# Patient Record
Sex: FEMALE | Race: WHITE | NOT HISPANIC OR LATINO | Employment: OTHER | URBAN - METROPOLITAN AREA
[De-identification: names, ages, dates, MRNs, and addresses within clinical notes are randomized per-mention and may not be internally consistent; named-entity substitution may affect disease eponyms.]

---

## 2017-01-10 ENCOUNTER — HOSPITAL ENCOUNTER (OUTPATIENT)
Dept: NON INVASIVE DIAGNOSTICS | Facility: CLINIC | Age: 82
Discharge: HOME | End: 2017-01-10
Payer: COMMERCIAL

## 2017-01-10 DIAGNOSIS — I71.4 ABDOMINAL AORTIC ANEURYSM WITHOUT RUPTURE (HCC): ICD-10-CM

## 2017-01-10 DIAGNOSIS — Z98.890 OTHER SPECIFIED POSTPROCEDURAL STATES: ICD-10-CM

## 2017-01-10 DIAGNOSIS — Z95.828 PRESENCE OF OTHER VASCULAR IMPLANTS AND GRAFTS: ICD-10-CM

## 2017-01-10 PROCEDURE — 93978 VASCULAR STUDY: CPT

## 2017-01-18 ENCOUNTER — GENERIC CONVERSION - ENCOUNTER (OUTPATIENT)
Dept: OTHER | Facility: OTHER | Age: 82
End: 2017-01-18

## 2017-01-18 ENCOUNTER — LAB CONVERSION - ENCOUNTER (OUTPATIENT)
Dept: OTHER | Facility: OTHER | Age: 82
End: 2017-01-18

## 2017-01-18 LAB
CREATININE, RANDOM URINE (HISTORICAL): 79 MG/DL (ref 20–320)
MAGNESIUM, UR (HISTORICAL): 3.1 MG/DL
MICROALBUMIN/CREATININE RATIO (HISTORICAL): 39 MCG/MG CREAT
TSH SERPL DL<=0.05 MIU/L-ACNC: 9.8 MIU/L (ref 0.4–4.5)

## 2017-01-23 ENCOUNTER — GENERIC CONVERSION - ENCOUNTER (OUTPATIENT)
Dept: OTHER | Facility: OTHER | Age: 82
End: 2017-01-23

## 2017-02-02 ENCOUNTER — TRANSCRIBE ORDERS (OUTPATIENT)
Dept: ADMINISTRATIVE | Facility: HOSPITAL | Age: 82
End: 2017-02-02

## 2017-02-02 DIAGNOSIS — Z00.00 ROUTINE CHECK-UP: Primary | ICD-10-CM

## 2017-02-02 LAB
A/G RATIO (HISTORICAL): 1.6 (CALC) (ref 1–2.5)
ALBUMIN SERPL BCP-MCNC: 4.6 G/DL (ref 3.6–5.1)
ALP SERPL-CCNC: 69 U/L (ref 33–130)
ALT SERPL W P-5'-P-CCNC: 17 U/L (ref 6–29)
AST SERPL W P-5'-P-CCNC: 19 U/L (ref 10–35)
BILIRUB SERPL-MCNC: 0.8 MG/DL (ref 0.2–1.2)
BUN SERPL-MCNC: 26 MG/DL (ref 7–25)
BUN/CREA RATIO (HISTORICAL): 20 (CALC) (ref 6–22)
CALCIUM SERPL-MCNC: 9.8 MG/DL (ref 8.6–10.4)
CHLORIDE SERPL-SCNC: 95 MMOL/L (ref 98–110)
CO2 SERPL-SCNC: 26 MMOL/L (ref 20–31)
CREAT SERPL-MCNC: 1.32 MG/DL (ref 0.6–0.88)
EGFR AFRICAN AMERICAN (HISTORICAL): 41 ML/MIN/1.73M2
EGFR-AMERICAN CALC (HISTORICAL): 35 ML/MIN/1.73M2
GAMMA GLOBULIN (HISTORICAL): 2.9 G/DL (CALC) (ref 1.9–3.7)
GLUCOSE (HISTORICAL): 96 MG/DL (ref 65–99)
POTASSIUM SERPL-SCNC: 4.7 MMOL/L (ref 3.5–5.3)
SODIUM SERPL-SCNC: 131 MMOL/L (ref 135–146)
TOTAL PROTEIN (HISTORICAL): 7.5 G/DL (ref 6.1–8.1)

## 2017-02-07 ENCOUNTER — ALLSCRIPTS OFFICE VISIT (OUTPATIENT)
Dept: OTHER | Facility: OTHER | Age: 82
End: 2017-02-07

## 2017-03-17 ENCOUNTER — LAB CONVERSION - ENCOUNTER (OUTPATIENT)
Dept: OTHER | Facility: OTHER | Age: 82
End: 2017-03-17

## 2017-03-17 LAB
A/G RATIO (HISTORICAL): 1.7 (CALC) (ref 1–2.5)
ALBUMIN SERPL BCP-MCNC: 4.5 G/DL (ref 3.6–5.1)
ALP SERPL-CCNC: 70 U/L (ref 33–130)
ALT SERPL W P-5'-P-CCNC: 18 U/L (ref 6–29)
AST SERPL W P-5'-P-CCNC: 15 U/L (ref 10–35)
BILIRUB SERPL-MCNC: 0.8 MG/DL (ref 0.2–1.2)
BUN SERPL-MCNC: 24 MG/DL (ref 7–25)
BUN/CREA RATIO (HISTORICAL): 20 (CALC) (ref 6–22)
CALCIUM SERPL-MCNC: 9.8 MG/DL (ref 8.6–10.4)
CHLORIDE SERPL-SCNC: 101 MMOL/L (ref 98–110)
CO2 SERPL-SCNC: 30 MMOL/L (ref 20–31)
CREAT SERPL-MCNC: 1.21 MG/DL (ref 0.6–0.88)
EGFR AFRICAN AMERICAN (HISTORICAL): 45 ML/MIN/1.73M2
EGFR-AMERICAN CALC (HISTORICAL): 39 ML/MIN/1.73M2
GAMMA GLOBULIN (HISTORICAL): 2.6 G/DL (CALC) (ref 1.9–3.7)
GLUCOSE (HISTORICAL): 132 MG/DL (ref 65–99)
HBA1C MFR BLD HPLC: 6 % OF TOTAL HGB
POTASSIUM SERPL-SCNC: 4.8 MMOL/L (ref 3.5–5.3)
SODIUM SERPL-SCNC: 135 MMOL/L (ref 135–146)
T4 FREE SERPL-MCNC: 1.3 NG/DL (ref 0.8–1.8)
TOTAL PROTEIN (HISTORICAL): 7.1 G/DL (ref 6.1–8.1)
TSH SERPL DL<=0.05 MIU/L-ACNC: 7.64 MIU/L (ref 0.4–4.5)

## 2017-03-20 ENCOUNTER — GENERIC CONVERSION - ENCOUNTER (OUTPATIENT)
Dept: OTHER | Facility: OTHER | Age: 82
End: 2017-03-20

## 2017-03-20 DIAGNOSIS — E87.1 HYPO-OSMOLALITY AND HYPONATREMIA: ICD-10-CM

## 2017-03-20 DIAGNOSIS — N18.30 CHRONIC KIDNEY DISEASE, STAGE III (MODERATE) (HCC): ICD-10-CM

## 2017-03-20 DIAGNOSIS — E03.9 HYPOTHYROIDISM: ICD-10-CM

## 2017-03-20 DIAGNOSIS — E11.9 TYPE 2 DIABETES MELLITUS WITHOUT COMPLICATIONS (HCC): ICD-10-CM

## 2017-04-06 ENCOUNTER — TRANSCRIBE ORDERS (OUTPATIENT)
Dept: ADMINISTRATIVE | Facility: HOSPITAL | Age: 82
End: 2017-04-06

## 2017-04-06 DIAGNOSIS — I71.4 ABDOMINAL AORTIC ANEURYSM WITHOUT RUPTURE (HCC): Primary | ICD-10-CM

## 2017-04-25 ENCOUNTER — HOSPITAL ENCOUNTER (OUTPATIENT)
Dept: RADIOLOGY | Facility: HOSPITAL | Age: 82
Discharge: HOME | End: 2017-04-25
Attending: SURGERY
Payer: COMMERCIAL

## 2017-04-25 DIAGNOSIS — I71.4 ABDOMINAL AORTIC ANEURYSM WITHOUT RUPTURE (HCC): ICD-10-CM

## 2017-04-25 PROCEDURE — 74176 CT ABD & PELVIS W/O CONTRAST: CPT

## 2017-06-01 DIAGNOSIS — I10 ESSENTIAL (PRIMARY) HYPERTENSION: ICD-10-CM

## 2017-06-01 DIAGNOSIS — E11.9 TYPE 2 DIABETES MELLITUS WITHOUT COMPLICATIONS (HCC): ICD-10-CM

## 2017-06-01 DIAGNOSIS — N18.30 CHRONIC KIDNEY DISEASE, STAGE III (MODERATE) (HCC): ICD-10-CM

## 2017-06-01 DIAGNOSIS — E03.9 HYPOTHYROIDISM: ICD-10-CM

## 2017-06-02 ENCOUNTER — LAB CONVERSION - ENCOUNTER (OUTPATIENT)
Dept: OTHER | Facility: OTHER | Age: 82
End: 2017-06-02

## 2017-06-02 LAB
A/G RATIO (HISTORICAL): 1.6 (CALC) (ref 1–2.5)
ALBUMIN SERPL BCP-MCNC: 4.4 G/DL (ref 3.6–5.1)
ALP SERPL-CCNC: 59 U/L (ref 33–130)
ALT SERPL W P-5'-P-CCNC: 17 U/L (ref 6–29)
AST SERPL W P-5'-P-CCNC: 16 U/L (ref 10–35)
BILIRUB SERPL-MCNC: 0.9 MG/DL (ref 0.2–1.2)
BUN SERPL-MCNC: 27 MG/DL (ref 7–25)
BUN/CREA RATIO (HISTORICAL): 23 (CALC) (ref 6–22)
CALCIUM SERPL-MCNC: 9.6 MG/DL (ref 8.6–10.4)
CHLORIDE SERPL-SCNC: 98 MMOL/L (ref 98–110)
CO2 SERPL-SCNC: 23 MMOL/L (ref 20–31)
CREAT SERPL-MCNC: 1.18 MG/DL (ref 0.6–0.88)
EGFR AFRICAN AMERICAN (HISTORICAL): 47 ML/MIN/1.73M2
EGFR-AMERICAN CALC (HISTORICAL): 40 ML/MIN/1.73M2
GAMMA GLOBULIN (HISTORICAL): 2.8 G/DL (CALC) (ref 1.9–3.7)
GLUCOSE (HISTORICAL): 113 MG/DL (ref 65–99)
POTASSIUM SERPL-SCNC: 4.4 MMOL/L (ref 3.5–5.3)
SODIUM SERPL-SCNC: 132 MMOL/L (ref 135–146)
T4 FREE SERPL-MCNC: 1.4 NG/DL (ref 0.8–1.8)
TOTAL PROTEIN (HISTORICAL): 7.2 G/DL (ref 6.1–8.1)
TSH SERPL DL<=0.05 MIU/L-ACNC: 9.3 MIU/L (ref 0.4–4.5)

## 2017-06-08 ENCOUNTER — ALLSCRIPTS OFFICE VISIT (OUTPATIENT)
Dept: OTHER | Facility: OTHER | Age: 82
End: 2017-06-08

## 2017-07-18 ENCOUNTER — HOSPITAL ENCOUNTER (OUTPATIENT)
Dept: RADIOLOGY | Facility: HOSPITAL | Age: 82
Discharge: HOME | End: 2017-07-18
Attending: ORTHOPAEDIC SURGERY
Payer: COMMERCIAL

## 2017-07-18 ENCOUNTER — ALLSCRIPTS OFFICE VISIT (OUTPATIENT)
Dept: OTHER | Facility: OTHER | Age: 82
End: 2017-07-18

## 2017-07-18 DIAGNOSIS — M25.562 PAIN IN LEFT KNEE: ICD-10-CM

## 2017-07-18 PROCEDURE — 73562 X-RAY EXAM OF KNEE 3: CPT

## 2017-08-01 ENCOUNTER — APPOINTMENT (OUTPATIENT)
Dept: PHYSICAL THERAPY | Age: 82
End: 2017-08-01
Payer: COMMERCIAL

## 2017-08-01 DIAGNOSIS — M25.562 PAIN IN LEFT KNEE: ICD-10-CM

## 2017-08-01 DIAGNOSIS — E03.9 HYPOTHYROIDISM: ICD-10-CM

## 2017-08-01 PROCEDURE — G8991 OTHER PT/OT GOAL STATUS: HCPCS

## 2017-08-01 PROCEDURE — 97162 PT EVAL MOD COMPLEX 30 MIN: CPT

## 2017-08-01 PROCEDURE — G8990 OTHER PT/OT CURRENT STATUS: HCPCS

## 2017-08-02 ENCOUNTER — GENERIC CONVERSION - ENCOUNTER (OUTPATIENT)
Dept: OTHER | Facility: OTHER | Age: 82
End: 2017-08-02

## 2017-08-02 ENCOUNTER — LAB CONVERSION - ENCOUNTER (OUTPATIENT)
Dept: OTHER | Facility: OTHER | Age: 82
End: 2017-08-02

## 2017-08-02 LAB
T4 FREE SERPL-MCNC: 1.7 NG/DL (ref 0.8–1.8)
TSH SERPL DL<=0.05 MIU/L-ACNC: 1.38 MIU/L (ref 0.4–4.5)

## 2017-08-03 ENCOUNTER — APPOINTMENT (OUTPATIENT)
Dept: PHYSICAL THERAPY | Age: 82
End: 2017-08-03
Payer: COMMERCIAL

## 2017-08-03 PROCEDURE — 97110 THERAPEUTIC EXERCISES: CPT

## 2017-08-08 ENCOUNTER — APPOINTMENT (OUTPATIENT)
Dept: PHYSICAL THERAPY | Age: 82
End: 2017-08-08
Payer: COMMERCIAL

## 2017-08-08 PROCEDURE — 97110 THERAPEUTIC EXERCISES: CPT

## 2017-08-10 ENCOUNTER — APPOINTMENT (OUTPATIENT)
Dept: PHYSICAL THERAPY | Age: 82
End: 2017-08-10
Payer: COMMERCIAL

## 2017-08-10 PROCEDURE — 97110 THERAPEUTIC EXERCISES: CPT

## 2017-08-15 ENCOUNTER — APPOINTMENT (OUTPATIENT)
Dept: PHYSICAL THERAPY | Age: 82
End: 2017-08-15
Payer: COMMERCIAL

## 2017-08-15 PROCEDURE — 97110 THERAPEUTIC EXERCISES: CPT

## 2017-08-15 PROCEDURE — 97010 HOT OR COLD PACKS THERAPY: CPT

## 2017-08-17 ENCOUNTER — APPOINTMENT (OUTPATIENT)
Dept: PHYSICAL THERAPY | Age: 82
End: 2017-08-17
Payer: COMMERCIAL

## 2017-08-17 PROCEDURE — 97010 HOT OR COLD PACKS THERAPY: CPT

## 2017-08-17 PROCEDURE — 97110 THERAPEUTIC EXERCISES: CPT

## 2017-08-22 ENCOUNTER — APPOINTMENT (OUTPATIENT)
Dept: PHYSICAL THERAPY | Age: 82
End: 2017-08-22
Payer: COMMERCIAL

## 2017-08-22 PROCEDURE — 97110 THERAPEUTIC EXERCISES: CPT

## 2017-08-24 ENCOUNTER — APPOINTMENT (OUTPATIENT)
Dept: PHYSICAL THERAPY | Age: 82
End: 2017-08-24
Payer: COMMERCIAL

## 2017-08-24 PROCEDURE — G8990 OTHER PT/OT CURRENT STATUS: HCPCS

## 2017-08-24 PROCEDURE — 97110 THERAPEUTIC EXERCISES: CPT

## 2017-08-24 PROCEDURE — G8991 OTHER PT/OT GOAL STATUS: HCPCS

## 2017-08-29 ENCOUNTER — APPOINTMENT (OUTPATIENT)
Dept: PHYSICAL THERAPY | Age: 82
End: 2017-08-29
Payer: COMMERCIAL

## 2017-08-29 PROCEDURE — 97110 THERAPEUTIC EXERCISES: CPT

## 2017-08-31 ENCOUNTER — APPOINTMENT (OUTPATIENT)
Dept: PHYSICAL THERAPY | Age: 82
End: 2017-08-31
Payer: COMMERCIAL

## 2017-08-31 PROCEDURE — 97110 THERAPEUTIC EXERCISES: CPT

## 2017-09-05 ENCOUNTER — APPOINTMENT (OUTPATIENT)
Dept: PHYSICAL THERAPY | Age: 82
End: 2017-09-05
Payer: COMMERCIAL

## 2017-09-07 ENCOUNTER — APPOINTMENT (OUTPATIENT)
Dept: PHYSICAL THERAPY | Age: 82
End: 2017-09-07
Payer: COMMERCIAL

## 2017-09-07 ENCOUNTER — GENERIC CONVERSION - ENCOUNTER (OUTPATIENT)
Dept: OTHER | Facility: OTHER | Age: 82
End: 2017-09-07

## 2017-09-07 PROCEDURE — G8992 OTHER PT/OT  D/C STATUS: HCPCS

## 2017-09-07 PROCEDURE — 97110 THERAPEUTIC EXERCISES: CPT

## 2017-09-07 PROCEDURE — G8991 OTHER PT/OT GOAL STATUS: HCPCS

## 2017-10-02 DIAGNOSIS — E03.9 HYPOTHYROIDISM: ICD-10-CM

## 2017-10-02 DIAGNOSIS — N18.30 CHRONIC KIDNEY DISEASE, STAGE III (MODERATE) (HCC): ICD-10-CM

## 2017-10-02 DIAGNOSIS — E11.42 TYPE 2 DIABETES MELLITUS WITH DIABETIC POLYNEUROPATHY (HCC): ICD-10-CM

## 2017-10-02 DIAGNOSIS — E11.9 TYPE 2 DIABETES MELLITUS WITHOUT COMPLICATIONS (HCC): ICD-10-CM

## 2017-10-02 DIAGNOSIS — I10 ESSENTIAL (PRIMARY) HYPERTENSION: ICD-10-CM

## 2017-10-02 DIAGNOSIS — E78.5 HYPERLIPIDEMIA: ICD-10-CM

## 2017-10-04 ENCOUNTER — LAB CONVERSION - ENCOUNTER (OUTPATIENT)
Dept: OTHER | Facility: OTHER | Age: 82
End: 2017-10-04

## 2017-10-04 LAB
A/G RATIO (HISTORICAL): 1.7 (CALC) (ref 1–2.5)
ALBUMIN SERPL BCP-MCNC: 4.5 G/DL (ref 3.6–5.1)
ALP SERPL-CCNC: 69 U/L (ref 33–130)
ALT SERPL W P-5'-P-CCNC: 18 U/L (ref 6–29)
AST SERPL W P-5'-P-CCNC: 17 U/L (ref 10–35)
BILIRUB SERPL-MCNC: 1 MG/DL (ref 0.2–1.2)
BUN SERPL-MCNC: 23 MG/DL (ref 7–25)
BUN/CREA RATIO (HISTORICAL): 20 (CALC) (ref 6–22)
CALCIUM SERPL-MCNC: 9.7 MG/DL (ref 8.6–10.4)
CHLORIDE SERPL-SCNC: 99 MMOL/L (ref 98–110)
CHOLEST SERPL-MCNC: 167 MG/DL
CHOLEST/HDLC SERPL: 1.7 (CALC)
CO2 SERPL-SCNC: 24 MMOL/L (ref 20–31)
CREAT SERPL-MCNC: 1.16 MG/DL (ref 0.6–0.88)
CREATININE, RANDOM URINE (HISTORICAL): 74 MG/DL (ref 20–320)
EGFR AFRICAN AMERICAN (HISTORICAL): 48 ML/MIN/1.73M2
EGFR-AMERICAN CALC (HISTORICAL): 41 ML/MIN/1.73M2
GAMMA GLOBULIN (HISTORICAL): 2.6 G/DL (CALC) (ref 1.9–3.7)
GLUCOSE (HISTORICAL): 125 MG/DL (ref 65–99)
HBA1C MFR BLD HPLC: 5.8 % OF TOTAL HGB
HDLC SERPL-MCNC: 101 MG/DL
LDL CHOLESTEROL (HISTORICAL): 50 MG/DL (CALC)
MAGNESIUM, UR (HISTORICAL): 3.3 MG/DL
MICROALBUMIN/CREATININE RATIO (HISTORICAL): 45 MCG/MG CREAT
NON-HDL-CHOL (CHOL-HDL) (HISTORICAL): 66 MG/DL (CALC)
POTASSIUM SERPL-SCNC: 4.7 MMOL/L (ref 3.5–5.3)
SODIUM SERPL-SCNC: 133 MMOL/L (ref 135–146)
TOTAL PROTEIN (HISTORICAL): 7.1 G/DL (ref 6.1–8.1)
TRIGL SERPL-MCNC: 75 MG/DL

## 2017-10-10 ENCOUNTER — ALLSCRIPTS OFFICE VISIT (OUTPATIENT)
Dept: OTHER | Facility: OTHER | Age: 82
End: 2017-10-10

## 2017-10-11 NOTE — PROGRESS NOTES
Assessment  1  Hypertension (401 9) (I10)   2  Type 2 diabetes mellitus (250 00) (E11 9)   3  Chronic kidney disease, stage 3 (585 3) (N18 3)   4  Hypothyroidism (244 9) (E03 9)   5  Hyperlipidemia (272 4) (E78 5)   6  Hyponatremia (276 1) (E87 1)   7  Ambulatory dysfunction (719 7) (R26 2)   8  Chronic pain of left knee (488 52,790 23) (M25 562,G89 29)   9  Seborrheic dermatitis of scalp (690 18) (L21 9)    Plan  Ambulatory dysfunction, Chronic kidney disease, stage 3, Hyperlipidemia, Hypertension,  Hypothyroidism, Type 2 diabetes mellitus    · Follow-up visit in 5 months Evaluation and Treatment  Follow-up  Status: Hold For -  Scheduling  Requested for: 10Oct2017  Chronic kidney disease, stage 3, Hyperlipidemia, Hypertension, Hyponatremia,  Hypothyroidism, Type 2 diabetes mellitus    · (Q) COMPREHENSIVE METABOLIC PNL W/ADJUSTED CALCIUM; Status:Active; Requested for:89Ijy6941;   Hypertension    · Losartan Potassium 100 MG Oral Tablet; take 1 tablet once daily   · Amlodipine-Atorvastatin 10-20 MG Oral Tablet; Take 1 tablet daily  Hypothyroidism    · Levothyroxine Sodium 137 MCG Oral Tablet; take 1 tablet daily before  breakfast   · (Q) TSH, 3RD GENERATION; Status:Active; Requested for:31Ouf3635;   Seborrheic dermatitis of scalp    · Ketoconazole 2 % External Shampoo; APPLY TO SCALP  2 TIMES PER WEEK   FOR 5 MINUTES AND RINSE    Discussion/Summary    1  HTN - stable  Continue current BP medications  Monitor BP at home  2 DM  diet controlled  Monitor blood sugar 2-3 times per week  a low carb  diet  stage 3  creatinine level is stable  Will continue to monitor labs  Recommended to stay well hydrated, avoid NSAIDs  - controlled  Continue statin  - take Sodium Chloride 1 g one tablet daily  continue Levothyroxine 137 mcg daily  dysfunction - discussed fall precautions with patient  Continue to use a walker for ambulation to prevent falls    knee pain secondary to OA - followup with orthopedic surgeon   Adria on 10/17/17  Seborrheic scalp dermatitis -use Ketoconazole 2% shampoo 1-2 times per week  dermatology evaluation  followup visit in 5 months  Check labs prior to next visit  The patient, patient's family was counseled regarding diagnostic results,-instructions for management,-risk factor reductions,-risks and benefits of treatment options,-importance of compliance with treatment  Possible side effects of new medications were reviewed with the patient/guardian today  The treatment plan was reviewed with the patient/guardian  The patient/guardian understands and agrees with the treatment plan      Chief Complaint  Patient here for 4 month follow up for Chronic kidney disease, stage 3, Diabetic peripheral neuropathy, Hyperlipidemia, Hypertension, Hypothyroidism, and Type 2 diabetes mellitus  Patient is here today for follow up of chronic conditions described in HPI  History of Present Illness  Patient is 44-year-old female with HTN, Type 2 DM- diet controlled, Hyperlipidemia, Hypothyroidism, peripheral neuropathy, CKD stage 3, Hyponatremia, PVD, DJD, ambulatory dysfunction  presents for 4 month followup visit accompanied by her daughter  current medications, blood work results from 10/3/17  blood sugar 125, Hemoglobin A1c 5 8, potassium 4 7, sodium 133, creatinine 1 16  stable, GFR 41, cholesterol 167, , LDL 50  1 38 - checked in 8/17    - currently taking Losartan 100 mg daily, Amlodipine / Atorvastatin 10/20 mg daily  denies CP, SOB, dizziness  2 DM  diet controlled  Patient checks blood sugar at home 2-3 times per week  blood sugar today was 119   - currently on Levothyroxine 137 mcg daily  Weight has been stable  Denies fatigue  has PVD, S/P endovascular AAA repair performed by vascular surgeon Dr Laney Colon in 3/16  arterial Duplex done in 1/17  abdomen done in April 2017 showed AAA 3 6 x 4 8 cm  will have vascular studies done in 1/18 as per vascular surgeon    dry scaly patches on scalp, itchy scalp  Using Ketoconazole 2 % shampoo 1-2 times per week  is independent with ADLs  She walks with a walker  Denies falls  daughter visits frequently, supervises all medications, provides transportation, helps with social activities  pain in L knee, swelling  She has followup appointment with orthopedic surgeon Dr Roxy Diehl on 10/17/17  Tylenol PRN for arthritic pain  is seeing podiatrist Dr Constanza Shah every 3 months  Patient is due for eye exam    had Flu shot done at [facility] last week  13 done in 9/15  Review of Systems    Constitutional: no fever,-no chills-and-not feeling tired  Wt  has been stable  Eyes: wears glasses, but-no eye pain,-no dryness of the eyes,-eyes not red,-no purulent discharge from the eyes-and-no itching of the eyes  No visual disturbances  ENT: hearing loss, but-no earache,-no nosebleeds,-no sore throat,-no nasal discharge-and-no hoarseness  Cardiovascular: mild L leg edema, using compression stockings, but-no chest pain,-no intermittent leg claudication-and-no palpitations  Respiratory: no shortness of breath,-no cough,-no wheezing-and-no shortness of breath during exertion  Gastrointestinal: no abdominal pain,-no nausea,-no vomiting,-no diarrhea-and-no blood in stools-   The patient presents with complaints of occasional episodes of constipation  Genitourinary: no dysuria,-no pelvic pain-and-no incontinence  Musculoskeletal: joint stiffness-   The patient presents with complaints of bilateral hand, bilateral finger(s) and left knee arthralgias  Integumentary: dry, flaky scalp, but-no rashes-and-no skin wound  Neurological: numbness in feet, but-no headache-and-no dizziness  Psychiatric: feels sad due to recent death of her son, but-no anxiety-and-no sleep disturbances  Endocrine: no muscle weakness-and-no hot flashes     Hematologic/Lymphatic: no swollen glands,-no tendency for easy bleeding,-no tendency for easy bruising-and-no swollen glands in the neck  Active Problems  1  Aftercare following left shoulder joint replacement surgery (V54 81,V43 61)   (Z47 1,Z96 612)   2  Allergic rhinitis (477 9) (J30 9)   3  Ambulatory dysfunction (719 7) (R26 2)   4  Aneurysm of abdominal aorta (441 4) (I71 4)   5  Anxiety (300 00) (F41 9)   6  Aortoiliac stenosis (440 0) (I70 0)   7  Chronic kidney disease, stage 3 (585 3) (N18 3)   8  Chronic left shoulder pain (719 41,338 29) (M25 512,G89 29)   9  Chronic pain of left knee (091 36,218 62) (M25 562,G89 29)   10  Chronic pain syndrome (338 4) (G89 4)   11  Constipation (564 00) (K59 00)   12  Diabetic peripheral neuropathy (250 60,357 2) (E11 42)   13  Dry scalp (701 1) (R23 8)   14  First degree AV block (426 11) (I44 0)   15  Gait disturbance (781 2) (R26 9)   16  Greater trochanteric bursitis of right hip (726 5) (M70 61)   17  History of endovascular stent graft for abdominal aortic aneurysm (V43 4) (Z95 828)   18  Hyperlipidemia (272 4) (E78 5)   19  Hypertension (401 9) (I10)   20  Hyponatremia (276 1) (E87 1)   21  Hypothyroidism (244 9) (E03 9)   22  Initial Medicare annual wellness visit (V70 0) (Z00 00)   23  Left knee pain (719 46) (M25 562)   24  Leg cramps (729 82) (R25 2)   25  Lumbosacral spondylosis without myelopathy (721 3) (M47 817)   26  Medicare annual wellness visit, subsequent (V70 0) (Z00 00)   27  Microalbuminuria (791 0) (R80 9)   28  Osteoarthritis (715 90) (M19 90)   29  Osteopenia (733 90) (M85 80)   30  Peripheral neuropathy (356 9) (G62 9)   31  Postoperative state (V45 89) (Z98 890)   32  Preoperative cardiovascular examination (V72 81) (Z01 810)   33  Primary osteoarthritis of left knee (715 16) (M17 12)   34  Primary osteoarthritis of left shoulder (715 11) (M19 012)   35  PVD (peripheral vascular disease) (443 9) (I73 9)   36  Renal cyst, acquired (593 2) (N28 1)   37  Seasonal allergies (477 9) (J30 2)   38  Seborrheic dermatitis of scalp (690 18) (L21 9)   39  Shoulder pain (719 41) (M25 519)   40  Spinal stenosis (724 00) (M48 00)   41  Status post aortic aneurysm repair (V45 89) (Z98 890,Z86 79)   42  Type 2 diabetes mellitus (250 00) (E11 9)    Past Medical History  1  History of Appendicitis (541)   2  History of Gross hematuria (599 71) (R31 0)   3  History of allergic urticaria (V13 3) (Z87 2)   4  History of bronchitis (V12 69) (Z87 09)   5  History of eczema (V13 3) (Z87 2)   6  History of hematuria (V13 09) (Z87 448)   7  History of sinusitis (V12 69) (Z87 09)   8  History of syncope (V15 89) (Z87 898)   9  Left rotator cuff tear arthropathy (716 81) (M12 812)   10  History of Osteoporosis (733 00) (M81 0)   11  History of Primary osteoarthritis of left knee (715 16) (M17 12)   12  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) (M19 211)   13  History of Shoulder pain (719 41) (M25 519)    The active problems and past medical history were reviewed and updated today  Surgical History  1  History of Cataract Surgery   2  History of Endovascular Repair Of Abdominal Aorta Aneurysm   3  History of Hip Replacement   4  History of Knee Arthroscopy (Therapeutic)   5  History of Laminectomy Lumbar   6  History of Rotator Cuff Repair   7  History of Shoulder Arthroplasty   8  History of Total Hip Replacement    The surgical history was reviewed and updated today  Family History  Mother    1  Family history of Coronary Artery Disease (V17 49)   2  Family history of Diabetes Mellitus (V18 0)  Father    3  Family history of Coronary Artery Disease (V17 49)  Sister    4  Family history of Arthritis (V17 7)   5  Family history of Cancer  Family History    6  Family history of Unexplained Rapid Death    The family history was reviewed and updated today         Social History   · Denied: History of Alcohol   · Marital History -    · Never A Smoker   · Never Used Drugs   · Retired From Work   · Uses Safety Equipment - [employer]  The social history was reviewed and updated today  Current Meds   1  Amlodipine-Atorvastatin 10-20 MG Oral Tablet; Take 1 tablet daily; Therapy: 26SHH8814 to (Evaluate:17Aug2018)  Requested for: 54Hav6458; Last   Rx:26Upy4439 Ordered   2  B-12 500 MCG Oral Tablet; TAKE 1 TABLET DAILY; Therapy: (Recorded:68Vme7176) to Recorded   3  Calcium + D TABS; Therapy: (Recorded:15Cmg2823) to Recorded   4  Derma-Smoothe/FS Scalp 0 01 % External Oil; apply to scalp 3 times per week as   directed; Therapy: 28QRD9435 to (Last Rx:14Jhk3137)  Requested for: 11TCM9314 Ordered   5  Derma-Smoothe/FS Scalp 0 01 % External Oil; apply to scalp 3 times per week as   directed; Therapy: 89LDA6034 to (Last Rx:08Jun2017)  Requested for: 01ABT2043 Ordered   6  Docusate Sodium 100 MG Oral Tablet; TAKE 1 TABLET DAILY AS DIRECTED; Therapy: 98QZE7095 to Recorded   7  Fexofenadine HCl - 180 MG Oral Tablet; take 1 tab daily; Therapy: 28AIR5232 to (Last Rx:63Szk0907) Ordered   8  Fluticasone Propionate 50 MCG/ACT Nasal Suspension; use 2 spr  in each nostril daily; Therapy: 90EWU0910 to (Last Rx:35Fjg8141)  Requested for: 72WSN2838 Ordered   9  Ketoconazole 2 % External Shampoo; APPLY TO SCALP  2 TIMES PER WEEK  FOR 5   MINUTES AND RINSE; Therapy: 76OQQ5792 to (Last Rx:08Jun2017)  Requested for: 77NUO0409 Ordered   10  Levothyroxine Sodium 137 MCG Oral Tablet; take 1 tablet daily before breakfast;    Therapy: 53PVL7207 to (Evaluate:16Nov2017)  Requested for: 00VXP3142; Last    Rx:20Apr2017 Ordered   11  LORazepam 0 5 MG Oral Tablet; take 1/2 tab  - 1 tablet daily PRN for anxiety; Therapy: 18AYV1148 to (Last Rx:09Jun2017) Ordered   12  Losartan Potassium 100 MG Oral Tablet; take 1 tablet once daily  Requested for:    27OQD6281; Last Rx:13Iwy5984 Ordered   13  Magnesium 250 MG Oral Tablet Recorded   14  Multivitamin & Mineral Oral Liquid Recorded   15   OneTouch Delica Lancets Fine Miscellaneous; TEST BLOOD SUGAR ONE TIME DAILY    OR AS NEEDED; Therapy: 48IIW6609 to (Evaluate:20Pvf2641)  Requested for: 02PHA4090; Last    Rx:14Jun2017 Ordered   16  OneTouch Lancets Miscellaneous; TEST DAILY; Therapy: 82ZPK9406 to (Rosamaria Donovan)  Requested for: 50Ste5721; Last    Rx:40Jfd7296 Ordered   17  OneTouch Ultra Blue In [employer]; use as directed; Therapy: 67MOE4825 to (Evaluate:09Jun2018)  Requested for: 00HFW0370; Last    Rx:14Jun2017 Ordered   18  OneTouch Ultra Mini w/Device Kit; Test BS daily and as needed; Therapy: 07FEC7198 to (Last Rx:15Jan2016)  Requested for: 20Jan2016 Ordered   19  OneTouch UltraSoft Lancets Miscellaneous; TEST BLOOD SUGAR ONE TIME DAILY; Therapy: 79CHW1067 to (Evaluate:70Bcr8269)  Requested for: 20Jan2016; Last    Rx:15Jan2016 Ordered   20  Polyethylene Glycol 3350 Oral Powder; MIX 1 CAPFUL (17GM) IN 8 OUNCES OF WATER,    JUICE, OR TEA AND DRINK DAILY; Therapy: 41YJM6804 to (Evaluate:05Jan2016); Last Rx:79Mhg9849 Ordered   21  Sodium Chloride 1 GM Oral Tablet; Take 1 tab  Daily; Therapy: 39Vfr3283 to (Last Rx:31Jan2017)  Requested for: 85TND2651 Ordered   22  Triamcinolone Acetonide 0 1 % External Cream; Apply BID to affected areas; Therapy: 14EUU7001 to (Evaluate:71Kok7364)  Requested for: 93RPW9672; Last    Rx:24Dbq5850 Ordered    The medication list was reviewed and updated today  Allergies  1  Bextra TABS   2  Darvocet-N 100 TABS   3  Cataflam TABS   4  Daypro TABS   5  Percocet TABS    Vitals  Vital Signs    Recorded: 60VKR6002 10:47AM Recorded: 74ZEW1524 10:25AM   Temperature  97 6 F, Tympanic   Heart Rate  72, L Radial   Respiration  16   Systolic 124 809, LUE   Diastolic 66 64, LUE   Height  5 ft 2 in   Weight  149 lb 3 2 oz   BMI Calculated  27 29   BSA Calculated  1 69   Pain Scale  8-9     Physical Exam    Constitutional   General appearance: No acute distress, well appearing and well nourished  Eyes   Conjunctiva and lids: No swelling, erythema or discharge      Pupils and irises: Equal, round and reactive to light  Ears, Nose, Mouth, and Throat   External inspection of ears and nose: Normal     Otoscopic examination: Tympanic membranes translucent with normal light reflex  Canals patent without erythema  Oropharynx: Normal with no erythema, edema, exudate or lesions  Pulmonary   Respiratory effort: No increased work of breathing or signs of respiratory distress  Auscultation of lungs: Clear to auscultation  Cardiovascular   Auscultation of heart: Normal rate and rhythm, normal S1 and S2, without murmurs  Examination of extremities for edema and/or varicosities: Abnormal   mild L LE edema, using compression stockings  Carotid pulses: Normal  -no carotid bruits  Abdomen   Abdomen: Non-tender, no masses  Liver and spleen: No hepatomegaly or splenomegaly  Musculoskeletal Ambulates with a walker  Digits and nails: Normal without clubbing or cyanosis  Inspection/palpation of joints, bones, and muscles: Abnormal     L knee is swollen, tender over medial joint line  Decreased ROM  Skin   Skin and subcutaneous tissue: Abnormal     Flaky skin, dry scaly patches on scalp  Neurologic   Sensation: Abnormal  -decreased sensation to light touch in feet  Psychiatric   Orientation to person, place, and time: Normal     Mood and affect: Normal          Results/Data  (1) COMPREHENSIVE METABOLIC PANEL 45RQS8536 76:82RS Vandana Segura     Test Name Result Flag Reference   GLUCOSE 125 mg/dL H 65-99   Fasting reference interval     For someone without known diabetes, a glucose value  between 100 and 125 mg/dL is consistent with  prediabetes and should be confirmed with a  follow-up test    UREA NITROGEN (BUN) 23 mg/dL  7-25   CREATININE 1 16 mg/dL H 0 60-0 88   For patients >52years of age, the reference limit  for Creatinine is approximately 13% higher for people  identified as -American  eGFR NON-AFR   AMERICAN 41 mL/min/1 73m2 L > OR = 60   eGFR  48 mL/min/1 73m2 L > OR = 60   BUN/CREATININE RATIO 20 (calc)  6-22   SODIUM 133 mmol/L L 135-146   POTASSIUM 4 7 mmol/L  3 5-5 3   CHLORIDE 99 mmol/L     CARBON DIOXIDE 24 mmol/L  20-31   CALCIUM 9 7 mg/dL  8 6-10 4   PROTEIN, TOTAL 7 1 g/dL  6 1-8 1   ALBUMIN 4 5 g/dL  3 6-5 1   GLOBULIN 2 6 g/dL (calc)  1 9-3 7   ALBUMIN/GLOBULIN RATIO 1 7 (calc)  1 0-2 5   BILIRUBIN, TOTAL 1 0 mg/dL  0 2-1 2   ALKALINE PHOSPHATASE 69 U/L     AST 17 U/L  10-35   ALT 18 U/L  6-29     (1) LIPID PANEL, FASTING 40XFB9139 09:38AM Paladion     Test Name Result Flag Reference   CHOLESTEROL, TOTAL 167 mg/dL  <200   HDL CHOLESTEROL 101 mg/dL  >53   TRIGLICERIDES 75 mg/dL  <547   LDL-CHOLESTEROL 50 mg/dL (calc)     Reference range: <100     Desirable range <100 mg/dL for patients with CHD or  diabetes and <70 mg/dL for diabetic patients with  known heart disease  LDL-C is now calculated using the Jose Miguel-Reynolds   calculation, which is a validated novel method providing   better accuracy than the Friedewald equation in the   estimation of LDL-C  Cristin Barnes al  [facility]  6199;504(06): 9830-1620   (http://[employer]/faq/RTR286)   CHOL/HDLC RATIO 1 7 (calc)  <5 0   NON HDL CHOLESTEROL 66 mg/dL (calc)  <130   For patients with diabetes plus 1 major ASCVD risk   factor, treating to a non-HDL-C goal of <100 mg/dL   (LDL-C of <70 mg/dL) is considered a therapeutic   option       (Q) MICROALBUMIN, RANDOM URINE (W/CREATININE) 00SPQ9932 09:38AM Paladion     Test Name Result Flag Reference   CREATININE, RANDOM URINE 74 mg/dL     MICROALBUMIN 3 3 mg/dL     Reference Range  Not established   MICROALBUMIN/CREATININE$RATIO, RANDOM URINE 45 mcg/mg creat H <30   The ADA defines abnormalities in albumin  excretion as follows:     Category         Result (mcg/mg creatinine)     Normal                    <30  Microalbuminuria            Clinical albuminuria   > OR = 300     The ADA recommends that at least two of three  specimens collected within a 3-6 month period be  abnormal before considering a patient to be  within a diagnostic category  (Q) HEMOGLOBIN A1c 03Oct2017 09:38AM Douglas Arredondo   REPORT COMMENT:  FASTING:YES     Test Name Result Flag Reference   HEMOGLOBIN A1c 5 8 % of total Hgb H <5 7   For someone without known diabetes, a hemoglobin   A1c value between 5 7% and 6 4% is consistent with  prediabetes and should be confirmed with a   follow-up test      For someone with known diabetes, a value <7%  indicates that their diabetes is well controlled  A1c  targets should be individualized based on duration of  diabetes, age, comorbid conditions, and other  considerations  This assay result is consistent with an increased risk  of diabetes  Currently, no consensus exists regarding use of  hemoglobin A1c for diagnosis of diabetes for children  Health Management  Type 2 diabetes mellitus   *VB - Eye Exam; every 1 year; Last 21UZG4584; Next Due: 25Rfh2668; Overdue    Future Appointments    Date/Time Provider Specialty Site   10/17/2017 01:30 PM WILMER Grande   Orthopedic Surgery [address]     Signatures   Electronically signed by : WILMER Ruiz ; Oct 10 2017 11:18AM EST                       (Author)

## 2017-10-17 ENCOUNTER — GENERIC CONVERSION - ENCOUNTER (OUTPATIENT)
Dept: OTHER | Facility: OTHER | Age: 82
End: 2017-10-17

## 2017-10-27 DIAGNOSIS — I71.4 ABDOMINAL AORTIC ANEURYSM WITHOUT RUPTURE (HCC): ICD-10-CM

## 2018-01-09 NOTE — RESULT NOTES
Message   Call patient  Fasting blood sugar 103  Hb A1c 5 8 - very good blood sugar control  Kidney function test,  electrolytes, blood count - WNL  Verified Results  (1) CBC/PLT/DIFF 00REJ7150 11:00AM Amaya Solitario     Test Name Result Flag Reference   WBC COUNT 9 21 Thousand/uL  4 31-10 16   RBC COUNT 4 24 Million/uL  3 81-5 12   HEMOGLOBIN 13 8 g/dL  11 5-15 4   HEMATOCRIT 40 5 %  34 8-46  1   MCV 96 fL  82-98   MCH 32 5 pg  26 8-34 3   MCHC 34 1 g/dL  31 4-37 4   RDW 14 2 %  11 6-15 1   MPV 11 0 fL  8 9-12 7   PLATELET COUNT 625 Thousands/uL  149-390   nRBC AUTOMATED 0 /100 WBCs     NEUTROPHILS RELATIVE PERCENT 69 %  43-75   LYMPHOCYTES RELATIVE PERCENT 21 %  14-44   MONOCYTES RELATIVE PERCENT 9 %  4-12   EOSINOPHILS RELATIVE PERCENT 1 %  0-6   BASOPHILS RELATIVE PERCENT 0 %  0-1   NEUTROPHILS ABSOLUTE COUNT 6 36 Thousands/µL  1 85-7 62   LYMPHOCYTES ABSOLUTE COUNT 1 91 Thousands/µL  0 60-4 47   MONOCYTES ABSOLUTE COUNT 0 81 Thousand/µL  0 17-1 22   EOSINOPHILS ABSOLUTE COUNT 0 07 Thousand/µL  0 00-0 61   BASOPHILS ABSOLUTE COUNT 0 04 Thousands/µL  0 00-0 10     (1) COMPREHENSIVE METABOLIC PANEL 64WBW9857 65:84CH Amaya Solitario   National Kidney Disease Education Program recommendations are as follows:  GFR calculation is accurate only with a steady state creatinine  Chronic Kidney disease less than 60 ml/min/1 73 sq  meters  Kidney failure less than 15 ml/min/1 73 sq  meters       Test Name Result Flag Reference   GLUCOSE,RANDM 103 mg/dL     SODIUM 136 mmol/L  136-145   POTASSIUM 4 8 mmol/L  3 5-5 3   CHLORIDE 102 mmol/L  100-108   CARBON DIOXIDE 27 mmol/L  21-32   ANION GAP (CALC) 7 mmol/L  4-13   BLOOD UREA NITROGEN 23 mg/dL  5-25   CREATININE 1 12 mg/dL  0 60-1 30   Standardized to IDMS reference method   CALCIUM 9 6 mg/dL  8 3-10 1   BILI, TOTAL 1 05 mg/dL H 0 20-1 00   ALK PHOSPHATAS 74 U/L     ALT (SGPT) 26 U/L  12-78   AST(SGOT) 14 U/L  5-45   ALBUMIN 4 4 g/dL  3 5-5 0 TOTAL PROTEIN 7 7 g/dL  6 4-8 2   eGFR Non-African American 45 7 ml/min/1 73sq m       (1) HEMOGLOBIN A1C [id number] 11:00AM Tatianna Arredondo   5 7-6 4% impaired fasting glucose  >=6 5% diagnosis of diabetes    Falsely low levels are seen in conditions linked to short RBC life span-  hemolytic anemia, and splenomegaly  Falsely elevated levels are seen in situations where there is an increased production of RBC- receipt of erythropoietin or blood transfusions  Adopted from ADA-Clinical Practice Recommendations     Test Name Result Flag Reference   HEMOGLOBIN A1C 5 8 % H 4 0-5 6   EST  AVG   GLUCOSE 120 mg/dl

## 2018-01-09 NOTE — RESULT NOTES
Message   Call patient and her daughter  Kidney function test ( creatinine level) is elevated  Recommended to improve hydration, drink more fluids  Will review the rest of blood work when patient comes for followup visit next month         Verified Results  (Q) TSH, 3RD GENERATION [id number] 09:30AM Gulshan Cespedes   REPORT COMMENT:  FASTING:YES     Test Name Result Flag Reference   TSH 9 80 mIU/L H 0 40-4 50

## 2018-01-10 NOTE — PROGRESS NOTES
History of Present Illness  Care Coordination Encounter Information:   Type of Encounter: Telephonic   Contact: Initial Contact   Last Office Visit: 3/17/16   Spoke to Adult Child   Outreached patient to see how she is doing post abdominal aortic repair on 3/11/16  He daughter Amber Smith answered and states mom is doing well, back to her old self  She didn't't know her exact blood sugar readings but said they were good, back to her normal  She is checking them three times a week  She has a CT scan scheduled for 4/12/16 then a follow up with Dr Joni Stafford on 4/18/16  No changes with medications  Her mom is now getting ready for shoulder replacement therapy and starting that process  Instructed to call the office with any concerns  Active Problems    1  Allergic rhinitis (477 9) (J30 9)   2  Allergic urticaria (708 0) (L50 0)   3  Aneurysm of abdominal aorta (441 4) (I71 4)   4  Anxiety (300 00) (F41 9)   5  Aortoiliac stenosis (440 0) (I70 0)   6  Chronic kidney disease, stage 3 (585 3) (N18 3)   7  Chronic pain syndrome (338 4) (G89 4)   8  Constipation (564 00) (K59 00)   9  Diabetic peripheral neuropathy (250 60,357 2) (E11 42)   10  First degree AV block (426 11) (I44 0)   11  Gait disturbance (781 2) (R26 9)   12  Hyperlipidemia (272 4) (E78 5)   13  Hypertension (401 9) (I10)   14  Hyponatremia (276 1) (E87 1)   15  Hypothyroidism (244 9) (E03 9)   16  Initial Medicare annual wellness visit (V70 0) (Z00 00)   17  Leg cramps (729 82) (R25 2)   18  Lumbosacral spondylosis without myelopathy (721 3) (M47 817)   19  Osteoarthritis (715 90) (M19 90)   20  Osteopenia (733 90) (M85 80)   21  Peripheral neuropathy (356 9) (G62 9)   22  Postoperative state (V45 89) (Z98 89)   23  Preoperative cardiovascular examination (V72 81) (Z01 810)   24  PVD (peripheral vascular disease) (443 9) (I73 9)   25  Renal cyst, acquired (593 2) (N28 1)   26  Seasonal allergies (477 9) (J30 2)   27   Spinal stenosis (724 00) (M48 00) 28  Status post aortic aneurysm repair (V45 89) (Z98 89,Z86 79)   29  Type 2 diabetes mellitus (250 00) (E11 9)    Past Medical History    1  History of Appendicitis (541)   2  History of Gross hematuria (599 71) (R31 0)   3  History of bronchitis (V12 69) (Z87 09)   4  History of eczema (V13 3) (Z87 2)   5  History of hematuria (V13 09) (Z87 448)   6  History of sinusitis (V12 69) (Z87 09)   7  History of syncope (V15 89) (Z87 898)   8  History of Osteoporosis (733 00) (M81 0)   9  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) (M19 211)   10  History of Shoulder pain (719 41) (M25 519)    Surgical History    1  History of Cataract Surgery   2  History of Endovascular Repair Of Abdominal Aorta Aneurysm   3  History of Hip Replacement   4  History of Knee Arthroscopy   5  History of Laminectomy Lumbar   6  History of Rotator Cuff Repair   7  History of Shoulder Arthroplasty   8  History of Total Hip Replacement    Family History    1  Family history of Coronary Artery Disease (V17 49)   2  Family history of Diabetes Mellitus (V18 0)    3  Family history of Coronary Artery Disease (V17 49)    4  Family history of Arthritis (V17 7)   5  Family history of Cancer    6  Family history of Unexplained Rapid Death    Social History    · Denied: History of Alcohol   · Marital History -    · Never A Smoker   · Never Used Drugs   · Retired From Work   · Uses Safety Equipment - [address] [employer] [address]    1  Fluticasone Propionate 50 MCG/ACT Nasal Suspension; use 2 spr  in each nostril daily; Therapy: 77TJF6360 to (Last Rx:78Vfb4811)  Requested for: 23MAX3176 Ordered    2  Fexofenadine HCl - 180 MG Oral Tablet (Allegra Allergy); take 1 tab daily; Therapy: 02YNA6348 to (Last Rx:33Zpy3349) Ordered   3  Triamcinolone Acetonide 0 1 % External Cream; Apply BID to affected areas; Therapy: 73WTS4804 to (Evaluate:51Lbr0959)  Requested for: 92EOF7224; Last   Rx:59Cqy7388 Ordered    4   LORazepam 0 5 MG Oral Tablet; take 1/2 tab  - 1 tablet daily PRN for anxiety; Therapy: 26EEW1923 to (Last Rx:13Oct2014) Ordered    5  N-Acetyl-L-Cysteine 600 MG Oral Capsule; Take 2 tablets by mouth the evening before   CT angiogram  Repeat 2 tablets by mouth at 6 AM the day of CT angiogram;   Therapy: 56JRD1231 to (Last Rx:01Feb2016)  Requested for: 99JSC7879 Ordered    6  Polyethylene Glycol 3350 Oral Powder (MiraLax); MIX 1 CAPFUL (17GM) IN 8 OUNCES   OF WATER, JUICE, OR TEA AND DRINK DAILY; Therapy: 58BKJ6928 to (Evaluate:05Jan2016); Last Rx:50Cuu7464 Ordered    7  Amlodipine-Atorvastatin 10-20 MG Oral Tablet; Take 1 tablet daily; Therapy: 83OCK3069 to (Evaluate:27Oct2016)  Requested for: 96ERJ5397; Last   Rx:02Nov2015 Ordered   8  Losartan Potassium 100 MG Oral Tablet; take 1 tablet once daily  Requested for:   16Jun2015; Last Rx:16Jun2015 Ordered    9  Sodium Chloride 1 GM Oral Tablet; Take 1 tab  Daily; Therapy: 20Rhu1685 to (Last Rx:37Got3905)  Requested for: 94Xgx7813 Ordered    10  Levothyroxine Sodium 137 MCG Oral Tablet; take 1 tablet daily before breakfast;    Therapy: 47KJT6642 to (Evaluate:13Mar2016)  Requested for: 21BFT1538; Last    Rx:19Mar2015 Ordered    11  OneTouch Delica Lancets Fine Miscellaneous; CHECK BLOOD SUGAR 1 TIME DAILY    OR  AS NEEDED; Therapy: 04CKP9411 to (Last Rx:16Feb2016)  Requested for: 74EKO4512 Ordered   12  OneTouch Lancets Miscellaneous; TEST DAILY; Therapy: 37GES9571 to (Velna Dial)  Requested for: 52Zon2965; Last    Rx:13Nos4258 Ordered   13  OneTouch Ultra Blue In Vitro Strip; USE AS DIRECTED; Therapy: 49OVD7137 to (Evaluate:08Jan2017)  Requested for: 20Jan2016; Last    Rx:15Jan2016 Ordered   14  OneTouch Ultra Mini w/Device Kit; Test BS daily and as needed; Therapy: 88VGS4118 to (Last Rx:15Jan2016)  Requested for: 20Jan2016 Ordered   15  OneTouch UltraSoft Lancets Miscellaneous; TEST BLOOD SUGAR ONE TIME DAILY;     Therapy: 13LEP8927 to (Evaluate:21Lgo3021)  Requested for: 08DIE7300; Last    Rx:15Jan2016 Ordered    16  B-12 500 MCG Oral Tablet; TAKE 1 TABLET DAILY; Therapy: (Recorded:75Umq6765) to Recorded   17  Calcium + D TABS; Therapy: (Recorded:38Okt7948) to Recorded   18  Cranberry CAPS Recorded   19  Docusate Sodium 100 MG Oral Tablet; TAKE 1 TABLET DAILY AS DIRECTED; Therapy: 69BNC6525 to Recorded   20  Magnesium 250 MG Oral Tablet Recorded   21  MethylPREDNISolone (Liu) 4 MG Oral Tablet; TAKE AS DIRECTED ON PATIENT    INSTRUCTION CARD; Therapy: (Recorded:63Xmw4782) to Recorded   22  Multivitamin & Mineral Oral Liquid Recorded    Allergies    1  Bextra TABS   2  Darvocet-N 100 TABS   3  Cataflam TABS   4  Daypro TABS   5  Percocet TABS    Health Management   *VB - Eye Exam; every 1 year; Last 42JPZ2293; Next Due: 49Meb4279; Active    End of Encounter Meds    1  Fluticasone Propionate 50 MCG/ACT Nasal Suspension; use 2 spr  in each nostril daily; Therapy: 89AVO4809 to (Last Rx:04Feb2016)  Requested for: 52CTH0082 Ordered    2  Fexofenadine HCl - 180 MG Oral Tablet (Allegra Allergy); take 1 tab daily; Therapy: 84WRC9881 to (Last Rx:03Dec2015) Ordered   3  Triamcinolone Acetonide 0 1 % External Cream; Apply BID to affected areas; Therapy: 23DXU9033 to (Evaluate:30Sow4198)  Requested for: 04QPV5184; Last   Rx:47Asz3837 Ordered    4  LORazepam 0 5 MG Oral Tablet; take 1/2 tab  - 1 tablet daily PRN for anxiety; Therapy: 17WWE1608 to (Last Rx:13Oct2014) Ordered    5  N-Acetyl-L-Cysteine 600 MG Oral Capsule; Take 2 tablets by mouth the evening before   CT angiogram  Repeat 2 tablets by mouth at 6 AM the day of CT angiogram;   Therapy: 25NJS7103 to (Last Rx:01Feb2016)  Requested for: 60TLL4253 Ordered    6  Polyethylene Glycol 3350 Oral Powder (MiraLax); MIX 1 CAPFUL (17GM) IN 8 OUNCES   OF WATER, JUICE, OR TEA AND DRINK DAILY; Therapy: 68TIM9230 to (Evaluate:05Jan2016); Last Rx:18Ccf4876 Ordered    7  Amlodipine-Atorvastatin 10-20 MG Oral Tablet;  Take 1 tablet daily; Therapy: 15PPD8595 to (Evaluate:27Oct2016)  Requested for: 25NRR5096; Last   Rx:02Nov2015 Ordered   8  Losartan Potassium 100 MG Oral Tablet; take 1 tablet once daily  Requested for:   16Jun2015; Last Rx:16Jun2015 Ordered    9  Sodium Chloride 1 GM Oral Tablet; Take 1 tab  Daily; Therapy: 52Snx0108 to (Last Rx:62Liy5018)  Requested for: 36Mip9399 Ordered    10  Levothyroxine Sodium 137 MCG Oral Tablet; take 1 tablet daily before breakfast;    Therapy: 38TTY0756 to (Evaluate:13Mar2016)  Requested for: 40XRS1946; Last    Rx:19Mar2015 Ordered    11  OneTouch Delica Lancets Fine Miscellaneous; CHECK BLOOD SUGAR 1 TIME DAILY    OR  AS NEEDED; Therapy: 81UQA4332 to (Last Rx:54Fdj3411)  Requested for: 19DQC4689 Ordered   12  OneTouch Lancets Miscellaneous; TEST DAILY; Therapy: 09NFC1279 to ([phone])  Requested for: 50Qni8766; Last    Rx:13Gle2778 Ordered   13  OneTouch Ultra Blue In Vitro Strip; USE AS DIRECTED; Therapy: 27EIP4494 to (Evaluate:08Jan2017)  Requested for: 20Jan2016; Last    Rx:15Jan2016 Ordered   14  OneTouch Ultra Mini w/Device Kit; Test BS daily and as needed; Therapy: 22LBG7233 to (Last Rx:15Jan2016)  Requested for: 20Jan2016 Ordered   15  OneTouch UltraSoft Lancets Miscellaneous; TEST BLOOD SUGAR ONE TIME DAILY; Therapy: 36RAK6321 to (Evaluate:63Iyb6262)  Requested for: 20Jan2016; Last    Rx:15Jan2016 Ordered    16  B-12 500 MCG Oral Tablet; TAKE 1 TABLET DAILY; Therapy: (Recorded:86Nqm1206) to Recorded   17  Calcium + D TABS; Therapy: (Recorded:36Obl8982) to Recorded   18  Cranberry CAPS Recorded   19  Docusate Sodium 100 MG Oral Tablet; TAKE 1 TABLET DAILY AS DIRECTED; Therapy: 94RCF7847 to Recorded   20  Magnesium 250 MG Oral Tablet Recorded   21  MethylPREDNISolone (Liu) 4 MG Oral Tablet; TAKE AS DIRECTED ON PATIENT    INSTRUCTION CARD; Therapy: (Recorded:63Bst7899) to Recorded   22   Multivitamin & Mineral Oral Liquid Recorded    Future Appointments    Date/Time Provider Specialty Site   04/18/2016 01:45 PM Arcenio Maher MD Vascular Surgery [facility]  [place]   08/04/2016 02:00 PM WILMER Hernandez  [facility]   04/05/2016 01:30 PM WILMER Wheatley   [facility]     Patient Care Team    Care Team Member Role Specialty Office Number   Lyubov Cho DO  Pain Management [phone]   [facility] [place], [address]  Pain Management [phone]   Jory Rodarte MD Referring Family Medicine [phone]   [address]  Nurse Practitioner [phone]   Flavia Solis MD  Vascular Surgery [phone]     Signatures   Electronically signed by : Prema Carcamo RN; Mar 28 2016 12:00PM EST                       (Author)

## 2018-01-13 NOTE — MISCELLANEOUS
History of Present Illness  TCM Communication [facility]: The patient is being contacted for follow-up after hospitalization  She was hospitalized at [facility]  The dates of hospitalization: 2, date of admission: 5/31/2016, date of discharge: 6/2/2016  Diagnosis: Left reverse total shoulder arthroplasty  She was discharged Still admitted  Medications were not reviewed today  She did not schedule a follow up appointment  She refused a follow up appointment due to Still admitted at the hospital  Counseling was provided to the patient  Topics counseled included importance of compliance with treatment  Communication performed and completed by WILLIE Thorpe      Active Problems    1  Allergic rhinitis (477 9) (J30 9)   2  Aneurysm of abdominal aorta (441 4) (I71 4)   3  Anxiety (300 00) (F41 9)   4  Aortoiliac stenosis (440 0) (I70 0)   5  Chronic kidney disease, stage 3 (585 3) (N18 3)   6  Chronic left shoulder pain (719 41,338 29) (M25 512,G89 29)   7  Chronic pain syndrome (338 4) (G89 4)   8  Constipation (564 00) (K59 00)   9  Diabetic peripheral neuropathy (250 60,357 2) (E11 42)   10  First degree AV block (426 11) (I44 0)   11  Gait disturbance (781 2) (R26 9)   12  History of endovascular stent graft for abdominal aortic aneurysm (V43 4) (Z95 828)   13  Hyperlipidemia (272 4) (E78 5)   14  Hypertension (401 9) (I10)   15  Hyponatremia (276 1) (E87 1)   16  Hypothyroidism (244 9) (E03 9)   17  Initial Medicare annual wellness visit (V70 0) (Z00 00)   18  Left rotator cuff tear arthropathy (716 81) (M12 812)   19  Leg cramps (729 82) (R25 2)   20  Lumbosacral spondylosis without myelopathy (721 3) (M47 817)   21  Osteoarthritis (715 90) (M19 90)   22  Osteopenia (733 90) (M85 80)   23  Peripheral neuropathy (356 9) (G62 9)   24  Postoperative state (V45 89) (Z98 89)   25  Preoperative cardiovascular examination (V72 81) (Z01 810)   26  Primary osteoarthritis of left shoulder (715 11) (M19 012)   27   PVD (peripheral vascular disease) (443 9) (I73 9)   28  Renal cyst, acquired (593 2) (N28 1)   29  Seasonal allergies (477 9) (J30 2)   30  Shoulder pain (719 41) (M25 519)   31  Spinal stenosis (724 00) (M48 00)   32  Status post aortic aneurysm repair (V45 89) (Z98 89,Z86 79)   33  Type 2 diabetes mellitus (250 00) (E11 9)    Past Medical History    1  History of Appendicitis (541)   2  History of Gross hematuria (599 71) (R31 0)   3  History of allergic urticaria (V13 3) (Z87 2)   4  History of bronchitis (V12 69) (Z87 09)   5  History of eczema (V13 3) (Z87 2)   6  History of hematuria (V13 09) (Z87 448)   7  History of sinusitis (V12 69) (Z87 09)   8  History of syncope (V15 89) (Z87 898)   9  History of Osteoporosis (733 00) (M81 0)   10  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) (M19 211)   11  History of Shoulder pain (719 41) (M25 519)    Surgical History    1  History of Cataract Surgery   2  History of Endovascular Repair Of Abdominal Aorta Aneurysm   3  History of Hip Replacement   4  History of Knee Arthroscopy   5  History of Laminectomy Lumbar   6  History of Rotator Cuff Repair   7  History of Shoulder Arthroplasty   8  History of Total Hip Replacement    Family History  Mother    1  Family history of Coronary Artery Disease (V17 49)   2  Family history of Diabetes Mellitus (V18 0)  Father    3  Family history of Coronary Artery Disease (V17 49)  Sister    4  Family history of Arthritis (V17 7)   5  Family history of Cancer  Family History    6  Family history of Unexplained Rapid Death    Social History    · Denied: History of Alcohol   · Marital History -    · Never A Smoker   · Never Used Drugs   · Retired From Work   · Uses Safety Equipment - [employer]   1  Amlodipine-Atorvastatin 10-20 MG Oral Tablet; Take 1 tablet daily; Therapy: 44DLP9851 to (Evaluate:27Oct2016)  Requested for: 70QLJ7776; Last   Rx:02Nov2015 Ordered   2   B-12 500 MCG Oral Tablet; TAKE 1 TABLET DAILY; Therapy: (Recorded:62Pye9212) to Recorded   3  Calcium + D TABS; Therapy: (Recorded:12Lgc5054) to Recorded   4  Cranberry CAPS Recorded   5  Docusate Sodium 100 MG Oral Tablet; TAKE 1 TABLET DAILY AS DIRECTED; Therapy: 71JYB2229 to Recorded   6  Fexofenadine HCl - 180 MG Oral Tablet; take 1 tab daily; Therapy: 31GRT6065 to (Last Rx:46Loh5685) Ordered   7  Fluticasone Propionate 50 MCG/ACT Nasal Suspension; use 2 spr  in each nostril daily; Therapy: 40IZM6007 to (Last Rx:21Xzf5039)  Requested for: 75TOA9475 Ordered   8  Levothyroxine Sodium 137 MCG Oral Tablet; take 1 tablet daily before breakfast;   Therapy: 27BHL3130 to (Evaluate:25Mar2017)  Requested for: 53UDK4010; Last   Rx:30Mar2016 Ordered   9  LORazepam 0 5 MG Oral Tablet; take 1/2 tab  - 1 tablet daily PRN for anxiety; Therapy: 99SXO2775 to (Last Rx:07Acl3684) Ordered   10  Losartan Potassium 100 MG Oral Tablet; take 1 tablet once daily  Requested for:    16Jun2015; Last Rx:16Jun2015 Ordered   11  Magnesium 250 MG Oral Tablet Recorded   12  Multivitamin & Mineral Oral Liquid Recorded   13  N-Acetyl-L-Cysteine 600 MG Oral Capsule; Take 2 tablets by mouth the evening before    CT angiogram  Repeat 2 tablets by mouth at 6 AM the day of CT angiogram;    Therapy: 80BVB7679 to (Last Rx:65Vzh1217)  Requested for: 77QXF9896 Ordered   14  OneTouch Delica Lancets Fine Miscellaneous; CHECK BLOOD SUGAR 1 TIME DAILY    OR  AS NEEDED; Therapy: 52EKU0627 to (Last Rx:49Vqe4780)  Requested for: 31IPS1228 Ordered   15  OneTouch Lancets Miscellaneous; TEST DAILY; Therapy: 03LOI9594 to (Shady Vzáquez)  Requested for: 74Pru5734; Last    Rx:44Bht8715 Ordered   16  OneTouch Ultra Blue In Vitro Strip; USE AS DIRECTED; Therapy: 07JLG9743 to (Evaluate:08Jan2017)  Requested for: 20Jan2016; Last    Rx:15Jan2016 Ordered   17  OneTouch Ultra Mini w/Device Kit; Test BS daily and as needed;     Therapy: 89JLM7349 to (Last Rx:15Jan2016)  Requested for: 49HQN1829 Ordered   18  OneTouch UltraSoft Lancets Miscellaneous; TEST BLOOD SUGAR ONE TIME DAILY; Therapy: 03LOM0957 to (Evaluate:76Tmk9257)  Requested for: 20Jan2016; Last    Rx:15Jan2016 Ordered   19  Polyethylene Glycol 3350 Oral Powder; MIX 1 CAPFUL (17GM) IN 8 OUNCES OF WATER,    JUICE, OR TEA AND DRINK DAILY; Therapy: 75TRU1189 to (Evaluate:05Jan2016); Last Rx:80Sox3988 Ordered   20  Sodium Chloride 1 GM Oral Tablet; Take 1 tab  Daily; Therapy: 96Txr7860 to (Last Rx:44Bvw0131)  Requested for: 17Pha9258 Ordered   21  Triamcinolone Acetonide 0 1 % External Cream; Apply BID to affected areas; Therapy: 29MFL3517 to (Evaluate:99Fkr9264)  Requested for: 64IMT1316; Last    Rx:61Nvz9002 Ordered    Allergies    1  Bextra TABS   2  Darvocet-N 100 TABS   3  Cataflam TABS   4  Daypro TABS   5  Percocet TABS    Health Management  Type 2 diabetes mellitus   *VB - Eye Exam; every 1 year; Last 19JRU4086; Next Due: 78Dza4723; Active    Future Appointments    Date/Time Provider Specialty Site   08/04/2016 02:00 PM WILMER Hernandez  [facility]   06/09/2016 11:00 AM WILMER Wheatley   Orthopedic Surgery [facility]     Signatures   Electronically signed by : WILMER Negron ; Jun 6 2016 12:14PM EST                       (Author)

## 2018-01-13 NOTE — RESULT NOTES
Verified Results  (1) T4, FREE 66Xkw2851 02:06PM [facility]     Test Name Result Flag Reference   T4, FREE 1 7 ng/dL  0 8-1 8     (Q) TSH, 3RD GENERATION 92Oee9098 02:06PM [facility]     Test Name Result Flag Reference   TSH 1 38 mIU/L  0 40-4 50

## 2018-01-14 VITALS
HEART RATE: 68 BPM | WEIGHT: 148 LBS | TEMPERATURE: 97.5 F | BODY MASS INDEX: 27.23 KG/M2 | RESPIRATION RATE: 16 BRPM | SYSTOLIC BLOOD PRESSURE: 138 MMHG | DIASTOLIC BLOOD PRESSURE: 74 MMHG | HEIGHT: 62 IN

## 2018-01-14 VITALS
HEIGHT: 62 IN | SYSTOLIC BLOOD PRESSURE: 134 MMHG | BODY MASS INDEX: 27.46 KG/M2 | TEMPERATURE: 97.6 F | HEART RATE: 72 BPM | WEIGHT: 149.2 LBS | DIASTOLIC BLOOD PRESSURE: 66 MMHG | RESPIRATION RATE: 16 BRPM

## 2018-01-14 VITALS
DIASTOLIC BLOOD PRESSURE: 68 MMHG | BODY MASS INDEX: 27.63 KG/M2 | HEIGHT: 62 IN | HEART RATE: 76 BPM | WEIGHT: 150.13 LBS | SYSTOLIC BLOOD PRESSURE: 169 MMHG

## 2018-01-15 VITALS
DIASTOLIC BLOOD PRESSURE: 64 MMHG | TEMPERATURE: 96.7 F | HEIGHT: 62 IN | BODY MASS INDEX: 27.3 KG/M2 | HEART RATE: 64 BPM | SYSTOLIC BLOOD PRESSURE: 142 MMHG | WEIGHT: 148.38 LBS | RESPIRATION RATE: 16 BRPM

## 2018-01-15 NOTE — RESULT NOTES
Message  I called patient  Spoke with her daughter  Reviewed test results  Patient is doing well  Will continue current medications, recheck CMP, TSH, T4 free in June prior to followup visit  Will mail slip for blood test to patient's home  Verified Results  (1) COMPREHENSIVE METABOLIC PANEL 95UQN6110 77:62RQ Jorge Motta     Test Name Result Flag Reference   GLUCOSE 132 mg/dL H 65-99   Fasting reference interval   UREA NITROGEN (BUN) 24 mg/dL  7-25   CREATININE 1 21 mg/dL H 0 60-0 88   For patients >52years of age, the reference limit  for Creatinine is approximately 13% higher for people  identified as -American  eGFR NON-AFR  AMERICAN 39 mL/min/1 73m2 L > OR = 60   eGFR AFRICAN AMERICAN 45 mL/min/1 73m2 L > OR = 60   BUN/CREATININE RATIO 20 (calc)  6-22   ALT 18 U/L  6-29   ALBUMIN 4 5 g/dL  3 6-5 1   GLOBULIN 2 6 g/dL (calc)  1 9-3 7   ALBUMIN/GLOBULIN RATIO 1 7 (calc)  1 0-2 5   BILIRUBIN, TOTAL 0 8 mg/dL  0 2-1 2   ALKALINE PHOSPHATASE 70 U/L     AST 15 U/L  10-35   SODIUM 135 mmol/L  135-146   POTASSIUM 4 8 mmol/L  3 5-5 3   CHLORIDE 101 mmol/L     CARBON DIOXIDE 30 mmol/L  20-31   CALCIUM 9 8 mg/dL  8 6-10 4   PROTEIN, TOTAL 7 1 g/dL  6 1-8 1     (1) T4, FREE 57SVN4068 09:54AM Obed Arredondo     Test Name Result Flag Reference   T4, FREE 1 3 ng/dL  0 8-1 8     (Q) TSH, 3RD GENERATION 70QRS1704 09:54AM Jorge Motta     Test Name Result Flag Reference   TSH 7 64 mIU/L H 0 40-4 50     (Q) HEMOGLOBIN A1c 60DKX1143 09:54AM Tatianna Arredondo   REPORT COMMENT:  FASTING:NO     Test Name Result Flag Reference   HEMOGLOBIN A1c 6 0 % of total Hgb H <5 7   According to ADA guidelines, hemoglobin A1c <7 0%  represents optimal control in non-pregnant diabetic  patients  Different metrics may apply to specific  patient populations  Standards of Medical Care in  [phone]  Diabetes Care   2013;36:s11-s66     For the purpose of screening for the presence of  diabetes  <5 7%       Consistent with the absence of diabetes  5 7-6 4%    Consistent with increased risk for diabetes              (prediabetes)  >or=6 5%    Consistent with diabetes     This assay result is consistent with an increased risk  of diabetes  Currently, no consensus exists for use of hemoglobin  A1c for diagnosis of diabetes for children         Signatures   Electronically signed by : WILMER Zhang ; Mar 20 2017 12:12PM EST                       (Author)

## 2018-01-15 NOTE — MISCELLANEOUS
History of Present Illness  TCM Communication [facility]: The patient is being contacted for follow-up after hospitalization  She was hospitalized at [facility]  The dates of hospitalization: 2 days, date of admission: 3/10/2016, date of discharge: 3/12/2016  Diagnosis: Abdominal aortic aneurysm without rupture  She was discharged to home  Medications were not reviewed today  She did not schedule a follow up appointment  She refused a follow up appointment due to did not return call for KAITLIN  Communication performed and completed by A  [facility] LPN      Active Problems     1  Allergic rhinitis (477 9) (J30 9)   2  Allergic urticaria (708 0) (L50 0)   3  Anxiety (300 00) (F41 9)   4  Aortoiliac stenosis (440 0) (I70 0)   5  Chronic pain syndrome (338 4) (G89 4)   6  Constipation (564 00) (K59 00)   7  Diabetic peripheral neuropathy (250 60,357 2) (E11 42)   8  First degree AV block (426 11) (I44 0)   9  Gait disturbance (781 2) (R26 9)   10  Hyperlipidemia (272 4) (E78 5)   11  Hyponatremia (276 1) (E87 1)   12  Initial Medicare annual wellness visit (V70 0) (Z00 00)   13  Leg cramps (729 82) (R25 2)   14  Lumbosacral spondylosis without myelopathy (721 3) (M47 817)   15  Osteoarthritis (715 90) (M19 90)   16  Osteopenia (733 90) (M85 80)   17  Peripheral neuropathy (356 9) (G62 9)   18  Preoperative cardiovascular examination (V72 81) (Z01 810)   19  Renal cyst, acquired (593 2) (N28 1)   20  Seasonal allergies (477 9) (J30 2)   21  Spinal stenosis (724 00) (M48 00)    Hypothyroidism (244 9) (E03 9)       Type 2 diabetes mellitus (250 00) (E11 9)       Hypertension (401 9) (I10)       Aneurysm of abdominal aorta (441 4) (I71 4)       PVD (peripheral vascular disease) (443 9) (I73 9)       Chronic kidney disease, stage 3 (585 3) (N18 3)          Past Medical History    1  History of Appendicitis (541)   2  History of Gross hematuria (599 71) (R31 0)   3  History of bronchitis (V12 69) (Z87 09)   4   History of eczema (V13 3) (Z87 2)   5  History of hematuria (V13 09) (Z87 448)   6  History of sinusitis (V12 69) (Z87 09)   7  History of syncope (V15 89) (Z87 898)   8  History of Osteoporosis (733 00) (M81 0)   9  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) (M19 211)   10  History of Shoulder pain (719 41) (M25 519)    Surgical History    1  History of Cataract Surgery   2  History of Hip Replacement   3  History of Knee Arthroscopy   4  History of Laminectomy Lumbar   5  History of Rotator Cuff Repair   6  History of Shoulder Arthroplasty   7  History of Total Hip Replacement    Family History    1  Family history of Coronary Artery Disease (V17 49)   2  Family history of Diabetes Mellitus (V18 0)    3  Family history of Coronary Artery Disease (V17 49)    4  Family history of Arthritis (V17 7)   5  Family history of Cancer    6  Family history of Unexplained Rapid Death    Social History    · Denied: History of Alcohol   · Marital History -    · Never A Smoker   · Never Used Drugs   · Retired From Work   · Uses Safety Equipment - [address] [employer] [address]   1  Amlodipine-Atorvastatin 10-20 MG Oral Tablet; Take 1 tablet daily; Therapy: 33ACE8022 to (Evaluate:27Oct2016)  Requested for: 31EDF2965; Last   Rx:02Nov2015 Ordered   2  B-12 500 MCG Oral Tablet; TAKE 1 TABLET DAILY; Therapy: (Recorded:26Okz2874) to Recorded   3  Calcium + D TABS; Therapy: (Recorded:60Hcz0060) to Recorded   4  Cranberry CAPS Recorded   5  Docusate Sodium 100 MG Oral Tablet; TAKE 1 TABLET DAILY AS DIRECTED; Therapy: 52QLI0985 to Recorded   6  Fexofenadine HCl - 180 MG Oral Tablet; take 1 tab daily; Therapy: 88PPM4114 to (Last Rx:83Xka6120) Ordered   7  Fluticasone Propionate 50 MCG/ACT Nasal Suspension; use 2 spr  in each nostril daily; Therapy: 87XQX3725 to (Last Rx:45Ueb9081)  Requested for: 64EBW4877 Ordered   8   Levothyroxine Sodium 137 MCG Oral Tablet; take 1 tablet daily before breakfast;   Therapy: 86SKD4070 to (Evaluate:13Mar2016)  Requested for: 50EZS2053; Last   Rx:19Mar2015 Ordered   9  LORazepam 0 5 MG Oral Tablet; take 1/2 tab  - 1 tablet daily PRN for anxiety; Therapy: 28AMI3856 to (Last Rx:13Oct2014) Ordered   10  Losartan Potassium 100 MG Oral Tablet; take 1 tablet once daily  Requested for:    16Jun2015; Last Rx:16Jun2015 Ordered   11  Magnesium 250 MG Oral Tablet Recorded   12  MethylPREDNISolone (Liu) 4 MG Oral Tablet; TAKE AS DIRECTED ON PATIENT    INSTRUCTION CARD; Therapy: (Recorded:50Vdi8449) to Recorded   13  Multivitamin & Mineral Oral Liquid Recorded   14  N-Acetyl-L-Cysteine 600 MG Oral Capsule; Take 2 tablets by mouth the evening before    CT angiogram  Repeat 2 tablets by mouth at 6 AM the day of CT angiogram;    Therapy: 85PPS9872 to (Last Rx:01Feb2016)  Requested for: 76DRY7031 Ordered   15  OneTouch Delica Lancets Fine Miscellaneous; CHECK BLOOD SUGAR 1 TIME DAILY    OR  AS NEEDED; Therapy: 88JRM8347 to (Last Rx:16Feb2016)  Requested for: 10QIH8065 Ordered   16  OneTouch Lancets Miscellaneous; TEST DAILY; Therapy: 12SGR6468 to (Daiana Grandchild)  Requested for: 03Gle3296; Last    Rx:36Csa6994 Ordered   17  OneTouch Ultra Blue In Vitro Strip; USE AS DIRECTED; Therapy: 02UMG4223 to (Evaluate:08Jan2017)  Requested for: 20Jan2016; Last    Rx:15Jan2016 Ordered   18  OneTouch Ultra Mini w/Device Kit; Test BS daily and as needed; Therapy: 03OBK0453 to (Last Rx:15Jan2016)  Requested for: 20Jan2016 Ordered   19  OneTouch UltraSoft Lancets Miscellaneous; TEST BLOOD SUGAR ONE TIME DAILY; Therapy: 00QOU2014 to (Evaluate:72Xya4623)  Requested for: 20Jan2016; Last    Rx:15Jan2016 Ordered   20  Polyethylene Glycol 3350 Oral Powder; MIX 1 CAPFUL (17GM) IN 8 OUNCES OF WATER,    JUICE, OR TEA AND DRINK DAILY; Therapy: 81SXB7296 to (Evaluate:05Jan2016); Last Rx:42Hkm2338 Ordered   21  Sodium Chloride 1 GM Oral Tablet; Take 1 tab  Daily;     Therapy: 30Apr2015 to (Last Rx:21Dec2015)  Requested for: 79Htr5249 Ordered   22  Triamcinolone Acetonide 0 1 % External Cream; Apply BID to affected areas; Therapy: 29DJL7574 to (Evaluate:09Gmz6315)  Requested for: 02OHZ3130; Last    Rx:31Hcq4039 Ordered    Allergies    1  Bextra TABS   2  Darvocet-N 100 TABS   3  Cataflam TABS   4  Daypro TABS   5  Percocet TABS    Health Management  Type 2 diabetes mellitus   *VB - Eye Exam; every 1 year; Last 99WSK6968; Next Due: 83Zjt0454; Active    Future Appointments    Date/Time Provider Specialty Site   08/04/2016 02:00 PM WILMER Lundy   [address]   03/24/2016 10:15 AM LUCIANA Osorio [facility]     Signatures   Electronically signed by : WILMER Zhang ; Mar 18 2016  8:41AM EST                       (Author)

## 2018-01-16 ENCOUNTER — GENERIC CONVERSION - ENCOUNTER (OUTPATIENT)
Dept: OTHER | Facility: OTHER | Age: 83
End: 2018-01-16

## 2018-01-16 ENCOUNTER — ALLSCRIPTS OFFICE VISIT (OUTPATIENT)
Dept: OTHER | Facility: OTHER | Age: 83
End: 2018-01-16

## 2018-01-16 ENCOUNTER — HOSPITAL ENCOUNTER (OUTPATIENT)
Dept: NON INVASIVE DIAGNOSTICS | Facility: CLINIC | Age: 83
Discharge: HOME | End: 2018-01-16
Payer: COMMERCIAL

## 2018-01-16 DIAGNOSIS — Z00.00 ROUTINE CHECK-UP: ICD-10-CM

## 2018-01-16 DIAGNOSIS — M70.52 OTHER BURSITIS OF KNEE, LEFT KNEE: ICD-10-CM

## 2018-01-16 PROCEDURE — 93978 VASCULAR STUDY: CPT

## 2018-01-17 NOTE — PROGRESS NOTES
Assessment   1  Pes anserinus bursitis of left knee (726 61) (M70 52)   2  Primary osteoarthritis of left knee (715 16) (M17 12)   3  Knee effusion, left (719 06) (M25 462)    Plan   Pes anserinus bursitis of left knee    · *1 - [facility]  * please evaluate and treat for left knee    arthritis has anserinus and difficulty with balance  Status: Active  Requested for:    44CNO9748  Care Summary provided  : Yes   · Follow-up visit in 3 months Evaluation and Treatment  Follow-up  Status: Complete     Done: 72JFV2430    Discussion/Summary      57-year-old female presents for repeat evaluation of left knee arthritis and pes anserine bursitis   had aspiration injection left knee today as well as left pes bursitis weightbear as tolerated as tolerated 3 months repeat evaluation  History of Present Illness   HPI: A 3year-old female turns for follow-up examination of left knee pain  Secondary to left knee pes bursitis as well as left knee arthritis  Patient has noticed increasing in bilateral pitting edema and lower extremity pain since her previous visit  No other complaints at this time  Review of Systems        Constitutional: No fever, no chills, feels well, no tiredness, no recent weight gain or loss  Eyes: No complaints of eyesight problems, no red eyes  ENT: no loss of hearing, no nosebleeds, no sore throat  Cardiovascular: No complaints of chest pain, no palpitations, no leg claudication or lower extremity edema  Respiratory: no compliants of shortness of breath, no wheezing, no cough  Gastrointestinal: no complaints of abdominal pain, no constipation, no nausea or diarrhea, no vomiting, no bloody stools  Genitourinary: no complaints of dysuria, no incontinence  Musculoskeletal: as noted in HPI  Integumentary: no complaints of skin rash or lesion, no itching or dry skin, no skin wounds        Neurological: no complaints of headache, no confusion, no numbness or tingling, no dizziness  Endocrine: No complaints of muscle weakness, no feelings of weakness, no frequent urination, no excessive thirst       Psychiatric: No suicidal thoughts, no anxiety, no feelings of depression  ROS reviewed  Active Problems   1  Aftercare following left shoulder joint replacement surgery (V54 81,V43 61)     (Z47 1,Z96 612)   2  Allergic rhinitis (477 9) (J30 9)   3  Ambulatory dysfunction (719 7) (R26 2)   4  Aneurysm of abdominal aorta (441 4) (I71 4)   5  Anxiety (300 00) (F41 9)   6  Aortoiliac stenosis (440 0) (I70 0)   7  Chronic kidney disease, stage 3 (585 3) (N18 3)   8  Chronic left shoulder pain (719 41,338 29) (M25 512,G89 29)   9  Chronic pain of left knee (424 99,936 37) (M25 562,G89 29)   10  Chronic pain syndrome (338 4) (G89 4)   11  Constipation (564 00) (K59 00)   12  Diabetic peripheral neuropathy (250 60,357 2) (E11 42)   13  Dry scalp (701 1) (R23 8)   14  First degree AV block (426 11) (I44 0)   15  Gait disturbance (781 2) (R26 9)   16  Greater trochanteric bursitis of right hip (726 5) (M70 61)   17  History of endovascular stent graft for abdominal aortic aneurysm (V43 4) (Z95 828)   18  Hyperlipidemia (272 4) (E78 5)   19  Hypertension (401 9) (I10)   20  Hyponatremia (276 1) (E87 1)   21  Hypothyroidism (244 9) (E03 9)   22  Initial Medicare annual wellness visit (V70 0) (Z00 00)   23  Left knee pain (719 46) (M25 562)   24  Leg cramps (729 82) (R25 2)   25  Lumbosacral spondylosis without myelopathy (721 3) (M47 817)   26  Medicare annual wellness visit, subsequent (V70 0) (Z00 00)   27  Microalbuminuria (791 0) (R80 9)   28  Osteoarthritis (715 90) (M19 90)   29  Osteopenia (733 90) (M85 80)   30  Peripheral neuropathy (356 9) (G62 9)   31  Pes anserinus bursitis of left knee (726 61) (M70 52)   32  Postoperative state (V45 89) (Z98 890)   33  Preoperative cardiovascular examination (V72 81) (Z01 810)   34   Primary osteoarthritis of left knee (715 16) (M17 12)   35  Primary osteoarthritis of left shoulder (715 11) (M19 012)   36  PVD (peripheral vascular disease) (443 9) (I73 9)   37  Renal cyst, acquired (593 2) (N28 1)   38  Seasonal allergies (477 9) (J30 2)   39  Seborrheic dermatitis of scalp (690 18) (L21 9)   40  Shoulder pain (719 41) (M25 519)   41  Spinal stenosis (724 00) (M48 00)   42  Status post aortic aneurysm repair (V45 89) (Z98 890,Z86 79)   43  Type 2 diabetes mellitus (250 00) (E11 9)    Past Medical History    · History of Appendicitis (541)   · History of Gross hematuria (599 71) (R31 0)   · History of allergic urticaria (V13 3) (Z87 2)   · History of bronchitis (V12 69) (Z87 09)   · History of eczema (V13 3) (Z87 2)   · History of hematuria (V13 09) (Z87 448)   · History of sinusitis (V12 69) (Z87 09)   · History of syncope (V15 89) (N77 779)   · Left rotator cuff tear arthropathy (716 81) (M12 812)   · History of Osteoporosis (733 00) (M81 0)   · History of Primary osteoarthritis of left knee (715 16) (M17 12)   · 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) (M19 211)   · History of Shoulder pain (719 41) (M25 519)     The active problems and past medical history were reviewed and updated today  Surgical History    · History of Cataract Surgery   · History of Endovascular Repair Of Abdominal Aorta Aneurysm   · History of Hip Replacement   · History of Knee Arthroscopy (Therapeutic)   · History of Laminectomy Lumbar   · History of Rotator Cuff Repair   · History of Shoulder Arthroplasty   · History of Total Hip Replacement     The surgical history was reviewed and updated today         Family History   Mother    · Family history of Coronary Artery Disease (V17 49)   · Family history of Diabetes Mellitus (V18 0)  Father    · Family history of Coronary Artery Disease (V17 49)  Sister    · Family history of Arthritis (V17 7)   · Family history of Cancer  Family History    · Family history of Unexplained Rapid Death     The family history was reviewed and updated today  Social History    · Denied: History of Alcohol   · Marital History -    · Never A Smoker   · Never Used Drugs   · Retired From Work   · Uses Safety Equipment - [address]  The social history was reviewed and updated today  Current Meds    1  Amlodipine-Atorvastatin 10-20 MG Oral Tablet; Take 1 tablet daily; Therapy: 77SZW3846 to (Evaluate:17Aug2018)  Requested for: 86OTQ2094; Last     Rx:67Gzl7884 Ordered   2  B-12 500 MCG Oral Tablet; TAKE 1 TABLET DAILY; Therapy: (Recorded:67Sia3241) to Recorded   3  Calcium + D TABS; Therapy: (Recorded:42Ljz9543) to Recorded   4  Derma-Smoothe/FS Scalp 0 01 % External Oil; apply to scalp 3 times per week as     directed; Therapy: 91APZ4050 to (Last Rx:97Obd4268)  Requested for: 07QYD1128 Ordered   5  Derma-Smoothe/FS Scalp 0 01 % External Oil; apply to scalp 3 times per week as     directed; Therapy: 23PNS0133 to (Last Rx:08Jun2017)  Requested for: 53ORN3801 Ordered   6  Docusate Sodium 100 MG Oral Tablet; TAKE 1 TABLET DAILY AS DIRECTED; Therapy: 14GQM3304 to Recorded   7  Fexofenadine HCl - 180 MG Oral Tablet; take 1 tab daily; Therapy: 95OSR1509 to (Last Rx:73Orb0604) Ordered   8  Fluticasone Propionate 50 MCG/ACT Nasal Suspension; use 2 spr  in each nostril daily; Therapy: 55SOC6681 to (Last Rx:42Iym5590)  Requested for: 17DKL4143 Ordered   9  Ketoconazole 2 % External Shampoo; APPLY TO SCALP  2 TIMES PER WEEK  FOR 5     MINUTES AND RINSE; Therapy: 56BGG6945 to (Last Rx:10Oct2017)  Requested for: 98ABA8154 Ordered   10  Levothyroxine Sodium 137 MCG Oral Tablet; take 1 tablet daily before breakfast;      Therapy: 87SPY6456 to (Evaluate:05Oct2018)  Requested for: 13POS0215; Last      Rx:10Oct2017 Ordered   11  LORazepam 0 5 MG Oral Tablet; take 1/2 tab  - 1 tablet daily PRN for anxiety; Therapy: 02EWH5661 to (Last Rx:09Jun2017) Ordered   12   Losartan Potassium 100 MG Oral Tablet; take 1 tablet once daily  Requested for:      82NYD4878; Last Rx:10Oct2017 Ordered   13  Magnesium 250 MG Oral Tablet Recorded   14  Multivitamin & Mineral Oral Liquid Recorded   15  OneTouch Delica Lancets Fine Miscellaneous; TEST BLOOD SUGAR ONE TIME DAILY      OR AS NEEDED; Therapy: 17TAU0878 to (Evaluate:80Xyq3795)  Requested for: 67PDQ7990; Last      Rx:14Jun2017 Ordered   16  OneTouch Lancets MISC; TEST DAILY; Therapy: 83NKI9928 to (Levon Rashid)  Requested for: 59Lyt0666; Last      Rx:20Rmr5311 Ordered   17  OneTouch Ultra Blue In [employer]; use as directed; Therapy: 69JOV0130 to (Evaluate:09Jun2018)  Requested for: 81FNW2870; Last      Rx:14Jun2017 Ordered   18  OneTouch Ultra Mini w/Device Kit; Test BS daily and as needed; Therapy: 14KHV6618 to (Last Rx:15Jan2016)  Requested for: 20Jan2016 Ordered   19  OneTouch UltraSoft Lancets Miscellaneous; TEST BLOOD SUGAR ONE TIME DAILY; Therapy: 20UNH3340 to (Evaluate:89Dfq4861)  Requested for: 20Jan2016; Last      Rx:15Jan2016 Ordered   20  Polyethylene Glycol 3350 Oral Powder; MIX 1 CAPFUL (17GM) IN 8 OUNCES OF      WATER, JUICE, OR TEA AND DRINK DAILY; Therapy: 86ZKS7602 to (Evaluate:05Jan2016); Last Rx:22Yqx9808 Ordered   21  Sodium Chloride 1 GM Oral Tablet; Take 1 tab  Daily; Therapy: 20Oun8472 to (Last Rx:31Jan2017)  Requested for: 43RSM5757 Ordered   22  Triamcinolone Acetonide 0 1 % External Cream; Apply BID to affected areas; Therapy: 49YIH4719 to (Evaluate:69Ecr6601)  Requested for: 65UDL9466; Last      Rx:35Hok1108 Ordered     The medication list was reviewed and updated today  Allergies   1  Bextra TABS   2  Darvocet-N 100 TABS   3  Cataflam TABS   4  Daypro TABS   5   Percocet TABS    Vitals   Signs   Heart Rate: 77  Systolic: 745  Diastolic: 62  Height: 5 ft 2 in  Weight: 152 lb 8 oz  BMI Calculated: 27 89  BSA Calculated: 1 7    Physical Exam       tenderness to palpation over joint  to palpation over pes anserine  Tender to palpation over anterior and lateral compartments of lower extremity  Pitting edema bilaterally up to knees  Stable to varus valgus stress in extension 0 degrees flexion 120 degrees negative Lachman's negative posterior drawer      Constitutional - General appearance: Normal       Musculoskeletal - Gait and station: Normal       Cardiovascular - Pulses: Normal       Respiratory - Lungs - Clear to auscultation bilaterally, no rales, no rhonci, no wheezes  Abdomen - Abdomen - Soft, nontender, nondistended  Eyes      Pupils and irises: Normal        Procedure   After sterile preparation of skin overlying the pes anserine bursitis injection of 2 cc lidocaine 2 cc Marcaine 2 cc Celestone was introduced into the pes bursa patient tolerated procedure well sterile dressing was then applied  Following after using 3 cc 1 percent lidocaine as anesthetic for the superior lateral portal of the left knee 18 gauge needle was used to aspirate 30 cc of serous fluid from the left knee  Following 2 cc lidocaine 2 cc Marcaine 2 cc Celestone was introduced into the left knee joint patient tolerated procedure well sterile dressing was applied      Attending Note   Attending Note [facility]: Attending Note: I interviewed, took the history and examined the patient,-- the staff discussed the patient on the day of the visit,-- I discussed the case with the Resident and reviewed the Resident's note,-- I supervised the Resident,-- I supervised the procedure performed by the Resident-- and-- I agree with the Resident management plan as it was presented to me  Level of Participation: I was present in clinic and examined the patient  Patient's History: 70-year-old female reports return of pain in the medial aspect left knee, in addition she reports balance issues that requires therapy  Key Parts of the Exam: Exam finds diffuse left knee that is in varus   There is tenderness patient medial joint line  There is tenderness palpation the proximal medial tibia  The calf is edematous  There is no tenderness  Diagnosis and Plan: Balance issues, and effused left knee which is arthritic  An aspiration injection for the knee joint is indicated, as is injection to the pes bursa  These are advised, except, administers outlined above  Physical therapy was written for this patient and I would welcome the opportunity to see her back in the office in 3 months time for follow-up  I agree with the Resident's note  Future Appointments      Date/Time Provider Specialty Site   03/13/2018 10:00 AM WILMER Hope  [facility] [address] [place] [address]     Signatures    Electronically signed by :  Mohit Bravo, ; Jan 16 2018  3:34PM EST                       (Author)     Electronically signed by : WILMER Agudelo ; Jan 16 2018  6:17PM EST                       (Author)

## 2018-01-18 NOTE — PROGRESS NOTES
Preliminary Nursing Report                Patient Information    Initial Encounter Entry Date:   2016 1:09 PM EST (Automated Transmission Automated Transmission)       Last Modified:   {Peewee Wyatt}              Legal Name: Duc Chowdhury        Social Security Number:        YOB: 1925        Age (years): 80        Gender: F        Body Mass Index (BMI): 26 kg/m2        Height: 64 in  Weight: 150 lbs (68 kgs)           Address:   [address]              Phone: [phone]        Email:        Ethnicity: Decline to State        Religious:        Marital Status:        Preferred Language: English        Race: Other Race                    Patient Insurance Information        Primary Insurance Information Carrier Name: {Primary  CarrierName}           Carrier Address:   {Primary  CarrierAddress}              Carrier Phone: {Primary  CarrierPhone}          Group Number: {Primary  GroupNumber}          Policy Number: {Primary  PolicyNumber}          Insured Name: {Primary  InsuredName}          Insured : {Primary  InsuredDOB}          Relationship to Insured: {Primary  RelationshiptoInsured}           Secondary Insurance Information Carrier Name: {Secondary  CarrierName}           Carrier Address:   {Secondary  CarrierAddress}              Carrier Phone: {Secondary  CarrierPhone}          Group Number: {Secondary  GroupNumber}          Policy Number: {Secondary  PolicyNumber}          Insured Name: {Secondary  InsuredName}          Insured : {Secondary  InsuredDOB}          Relationship to Insured: {Secondary  RelationshiptoInsured}                       Health Profile   Booking #:   Lali Parsons #: [id number]               DOS: 03/10/2016    Surgery : 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; using modular bifurcated prosthesis (one docking limb)    Add'l Procedures/Notes:     Surgery Risk: Major          Precautions Chronic kidney disease, stage 3       Diabetic peripheral neuropathy       PVD (peripheral vascular disease)       Type 2 diabetes mellitus                   Allergies    Bextra TABS       Cataflam TABS       Darvocet-N 100 TABS       Daypro TABS       Percocet TABS             Medications    Amlodipine-Atorvastatin 10-20 MG Oral Tablet       B-12 500 MCG Oral Tablet       Calcium + D TABS       Cranberry CAPS       Docusate Sodium 100 MG Oral Tablet       Fexofenadine HCl - 180 MG Oral Tablet       Fluticasone Propionate 50 MCG/ACT Nasal Suspension       Levothyroxine Sodium 137 MCG Oral Tablet       LORazepam 0 5 MG Oral Tablet       Losartan Potassium 100 MG Oral Tablet       Magnesium 250 MG Oral Tablet       MethylPREDNISolone (Liu) 4 MG Oral Tablet       Multivitamin & Mineral Oral Liquid       N-Acetyl-L-Cysteine 600 MG Oral Capsule       Polyethylene Glycol 3350 Oral Powder       Sodium Chloride 1 GM Oral Tablet       Triamcinolone Acetonide 0 1 % External Cream               Conditions    Allergic rhinitis       Allergic urticaria       Aneurysm of abdominal aorta       Anxiety       Aortoiliac stenosis       Chronic kidney disease, stage 3       Chronic pain syndrome       Constipation       Diabetic peripheral neuropathy       First degree AV block       Gait disturbance       Hyperlipidemia       Hypertension       Hyponatremia       Hypothyroidism       Initial Medicare annual wellness visit       Leg cramps       Lumbosacral spondylosis without myelopathy       Osteoarthritis       Osteopenia       Peripheral neuropathy       Preoperative cardiovascular examination       PVD (peripheral vascular disease)       Renal cyst, acquired       Seasonal allergies       Spinal stenosis       Type 2 diabetes mellitus               Family History    None             Surgical History    None             Social History    Denies Alcohol       Marital History -        Never A Smoker       Never Used Drugs Retired From Work       Uses Safety Equipment - [employer]                               Patient Instructions       ? NPO Instructions   The day before surgery it is recommended to have a light dinner at your usual time and you are allowed a light snack early in the evening  Do not eat anything heavy or eat a big meal after 7pm  Do not eat or drink anything after midnight prior to your surgery  If you are supposed to take any of your medications, do so with a sip of water  Failure to follow these instructions can lead to an increased risk of lung complications and may result in a delay or cancellation of your procedure  If you have any questions, contact your institution for further instructions  No candy, no gum, no mints, no chewing tobacco   Triggered by: Medical Procedure Risk         ? Calcium Blocker (Blood Pressure Medication) 3, 4, 6, 5, 2  Please continue to take this medication on your normal schedule  If this is an oral medication and you take in the morning, you may do so with a sip of water  Triggered by: Amlodipine-Atorvastatin 10-20 MG Oral Tablet         ? Diabetic Medication   Please decrease your morning insulin dose to one-half of normal dose  If you are taking oral diabetes medications, the morning dose should be omitted  If taking metformin (Glucophage), discontinue the medication for 24 hours prior to surgery  If you have an insulin pump, continue at a basal rate only  Triggered by: Type 2 diabetes mellitus, Diabetic peripheral neuropathy         ? Steroids 83, 84, 102  Please continue your steroid medications up to and including the day of surgery (with a sip of water, if oral medication)  Triggered by: Triamcinolone Acetonide 0 1 % External Cream, , MethylPREDNISolone (Liu) 4 MG Oral Tablet         ? Thyroxine (Synthroid)   Please continue to take this medication on your normal schedule   If this is an oral medication and you take in the morning, you may do so with a sip of water   Triggered by: Levothyroxine Sodium 137 MCG Oral Tablet               Testing Considerations       ? Blood Glucose on Day of Surgery t  Please check the blood sugar on the morning of surgery  Triggered by: Type 2 diabetes mellitus, Diabetic peripheral neuropathy         ? Coagulation Tests (PT/PTT/INR) t  Triggered by: Aneurysm of abdominal aorta         ? Complete Blood Count (CBC) t, client, client  If test was completed and normal within last six months, repeat test is not necessary  Triggered by: Aneurysm of abdominal aorta, Chronic kidney disease, stage 3, First degree AV block, PVD (peripheral vascular disease), Age or Facility Rec         ? Comprehensive Metabolic Panel (CMP) t  If test was completed and normal within last six months, repeat test is not necessary  Triggered by: Aneurysm of abdominal aorta, First degree AV block, PVD (peripheral vascular disease)         ? Consider Chest X-ray (CXR) t  Consider a Chest X-Ray (CXR) if patient is having respiratory symptoms  Triggered by: Aneurysm of abdominal aorta, Age or Facility Rec         ? Consider Hemoglobin A1c (HbA1c) client, client, client, client  If test was completed and normal within the last three months, repeat test is not necessary  Triggered by: Type 2 diabetes mellitus, Diabetic peripheral neuropathy, Age or Facility Rec         ? Electrocardiogram (ECG) t  Patient does not need new test if normal ECG is present within the last six months and no change in clinical condition  Triggered by: Aneurysm of abdominal aorta, Chronic kidney disease, stage 3, Type 2 diabetes mellitus, Diabetic peripheral neuropathy, First degree AV block, Hypertension, Hyponatremia, PVD (peripheral vascular disease), Age or Facility Rec         ? Type and Screen client  Type and Screen - Blood: If there is anticipated or possible large blood loss with this procedure, then a Type and Screen for Blood should be ordered    Triggered by: Age or Facility Rec ? Urinalysis t  Urinalysis may be appropriate if recent urinary symptoms or implants are being placed in surgical procedure  Triggered by: Medical Procedure               Consultations       ? Cardiac Evaluation   Further evaluation of cardiopulmonary status should be strongly considered  Triggered by: Aneurysm of abdominal aorta         ? Pain Management Notes client, client  The patient has listed conditions or takes medication that may affect their pain management  Triggered by: Chronic pain syndrome, Age or Facility Rec         ? Primary Care Physician Evaluation   Primary care physician may need to evaluate patient prior to surgery  This is likely NOT necessary if the listed conditions are chronic and stable  Triggered by: PVD (peripheral vascular disease)               Miscellaneous Questions         Question: Are you able to walk up a flight of stairs, walk up a hill or do heavy housework WITHOUT having chest pain or shortness of breath? Answer: YES                   Allergies/Conditions/Medications Not Found        The following were not recognized by our system when generating the recommendations  Please consider if this would impact any preoperative protocols  ? Denies Alcohol       ? Marital History -        ? Never A Smoker       ? Never Used Drugs       ? Peripheral neuropathy       ? Retired From Work       ? Uses Safety Equipment - Seatbelts       ? B-12 500 MCG Oral Tablet       ? Calcium + D TABS       ? Cranberry CAPS       ? Docusate Sodium 100 MG Oral Tablet       ? Fluticasone Propionate 50 MCG/ACT Nasal Suspension       ? LORazepam 0 5 MG Oral Tablet       ? Losartan Potassium 100 MG Oral Tablet       ? Sodium Chloride 1 GM Oral Tablet                  Appointment Information         Date:    03/10/2016        Location:    [place]        Address:           Directions:                      Footnotes revision 14      ?? Denotes a free-text entry        Legal Disclaimer: Any and all recommendations and services provided herein are designed to assist in the preoperative care of the patient  Nothing contained herein is designed to replace, eliminate or alleviate the responsibility of the attending physician to supervise and determine the patient?s preoperative care and course of treatment  Failure to provide complete, accurate information may negatively impact the system?s ability to recommend the proper preoperative protocol  THE ATTENDING PHYSICIAN IS RESPONSIBLE TO REVIEW THE SUGGESTED PREOPERATIVE PROTOCOLS/COURSE OF TREATMENT AND PRESCRIBE THE FINAL COURSE OF PREOPERATIVE TREATMENT IN CONSULTATION WITH THE PATIENT  THE ePREOP SYSTEM AND ITS MATERIALS ARE PROVIDED ? AS IS? WITHOUT WARRANTY OF ANY KIND, EXPRESS OR IMPLIED, INCLUDING, BUT NOT LIMITED TO, WARRANTIES OF PERFORMANCE OR MERCHANTABILITY OR FITNESS FOR A PARTICULAR PURPOSE  PATIENT AND PHYSICIANS HEREBY AGREE THAT THEIR USE OF THE MATERIALS AND RESOURCES ACT AS A CONSENT TO RELEASE AND WAIVE ePREOP FROM ANY AND ALL CLAIMS OF WARRANTY, TORT OR CONTRACT LAW OF ANY KIND             Electronically signed Wu Rollins MD  Feb 25 2016  4:58PM EST

## 2018-01-22 VITALS
WEIGHT: 146.25 LBS | HEIGHT: 62 IN | SYSTOLIC BLOOD PRESSURE: 170 MMHG | DIASTOLIC BLOOD PRESSURE: 68 MMHG | BODY MASS INDEX: 26.91 KG/M2 | HEART RATE: 86 BPM

## 2018-01-22 VITALS
DIASTOLIC BLOOD PRESSURE: 62 MMHG | HEIGHT: 62 IN | HEART RATE: 77 BPM | BODY MASS INDEX: 28.06 KG/M2 | WEIGHT: 152.5 LBS | SYSTOLIC BLOOD PRESSURE: 171 MMHG

## 2018-01-23 ENCOUNTER — APPOINTMENT (OUTPATIENT)
Dept: PHYSICAL THERAPY | Age: 83
End: 2018-01-23
Payer: COMMERCIAL

## 2018-01-23 DIAGNOSIS — M70.52 OTHER BURSITIS OF KNEE, LEFT KNEE: ICD-10-CM

## 2018-01-23 PROCEDURE — G8978 MOBILITY CURRENT STATUS: HCPCS

## 2018-01-23 PROCEDURE — G8979 MOBILITY GOAL STATUS: HCPCS

## 2018-01-23 PROCEDURE — 97162 PT EVAL MOD COMPLEX 30 MIN: CPT

## 2018-01-24 ENCOUNTER — TRANSCRIBE ORDERS (OUTPATIENT)
Dept: VASCULAR SURGERY | Facility: CLINIC | Age: 83
End: 2018-01-24

## 2018-01-24 DIAGNOSIS — I71.4 ABDOMINAL AORTIC ANEURYSM WITHOUT RUPTURE (HCC): Primary | ICD-10-CM

## 2018-01-25 ENCOUNTER — OFFICE VISIT (OUTPATIENT)
Dept: PHYSICAL THERAPY | Age: 83
End: 2018-01-25
Payer: COMMERCIAL

## 2018-01-25 DIAGNOSIS — M70.52 PES ANSERINUS BURSITIS OF LEFT KNEE: Primary | ICD-10-CM

## 2018-01-25 PROCEDURE — 97110 THERAPEUTIC EXERCISES: CPT

## 2018-01-25 NOTE — PROGRESS NOTES
Daily Note     Today's date: 2018  Patient name: Matthew Rogers  :   MRN: [id number]  Referring provider: Josh Lamas MD  Dx:   Encounter Diagnosis   Name Primary?  Pes anserinus bursitis of left knee Yes                  Subjective: No additional complaints following initial eval  Patient reports no issues performing HEP  Objective: See treatment diary below    Precautions: Decreased balance, HTN, DM-diet controlled    Daily Treatment Diary     Manual              Hip adductor, L 5 min                             Exercise Diary              Nu step L1 x10'            QS 20x 5"            Glut sets 20x5"            Heel slides, L 10x            SAQ, B 2x 10            Ball squeezes 20x 5"            Hip abd Red  20x 5"                         Stand hip 3 way             Stand ham curl             HR 20x                         Progression:             Side stepping NT            Tandem stance NT            SLS NT            Biodex NT                                                       Modalities                                                         Assessment: Initiated plan of care today  Focus facilitating technique and dosage of TE with review of HEP  Patient tolerated treatment well and without report of pain t/o program  Patient educated about delayed muscle soreness associated with start of new exercises  To continue with current HEP  Monitor response to initiation of plan of care  Plan: Monitor response to start of POC  Watch for abnormal DOMS  Progress treatment as tolerated

## 2018-01-29 ENCOUNTER — TELEPHONE (OUTPATIENT)
Dept: FAMILY MEDICINE CLINIC | Facility: CLINIC | Age: 83
End: 2018-01-29

## 2018-01-30 ENCOUNTER — OFFICE VISIT (OUTPATIENT)
Dept: PHYSICAL THERAPY | Age: 83
End: 2018-01-30
Payer: COMMERCIAL

## 2018-01-30 DIAGNOSIS — M70.52 PES ANSERINUS BURSITIS OF LEFT KNEE: Primary | ICD-10-CM

## 2018-01-30 PROCEDURE — 97110 THERAPEUTIC EXERCISES: CPT

## 2018-01-30 NOTE — PROGRESS NOTES
Daily Note     Today's date: 2018  Patient name: Jj Estrada  :   MRN: [id number]  Referring provider: Henrietta Skaggs MD  Dx:   Encounter Diagnosis   Name Primary?  Pes anserinus bursitis of left knee Yes                  Subjective: Patient reports no issues with HEP or exercises initiated last visit  Notes she was sore  and Monday but feels that is more related to getting in/out of backseat of car multiple times over the weekend  Reports she usually sits in the front so she was bending more having to get into the backseat  Objective: See treatment diary below    Precautions: Decreased balance, HTN, DM-diet controlled    Daily Treatment Diary     Manual             Hip adductor, L 5 min 5 min                            Exercise Diary              Nu step L1 x10' L3  x10'           QS 20x 5" 20x :05           Glut sets 20x5" 20x :05           Heel slides, L 10x 10x           SAQ, B 2x 10 2x 10           Ball squeezes 20x 5" 20x :05           Hip abd Red  20x 5" Red  20x :05                        Stand hip 3 way  10x           Stand ham curl  10x           HR 20x 20x                        Progression:             Side stepping NT NT           Tandem stance NT NT           SLS NT NT           Biodex NT NT                        Functional training sit/stand  5x                            Modalities                                                         Assessment: Patient continuing to tolerate POC well  Able to progress with additional standing TE today without adverse effects  Time spent today on functional training sit <> stand as patient has noticed increasing difficulty standing from seated position  Patieant educated proper weight shift and foot placement  Able to complete sit <> stand with only assistance from arm rests following instruction  Plan: Continue POC  Continue to monitor for abnormal DOMS  Progress sit <> stand to low mat table at future visits

## 2018-01-31 ENCOUNTER — TELEPHONE (OUTPATIENT)
Dept: FAMILY MEDICINE CLINIC | Facility: CLINIC | Age: 83
End: 2018-01-31

## 2018-02-01 ENCOUNTER — OFFICE VISIT (OUTPATIENT)
Dept: PHYSICAL THERAPY | Age: 83
End: 2018-02-01
Payer: COMMERCIAL

## 2018-02-01 DIAGNOSIS — M70.52 PES ANSERINUS BURSITIS OF LEFT KNEE: Primary | ICD-10-CM

## 2018-02-01 PROCEDURE — 97110 THERAPEUTIC EXERCISES: CPT | Performed by: PHYSICAL THERAPIST

## 2018-02-01 NOTE — PROGRESS NOTES
Daily Note     Today's date: 2018  Patient name: Joy Lozada  : [id number]  MRN: [id number]  Referring provider: Ana Bose MD  Dx:   Encounter Diagnosis   Name Primary?  Pes anserinus bursitis of left knee Yes                  Subjective: Patient feels her knee is steadily improving since IE   Has less knee pain today  Objective: See treatment diary below    Precautions: Decreased balance, HTN, DM-diet controlled    Daily Treatment Diary     Manual            Hip adductor, L 5 min 5 min 5'                           Exercise Diary              Nu step L1 x10' L3  x10' L3  10'          QS 20x 5" 20x :05 20x5"          Glut sets 20x5" 20x :05 20x5"          Heel slides, L 10x 10x 10x          SAQ, B 2x 10 2x 10 1 5#  20x          Ball squeezes 20x 5" 20x :05 20x5"          Hip abd Red  20x 5" Red  20x :05 green  20x                       Stand hip 3 way  10x 10x          Stand ham curl  10x 10x          HR 20x 20x 20x                       Progression:             Side stepping NT NT           Tandem stance NT NT           SLS NT NT           Biodex NT NT                        Functional training sit/stand  5x 2x 5  Hi/lo                           Modalities                                                         Assessment:  Continued with current plan  Progressed program as patient able to tolerate  Improved hip abduction rom since IE ; however, still very tight proximally  Improved ambulation- speed and improved knee flexion during swing phase since IE   Patient making good progress  Increased R > L knee valgus collapse during stand>sit portion of sit<>stand transfer exercises  Cuing for increased control during eccentric portion, knee control improves  Plan: Continue POC

## 2018-02-05 NOTE — TELEPHONE ENCOUNTER
Called daughter, left voicemail to let know that forms will just be filled out and returned on 2/9/18 at appointment

## 2018-02-06 ENCOUNTER — OFFICE VISIT (OUTPATIENT)
Dept: PHYSICAL THERAPY | Age: 83
End: 2018-02-06
Payer: COMMERCIAL

## 2018-02-06 DIAGNOSIS — M70.52 PES ANSERINUS BURSITIS OF LEFT KNEE: Primary | ICD-10-CM

## 2018-02-06 PROCEDURE — 97110 THERAPEUTIC EXERCISES: CPT

## 2018-02-06 PROCEDURE — 97140 MANUAL THERAPY 1/> REGIONS: CPT

## 2018-02-06 NOTE — PROGRESS NOTES
Daily Note     Today's date: 2018  Patient name: Michelle Carpenter  :   MRN: [id number]  Referring provider: Sumanth Burns MD  Dx:   Encounter Diagnosis   Name Primary?  Pes anserinus bursitis of left knee Yes                   Subjective: Increased L knee pain and swelling today  Patient denies activity which may have caused increased pain  Objective: See treatment diary below    Precautions: Decreased balance, HTN, DM-diet controlled     Daily Treatment Diary      Manual                 Hip adductor, L 5 min 5 min 5'  5'                                             Exercise Diary                        Nu step L1 x10' L3  x10' L3  10'  L3 x 10 min               QS 20x 5" 20x :05 20x5"  20x :05               Glut sets 20x5" 20x :05 20x5"  20x :05               Heel slides, L 10x 10x 10x  20x               SAQ, B 2x 10 2x 10 1 5#  20x  1 5#  20x               Ball squeezes 20x 5" 20x :05 20x5"  20x :05               Hip abd Red  20x 5" Red  20x :05 green  20x  green  20x                                       Stand hip 3 way   10x 10x  20x               Stand ham curl   10x 10x  20x               HR 20x 20x 20x  20x                                       Progression:                       Side stepping NT NT    NT               Tandem stance NT NT    NT               SLS NT NT    NT               Biodex NT NT    NT                                       Functional training sit/stand   5x 2x 5  Hi/lo  2x 5  Hi/lo                                             Modalities                                                                                                    Assessment: Continuing plan of care  Patient tolerating mat TE without difficulty or increased L knee pain  Patient with increasing symptoms with standing TE but feeling she would like to increase reps today  Increased fatigue with increased reps  Using green TB today during sit <> stand to prevent valgus collapse  Educated patient about modifying functional activities to avoid increasing L knee pain  Plan: Continue per plan of care

## 2018-02-07 RX ORDER — BLOOD-GLUCOSE METER
KIT MISCELLANEOUS
COMMUNITY
Start: 2016-01-14 | End: 2018-06-26

## 2018-02-07 RX ORDER — ASPIRIN 81 MG
1 TABLET, DELAYED RELEASE (ENTERIC COATED) ORAL AS NEEDED
COMMUNITY
Start: 2014-04-29 | End: 2018-12-17 | Stop reason: ALTCHOICE

## 2018-02-07 RX ORDER — LORAZEPAM 0.5 MG/1
TABLET ORAL
Status: ON HOLD | COMMUNITY
Start: 2014-10-13 | End: 2018-07-23 | Stop reason: ALTCHOICE

## 2018-02-07 RX ORDER — FLUOCINOLONE ACETONIDE 0.11 MG/ML
OIL TOPICAL
COMMUNITY
Start: 2017-06-08 | End: 2020-02-03

## 2018-02-07 RX ORDER — LANCETS
EACH MISCELLANEOUS
COMMUNITY
Start: 2014-12-26 | End: 2018-06-26

## 2018-02-07 RX ORDER — FEXOFENADINE HCL 180 MG/1
1 TABLET ORAL AS NEEDED
COMMUNITY
Start: 2015-12-03

## 2018-02-08 ENCOUNTER — OFFICE VISIT (OUTPATIENT)
Dept: PHYSICAL THERAPY | Age: 83
End: 2018-02-08
Payer: COMMERCIAL

## 2018-02-08 DIAGNOSIS — M70.52 PES ANSERINUS BURSITIS OF LEFT KNEE: Primary | ICD-10-CM

## 2018-02-08 PROBLEM — I12.9 TYPE 2 DM WITH CKD STAGE 3 AND HYPERTENSION (HCC): Status: ACTIVE | Noted: 2018-02-08

## 2018-02-08 PROBLEM — E03.9 ACQUIRED HYPOTHYROIDISM: Status: ACTIVE | Noted: 2018-02-08

## 2018-02-08 PROBLEM — M25.562 CHRONIC PAIN OF LEFT KNEE: Status: ACTIVE | Noted: 2018-02-08

## 2018-02-08 PROBLEM — I73.9 PVD (PERIPHERAL VASCULAR DISEASE) (HCC): Status: ACTIVE | Noted: 2018-02-08

## 2018-02-08 PROBLEM — E11.22 TYPE 2 DM WITH CKD STAGE 3 AND HYPERTENSION (HCC): Status: ACTIVE | Noted: 2018-02-08

## 2018-02-08 PROBLEM — N18.30 TYPE 2 DM WITH CKD STAGE 3 AND HYPERTENSION (HCC): Status: ACTIVE | Noted: 2018-02-08

## 2018-02-08 PROBLEM — N18.30 CHRONIC KIDNEY DISEASE (CKD), STAGE III (MODERATE) (HCC): Status: ACTIVE | Noted: 2018-02-08

## 2018-02-08 PROBLEM — E87.1 HYPONATREMIA: Status: ACTIVE | Noted: 2018-02-08

## 2018-02-08 PROBLEM — M19.90 DEGENERATIVE JOINT DISEASE: Status: ACTIVE | Noted: 2018-02-08

## 2018-02-08 PROBLEM — M15.9 GENERALIZED OSTEOARTHRITIS: Status: ACTIVE | Noted: 2018-02-08

## 2018-02-08 PROBLEM — E78.2 MIXED HYPERLIPIDEMIA: Status: ACTIVE | Noted: 2018-02-08

## 2018-02-08 PROBLEM — G89.29 CHRONIC PAIN OF LEFT KNEE: Status: ACTIVE | Noted: 2018-02-08

## 2018-02-08 PROBLEM — R26.2 AMBULATORY DYSFUNCTION: Status: ACTIVE | Noted: 2018-02-08

## 2018-02-08 PROBLEM — I10 ESSENTIAL HYPERTENSION: Status: ACTIVE | Noted: 2018-02-08

## 2018-02-08 PROCEDURE — 97110 THERAPEUTIC EXERCISES: CPT

## 2018-02-08 PROCEDURE — 97530 THERAPEUTIC ACTIVITIES: CPT

## 2018-02-08 NOTE — PROGRESS NOTES
Daily Note     Today's date: 2018  Patient name: Amada New  :   MRN: [id number]  Referring provider: Debi Souza MD  Dx:   Encounter Diagnosis   Name Primary?  Pes anserinus bursitis of left knee Yes                   Subjective:  Reports some decrease in L knee since last session  Notes swelling decreases with overnight rest and increases t/o day  Plans to try compression socks to help control edema in LLE  Objective: See treatment diary below    Precautions: Decreased balance, HTN, DM-diet controlled     Daily Treatment Diary      Manual               Hip adductor, L 5 min 5 min 5'  5'                                             Exercise Diary                        Nu step L1 x10' L3  x10' L3  10'  L3 x 10 min  L3   10 min             QS 20x 5" 20x :05 20x5"  20x :05  20x :03             Glut sets 20x5" 20x :05 20x5"  20x :05  20x :03             Heel slides, L 10x 10x 10x  20x  20x             SAQ, B 2x 10 2x 10 1 5#  20x  1 5#  20x  1 5#  20x             Ball squeezes 20x 5" 20x :05 20x5"  20x :05  20x :05             Hip abd Red  20x 5" Red  20x :05 green  20x  green  20x  green  20x                                     Stand hip 3 way   10x 10x  20x  20x             Stand ham curl   10x 10x  20x  20x             HR 20x 20x 20x  20x  20x                                     Progression:                       Side stepping NT NT    NT  NT             Tandem stance NT NT    NT  NT             SLS NT NT    NT  NT             Biodex NT NT    NT  NT                                     Functional training sit/stand   5x 2x 5  Hi/lo  2x 5  Hi/lo  2x 5  Hi/lo                                           Modalities                                                                                                  Assessment: Continuing with plan of care as indicated  Patient continues to require cues for proper weight shift with sit <> stand from low surface   Using TB around knees for proprioception to decrease L knee valgus collapse and compensation during knee extension with standing  Patient adapting well to plan of care  Plan: Continue per plan of care

## 2018-02-09 ENCOUNTER — OFFICE VISIT (OUTPATIENT)
Dept: FAMILY MEDICINE CLINIC | Facility: CLINIC | Age: 83
End: 2018-02-09
Payer: COMMERCIAL

## 2018-02-09 VITALS
HEIGHT: 63 IN | BODY MASS INDEX: 26.68 KG/M2 | DIASTOLIC BLOOD PRESSURE: 70 MMHG | TEMPERATURE: 97.9 F | SYSTOLIC BLOOD PRESSURE: 136 MMHG | WEIGHT: 150.6 LBS | RESPIRATION RATE: 16 BRPM | HEART RATE: 64 BPM

## 2018-02-09 DIAGNOSIS — E03.9 ACQUIRED HYPOTHYROIDISM: ICD-10-CM

## 2018-02-09 DIAGNOSIS — G89.29 CHRONIC PAIN OF LEFT KNEE: ICD-10-CM

## 2018-02-09 DIAGNOSIS — I12.9 TYPE 2 DM WITH CKD STAGE 3 AND HYPERTENSION (HCC): ICD-10-CM

## 2018-02-09 DIAGNOSIS — R60.0 BILATERAL LEG EDEMA: ICD-10-CM

## 2018-02-09 DIAGNOSIS — E11.22 TYPE 2 DM WITH CKD STAGE 3 AND HYPERTENSION (HCC): ICD-10-CM

## 2018-02-09 DIAGNOSIS — M15.9 GENERALIZED OSTEOARTHRITIS: ICD-10-CM

## 2018-02-09 DIAGNOSIS — I10 ESSENTIAL HYPERTENSION: Primary | ICD-10-CM

## 2018-02-09 DIAGNOSIS — N18.30 TYPE 2 DM WITH CKD STAGE 3 AND HYPERTENSION (HCC): ICD-10-CM

## 2018-02-09 DIAGNOSIS — E87.1 HYPONATREMIA: ICD-10-CM

## 2018-02-09 DIAGNOSIS — R26.2 AMBULATORY DYSFUNCTION: ICD-10-CM

## 2018-02-09 DIAGNOSIS — I73.9 PVD (PERIPHERAL VASCULAR DISEASE) (HCC): ICD-10-CM

## 2018-02-09 DIAGNOSIS — M25.562 CHRONIC PAIN OF LEFT KNEE: ICD-10-CM

## 2018-02-09 PROCEDURE — 99214 OFFICE O/P EST MOD 30 MIN: CPT | Performed by: FAMILY MEDICINE

## 2018-02-09 RX ORDER — SODIUM CHLORIDE 1000 MG
1 TABLET, SOLUBLE MISCELLANEOUS DAILY
Qty: 30 TABLET | Refills: 6 | Status: SHIPPED | OUTPATIENT
Start: 2018-02-09 | End: 2018-10-17 | Stop reason: SDUPTHER

## 2018-02-09 NOTE — PROGRESS NOTES
Assessment/Plan:    Patient [facility] due to physical decline, balance problems  She needs assistance with bathing, showering, dressing,  mobility ( getting in and out of bed, chair)  Forms completed today  HTN - stable  Continue current medical regimen  Type 2 DM - diet controlled  Monitor blood sugar at home 2-3 times per week  CKD stage 3 - recommended to stay well hydrated  Avoid NSAID's  Will continue to monitor labs  Hypothyroidism - continue levothyroxine 137 mcg daily  Ambulatory dysfunction - discussed fall precations with patient  Continue to use a walker for ambulation  BP Leg edema / Venous insufficiency - recommended to start wearing compression stockings  Patient was provided with Rx to get new pair of KENDALL stockings  Keep follow-up visit as scheduled in 4/18  Diagnoses and all orders for this visit:    Essential hypertension    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 hypertension ([facility] [address] )    Acquired hypothyroidism    Ambulatory dysfunction    Chronic pain of left knee    Generalized osteoarthritis    PVD (peripheral vascular disease) ([facility])    Hyponatremia  -     sodium chloride 1 g tablet; Take 1 tablet (1 g total) by mouth daily Sodium Chloride 1 GM Oral Tablet Take 1 tab  Daily  Quantity: 30;  Refills: 5    Lorean Fought M D ;  Started 30-Apr-2015 Active    Bilateral leg edema    Other orders  -     pyrithione zinc (HEAD AND SHOULDERS) 1 % shampoo; Apply topically daily as needed for dandruff          Subjective:      Patient ID: Priyanka Merlos is a 80 y o  female  HPI     Patient is 27-year-old female with HTN, Type 2 DM- diet controlled, Hyperlipidemia, Hypothyroidism, peripheral neuropathy, CKD stage 3, Hyponatremia, PVD, DJD, ambulatory dysfunction  She presents to the office accompanied by her daughter to fill out forms      Patient applied for aid and attendance benefits from the [facility] due to physical decline, balance problems  She needs help with bathing, showering, dressing,  assistance with mobility ( getting in and out of bed, chair)  Patient  walks with a walker  Denies recent falls  C/o BL knee pain due to severe osteoarthritis  She has been followed by orthopedic surgeon Dr Tarik Lynch who drained fluid from L knee and injected cortisone  Daughter visits frequently, supervises all medications, provides transportation, helps with social activities  HTN - BP remains stable  Denies chest pain, SOB, dizziness  Type 2 DM - diet controlled  HB A1C was 5 8 in 10/17  Patient checks blood sugar at home 2-3 times per week  Bood sugar ranges 114-130's  Hypothyroidism - currently on Levothyroxine 137 mcg daily  Patient has PVD, S/P endovascular AAA repair performed by vascular surgeon Dr Martinez Ask in 3/16  C/o BL leg swelling, L > R  Patient does not use compression stockings, asking for        Rx  to get a new pair  Review of Systems   Constitutional: Positive for fatigue (mild )  Negative for appetite change  HENT: Positive for hearing loss  Negative for congestion, ear pain, sore throat and trouble swallowing  Eyes: Negative for pain, redness and visual disturbance  Respiratory: Negative for cough, chest tightness, shortness of breath and wheezing  Cardiovascular: Positive for leg swelling  Negative for chest pain and palpitations  Gastrointestinal: Positive for constipation (occasional)  Negative for abdominal pain, blood in stool, nausea and vomiting  Genitourinary: Negative for dysuria, frequency, hematuria and pelvic pain  Musculoskeletal: Positive for arthralgias (BL knee pain)  Joint stiffness  Gait instability   Skin: Negative for rash and wound  Neurological: Positive for numbness (in feet)  Negative for dizziness, syncope and headaches  Hematological: Negative      Psychiatric/Behavioral: Negative for behavioral problems and sleep disturbance  The patient is not nervous/anxious  Objective:    Vitals:    02/09/18 0953   BP: 136/70   Pulse: 64   Resp: 16   Temp: 97 9 °F (36 6 °C)        Physical Exam   Constitutional: She is oriented to person, place, and time  She appears well-developed and well-nourished  No distress  HENT:   Head: Normocephalic and atraumatic  Right Ear: External ear normal    Left Ear: External ear normal    Mouth/Throat: Oropharynx is clear and moist    Eyes: Conjunctivae are normal  Pupils are equal, round, and reactive to light  Cardiovascular: Normal rate, regular rhythm and normal heart sounds  No murmur heard  BL LE edema 1+  No calf tenderness BL  Pulmonary/Chest: Effort normal and breath sounds normal  She has no wheezes  She has no rales  Abdominal: Soft  Bowel sounds are normal  There is no tenderness  Musculoskeletal:   Patient ambulates with a walker  BL knee exam: crepitus with ROM, mild swelling  Neurological: She is alert and oriented to person, place, and time  Skin: Skin is warm and dry  No rash noted  Psychiatric: She has a normal mood and affect  Her behavior is normal    Nursing note and vitals reviewed

## 2018-02-12 ENCOUNTER — TELEPHONE (OUTPATIENT)
Dept: FAMILY MEDICINE CLINIC | Facility: CLINIC | Age: 83
End: 2018-02-12

## 2018-02-13 ENCOUNTER — OFFICE VISIT (OUTPATIENT)
Dept: PHYSICAL THERAPY | Age: 83
End: 2018-02-13
Payer: COMMERCIAL

## 2018-02-13 DIAGNOSIS — M70.52 PES ANSERINUS BURSITIS OF LEFT KNEE: Primary | ICD-10-CM

## 2018-02-13 PROCEDURE — 97110 THERAPEUTIC EXERCISES: CPT | Performed by: PHYSICAL THERAPIST

## 2018-02-13 NOTE — PROGRESS NOTES
Daily Note     Today's date: 2018  Patient name: Claudio Das  :   MRN: [id number]  Referring provider: Katarina Mitchell MD  Dx:   No diagnosis found  Subjective:   Patient reports increased L knee buckling today, L knee pain persists  8/10 knee pain today  Objective: See treatment diary below    Precautions: Decreased balance, HTN, DM-diet controlled     Daily Treatment Diary      Manual             Hip adductor, L 5 min 5 min 5'  5'    5'                                         Exercise Diary                        Nu step L1 x10' L3  x10' L3  10'  L3 x 10 min  L3   10 min  L 3  10'           QS 20x 5" 20x :05 20x5"  20x :05  20x :03 20x  :05           Glut sets 20x5" 20x :05 20x5"  20x :05  20x :03  20x  :05           Heel slides, L 10x 10x 10x  20x  20x  20x           SAQ, B 2x 10 2x 10 1 5#  20x  1 5#  20x  1 5#  20x  2#  20x           Ball squeezes 20x 5" 20x :05 20x5"  20x :05  20x :05  20x  :05           Hip abd Red  20x 5" Red  20x :05 green  20x  green  20x  green  20x green  20x                                   Stand hip 3 way   10x 10x  20x  20x  20x           Stand ham curl   10x 10x  20x  20x  20x           HR 20x 20x 20x  20x  20x  20x                                   Progression:                       Side stepping NT NT    NT  NT   NT           Tandem stance NT NT    NT  NT   NT           SLS NT NT    NT  NT   NT           Biodex NT NT    NT  NT   NT                                   Functional training sit/stand   5x 2x 5  Hi/lo  2x 5  Hi/lo  2x 5  Hi/lo   NT                                         Modalities                                                                                                  Assessment: Corrected knee position with heel slides- tends to externally rotate hip with heel slides - medial knee pain subsides with change in LE position   Appears to fire hip adductors to compensate for quad weakness with heel slides, with walking, etc which may be attributing to patient's pain  Knee pain subsided with mat TE   Aggravated with standing exercises, modified to 10 reps on each leg, then repeated  TR performed seated  Plan: Continue per plan of care

## 2018-02-15 ENCOUNTER — OFFICE VISIT (OUTPATIENT)
Dept: PHYSICAL THERAPY | Age: 83
End: 2018-02-15
Payer: COMMERCIAL

## 2018-02-15 DIAGNOSIS — M70.52 PES ANSERINUS BURSITIS OF LEFT KNEE: Primary | ICD-10-CM

## 2018-02-15 PROCEDURE — G8980 MOBILITY D/C STATUS: HCPCS

## 2018-02-15 PROCEDURE — 97140 MANUAL THERAPY 1/> REGIONS: CPT

## 2018-02-15 PROCEDURE — 97110 THERAPEUTIC EXERCISES: CPT

## 2018-02-15 PROCEDURE — G8979 MOBILITY GOAL STATUS: HCPCS

## 2018-02-15 NOTE — PROGRESS NOTES
Daily Note     Today's date: 2/15/2018  Patient name: Jose Cat  :   MRN: [id number]  Referring provider: Marito Ochoa MD  Dx:   Encounter Diagnosis   Name Primary?  Pes anserinus bursitis of left knee Yes                   Subjective:  L knee pain remains elevated  Difficulty walking even with 4 wheeled RW due to pain and sensation of possible knee buckling  Reports working on correcting LLE positioning with gait  Notes difficult to maintain and increased knee pain       Objective: See treatment diary below    Precautions: Decreased balance, HTN, DM-diet controlled     Daily Treatment Diary      Manual  1/25 1/30 2/1  2/6  2/8  2/13  2/15         Hip adductor, L 5 min 5 min 5'  5'    5'  5'                                       Exercise Diary               2/15         Nu step L1 x10' L3  x10' L3  10'  L3 x 10 min  L3   10 min  L 3  10' L3 x 10 min         QS 20x 5" 20x :05 20x5"  20x :05  20x :03 20x  :05  20x :05         Glut sets 20x5" 20x :05 20x5"  20x :05  20x :03  20x  :05  20x :05         Heel slides, L 10x 10x 10x  20x  20x  20x  20x         SAQ, B 2x 10 2x 10 1 5#  20x  1 5#  20x  1 5#  20x  2#  20x  2#  20x         Ball squeezes 20x 5" 20x :05 20x5"  20x :05  20x :05  20x  :05  20x :05         Hip abd Red  20x 5" Red  20x :05 green  20x  green  20x  green  20x green  20x  green  20x                                 Stand hip 3 way   10x 10x  20x  20x  20x  20x         Stand ham curl   10x 10x  20x  20x  20x  20x         HR 20x 20x 20x  20x  20x  20x  20x                                 Progression:                       Side stepping NT NT    NT  NT   NT  2x         Tandem stance NT NT    NT  NT   NT  NT         SLS NT NT    NT  NT   NT  NT         Biodex NT NT    NT  NT   NT  NT                                 Functional training sit/stand   5x 2x 5  Hi/lo  2x 5  Hi/lo  2x 5  Hi/lo   NT  NT                                         Assessment: FOTO score improved from IE   Continues to have moderate difficulty with up/down stairs and performing usual housework  No change to walking two blocks with patient reporting she continues to have quite a bit of difficulty  Little to no difficulty with simple tasks and ADLs such as lifting groceries from floor and don/doff socks  Plan: Continue per plan of care

## 2018-02-15 NOTE — PROGRESS NOTES
PT Re-Evaluation     Today's date: 2/15/2018  Patient name: Michelle Carpenter  :   MRN: [id number]  Referring provider: Sumanth Burns MD  Dx:   Encounter Diagnosis   Name Primary?  Pes anserinus bursitis of left knee Yes       Start Time: 1400  Stop Time: 1450  Total time in clinic (min): 50 minutes    Assessment  Impairments: abnormal gait, abnormal or restricted ROM, activity intolerance, impaired balance, impaired physical strength, lacks appropriate home exercise program and pain with function  Functional limitations: Patient reports pain with sit>stand transfers, with IADLs, with prolonged standing and walking  Limitations in her daily walking program- unable to tolerate walking > 5-10 min d/t knee pain  Assessment details: Patient seen for re-assessment  Patient is making progress towards goals; however, continues to c/o moderate-severe knee pain with functional activities, walking and at rest  Patient's gait dysfunction, tight adductors seem to be attributing to the pain/deficit  PT focusing on hip/knee strengthening, stability, gait dysfunction to address pes anserine pain/deficits  Patient is making good progress towards goals  PT recommending patient to continue with PT   Understanding of Dx/Px/POC: good   Prognosis: good    Goals  ST  Increase MMT by 1/2 MMT - partially met  2  Increase ROM by 5-10 degrees - partially met  3  Decrease pain by 50% - partially met    LTG  1  Independent with HEP - MET  2  Patient to be able to perform sit>stand transfer with < 3/10 pain- not met  3  Improve gait pattern - as able  4  Patient to be able to tolerate her previous walking program of 15 min with RW     5  Patient to be able to tolerate standing (ie with meal prep) x ~5-10 min with <4/10 knee pain      Plan  Patient would benefit from: skilled PT  Planned modality interventions: thermotherapy: hydrocollator packs and cryotherapy  Planned therapy interventions: manual therapy, neuromuscular re-education, patient education, postural training, strengthening, stretching, therapeutic activities, therapeutic exercise, activity modification, balance, functional ROM exercises, gait training, home exercise program and transfer training  Frequency: 2x week  Duration in weeks: 4  Treatment plan discussed with: patient        Subjective Evaluation    History of Present Illness  Mechanism of injury: Patient feels therapy has helped somewhat with her knee pain, has some good days/bad days in regards to her pain  Patient noticing increase in knee pain over the past few days  Has tried walking outside (with her walker) ~ 5-10 min total to assist with regaining her ability to tolerate walking 15 min as she did previously    Pain  Current pain ratin  At best pain ratin  At worst pain ratin  Quality: dull ache, sharp, pressure and throbbing  Relieving factors: ice and rest  Aggravating factors: standing and walking  Progression: improved          Objective     Active Range of Motion   Left Hip   Flexion: 80 degrees   Abduction: 10 degrees     Right Hip   Flexion: 90 degrees   Abduction: 15 degrees   Left Knee   Flexion: WFL  Extension: -5 degrees     Right Knee   Flexion: WFL  Extension: -5 degrees   Left Ankle/Foot   Dorsiflexion (ke): 0 degrees     Right Ankle/Foot   Dorsiflexion (ke): 0 degrees     Additional Active Range of Motion Details  Passive hip abduction 20 degrees B    Strength/Myotome Testing     Left Hip   Planes of Motion   Flexion: 3+  Extension: 3+  Abduction: 3+  Adduction: 3+  External rotation: WFL    Right Hip   Planes of Motion   Flexion: 4-  Extension: 3+  Abduction: 3+  Adduction: 3+    Left Knee   Flexion: 4-  Extension: 4-    Right Knee   Flexion: 4-  Extension: 4-    Left Ankle/Foot   Dorsiflexion: 3+  Plantar flexion: 4-    Right Ankle/Foot   Dorsiflexion: 4-  Plantar flexion: 4-    Additional Strength Details  Pain with MMT both knee flexion/extension    Ambulation Ambulation: Level Surfaces   Ambulation with assistive device: independent    Additional Level Surfaces Ambulation Details  Patient ambulates with RW at all times, increased L knee valgus position during stance phase, decreased B step length, decreased gait speed, narrow base of support, significant trendelenburg gait pattern in each hip during SLS when walking/stepping without UE support  Recommended patient to use RW at all times with gait and transfers  Gait training during PT sessions - specifically to address hip weakness/knee control during stance phase to address knee pain  Patient reports less knee pain with corrected knee position during stance phase  Unable to consistently maintain this gait pattern throughout all gait           Flowsheet Rows    Flowsheet Row Most Recent Value   PT/OT G-Codes   Current Score  45   Projected Score  47   FOTO information reviewed  Yes   Assessment Type  Re-evaluation   G code set  Mobility: Walking & Moving Around

## 2018-02-20 ENCOUNTER — APPOINTMENT (OUTPATIENT)
Dept: PHYSICAL THERAPY | Age: 83
End: 2018-02-20
Payer: COMMERCIAL

## 2018-02-21 LAB
ALBUMIN SERPL-MCNC: 4.3 G/DL (ref 3.6–5.1)
ALBUMIN/GLOB SERPL: 1.8 (CALC) (ref 1–2.5)
ALP SERPL-CCNC: 75 U/L (ref 33–130)
ALT SERPL-CCNC: 17 U/L (ref 6–29)
AST SERPL-CCNC: 16 U/L (ref 10–35)
BILIRUB SERPL-MCNC: 0.8 MG/DL (ref 0.2–1.2)
BUN SERPL-MCNC: 26 MG/DL (ref 7–25)
BUN/CREAT SERPL: 20 (CALC) (ref 6–22)
CALCIUM ALBUM COR SERPL-MCNC: 9.6 MG/DL (CALC) (ref 8.6–10.2)
CALCIUM SERPL-MCNC: 9.5 MG/DL (ref 8.6–10.4)
CHLORIDE SERPL-SCNC: 98 MMOL/L (ref 98–110)
CO2 SERPL-SCNC: 26 MMOL/L (ref 20–31)
CREAT SERPL-MCNC: 1.27 MG/DL (ref 0.6–0.88)
GLOBULIN SER CALC-MCNC: 2.4 G/DL (CALC) (ref 1.9–3.7)
GLUCOSE SERPL-MCNC: 112 MG/DL (ref 65–99)
POTASSIUM SERPL-SCNC: 4.2 MMOL/L (ref 3.5–5.3)
PROT SERPL-MCNC: 6.7 G/DL (ref 6.1–8.1)
SL AMB EGFR AFRICAN AMERICAN: 42 ML/MIN/1.73M2
SL AMB EGFR NON AFRICAN AMERICAN: 37 ML/MIN/1.73M2
SODIUM SERPL-SCNC: 132 MMOL/L (ref 135–146)
TSH SERPL-ACNC: 1.35 MIU/L (ref 0.4–4.5)

## 2018-02-22 ENCOUNTER — APPOINTMENT (OUTPATIENT)
Dept: PHYSICAL THERAPY | Age: 83
End: 2018-02-22
Payer: COMMERCIAL

## 2018-02-27 ENCOUNTER — OFFICE VISIT (OUTPATIENT)
Dept: PHYSICAL THERAPY | Age: 83
End: 2018-02-27
Payer: COMMERCIAL

## 2018-02-27 DIAGNOSIS — M70.52 PES ANSERINUS BURSITIS OF LEFT KNEE: Primary | ICD-10-CM

## 2018-02-27 PROCEDURE — 97110 THERAPEUTIC EXERCISES: CPT

## 2018-02-27 NOTE — PROGRESS NOTES
Daily Note     Today's date: 2018  Patient name: Enma Pompa  :   MRN: [id number]  Referring provider: Meche Green MD  Dx:   Encounter Diagnosis     ICD-10-CM    1  Pes anserinus bursitis of left knee M70 52                   Subjective: Started wearing B compressions stockings  Patient reports LLE decreased below knee since start of wearing socks  Minimal changes to swelling in knee  Objective: See treatment diary below    Precautions: Decreased balance, HTN, DM - diet controlled    Specialty Daily Treatment Diary     Manual         Hip adductor, L 5 min                   Exercise Diary                 Nu step, L3 10 min       QS 20x :05       Glut set  20x :05       SAQ, B 2#  20x       Ball squeezes 20x :03       Hip abduction, TB Red  20x               Stand hip 3-way, B 20x       Stand ham curl, B 20x       HR 20x               Sidestepping  3x       Sit <> stand 2x 5               Tandem stance NT       SLS NT       Biodex NT                           Modalities                                    Assessment: Tolerated treatment well  Continuing with current plan of care  Patient presents with good technique with TE but does present with LLE ROM and strength deficits  Patient feeling swelling and pain in L knee limiting progression  Planning on rescheduling orthopedic appointment to earlier date feeling  is too long to wait to be seen  PT re-assessment pushed back as patient missing last week due to previously scheduled appointments and transportation issues  Plan: Continue per plan of care

## 2018-03-01 ENCOUNTER — APPOINTMENT (OUTPATIENT)
Dept: PHYSICAL THERAPY | Age: 83
End: 2018-03-01
Payer: COMMERCIAL

## 2018-03-01 ENCOUNTER — OFFICE VISIT (OUTPATIENT)
Dept: PHYSICAL THERAPY | Age: 83
End: 2018-03-01
Payer: COMMERCIAL

## 2018-03-01 DIAGNOSIS — M70.52 PES ANSERINUS BURSITIS OF LEFT KNEE: Primary | ICD-10-CM

## 2018-03-01 PROCEDURE — 97110 THERAPEUTIC EXERCISES: CPT

## 2018-03-01 NOTE — PROGRESS NOTES
Daily Note     Today's date: 3/1/2018  Patient name: Tari Rice  :   MRN: [id number]  Referring provider: Leticia Porter MD  Dx:   Encounter Diagnosis     ICD-10-CM    1  Pes anserinus bursitis of left knee M70 52                   Subjective: No significant changes to report    Objective: See treatment diary below    Precautions: Decreased balance, HTN, DM - diet controlled    Specialty Daily Treatment Diary     Manual  2/27 3/1      Hip adductor, L 5 min 5 min      MRE Hip Er, L  NV          Exercise Diary                 Nu step, L3 10 min 10 min      QS 20x :05 20x :05      Glut set  20x :05 20x :05      SAQ, B 2#  20x       Ball squeezes 20x :03 20x :03      Hip abduction, TB Red  20x Green  20x :03              Stand hip 3-way, B 20x 20x      Stand ham curl, B 20x 20x      HR 20x 20x              Sidestepping  3x 3x      Sit <> stand 2x 5 2x 5              Tandem stance NT NV      SLS NT NV      Biodex NT NV                          Modalities                                    Assessment: Patient making steady progress  Continues with L knee valgus and IR of hip with gait activities  Would benefit from additional hip ER strengthening  Plan: D/C standing TE NV in order to progress to balance activities  Add MRE hip ER

## 2018-03-02 ENCOUNTER — TELEPHONE (OUTPATIENT)
Dept: FAMILY MEDICINE CLINIC | Facility: CLINIC | Age: 83
End: 2018-03-02

## 2018-03-02 DIAGNOSIS — R60.0 BILATERAL LEG EDEMA: ICD-10-CM

## 2018-03-02 DIAGNOSIS — E11.22 TYPE 2 DM WITH CKD STAGE 3 AND HYPERTENSION (HCC): ICD-10-CM

## 2018-03-02 DIAGNOSIS — I73.9 PVD (PERIPHERAL VASCULAR DISEASE) (HCC): ICD-10-CM

## 2018-03-02 DIAGNOSIS — I12.9 TYPE 2 DM WITH CKD STAGE 3 AND HYPERTENSION (HCC): ICD-10-CM

## 2018-03-02 DIAGNOSIS — N18.30 TYPE 2 DM WITH CKD STAGE 3 AND HYPERTENSION (HCC): ICD-10-CM

## 2018-03-02 DIAGNOSIS — M15.9 GENERALIZED OSTEOARTHRITIS: ICD-10-CM

## 2018-03-02 DIAGNOSIS — M25.562 CHRONIC PAIN OF LEFT KNEE: Primary | ICD-10-CM

## 2018-03-02 DIAGNOSIS — G89.29 CHRONIC PAIN OF LEFT KNEE: Primary | ICD-10-CM

## 2018-03-02 NOTE — TELEPHONE ENCOUNTER
I placed order for hospital bed  Please fax it to [address]  supply    Please check with [facility] if they have forms to fill out for prior authorization

## 2018-03-06 ENCOUNTER — OFFICE VISIT (OUTPATIENT)
Dept: PHYSICAL THERAPY | Age: 83
End: 2018-03-06
Payer: COMMERCIAL

## 2018-03-06 DIAGNOSIS — M70.52 PES ANSERINUS BURSITIS OF LEFT KNEE: Primary | ICD-10-CM

## 2018-03-06 PROCEDURE — G8979 MOBILITY GOAL STATUS: HCPCS | Performed by: PHYSICAL THERAPIST

## 2018-03-06 PROCEDURE — 97110 THERAPEUTIC EXERCISES: CPT | Performed by: PHYSICAL THERAPIST

## 2018-03-06 PROCEDURE — 97112 NEUROMUSCULAR REEDUCATION: CPT | Performed by: PHYSICAL THERAPIST

## 2018-03-06 PROCEDURE — G8980 MOBILITY D/C STATUS: HCPCS | Performed by: PHYSICAL THERAPIST

## 2018-03-06 NOTE — PROGRESS NOTES
Daily Note     Today's date: 3/6/2018  Patient name: Whitney Robledo  :   MRN: [id number]  Referring provider: Drake Fernandes MD  Dx:   Encounter Diagnosis     ICD-10-CM    1  Pes anserinus bursitis of left knee M70 52                   Subjective: Patient feeling that she's not making any progress in the past 2 weeks, continues to feel knee pain at rest and with activity  Patient feeling discouraged  PT talked with patient, recommended her to call MD again and set up another appt if able and talked about patient exercising at home and discharging PT   Objective: See treatment diary below    Precautions: Decreased balance, HTN, DM - diet controlled    Specialty Daily Treatment Diary     Manual  2/27 3/1 3/6     Hip adductor, L 5 min 5 min 5 min     MRE Hip Er, L  NV          Exercise Diary                 Nu step, L3 10 min 10 min 10'     QS 20x :05 20x :05 20x :05     Glut set  20x :05 20x :05 20x :05     SAQ, B 2#  20x       Ball squeezes 20x :03 20x :03 20x :03     Hip abduction, TB Red  20x Green  20x :03 Green  20x :03             Stand hip 3-way, B 20x 20x 20x     Stand ham curl, B 20x 20x 20x     HR 20x 20x 20x             Sidestepping  3x 3x 3x     Sit <> stand 2x 5 2x 5              Tandem stance NT NV      SLS NT NV      Biodex NT NV                          Modalities                                    Assessment: Patient unable to perform balance exercises today d/t increasing knee pain  Patient re-assessed- see re-assessment for further information  Plan: D/C PT

## 2018-03-07 NOTE — PROCEDURES
Procedure    Surgeon: DR Jocelyn Sarabia   Procedure: LEFT TSA   Cognitive Assessment   Cognitive Assessment: Mini- Cog Total Score: Kassidy Monteroch DENIES   Depression Screening   Depression Screening: Total Score: Kassidy Redding DENIES   Cardiac Risk Factors Include:   1) ANEURYSM    2)    3) CAD   4) DM   5) HTN      Pulmonary Risk Factors Include:   1)    Patient was provided and educated on an incentive spirometer  Functional/Performance Assessment   The patient answered 0 of the 8 questions with "No"  A caregiver does not assist patient   Able to stand up from a chair by themselves and on the first try  Able to get dressed by self  Able to bathe by self  Able to make own meals  Able to get to bathroom by self  There is a bathroom in the home on the same floor they will sepnd most of their time in after surgery  Patient is able to obtain assistance after surgery in their home from a relative or other caregiver that does not reside with them  Home health services have not been utilized in the past year   ~He/he owns and uses adaptive equipment and has experienced 0 fall(s) in the last year  Frailty Assessment   Frailty Score: 2  inpatient physical therapy consult is appropriate  INPATIENT   Gait & Mobility Assessment The patient did not require more than 15 seconds to complete TGUT  Nutritional Assessment r /rs  had not experienced unexplained weightloss in the past year  Caregiver burden score: (2) Moderate burden   Summary: THIS PATIENT HAD GERIATRIC SCREENING RT AGE AND HAVING SURGERY  THE PATIENT WALKED INTO PAT WITH A ROLLER WALKER AND DID WELL/     NO STEP INTO HER PLACE, SHE HAS ELEVATORS AS SHE LIVES IN AN INDEPENDENT LIVING  DENIES FALLS  DENIES WT  LOSS  DENIES DEPRESSION  PATIENT WISHES TO GO TO REHAB AFTER DC FROM SURGERY        Active Problems     1  Allergic rhinitis (477 9) (J30 9)   2  Aneurysm of abdominal aorta (441 4) (I71 4)   3  Anxiety (300 00) (F41 9)   4   Aortoiliac stenosis (440 0) (I70 0)   5  Chronic pain syndrome (338 4) (G89 4)   6  Constipation (564 00) (K59 00)   7  Diabetic peripheral neuropathy (250 60,357 2) (E11 42)   8  First degree AV block (426 11) (I44 0)   9  Gait disturbance (781 2) (R26 9)   10  History of endovascular stent graft for abdominal aortic aneurysm (V43 4) (Z95 828)   11  Initial Medicare annual wellness visit (V70 0) (Z00 00)   12  Leg cramps (729 82) (R25 2)   13  Lumbosacral spondylosis without myelopathy (721 3) (M47 817)   14  Osteoarthritis (715 90) (M19 90)   15  Osteopenia (733 90) (M85 80)   16  Peripheral neuropathy (356 9) (G62 9)   17  Postoperative state (V45 89) (Z98 89)   18  PVD (peripheral vascular disease) (443 9) (I73 9)   19  Renal cyst, acquired (593 2) (N28 1)   20  Seasonal allergies (477 9) (J30 2)   21  Shoulder pain (719 41) (M25 519)   22  Spinal stenosis (724 00) (M48 00)   23  Status post aortic aneurysm repair (V45 89) (Z98 89,Z86 79)    Hypothyroidism (244 9) (E03 9)       Type 2 diabetes mellitus (250 00) (E11 9)       Hypertension (401 9) (I10)       Hyperlipidemia (272 4) (E78 5)       Hyponatremia (276 1) (E87 1)       Preoperative cardiovascular examination (V72 81) (Z01 810)       Allergic urticaria (708 0) (L50 0)       Chronic kidney disease, stage 3 (585 3) (N18 3)       Left rotator cuff tear arthropathy (716 81) (K25 774)          Current Meds    1  Fluticasone Propionate 50 MCG/ACT Nasal Suspension; use 2 spr  in each nostril daily; Therapy: 88DRM0728 to (Last Rx:04Feb2016)  Requested for: 83ZUL5986 Ordered    2  LORazepam 0 5 MG Oral Tablet; take 1/2 tab  - 1 tablet daily PRN for anxiety; Therapy: 47GIW7688 to (Last Rx:13Oct2014) Ordered    3  N-Acetyl-L-Cysteine 600 MG Oral Capsule; Take 2 tablets by mouth the evening before   CT angiogram  Repeat 2 tablets by mouth at 6 AM the day of CT angiogram;   Therapy: 74VIT6647 to (Last Rx:57Myi8446)  Requested for: 13SWJ5471 Ordered    4   Polyethylene Glycol 3350 Oral Powder; MIX 1 CAPFUL (17GM) IN 8 OUNCES OF WATER,   JUICE, OR TEA AND DRINK DAILY; Therapy: 65BXJ6051 to (Evaluate:05Jan2016); Last Rx:09Jsh0335 Ordered    5  Amlodipine-Atorvastatin 10-20 MG Oral Tablet; Take 1 tablet daily; Therapy: 45DTT7484 to (Evaluate:27Oct2016)  Requested for: 81QNB9322; Last   Rx:02Nov2015 Ordered   6  Losartan Potassium 100 MG Oral Tablet; take 1 tablet once daily  Requested for:   24Xtm5251; Last Rx:16Jun2015 Ordered    7  Sodium Chloride 1 GM Oral Tablet; Take 1 tab  Daily; Therapy: 56Neh9821 to (Last Rx:98Bjv7866)  Requested for: 69Onn3944 Ordered    8  Levothyroxine Sodium 137 MCG Oral Tablet; take 1 tablet daily before breakfast;   Therapy: 92KYC8436 to (Evaluate:25Mar2017)  Requested for: 55PFK6725; Last   Rx:30Mar2016 Ordered    9  Fexofenadine HCl - 180 MG Oral Tablet; take 1 tab daily; Therapy: 24SGH5303 to (Last Rx:44Tmi2937) Ordered   10  Triamcinolone Acetonide 0 1 % External Cream; Apply BID to affected areas; Therapy: 98QQD2056 to (Evaluate:23Dec2015)  Requested for: 55BUZ9811; Last    Rx:76Mvx4025 Ordered    11  OneTouch Delica Lancets Fine Miscellaneous; CHECK BLOOD SUGAR 1 TIME DAILY    OR  AS NEEDED; Therapy: 20KMP7533 to (Last Rx:25Qpv4842)  Requested for: 07PZT2908 Ordered   12  OneTouch Lancets Miscellaneous; TEST DAILY; Therapy: 80VJF0630 to (Kathey Hammans)  Requested for: 40Eoc9676; Last    Rx:97Tit2511 Ordered   13  OneTouch Ultra Blue In Vitro Strip; USE AS DIRECTED; Therapy: 50RMF1358 to (Evaluate:08Jan2017)  Requested for: 20Jan2016; Last    Rx:15Jan2016 Ordered   14  OneTouch Ultra Mini w/Device Kit; Test BS daily and as needed; Therapy: 73CIH3790 to (Last Rx:15Jan2016)  Requested for: 20Jan2016 Ordered   15  OneTouch UltraSoft Lancets Miscellaneous; TEST BLOOD SUGAR ONE TIME DAILY; Therapy: 40ACE6723 to (Evaluate:39Nmv8113)  Requested for: 20Jan2016; Last    Rx:15Jan2016 Ordered    16   B-12 500 MCG Oral Tablet; TAKE 1 TABLET DAILY; Therapy: (Recorded:57Mse8922) to Recorded   17  Calcium + D TABS; Therapy: (Recorded:30Ahp7522) to Recorded   18  Cranberry CAPS Recorded   19  Docusate Sodium 100 MG Oral Tablet; TAKE 1 TABLET DAILY AS DIRECTED; Therapy: 57DLK8308 to Recorded   20  Magnesium 250 MG Oral Tablet Recorded   21  MethylPREDNISolone (Liu) 4 MG TABS; TAKE AS DIRECTED ON PATIENT    INSTRUCTION CARD; Therapy: (Recorded:26Xsb6965) to Recorded   22  Multivitamin & Mineral Oral Liquid Recorded    Allergies    1  Bextra TABS   2  Darvocet-N 100 TABS   3  Cataflam TABS   4  Daypro TABS   5   Percocet TABS    Signatures   Electronically signed by : MADHAVI Browne; May 25 2016  1:20PM EST                       (Author)    Electronically signed by : WILMER Hudson ; May 27 2016 10:41AM EST                       (Author)

## 2018-03-08 ENCOUNTER — APPOINTMENT (OUTPATIENT)
Dept: PHYSICAL THERAPY | Age: 83
End: 2018-03-08
Payer: COMMERCIAL

## 2018-03-08 NOTE — PROGRESS NOTES
PT Discharge    Today's date: 3/8/2018  Patient name: Antony Underwood  :   MRN: [id number]  Referring provider: Lori Watts MD  Dx:   Encounter Diagnosis     ICD-10-CM    1  Pes anserinus bursitis of left knee M70 52        Start Time: 1400  Stop Time: 1450  Total time in clinic (min): 50 minutes    Assessment  Impairments: abnormal or restricted ROM, impaired balance, impaired physical strength and pain with function    Assessment details: Patient seen for brief re-assessment  PT recommending patient to discharge and to follow up with MD   Patient responded well to PT initially after ~ 4 weeks, then pain started to increase again, swelling in knee joint increased with no change to PT exercise plan  Patient has compression stockings that she wears regularly  PT educated patient about gait pattern, patient trying to improve gait by abducting L hip and using L hip muscles to control trendelenberg gait pattern; however, unable to consistently maintain  Thank you for your referrals  Understanding of Dx/Px/POC: good   Prognosis: fair    Goals  Impairment Goals to be met within 4 weeks  - Decrease pain by 50% - NOT MET  - Improve ROM by 5-10 degrees - NOT MET  - Increase strength by 1/2 MMT grade - NOT MET       Functional Goals to be met within 4-6 weeks  - Return to Prior Level of Function  - Increase Functional Status Measure to predicted level   - Patient will be independent with HEP         Plan  Treatment plan discussed with: patient        Subjective Evaluation    History of Present Illness  Mechanism of injury: Patient continues to c/o increased knee pain, unsure if she's seeing results from PT as she normally does    Pain  Current pain ratin  At best pain rating: 3  At worst pain ratin  Location: L knee  Quality: dull ache and sharp  Aggravating factors: standing and walking          Objective     Active Range of Motion   Left Hip   Flexion: 90 degrees     Right Hip   Flexion: 90 degrees   Left Knee   Flexion: 90 degrees   Extension: -5 degrees     Right Knee   Flexion: 95 degrees   Extension: 0 degrees   Left Ankle/Foot   Dorsiflexion (ke): 0 degrees     Right Ankle/Foot   Dorsiflexion (ke): 0 degrees     Strength/Myotome Testing     Left Hip   Planes of Motion   Flexion: 4-  Abduction: 3+  Adduction: 4-    Right Hip   Planes of Motion   Flexion: 3+  Abduction: 3+  Adduction: 4-    Left Knee   Flexion: 4-  Extension: 4-    Right Knee   Flexion: 4-  Extension: 4-    Left Ankle/Foot   Dorsiflexion: 4  Plantar flexion: 4    Right Ankle/Foot   Dorsiflexion: 4  Plantar flexion: 4    Ambulation     Ambulation: Level Surfaces     Additional Level Surfaces Ambulation Details  Patient ambulates with 4 wheel RW with the following gait dysfunction- decreased and unequal stride length, decreased gait speed, trendelenberg gait pattern, keeps L knee/LE adducted during swing phase and stance phase  L knee pain decreases if patient is able to actively abduct L LE during stance phase; however, unable to consistently maintain

## 2018-03-11 PROBLEM — E11.9 TYPE 2 DIABETES MELLITUS (HCC): Status: ACTIVE | Noted: 2018-02-08

## 2018-03-11 PROBLEM — M25.462 KNEE EFFUSION, LEFT: Status: ACTIVE | Noted: 2018-01-16

## 2018-03-11 PROBLEM — L21.9 SEBORRHEIC DERMATITIS OF SCALP: Status: ACTIVE | Noted: 2017-06-08

## 2018-03-11 PROBLEM — E78.5 HYPERLIPIDEMIA: Status: ACTIVE | Noted: 2018-02-08

## 2018-03-11 PROBLEM — R23.8 DRY SCALP: Status: ACTIVE | Noted: 2017-02-07

## 2018-03-13 ENCOUNTER — APPOINTMENT (OUTPATIENT)
Dept: PHYSICAL THERAPY | Age: 83
End: 2018-03-13
Payer: COMMERCIAL

## 2018-03-13 ENCOUNTER — OFFICE VISIT (OUTPATIENT)
Dept: FAMILY MEDICINE CLINIC | Facility: CLINIC | Age: 83
End: 2018-03-13
Payer: COMMERCIAL

## 2018-03-13 VITALS
BODY MASS INDEX: 26.22 KG/M2 | HEIGHT: 63 IN | DIASTOLIC BLOOD PRESSURE: 76 MMHG | HEART RATE: 67 BPM | WEIGHT: 148 LBS | OXYGEN SATURATION: 99 % | RESPIRATION RATE: 16 BRPM | TEMPERATURE: 97.5 F | SYSTOLIC BLOOD PRESSURE: 142 MMHG

## 2018-03-13 DIAGNOSIS — G89.29 CHRONIC PAIN OF LEFT KNEE: ICD-10-CM

## 2018-03-13 DIAGNOSIS — I73.9 PVD (PERIPHERAL VASCULAR DISEASE) (HCC): ICD-10-CM

## 2018-03-13 DIAGNOSIS — N18.30 CHRONIC KIDNEY DISEASE, STAGE 3 (HCC): ICD-10-CM

## 2018-03-13 DIAGNOSIS — E78.2 MIXED HYPERLIPIDEMIA: ICD-10-CM

## 2018-03-13 DIAGNOSIS — I10 ESSENTIAL HYPERTENSION: Primary | ICD-10-CM

## 2018-03-13 DIAGNOSIS — M25.562 CHRONIC PAIN OF LEFT KNEE: ICD-10-CM

## 2018-03-13 DIAGNOSIS — M15.9 GENERALIZED OSTEOARTHRITIS: ICD-10-CM

## 2018-03-13 DIAGNOSIS — R26.2 AMBULATORY DYSFUNCTION: ICD-10-CM

## 2018-03-13 DIAGNOSIS — N18.30 TYPE 2 DM WITH CKD STAGE 3 AND HYPERTENSION (HCC): ICD-10-CM

## 2018-03-13 DIAGNOSIS — E03.9 ACQUIRED HYPOTHYROIDISM: ICD-10-CM

## 2018-03-13 DIAGNOSIS — E11.22 TYPE 2 DM WITH CKD STAGE 3 AND HYPERTENSION (HCC): ICD-10-CM

## 2018-03-13 DIAGNOSIS — I12.9 TYPE 2 DM WITH CKD STAGE 3 AND HYPERTENSION (HCC): ICD-10-CM

## 2018-03-13 DIAGNOSIS — R60.0 LEG EDEMA, LEFT: ICD-10-CM

## 2018-03-13 PROBLEM — I87.2 CHRONIC VENOUS INSUFFICIENCY: Status: ACTIVE | Noted: 2018-02-08

## 2018-03-13 PROCEDURE — 99215 OFFICE O/P EST HI 40 MIN: CPT | Performed by: FAMILY MEDICINE

## 2018-03-13 RX ORDER — KETOCONAZOLE 20 MG/ML
1 SHAMPOO TOPICAL ONCE
COMMUNITY
End: 2020-02-03

## 2018-03-13 RX ORDER — TRAMADOL HYDROCHLORIDE 50 MG/1
TABLET ORAL
Qty: 30 TABLET | Refills: 0 | Status: SHIPPED | OUTPATIENT
Start: 2018-03-13 | End: 2018-03-19 | Stop reason: SDUPTHER

## 2018-03-13 RX ORDER — CLOBETASOL PROPIONATE 0.5 MG/G
1 CREAM TOPICAL 2 TIMES DAILY
COMMUNITY
End: 2019-09-05

## 2018-03-13 NOTE — PROGRESS NOTES
Assessment/Plan:    1  HTN - stable  Continue current BP medications  Monitor BP at home       2  Hypothyroidism -continue Levothyroxine 137 mcg daily  3 Type 2 DM - diet controlled  Hb A1C 5 9 in  10/17  Monitor blood sugar 2-3 times per week  Follow a low carb  diet       4  CKD stage 3 - creatinine level is stable  Will continue to monitor labs  Recommended to stay well hydrated, avoid NSAIDs      5  Hyperlipidemia - continue statin therapy       6  Ambulatory dysfunction - discussed fall precautions with patient  Continue to use a walker for ambulation to prevent falls      7  Severe left knee pain secondary to OA / L knee swelling - recommended to followup with orthopedic surgeon Dr Mayo Agent  Rx given for Tramadol 50 mg to take 1/2 tab- 1 tablet every 8 hr  PRN for severe pain  Take Tylenol PRN for moderate pain  Discussed side effects with patient and her daughter  8 L LE edema - will order Venous Doppler L LE to r/o DVT  Use compression stoking for chronic venous insufficency Bl Le  Schedule follow-up visit in 4 months  Check labs prior to next visit          Diagnoses and all orders for this visit:    Essential hypertension  -     Comprehensive metabolic panel; Future    Acquired hypothyroidism  -     TSH, 3rd generation with T4 reflex; Future    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)  -     Comprehensive metabolic panel; Future  -     Lipid panel; Future  -     HEMOGLOBIN A1C W/ EAG ESTIMATION; Future  -     Microalbumin / creatinine urine ratio; Future    Chronic kidney disease, stage 3  -     CBC and differential; Future  -     Comprehensive metabolic panel; Future    Mixed hyperlipidemia    Ambulatory dysfunction    Generalized osteoarthritis  -     traMADol (ULTRAM) 50 mg tablet; Take 1/2 tab -1 tablet every 8 hr  PRN for pain    PVD (peripheral vascular disease) ([facility])    Leg edema, left  -     VAS lower limb venous duplex study, unilateral/limited;  Future    Chronic pain of left knee    Other orders  -     clobetasol (TEMOVATE) 0 05 % cream; Apply 1 application topically 2 (two) times a day  -     ketoconazole (NIZORAL) 2 % shampoo; Apply 1 application topically once          Subjective:      Patient ID: Nel Celestin is a 80 y o  female  HPI     Patient is 70-year-old female with HTN, Type 2 DM- diet controlled, Hyperlipidemia, Hypothyroidism, peripheral neuropathy, CKD stage 3, Hyponatremia, PVD, DJD, severe OA BL knee, ambulatory dysfunction  Patient presents for 4 month followup visit accompanied by her daughter  Reviewed all current medications, blood work results from 2/20/18  TSH 1 35, fasting blood sugar 112, potassium 4 2, creatinine 1 27- stable, GFR 37  HTN - BP is stable  Patient denies Cp, SOB, dizziness  Type 2 DM - diet controlled  Hb A1C was 5 8 in 10/17  Patient checks blood sugar at home 2-3 times per week  Blood sugar ranges 103-120  Hypothyroidism - currently on Levothyroxine 137 mcg daily  Weight has been stable  Patient has PVD, S/P endovascular AAA repair performed by vascular surgeon Dr Efrain Blandon in 3/16  Patient walks with a walker  Denies falls  Patient's  daughter visits frequently, supervises all medications, provides transportation, helps with social activities  Patient c/o severe pain in L knee, swelling, difficulty ambulating  She has been followed by orthopedic surgeon Dr Brenton Connell, last seen in 1/18  She takesTylenol 500 mg - 2 tablets daily for pain with minimal relief  C/o worsening  L leg edema  Denies prior H/o DVT  No recent leg injury  Patient  is  seeing podiatrist Dr Collette Shetty every 3 months  Prevnar 13 done in 9/15       The following portions of the patient's history were reviewed and updated as appropriate: allergies, current medications, past family history, past medical history, past social history, past surgical history and problem list     Review of Systems   Constitutional: Positive for fatigue (mild )  Negative for activity change, appetite change, chills, fever and unexpected weight change  HENT: Positive for hearing loss  Negative for ear pain, sore throat and trouble swallowing  Eyes: Negative for pain, redness and visual disturbance  Respiratory: Negative for cough, chest tightness, shortness of breath and wheezing  Cardiovascular: Positive for leg swelling (worsening L LE edema)  Negative for chest pain and palpitations  Gastrointestinal: Positive for constipation (occasional)  Negative for abdominal pain, blood in stool, nausea and vomiting  Genitourinary: Negative for dysuria, frequency, hematuria and pelvic pain  Musculoskeletal: Positive for arthralgias (BL knee pain, L > R ) and joint swelling (L knee)  Negative for myalgias  Joint stiffness  Gait instability   Skin: Negative for rash and wound  Neurological: Positive for numbness (in feet)  Negative for dizziness, syncope and headaches  Hematological: Negative  Psychiatric/Behavioral: Negative for behavioral problems and sleep disturbance  The patient is not nervous/anxious  Objective:      /76 (BP Location: Left arm, Patient Position: Sitting, Cuff Size: Adult)   Pulse 67   Temp 97 5 °F (36 4 °C) (Tympanic)   Resp 16   Ht 5' 3" (1 6 m)   Wt 67 1 kg (148 lb)   SpO2 99%   BMI 26 22 kg/m²     BP recheck 124/68  Physical Exam   Constitutional: She is oriented to person, place, and time  She appears well-developed and well-nourished  No distress  HENT:   Head: Normocephalic and atraumatic  Right Ear: External ear normal    Left Ear: External ear normal    Mouth/Throat: Oropharynx is clear and moist    Eyes: Conjunctivae are normal  Pupils are equal, round, and reactive to light  Cardiovascular: Normal rate, regular rhythm and normal heart sounds  No murmur heard  R LE edema 1+  L LE edema 2+   Mild L calf tenderness    No carotid bruits BL   Pulmonary/Chest: Effort normal and breath sounds normal  She has no wheezes  She has no rales  Abdominal: Soft  Bowel sounds are normal  There is no tenderness  Musculoskeletal:   Patient ambulates with a walker  BL knee exam: crepitus with ROM  L knee is swollen, tender over medial joint line  Neurological: She is alert and oriented to person, place, and time  Skin: Skin is warm and dry  No rash noted  Psychiatric: She has a normal mood and affect  Her behavior is normal    Nursing note and vitals reviewed

## 2018-03-14 ENCOUNTER — TELEPHONE (OUTPATIENT)
Dept: FAMILY MEDICINE CLINIC | Facility: CLINIC | Age: 83
End: 2018-03-14

## 2018-03-15 ENCOUNTER — HOSPITAL ENCOUNTER (OUTPATIENT)
Dept: ULTRASOUND IMAGING | Facility: HOSPITAL | Age: 83
Discharge: HOME | End: 2018-03-15
Payer: COMMERCIAL

## 2018-03-15 ENCOUNTER — OFFICE VISIT (OUTPATIENT)
Dept: OBGYN CLINIC | Facility: HOSPITAL | Age: 83
End: 2018-03-15
Payer: COMMERCIAL

## 2018-03-15 ENCOUNTER — TELEPHONE (OUTPATIENT)
Dept: OBGYN CLINIC | Facility: OTHER | Age: 83
End: 2018-03-15

## 2018-03-15 ENCOUNTER — APPOINTMENT (OUTPATIENT)
Dept: PHYSICAL THERAPY | Age: 83
End: 2018-03-15
Payer: COMMERCIAL

## 2018-03-15 VITALS
DIASTOLIC BLOOD PRESSURE: 62 MMHG | HEART RATE: 80 BPM | WEIGHT: 145 LBS | SYSTOLIC BLOOD PRESSURE: 151 MMHG | HEIGHT: 63 IN | BODY MASS INDEX: 25.69 KG/M2

## 2018-03-15 DIAGNOSIS — G89.29 CHRONIC PAIN OF LEFT KNEE: Primary | ICD-10-CM

## 2018-03-15 DIAGNOSIS — M25.462 EFFUSION OF LEFT KNEE: ICD-10-CM

## 2018-03-15 DIAGNOSIS — R60.0 LEG EDEMA, LEFT: ICD-10-CM

## 2018-03-15 DIAGNOSIS — M17.12 LOCALIZED OSTEOARTHRITIS OF LEFT KNEE: ICD-10-CM

## 2018-03-15 DIAGNOSIS — M25.562 CHRONIC PAIN OF LEFT KNEE: Primary | ICD-10-CM

## 2018-03-15 DIAGNOSIS — M70.52 PES ANSERINUS BURSITIS OF LEFT KNEE: ICD-10-CM

## 2018-03-15 PROCEDURE — 99213 OFFICE O/P EST LOW 20 MIN: CPT | Performed by: ORTHOPAEDIC SURGERY

## 2018-03-15 PROCEDURE — 20610 DRAIN/INJ JOINT/BURSA W/O US: CPT | Performed by: ORTHOPAEDIC SURGERY

## 2018-03-15 PROCEDURE — 93971 EXTREMITY STUDY: CPT | Performed by: SURGERY

## 2018-03-15 PROCEDURE — 93971 EXTREMITY STUDY: CPT

## 2018-03-15 RX ORDER — BUPIVACAINE HYDROCHLORIDE 2.5 MG/ML
2 INJECTION, SOLUTION INFILTRATION; PERINEURAL
Status: COMPLETED | OUTPATIENT
Start: 2018-03-15 | End: 2018-03-15

## 2018-03-15 RX ORDER — LIDOCAINE HYDROCHLORIDE 10 MG/ML
2 INJECTION, SOLUTION INFILTRATION; PERINEURAL
Status: COMPLETED | OUTPATIENT
Start: 2018-03-15 | End: 2018-03-15

## 2018-03-15 RX ORDER — BETAMETHASONE SODIUM PHOSPHATE AND BETAMETHASONE ACETATE 3; 3 MG/ML; MG/ML
6 INJECTION, SUSPENSION INTRA-ARTICULAR; INTRALESIONAL; INTRAMUSCULAR; SOFT TISSUE
Status: COMPLETED | OUTPATIENT
Start: 2018-03-15 | End: 2018-03-15

## 2018-03-15 RX ADMIN — LIDOCAINE HYDROCHLORIDE 2 ML: 10 INJECTION, SOLUTION INFILTRATION; PERINEURAL at 14:08

## 2018-03-15 RX ADMIN — BUPIVACAINE HYDROCHLORIDE 2 ML: 2.5 INJECTION, SOLUTION INFILTRATION; PERINEURAL at 14:08

## 2018-03-15 RX ADMIN — BETAMETHASONE SODIUM PHOSPHATE AND BETAMETHASONE ACETATE 6 MG: 3; 3 INJECTION, SUSPENSION INTRA-ARTICULAR; INTRALESIONAL; INTRAMUSCULAR; SOFT TISSUE at 14:08

## 2018-03-15 NOTE — PROGRESS NOTES
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)    Hypothyroidism    Chronic kidney disease, stage 3    Hyperlipidemia    Hyponatremia    Chronic venous insufficiency    Generalized osteoarthritis    Chronic pain of left knee    Ambulatory dysfunction    Bilateral leg edema    Allergic rhinitis    Anxiety    Aortoiliac stenosis ([facility])    Constipation    Diabetic peripheral neuropathy ([facility])    Dry scalp    First degree AV block    Gait disturbance    Knee effusion, left    Lumbosacral spondylosis without myelopathy    Microalbuminuria    Osteopenia    Peripheral neuropathy    Seborrheic dermatitis of scalp    Spinal stenosis    Leg edema, left    Effusion of left knee    Localized osteoarthritis of left knee    Pes anserinus bursitis of left knee           Plan      Left knee arthrocentesis with cortisone injection to both the joint and the left pes bursa  In regards to her bilateral lower edema Yanira Wooten was instructed to discuss this with her primary care physician as well as her use of compression stockings  Prescription for Voltaren gel was forwarded to her pharmacy to use locally for pain relief  She will follow up with her regular scheduled appointment in 1 month  Patient seen and examined and plan formulated with Dr Amelia Ramires  Subjective:     Patient ID:    Chief Ericka Jeffers 80 y o  female      HPI    80-year-old female whose known to us for conservative treatment of left knee osteoarthritis   She had her pes bursa and left knee joint aspirated/injected at her last visit here approximately 2 months ago  She has repeat swelling and presents today for treatment of this  The following portions of the patient's history were reviewed and updated as appropriate: allergies, current medications, past family history, past social history, past surgical history and problem list         Social History     Social History    Marital status:      Spouse name: N/A    Number of children: N/A    Years of education: N/A     Occupational History    RETIRED      Social History Main Topics    Smoking status: Never Smoker    Smokeless tobacco: Never Used    Alcohol use Yes      Comment: occasionally, DENIED: HISTORY OF ALCOHOL AS PER [employer]    Drug use: No    Sexual activity: Not on file     Other Topics Concern    Not on file     Social History Narrative    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 - [employer]     Past Medical History:   Diagnosis Date    AAA [facility]abdominal aortic aneurysm) ([facility] )     s/p EVAR 3/10/16    Allergic urticaria     LAST ASSESSED: [id number]    Appendicitis     Arthritis     Coronary artery disease     Diabetes mellitus ([facility] )     Eczema     Gross hematuria     LAST ASSESSED: [id number]    Hematuria     Hypertension     Hyponatremia     Hypothyroid     Osteoporosis     LAST ASSESSED: [id number]    Primary osteoarthritis of left knee     LAST ASSESSED: [id number]    Renal disorder     Renal insufficiency     Rotator cuff tear arthropathy     LEFT LAST ASSESSED: [id number]    Secondary osteoarthritis of right shoulder     LAST ASSESSED: [id number]    Syncope      Past Surgical History:   Procedure Laterality Date    APPENDECTOMY      BACK SURGERY      CATARACT EXTRACTION W/  INTRAOCULAR LENS IMPLANT Bilateral     KNEE ARTHROSCOPY Left     ONSET: [id number] THERAPEUTIC    LUMBAR LAMINECTOMY      MS AAA REPAIR,MODULR BIFURCATED PROSTH N/A 3/10/2016    Procedure: REPAIR ANEURYSM ENDOVASCULAR ABDOMINAL AORTIC  (EVAR) ;   Surgeon: Royce Quarles MD;  [facility];  Service: Vascular    MD RECONSTR TOTAL SHOULDER IMPLANT Left 5/31/2016    Procedure: ARTHROPLASTY SHOULDER REVERSE;  Surgeon: Moni Durand MD;  [facility];  Service: Orthopedics    REVERSE TOTAL SHOULDER ARTHROPLASTY Right     ROTATOR CUFF REPAIR Left     RC SURGERY RESOLVED: 1999    SHOULDER ARTHROPLASTY      5/15 - FOR RIGHT SHOULDER; 5/16 - FOR LEFT SHOULDER    SHOULDER ARTHROSCOPY      TOTAL HIP ARTHROPLASTY Left     ONSET: [id number]    TOTAL HIP ARTHROPLASTY  05/31/2006    REVISION     Allergies   Allergen Reactions    Bextra [Valdecoxib] Rash     GI INTOL    Percocet [Oxycodone-Acetaminophen] Rash and GI Intolerance     Current Outpatient Prescriptions on File Prior to Visit   Medication Sig Dispense Refill    acetaminophen (TYLENOL) 325 mg tablet Take 2 tablets (650 mg total) by mouth every 6 (six) hours as needed for mild pain  30 tablet 0    amLODIPine-atorvastatin (CADUET) 10-20 MG per tablet Take 1 tablet by mouth daily at bedtime   B-12, Methylcobalamin, 1000 MCG SUBL Take 1 tablet by mouth daily  B-12 500 MCG Oral Tablet TAKE 1 TABLET DAILY  Quantity: 30;  Refills: 0    Eagle Carley GUILLEN D ; Active       Blood Glucose Monitoring Suppl (ONE TOUCH ULTRA MINI) w/Device KIT by Does not apply route      calcium citrate-vitamin D (CITRACAL+D) 315-200 MG-UNIT per tablet Take 1 tablet by mouth daily  Calcium + D TABS  Refills: 0   , M D ; Active       clobetasol (TEMOVATE) 0 05 % cream Apply 1 application topically 2 (two) times a day      Docusate Sodium 100 MG capsule Take 1 tablet by mouth daily      fexofenadine (ALLEGRA) 180 MG tablet Take 1 tablet by mouth daily      Fluocinolone Acetonide Scalp (DERMA-SMOOTHE/FS SCALP) 0 01 % OIL Apply topically      fluticasone (FLONASE) 50 mcg/act nasal spray Fluticasone Propionate 50 MCG/ACT Nasal Suspension use 2 spr   in each nostril daily  Quantity: 1;  Refills: 5    Eagle Lilliania WILMER D ;  Started 23-Sep-2014 Active      glucose blood test strip by In Vitro route      ketoconazole (NIZORAL) 2 % shampoo Apply 1 application topically once      Lancets (ONETOUCH ULTRASOFT) lancets by Does not apply route      levothyroxine 137 mcg tablet Take by mouth daily  Levothyroxine Sodium 137 MCG Oral Tablet take 1 tablet daily before breakfast  Quantity: 90;  Refills: 3    Matilda Curry M D ;  Started 3-Oct-2014 Active       LORazepam (ATIVAN) 0 5 mg tablet Take by mouth      losartan (COZAAR) 100 MG tablet Take 100 mg by mouth daily  Losartan Potassium 100 MG Oral Tablet take 1 tablet once daily  Quantity: 90;  Refills: 3    Matilda Curry M D ; Active       Magnesium 250 MG TABS 1 tablet daily  Magnesium 250 MG Oral Tablet  Quantity: 0;  Refills: 0   , M D ; Active       Multiple Vitamins-Minerals (MULTIVITAMIN & MINERAL PO) 1 tablet daily  Multivitamin & Mineral Oral Liquid  Quantity: 0;  Refills: 0   , M D ; Active       Polyethylene Glycol 3350 GRAN Take 1 Dose by mouth daily as needed  Polyethylene Glycol 3350 Oral Powder MIX 1 CAPFUL (17GM) IN 8 OUNCES OF WATER, JUICE, OR TEA AND DRINK DAILY  Quantity: 557;  Refills: 0    Matilda Curry M D ;  Started 3-Dec-2015 Active       pyrithione zinc (HEAD AND SHOULDERS) 1 % shampoo Apply topically daily as needed for dandruff      sodium chloride 1 g tablet Take 1 tablet (1 g total) by mouth daily Sodium Chloride 1 GM Oral Tablet Take 1 tab  Daily  Quantity: 30;  Refills: 5    Matilda Curry M D ;  Started 30-Apr-2015 Active 30 tablet 6    traMADol (ULTRAM) 50 mg tablet Take 1/2 tab -1 tablet every 8 hr  PRN for pain 30 tablet 0     No current facility-administered medications on file prior to visit  Objective:  Bilateral lower extremity 1+ pitting pretibial edema is present to both lower legs  No posterior calf tenderness is present  She has a 2+ effusion to the left knee with tenderness along the pes bursa      Large joint arthrocentesis  Date/Time: 3/15/2018 2:08 PM  Consent given by: patient  Supporting Documentation  Indications: pain   Procedure Details  Location: knee - L knee  Needle size: 18 G  Ultrasound guidance: no  Approach: posterior  Medications administered: 2 mL bupivacaine 0 25 %; 2 mL lidocaine 1 %; 6 mg betamethasone acetate-betamethasone sodium phosphate 6 (3-3) mg/mL    Aspirate amount: 40 mL  Aspirate: yellow  Patient tolerance: patient tolerated the procedure well with no immediate complications      Large joint arthrocentesis  Date/Time: 3/15/2018 2:08 PM  Consent given by: patient  Supporting Documentation  Indications: pain   Procedure Details  Location: knee - L knee (pes bursa)  Needle size: 22 G  Ultrasound guidance: no  Approach: posterior  Medications administered: 2 mL bupivacaine 0 25 %; 2 mL lidocaine 1 %; 6 mg betamethasone acetate-betamethasone sodium phosphate 6 (3-3) mg/mL    Patient tolerance: patient tolerated the procedure well with no immediate complications            Ortho Exam        none    Portions of the record may have been created with voice recognition software   Occasional wrong word or "sound a like" substitutions may have occurred due to the inherent limitations of voice recognition software   Read the chart carefully and recognize, using context, where substitutions have occurred

## 2018-03-15 NOTE — TELEPHONE ENCOUNTER
Caller: Efraín Castillo  Ph: [phone]  Dr Belgica Lopes patient  Caller reports disclofenac sodium requires prior authorization

## 2018-03-16 NOTE — TELEPHONE ENCOUNTER
Patients daughter, Talya Pederson, calling  Ph: [phone]  Caller is checking the status of the pre-authorization for disclofenac  Please process

## 2018-03-16 NOTE — PROGRESS NOTES
Gave patient's daughter, Lyssa King, the results   Yesterday at Dr Flora Miles office, they removed 40cc of fluid from the [facility]

## 2018-03-19 ENCOUNTER — TELEPHONE (OUTPATIENT)
Dept: FAMILY MEDICINE CLINIC | Facility: CLINIC | Age: 83
End: 2018-03-19

## 2018-03-19 DIAGNOSIS — M15.9 GENERALIZED OSTEOARTHRITIS: ICD-10-CM

## 2018-03-19 RX ORDER — TRAMADOL HYDROCHLORIDE 50 MG/1
TABLET ORAL
Qty: 90 TABLET | Refills: 0 | Status: SHIPPED | OUTPATIENT
Start: 2018-03-19 | End: 2018-04-18 | Stop reason: SDUPTHER

## 2018-03-20 ENCOUNTER — APPOINTMENT (OUTPATIENT)
Dept: PHYSICAL THERAPY | Age: 83
End: 2018-03-20
Payer: COMMERCIAL

## 2018-03-22 ENCOUNTER — APPOINTMENT (OUTPATIENT)
Dept: PHYSICAL THERAPY | Age: 83
End: 2018-03-22
Payer: COMMERCIAL

## 2018-03-26 NOTE — TELEPHONE ENCOUNTER
Please assign this to someone well versed in prior authorizations as I unfortuantely am not  Thank you

## 2018-03-26 NOTE — TELEPHONE ENCOUNTER
Pt daughter called back wanted to know the statues please advise thanks   Callback# [phone] Jreamie Ag

## 2018-03-27 ENCOUNTER — OFFICE VISIT (OUTPATIENT)
Dept: FAMILY MEDICINE CLINIC | Facility: CLINIC | Age: 83
End: 2018-03-27
Payer: COMMERCIAL

## 2018-03-27 VITALS
HEART RATE: 70 BPM | RESPIRATION RATE: 16 BRPM | BODY MASS INDEX: 25.69 KG/M2 | HEIGHT: 63 IN | SYSTOLIC BLOOD PRESSURE: 132 MMHG | OXYGEN SATURATION: 99 % | WEIGHT: 145 LBS | DIASTOLIC BLOOD PRESSURE: 70 MMHG | TEMPERATURE: 97.7 F

## 2018-03-27 DIAGNOSIS — R26.2 AMBULATORY DYSFUNCTION: ICD-10-CM

## 2018-03-27 DIAGNOSIS — M15.9 GENERALIZED OSTEOARTHRITIS: ICD-10-CM

## 2018-03-27 DIAGNOSIS — I10 ESSENTIAL HYPERTENSION: ICD-10-CM

## 2018-03-27 DIAGNOSIS — E11.22 TYPE 2 DM WITH CKD STAGE 3 AND HYPERTENSION (HCC): ICD-10-CM

## 2018-03-27 DIAGNOSIS — I12.9 TYPE 2 DM WITH CKD STAGE 3 AND HYPERTENSION (HCC): ICD-10-CM

## 2018-03-27 DIAGNOSIS — G63 POLYNEUROPATHY ASSOCIATED WITH UNDERLYING DISEASE (HCC): Primary | ICD-10-CM

## 2018-03-27 DIAGNOSIS — N18.30 TYPE 2 DM WITH CKD STAGE 3 AND HYPERTENSION (HCC): ICD-10-CM

## 2018-03-27 DIAGNOSIS — I73.9 PERIPHERAL VASCULAR DISEASE (HCC): ICD-10-CM

## 2018-03-27 PROCEDURE — 99214 OFFICE O/P EST MOD 30 MIN: CPT | Performed by: FAMILY MEDICINE

## 2018-03-27 NOTE — PROGRESS NOTES
After Shoulder Arthroscopy  After your arthroscopy, you will recover in the hospital or surgery center for a few hours. In some cases, you may stay overnight. When you are able to go home, your healthcare provider will instruct you how to relieve any pain and how to care for your shoulder as it heals. To help with healing, your healthcare provider may prescribe a program of physical therapy (PT).  In the recovery room     Physical therapy can help you regain full use of your shoulder. Your program will be tailored to your surgery.   After surgery, youll be taken to a recovery area to rest. Youll have a bandage to protect your incisions, and a sling to hold your arm in place. In some cases, cold packs or a cooling unit may be used to reduce swelling in your shoulder.  Going home  Before leaving the hospital or surgery center, be sure to know how to care for your shoulder at home. Ask any questions you have. Also know who to contact if you have questions later. When you are ready to leave the hospital or surgery center, an adult family member or friend will need to drive you home.  At home  · Take prescribed pain medicines as directed. Dont wait for pain to get bad before you take them.  · Ice your shoulder 3 times a day for 20 minutes at a time. Use an ice machine (if given one) or a bag of ice or frozen peas. Put a thin cloth between your skin and the ice source.  · Take care of your incisions as directed. You can begin bathing again in 3 days.  · See your doctor for a follow-up visit, typically around 5 days after surgery.  · Wear your sling as directed until your healthcare provider indicates you no longer need it.  · Complete your physical therapy program.  · Avoid these activities: _____________, _____________, ______________  Call your healthcare provider if you have  · Fever over 100.4°F (38°C)  · Bleeding from an incision  · Increased shoulder pain or swelling  · A red or oozing incision  · Numbness or  Assessment/Plan:    She presents today for face-to-face examination in order  to get approved for pressure reducing support mattress pad  Patient has limited mobility due to severe genaralized osteoarthritis, altered sensory perception due to peripheral neuropathy, compromised circulatory status due to peripheral vascular disease  I believe that she will benefit from using hospital bed and pressure reducing support mattress pad to prevent pressure ulcers and improve level of pain while in bed  Discussed physical exam findings and recommendations with patient and her daughter  Continue all current medications  Discussed fall precautions, diet with patient  Diagnoses and all orders for this visit:    Polyneuropathy associated with underlying disease ([facility] )    Generalized osteoarthritis    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 hypertension ([facility] )    Peripheral vascular disease ([facility] )    Essential hypertension    Ambulatory dysfunction          Subjective:      Patient ID: Charli Salvador is a 80 y o  female  HPI     Patient is 55-year-old female with Type 2 DM , peripheral neuropathy, CKD stage 3, peripheral vascular disease,  severe generalized osteoarthritis,  ambulatory dysfunction  She presents to the office accompanied by her daughter for face-to-face examination in order  to get approved for pressure reducing support mattress pad  Patient's daughter will get a hospital bed from [address] and was told that her mother needs to have medical examination  to get approved for alternating pressure pad and pump system  Patient has limited mobility  C/o pain in R shoulder, back, BL knee pain, numbness in arms and legs, muscle cramping in both arms, decreased sensation in feet  No prior H/o pressure  ulcers  Patient has been followed by orthopedic surgeon Dr Denae Diaz , seen on 3/15/18 for persistent pain and swelling of left knee  Reviewed current medications   No changes since last visit  Patient ambulates with a walker  No recent falls  The following portions of the patient's history were reviewed and updated as appropriate: allergies, current medications, past family history, past medical history, past social history, past surgical history and problem list     Review of Systems   Constitutional: Positive for fatigue (mild )  Negative for activity change, appetite change, chills, fever and unexpected weight change  HENT: Positive for hearing loss  Negative for ear pain, sore throat and trouble swallowing  Eyes: Negative for pain, redness and visual disturbance  Respiratory: Negative for cough, chest tightness, shortness of breath and wheezing  Cardiovascular: Positive for leg swelling (worsening L LE edema)  Negative for chest pain and palpitations  Gastrointestinal: Positive for constipation (occasional)  Negative for abdominal pain, blood in stool, nausea and vomiting  Genitourinary: Negative for dysuria, frequency, hematuria and pelvic pain  No urinary incontinence   Musculoskeletal: Positive for arthralgias (BL knee pain, L > R ) and joint swelling (L knee)  Negative for myalgias  Joint stiffness  Gait instability   Skin: Negative for rash and wound  Neurological: Positive for numbness (legs, arms )  Negative for dizziness, syncope and headaches  Hematological: Negative  Psychiatric/Behavioral: Negative for behavioral problems and sleep disturbance  The patient is not nervous/anxious  Objective:      /70 (BP Location: Left arm, Patient Position: Sitting, Cuff Size: Adult)   Pulse 70   Temp 97 7 °F (36 5 °C) (Tympanic)   Resp 16   Ht 5' 3" (1 6 m)   Wt 65 8 kg (145 lb)   SpO2 99%   BMI 25 69 kg/m²          Physical Exam   Constitutional: She is oriented to person, place, and time  She appears well-developed and well-nourished  No distress  HENT:   Head: Normocephalic and atraumatic     Right Ear: External tingling that doesnt go away 24 hours after surgery  Date Last Reviewed: 9/13/2015  © 7953-6529 [employer]. [address] [place] [address], [place] [address]. All rights reserved. This information is not intended as a substitute for professional medical care. Always follow your healthcare professional's instructions.        Surgery for Shoulder Impingement: Your Experience  Surgery can help free up space in your shoulder joint. This relieves symptoms of impingement. Prepare for surgery as instructed. If you dont, your surgery may have to be rescheduled. Your healthcare provider will give you instructions for recovering at home. If you have any questions, be sure to get them answered.  Preparing for surgery  Here are ways to get ready:   · As instructed, stop taking aspirin or other anti-inflammatory medicines.  · Tell your healthcare provider about all medicines and herbs you take. Ask whether you should stop taking them before surgery.  · Dont eat or drink after midnight the night before surgery. This includes water, mints, and chewing gum.  · Arrange for an adult friend or family member to give you a ride home.  Recovering from surgery  Here is what to expect:   · You will be taken to a recovery area after surgery. A healthcare provider will give you medicine to help relieve discomfort.  · If you had arthroscopy, you might go home the same day. If you had open surgery, you may need to stay overnight.  · Before leaving the surgery center or hospital, make sure you know how to care for yourself at home. Taking medicine, using ice, and keeping your arm in a sling as instructed will help you recover faster.  · It may take a few months to feel the full benefit of the surgery.  When to call the healthcare provider  After surgery, its normal to feel some shoulder pain and numbness for the first few days. But call your healthcare provider if you notice any of the following:  · Excessive pain or  ear normal    Left Ear: External ear normal    Mouth/Throat: Oropharynx is clear and moist    Eyes: Conjunctivae are normal  Pupils are equal, round, and reactive to light  Cardiovascular: Normal rate, regular rhythm and normal heart sounds  No murmur heard  BL LE edema 1+   Diminished DP pulses BL    Pulmonary/Chest: Effort normal and breath sounds normal  She has no wheezes  She has no rales  Abdominal: Soft  Bowel sounds are normal  There is no tenderness  Musculoskeletal:   Patient ambulates with a walker  BL knee exam: crepitus with ROM  L knee is swollen, tender over medial joint line  Neurological: She is alert and oriented to person, place, and time  Decreased sensation to light touch in both feet   Skin: Skin is warm and dry  No rash noted  Psychiatric: She has a normal mood and affect  Her behavior is normal    Nursing note and vitals reviewed  swelling  · Excessive drainage from the wound  · Numbness in your fingers or hand  · Increased redness near an incision  · Fever of 100.4°F (38°C) or higher, or as directed by your healthcare provider   Date Last Reviewed: 10/17/2015  © 1508-6097 [employer]. [address] [place] [address]. All rights reserved. This information is not intended as a substitute for professional medical care. Always follow your healthcare professional's instructions.

## 2018-04-17 ENCOUNTER — OFFICE VISIT (OUTPATIENT)
Dept: OBGYN CLINIC | Facility: HOSPITAL | Age: 83
End: 2018-04-17
Payer: COMMERCIAL

## 2018-04-17 VITALS
BODY MASS INDEX: 25.7 KG/M2 | SYSTOLIC BLOOD PRESSURE: 156 MMHG | HEIGHT: 63 IN | HEART RATE: 76 BPM | WEIGHT: 145.06 LBS | DIASTOLIC BLOOD PRESSURE: 64 MMHG

## 2018-04-17 DIAGNOSIS — M25.562 CHRONIC PAIN OF LEFT KNEE: Primary | ICD-10-CM

## 2018-04-17 DIAGNOSIS — G89.29 CHRONIC PAIN OF LEFT KNEE: Primary | ICD-10-CM

## 2018-04-17 DIAGNOSIS — M25.462 EFFUSION OF LEFT KNEE: ICD-10-CM

## 2018-04-17 DIAGNOSIS — M17.12 LOCALIZED OSTEOARTHRITIS OF LEFT KNEE: ICD-10-CM

## 2018-04-17 PROCEDURE — 99213 OFFICE O/P EST LOW 20 MIN: CPT | Performed by: ORTHOPAEDIC SURGERY

## 2018-04-17 PROCEDURE — 20610 DRAIN/INJ JOINT/BURSA W/O US: CPT

## 2018-04-17 RX ORDER — BUPIVACAINE HYDROCHLORIDE 2.5 MG/ML
2 INJECTION, SOLUTION INFILTRATION; PERINEURAL
Status: COMPLETED | OUTPATIENT
Start: 2018-04-17 | End: 2018-04-17

## 2018-04-17 RX ORDER — BETAMETHASONE SODIUM PHOSPHATE AND BETAMETHASONE ACETATE 3; 3 MG/ML; MG/ML
12 INJECTION, SUSPENSION INTRA-ARTICULAR; INTRALESIONAL; INTRAMUSCULAR; SOFT TISSUE
Status: COMPLETED | OUTPATIENT
Start: 2018-04-17 | End: 2018-04-17

## 2018-04-17 RX ORDER — LIDOCAINE HYDROCHLORIDE 10 MG/ML
2 INJECTION, SOLUTION INFILTRATION; PERINEURAL
Status: COMPLETED | OUTPATIENT
Start: 2018-04-17 | End: 2018-04-17

## 2018-04-17 RX ADMIN — BETAMETHASONE SODIUM PHOSPHATE AND BETAMETHASONE ACETATE 12 MG: 3; 3 INJECTION, SUSPENSION INTRA-ARTICULAR; INTRALESIONAL; INTRAMUSCULAR; SOFT TISSUE at 10:29

## 2018-04-17 RX ADMIN — BUPIVACAINE HYDROCHLORIDE 2 ML: 2.5 INJECTION, SOLUTION INFILTRATION; PERINEURAL at 10:29

## 2018-04-17 RX ADMIN — LIDOCAINE HYDROCHLORIDE 2 ML: 10 INJECTION, SOLUTION INFILTRATION; PERINEURAL at 10:29

## 2018-04-17 NOTE — PROGRESS NOTES
89 y o female presents for re-evaluation of her left knee, known Oa  At her last visit on 3/15/18 her knee and pes bursa were both injected with cortisone  She has no pain at rest but varying pain when weight bearing  She takes diclofenac as well as tramadol  She uses a seated rolling walker to assist in ambulation  Review of Systems  Review of systems negative unless otherwise specified in HPI    Past Medical History  Past Medical History:   Diagnosis Date    AAA (abdominal aortic aneurysm) ([address] )     s/p EVAR 3/10/16    Allergic urticaria     LAST ASSESSED: [id number]    Appendicitis     Arthritis     Coronary artery disease     Diabetes mellitus (HCC)     Eczema     Gross hematuria     LAST ASSESSED: [id number]    Hematuria     Hypertension     Hyponatremia     Hypothyroid     Osteoporosis     LAST ASSESSED: [id number]    Primary osteoarthritis of left knee     LAST ASSESSED: [id number]    Renal disorder     Renal insufficiency     Rotator cuff tear arthropathy     LEFT LAST ASSESSED: [id number]    Secondary osteoarthritis of right shoulder     LAST ASSESSED: [id number]    Syncope        Past Surgical History  Past Surgical History:   Procedure Laterality Date    APPENDECTOMY      BACK SURGERY      CATARACT EXTRACTION W/  INTRAOCULAR LENS IMPLANT Bilateral     KNEE ARTHROSCOPY Left     ONSET: 25OCT1999 THERAPEUTIC    LUMBAR LAMINECTOMY      MS AAA REPAIR,MODULR BIFURCATED PROSTH N/A 3/10/2016    Procedure: REPAIR ANEURYSM ENDOVASCULAR ABDOMINAL AORTIC  (EVAR) ;   Surgeon: Matteo Delgado MD;  [facility];  Service: Vascular    MS RECONSTR TOTAL SHOULDER IMPLANT Left 5/31/2016    Procedure: ARTHROPLASTY SHOULDER REVERSE;  Surgeon: Reinaldo Pozo MD;  [facility];  Service: Orthopedics    REVERSE TOTAL SHOULDER ARTHROPLASTY Right     ROTATOR CUFF REPAIR Left     RC SURGERY RESOLVED: 1999    SHOULDER ARTHROPLASTY      5/15 - FOR RIGHT SHOULDER; 5/16 - FOR LEFT SHOULDER  SHOULDER ARTHROSCOPY      TOTAL HIP ARTHROPLASTY Left     ONSET: [id number]    TOTAL HIP ARTHROPLASTY  05/31/2006    REVISION       Current Medications  Current Outpatient Prescriptions on File Prior to Visit   Medication Sig Dispense Refill    acetaminophen (TYLENOL) 325 mg tablet Take 2 tablets (650 mg total) by mouth every 6 (six) hours as needed for mild pain  30 tablet 0    amLODIPine-atorvastatin (CADUET) 10-20 MG per tablet Take 1 tablet by mouth daily at bedtime   B-12, Methylcobalamin, 1000 MCG SUBL Take 1 tablet by mouth daily  B-12 500 MCG Oral Tablet TAKE 1 TABLET DAILY  Quantity: 30;  Refills: 0    Knute Carls M D ; Active       Blood Glucose Monitoring Suppl (ONE TOUCH ULTRA MINI) w/Device KIT by Does not apply route      calcium citrate-vitamin D (CITRACAL+D) 315-200 MG-UNIT per tablet Take 1 tablet by mouth daily  Calcium + D TABS  Refills: 0   , M D ; Active       clobetasol (TEMOVATE) 0 05 % cream Apply 1 application topically 2 (two) times a day      diclofenac sodium (VOLTAREN) 1 % Apply 2 g topically 4 (four) times a day 100 g 3    Docusate Sodium 100 MG capsule Take 1 tablet by mouth daily      fexofenadine (ALLEGRA) 180 MG tablet Take 1 tablet by mouth daily      Fluocinolone Acetonide Scalp (DERMA-SMOOTHE/FS SCALP) 0 01 % OIL Apply topically      fluticasone (FLONASE) 50 mcg/act nasal spray Fluticasone Propionate 50 MCG/ACT Nasal Suspension use 2 spr  in each nostril daily  Quantity: 1;  Refills: 5    Knute Carls M D ;  Started 23-Sep-2014 Active      glucose blood test strip by In Vitro route      ketoconazole (NIZORAL) 2 % shampoo Apply 1 application topically once      Lancets (ONETOUCH ULTRASOFT) lancets by Does not apply route      levothyroxine 137 mcg tablet Take by mouth daily   Levothyroxine Sodium 137 MCG Oral Tablet take 1 tablet daily before breakfast  Quantity: 90;  Refills: 3    Knute Carls M D ;  Started 3-Oct-2014 Active       LORazepam (ATIVAN) 0 5 mg tablet Take by mouth      losartan (COZAAR) 100 MG tablet Take 100 mg by mouth daily  Losartan Potassium 100 MG Oral Tablet take 1 tablet once daily  Quantity: 90;  Refills: 3    Joe MEDEL ; Active       Magnesium 250 MG TABS 1 tablet daily  Magnesium 250 MG Oral Tablet  Quantity: 0;  Refills: 0   , M D ; Active       Multiple Vitamins-Minerals (MULTIVITAMIN & MINERAL PO) 1 tablet daily  Multivitamin & Mineral Oral Liquid  Quantity: 0;  Refills: 0   , M D ; Active       Polyethylene Glycol 3350 GRAN Take 1 Dose by mouth daily as needed  Polyethylene Glycol 3350 Oral Powder MIX 1 CAPFUL (17GM) IN 8 OUNCES OF WATER, JUICE, OR TEA AND DRINK DAILY  Quantity: 557;  Refills: 0    Joe MEDEL ;  Started 3-Dec-2015 Active       pyrithione zinc (HEAD AND SHOULDERS) 1 % shampoo Apply topically daily as needed for dandruff      sodium chloride 1 g tablet Take 1 tablet (1 g total) by mouth daily Sodium Chloride 1 GM Oral Tablet Take 1 tab  Daily  Quantity: 30;  Refills: 5    Joe MEDEL ;  Started 30-Apr-2015 Active 30 tablet 6    traMADol (ULTRAM) 50 mg tablet Take 1/2 tab -1 tablet every 8 hr  PRN for pain 90 tablet 0     No current facility-administered medications on file prior to visit  Recent Labs (HCT,HGB,PT,INR,ESR,CRP,GLU,HgA1C)    0  Lab Value Date/Time   HCT 38 7 07/28/2016 0900   HCT 38 0 10/01/2015 1333   HGB 12 9 07/28/2016 0900   HGB 12 8 10/01/2015 1333   WBC 7 06 07/28/2016 0900   WBC 6 16 10/01/2015 1333   INR 0 98 05/25/2016 1451   INR 1 00 04/21/2015 0947   GLUCOSE 131 07/28/2016 0900   GLUCOSE 153 (H) 01/07/2016 1401   HGBA1C 5 8 (H) 10/03/2017 0938         Physical exam  · General: Awake, Alert, Oriented  · Eyes: Pupils equal, round and reactive to light  · Heart: regular rate and rhythm  · Lungs: No audible wheezing  · Abdomen: soft  left Knee exam  Skin intact, mild swelling with moderate effusion    Painful ROM with crepitus  There is bony enlargement tendons medially  No valgus-varus instability  Procedure  Large joint arthrocentesis  Date/Time: 4/17/2018 10:29 AM  Consent given by: patient  Site marked: site marked  Supporting Documentation  Indications: pain and joint swelling   Procedure Details  Location: knee - L knee  Preparation: Patient was prepped and draped in the usual sterile fashion  Ultrasound guidance: no  Approach: lateral  Medications administered: 12 mg betamethasone acetate-betamethasone sodium phosphate 6 (3-3) mg/mL; 2 mL bupivacaine 0 25 %; 2 mL lidocaine 1 %    Aspirate amount: 40 mL  Aspirate: clear and blood-tinged  Patient tolerance: patient tolerated the procedure well with no immediate complications  Dressing:  Sterile dressing applied          Imaging  none    ASSESSMENT 79yo female with chronic left knee and recurrent effusion due to her underlying Oa  Repeat aspiration with cortisone injection today        PLAN  Repeat aspiration with cortisone injection done today  ICE and elevate as needed  WBAT   Activities as tolerated   Tramadol as needed  Follow-up in 2 months

## 2018-04-18 DIAGNOSIS — M15.9 GENERALIZED OSTEOARTHRITIS: ICD-10-CM

## 2018-04-18 RX ORDER — TRAMADOL HYDROCHLORIDE 50 MG/1
TABLET ORAL
Qty: 270 TABLET | Refills: 0 | Status: SHIPPED | OUTPATIENT
Start: 2018-04-18 | End: 2018-07-25 | Stop reason: HOSPADM

## 2018-06-19 ENCOUNTER — OFFICE VISIT (OUTPATIENT)
Dept: OBGYN CLINIC | Facility: HOSPITAL | Age: 83
End: 2018-06-19
Payer: COMMERCIAL

## 2018-06-19 VITALS
HEIGHT: 62 IN | BODY MASS INDEX: 26.68 KG/M2 | SYSTOLIC BLOOD PRESSURE: 154 MMHG | WEIGHT: 145 LBS | DIASTOLIC BLOOD PRESSURE: 64 MMHG | HEART RATE: 98 BPM

## 2018-06-19 DIAGNOSIS — M25.562 CHRONIC PAIN OF LEFT KNEE: Primary | ICD-10-CM

## 2018-06-19 DIAGNOSIS — G89.29 CHRONIC PAIN OF LEFT KNEE: Primary | ICD-10-CM

## 2018-06-19 DIAGNOSIS — M17.12 LOCALIZED OSTEOARTHRITIS OF LEFT KNEE: ICD-10-CM

## 2018-06-19 PROCEDURE — 99214 OFFICE O/P EST MOD 30 MIN: CPT | Performed by: ORTHOPAEDIC SURGERY

## 2018-06-19 RX ORDER — ACETAMINOPHEN 325 MG/1
975 TABLET ORAL ONCE
Status: CANCELLED | OUTPATIENT
Start: 2018-06-19 | End: 2018-06-19

## 2018-06-19 RX ORDER — GABAPENTIN 300 MG/1
300 CAPSULE ORAL ONCE
Status: CANCELLED | OUTPATIENT
Start: 2018-06-19 | End: 2018-06-19

## 2018-06-19 NOTE — PROGRESS NOTES
89 y o female follow up for Left knee pain  Patient notes she had no relief from  Left knee injection on 4/17/2018 for about a month and then slowly started to see improvement  She notes constant pain when walking and standing  At times Left knee nelson when walking  Patient notes she is starting to have Right hip pain when walking  She is taking Tramadol and using Voltaren Gel  She describes significant pain and significant dysfunction in her left knee  She also reports that her left knee gives way and she has a fear of falling  Physical exam:  Medial lateral Tenderness Left knee  Left knee swelling  Skin intact  ROM with Crepitus  Left leg bowing    Imaging  N/A x-rays left knee show me significant medial patellofemoral compartment arthritis    Procedure  N/A    Assessment/Plan:   80 y  o female Chronic Left knee OA  Schedule for Left TKA  Follow up PO   Despite attempts in alleviating pain and dysfunction with non aggressive treatments, this patient continues to have considerable pain and dysfunction  Her left knee dysfunction put risk for falling which may create further injuries  Despite her age, this point time I think elective left total knee replacement surgery is indicated  After review of the risks and benefits, consent was that the above-mentioned surgery  No guarantees given    I would welcome the opportunity see back in the office a postoperative patient

## 2018-06-25 ENCOUNTER — ANESTHESIA EVENT (OUTPATIENT)
Dept: PERIOP | Facility: HOSPITAL | Age: 83
DRG: 470 | End: 2018-06-25
Payer: COMMERCIAL

## 2018-06-26 ENCOUNTER — OFFICE VISIT (OUTPATIENT)
Dept: LAB | Facility: HOSPITAL | Age: 83
End: 2018-06-26
Attending: ORTHOPAEDIC SURGERY
Payer: COMMERCIAL

## 2018-06-26 ENCOUNTER — APPOINTMENT (OUTPATIENT)
Dept: LAB | Facility: HOSPITAL | Age: 83
End: 2018-06-26
Payer: COMMERCIAL

## 2018-06-26 ENCOUNTER — HOSPITAL ENCOUNTER (OUTPATIENT)
Dept: RADIOLOGY | Facility: HOSPITAL | Age: 83
Discharge: HOME | End: 2018-06-26
Payer: COMMERCIAL

## 2018-06-26 DIAGNOSIS — M17.12 LOCALIZED OSTEOARTHRITIS OF LEFT KNEE: ICD-10-CM

## 2018-06-26 DIAGNOSIS — G89.29 CHRONIC PAIN OF LEFT KNEE: ICD-10-CM

## 2018-06-26 DIAGNOSIS — E03.9 HYPOTHYROIDISM: ICD-10-CM

## 2018-06-26 DIAGNOSIS — M25.562 CHRONIC PAIN OF LEFT KNEE: ICD-10-CM

## 2018-06-26 DIAGNOSIS — E11.42 TYPE 2 DIABETES MELLITUS WITH DIABETIC POLYNEUROPATHY (HCC): ICD-10-CM

## 2018-06-26 DIAGNOSIS — E11.9 TYPE 2 DIABETES MELLITUS WITHOUT COMPLICATIONS (HCC): ICD-10-CM

## 2018-06-26 DIAGNOSIS — I10 ESSENTIAL (PRIMARY) HYPERTENSION: ICD-10-CM

## 2018-06-26 DIAGNOSIS — N18.30 TYPE 2 DM WITH CKD STAGE 3 AND HYPERTENSION (HCC): ICD-10-CM

## 2018-06-26 DIAGNOSIS — E03.9 ACQUIRED HYPOTHYROIDISM: ICD-10-CM

## 2018-06-26 DIAGNOSIS — N18.30 CHRONIC KIDNEY DISEASE, STAGE III (MODERATE) (HCC): ICD-10-CM

## 2018-06-26 DIAGNOSIS — I12.9 TYPE 2 DM WITH CKD STAGE 3 AND HYPERTENSION (HCC): ICD-10-CM

## 2018-06-26 DIAGNOSIS — E78.5 HYPERLIPIDEMIA: ICD-10-CM

## 2018-06-26 DIAGNOSIS — E11.22 TYPE 2 DM WITH CKD STAGE 3 AND HYPERTENSION (HCC): ICD-10-CM

## 2018-06-26 DIAGNOSIS — I10 ESSENTIAL HYPERTENSION: ICD-10-CM

## 2018-06-26 DIAGNOSIS — N18.30 CHRONIC KIDNEY DISEASE, STAGE 3 (HCC): ICD-10-CM

## 2018-06-26 LAB
ABO GROUP BLD: NORMAL
ALBUMIN SERPL BCP-MCNC: 4.3 G/DL (ref 3.5–5)
ALP SERPL-CCNC: 66 U/L (ref 46–116)
ALT SERPL W P-5'-P-CCNC: 27 U/L (ref 12–78)
ANION GAP SERPL CALCULATED.3IONS-SCNC: 11 MMOL/L (ref 4–13)
APTT PPP: 31 SECONDS (ref 24–36)
AST SERPL W P-5'-P-CCNC: 16 U/L (ref 5–45)
BACTERIA UR QL AUTO: ABNORMAL /HPF
BASOPHILS # BLD AUTO: 0.05 THOUSANDS/ΜL (ref 0–0.1)
BASOPHILS NFR BLD AUTO: 1 % (ref 0–1)
BILIRUB SERPL-MCNC: 0.88 MG/DL (ref 0.2–1)
BILIRUB UR QL STRIP: NEGATIVE
BLD GP AB SCN SERPL QL: NEGATIVE
BUN SERPL-MCNC: 25 MG/DL (ref 5–25)
CALCIUM SERPL-MCNC: 9.5 MG/DL (ref 8.3–10.1)
CHLORIDE SERPL-SCNC: 100 MMOL/L (ref 100–108)
CHOLEST SERPL-MCNC: 157 MG/DL (ref 50–200)
CLARITY UR: CLEAR
CO2 SERPL-SCNC: 23 MMOL/L (ref 21–32)
COLOR UR: YELLOW
CREAT SERPL-MCNC: 1.39 MG/DL (ref 0.6–1.3)
CREAT UR-MCNC: 117 MG/DL
EOSINOPHIL # BLD AUTO: 0.15 THOUSAND/ΜL (ref 0–0.61)
EOSINOPHIL NFR BLD AUTO: 2 % (ref 0–6)
ERYTHROCYTE [DISTWIDTH] IN BLOOD BY AUTOMATED COUNT: 13.9 % (ref 11.6–15.1)
EST. AVERAGE GLUCOSE BLD GHB EST-MCNC: 137 MG/DL
GFR SERPL CREATININE-BSD FRML MDRD: 33 ML/MIN/1.73SQ M
GLUCOSE P FAST SERPL-MCNC: 147 MG/DL (ref 65–99)
GLUCOSE UR STRIP-MCNC: NEGATIVE MG/DL
HBA1C MFR BLD: 6.4 % (ref 4.2–6.3)
HCT VFR BLD AUTO: 39.7 % (ref 34.8–46.1)
HDLC SERPL-MCNC: 85 MG/DL (ref 40–60)
HGB BLD-MCNC: 12.9 G/DL (ref 11.5–15.4)
HGB UR QL STRIP.AUTO: NEGATIVE
HYALINE CASTS #/AREA URNS LPF: ABNORMAL /LPF
IMM GRANULOCYTES # BLD AUTO: 0.05 THOUSAND/UL (ref 0–0.2)
IMM GRANULOCYTES NFR BLD AUTO: 1 % (ref 0–2)
INR PPP: 1 (ref 0.86–1.17)
KETONES UR STRIP-MCNC: NEGATIVE MG/DL
LDLC SERPL CALC-MCNC: 53 MG/DL (ref 0–100)
LEUKOCYTE ESTERASE UR QL STRIP: ABNORMAL
LYMPHOCYTES # BLD AUTO: 1.7 THOUSANDS/ΜL (ref 0.6–4.47)
LYMPHOCYTES NFR BLD AUTO: 19 % (ref 14–44)
MCH RBC QN AUTO: 31.5 PG (ref 26.8–34.3)
MCHC RBC AUTO-ENTMCNC: 32.5 G/DL (ref 31.4–37.4)
MCV RBC AUTO: 97 FL (ref 82–98)
MICROALBUMIN UR-MCNC: 96.4 MG/L (ref 0–20)
MICROALBUMIN/CREAT 24H UR: 82 MG/G CREATININE (ref 0–30)
MONOCYTES # BLD AUTO: 0.65 THOUSAND/ΜL (ref 0.17–1.22)
MONOCYTES NFR BLD AUTO: 7 % (ref 4–12)
NEUTROPHILS # BLD AUTO: 6.4 THOUSANDS/ΜL (ref 1.85–7.62)
NEUTS SEG NFR BLD AUTO: 70 % (ref 43–75)
NITRITE UR QL STRIP: NEGATIVE
NON-SQ EPI CELLS URNS QL MICRO: ABNORMAL /HPF
NONHDLC SERPL-MCNC: 72 MG/DL
NRBC BLD AUTO-RTO: 0 /100 WBCS
PH UR STRIP.AUTO: 6.5 [PH] (ref 4.5–8)
PLATELET # BLD AUTO: 221 THOUSANDS/UL (ref 149–390)
PMV BLD AUTO: 10.7 FL (ref 8.9–12.7)
POTASSIUM SERPL-SCNC: 4.3 MMOL/L (ref 3.5–5.3)
PROT SERPL-MCNC: 7.7 G/DL (ref 6.4–8.2)
PROT UR STRIP-MCNC: ABNORMAL MG/DL
PROTHROMBIN TIME: 13.3 SECONDS (ref 11.8–14.2)
RBC # BLD AUTO: 4.09 MILLION/UL (ref 3.81–5.12)
RBC #/AREA URNS AUTO: ABNORMAL /HPF
RH BLD: POSITIVE
SODIUM SERPL-SCNC: 134 MMOL/L (ref 136–145)
SP GR UR STRIP.AUTO: 1.01 (ref 1–1.03)
SPECIMEN EXPIRATION DATE: NORMAL
T4 FREE SERPL-MCNC: 1.28 NG/DL (ref 0.76–1.46)
TRIGL SERPL-MCNC: 93 MG/DL
TSH SERPL DL<=0.05 MIU/L-ACNC: 2.79 UIU/ML (ref 0.36–3.74)
UROBILINOGEN UR QL STRIP.AUTO: 0.2 E.U./DL
WBC # BLD AUTO: 9 THOUSAND/UL (ref 4.31–10.16)
WBC #/AREA URNS AUTO: ABNORMAL /HPF

## 2018-06-26 PROCEDURE — 83036 HEMOGLOBIN GLYCOSYLATED A1C: CPT

## 2018-06-26 PROCEDURE — 84439 ASSAY OF FREE THYROXINE: CPT

## 2018-06-26 PROCEDURE — 82570 ASSAY OF URINE CREATININE: CPT

## 2018-06-26 PROCEDURE — 71046 X-RAY EXAM CHEST 2 VIEWS: CPT

## 2018-06-26 PROCEDURE — 81001 URINALYSIS AUTO W/SCOPE: CPT

## 2018-06-26 PROCEDURE — 85610 PROTHROMBIN TIME: CPT

## 2018-06-26 PROCEDURE — 86900 BLOOD TYPING SEROLOGIC ABO: CPT

## 2018-06-26 PROCEDURE — 85730 THROMBOPLASTIN TIME PARTIAL: CPT

## 2018-06-26 PROCEDURE — 93005 ELECTROCARDIOGRAM TRACING: CPT

## 2018-06-26 PROCEDURE — 80053 COMPREHEN METABOLIC PANEL: CPT

## 2018-06-26 PROCEDURE — 36415 COLL VENOUS BLD VENIPUNCTURE: CPT

## 2018-06-26 PROCEDURE — 85025 COMPLETE CBC W/AUTO DIFF WBC: CPT

## 2018-06-26 PROCEDURE — 80061 LIPID PANEL: CPT

## 2018-06-26 PROCEDURE — 86901 BLOOD TYPING SEROLOGIC RH(D): CPT

## 2018-06-26 PROCEDURE — 86850 RBC ANTIBODY SCREEN: CPT

## 2018-06-26 PROCEDURE — 82043 UR ALBUMIN QUANTITATIVE: CPT

## 2018-06-26 PROCEDURE — 84443 ASSAY THYROID STIM HORMONE: CPT

## 2018-06-26 RX ORDER — FOLIC ACID 1 MG/1
TABLET ORAL DAILY
COMMUNITY
End: 2018-12-17 | Stop reason: ALTCHOICE

## 2018-06-26 RX ORDER — ASCORBIC ACID 500 MG
500 TABLET ORAL DAILY
COMMUNITY
End: 2018-12-17 | Stop reason: ALTCHOICE

## 2018-06-26 RX ORDER — FERROUS SULFATE 325(65) MG
325 TABLET ORAL
COMMUNITY
End: 2018-07-18

## 2018-06-26 NOTE — PRE-PROCEDURE INSTRUCTIONS
Pre-Surgery Instructions:   Medication Instructions    acetaminophen (TYLENOL) 325 mg tablet Instructed patient per Anesthesia Guidelines   amLODIPine-atorvastatin (CADUET) 10-20 MG per tablet Instructed patient per Anesthesia Guidelines   ascorbic acid (VITAMIN C) 500 mg tablet Patient was instructed by Physician and understands   B-12, Methylcobalamin, 1000 MCG SUBL Instructed patient per Anesthesia Guidelines   calcium citrate-vitamin D (CITRACAL+D) 315-200 MG-UNIT per tablet Patient was instructed by Physician and understands   diclofenac sodium (VOLTAREN) 1 % Instructed patient per Anesthesia Guidelines   Docusate Sodium 100 MG capsule Instructed patient per Anesthesia Guidelines   ferrous sulfate 325 (65 Fe) mg tablet Patient was instructed by Physician and understands   fexofenadine (ALLEGRA) 180 MG tablet Instructed patient per Anesthesia Guidelines   Fluocinolone Acetonide Scalp (DERMA-SMOOTHE/FS SCALP) 0 01 % OIL Instructed patient per Anesthesia Guidelines   fluticasone (FLONASE) 50 mcg/act nasal spray Instructed patient per Anesthesia Guidelines   folic acid (FOLVITE) 1 mg tablet Instructed patient per Anesthesia Guidelines   ketoconazole (NIZORAL) 2 % shampoo Instructed patient per Anesthesia Guidelines   levothyroxine 137 mcg tablet Instructed patient per Anesthesia Guidelines   LORazepam (ATIVAN) 0 5 mg tablet Instructed patient per Anesthesia Guidelines   losartan (COZAAR) 100 MG tablet Instructed patient per Anesthesia Guidelines   Magnesium 250 MG TABS Instructed patient per Anesthesia Guidelines   Multiple Vitamins-Minerals (MULTIVITAMIN & MINERAL PO) Instructed patient per Anesthesia Guidelines   Polyethylene Glycol 3350 GRAN Instructed patient per Anesthesia Guidelines   pyrithione zinc (HEAD AND SHOULDERS) 1 % shampoo Instructed patient per Anesthesia Guidelines      sodium chloride 1 g tablet Instructed patient per Anesthesia Guidelines   traMADol (ULTRAM) 50 mg tablet Instructed patient per Anesthesia Guidelines  Patient had PAT appt, daughter present  Medication list reviewed & instructed  As of 7 11 18 pt to stop MV, calcium, vit B12, voltaren gel  Instructed on tylenol or tramadol only  Last dose of losartan 7 16 18  Pt will continue folic acid, vit C, iron until 7 17 18  Pt takes caduet at hs  Am DOS pt to take levothyroxine with 1-2 sips of water  Tylenol or tramadol ok if needed  Showering instructions & cloths given by office, reviewed at time of appt  Soap and incentive given to pt and instructed, pt verbalizes understanding  All instructions verbally understood by patient, all questions answered

## 2018-06-27 ENCOUNTER — TELEPHONE (OUTPATIENT)
Dept: OBGYN CLINIC | Facility: HOSPITAL | Age: 83
End: 2018-06-27

## 2018-06-27 DIAGNOSIS — G89.29 CHRONIC KNEE PAIN, UNSPECIFIED LATERALITY: Primary | ICD-10-CM

## 2018-06-27 DIAGNOSIS — M25.569 CHRONIC KNEE PAIN, UNSPECIFIED LATERALITY: Primary | ICD-10-CM

## 2018-06-27 LAB
ATRIAL RATE: 72 BPM
P AXIS: 61 DEGREES
PR INTERVAL: 214 MS
QRS AXIS: 17 DEGREES
QRSD INTERVAL: 96 MS
QT INTERVAL: 400 MS
QTC INTERVAL: 438 MS
T WAVE AXIS: 63 DEGREES
VENTRICULAR RATE: 72 BPM

## 2018-06-27 NOTE — TELEPHONE ENCOUNTER
Pt's daughter, Frieda Stoll, returned my call to do pre-op elective admission assessment  Per Frieda Stoll,  Pt lives alone in an independently living [facility] apartment at [facility]  Pt has not steps to get into apartment and no steps inside the apartment  She has a walk-in shower  Per daughter, Pt has lunch and dinner prepared by facility daily, she would just have to walk to the [facility] (which is on the same floor as pt's apartment) to get her meals  Per Fridea Stoll,  Due to the studio apartment layout and it being small, she would be unable to sleep at her moms  She reports "that apartment is not really conducive to anyone other than my mom, it is just to small"  Frieda Nikkysherrie also reports that her home is not conducive for her mom to stay with her post-operatively  Frieda Stoll reports "With her other surgeries, she went to a rehab  Last time she went to [place] [id number]  on [facility]  Per Frieda Stoll, her mom would be agreeable to go to any SNF in [place]  I reviewed the list of preferred providers with her, she chose [address] [facility], [facility] and [address] Frieda Stoll reports that her mother told her that if she was [facility] back to home that her mom would prefer [facility]  Frieda Stoll reports "I will help as much as I can and so will our family- we just can't be there 24-7"  I explained that pt would have to be recommended for rehab by careteam postoperatively and that the rec is made off of pt's functionality, caregiver support, and home layout  I explained that both the SNF facility would have to accept and insurance would have to approve for pt to go to a rehab  Nikki verbalizes understanding  Per daughter, pt is independent with ADLs and is ambulating currently with a walker  Pt self manages her own meds using a pillbox  She uses [facility] in [place]  Frieda Stoll reporting her mom does not have RX for post-op anticoagulant  Eloise Shay reports her mom does have balance issues "because the knee gives out sometimes, she does sometimes get vertigo too", she however denies falls within the last year  Daughter reports that pt did have a nephrologist at one point, Dr Ponce Evans  I provided daughter with Dr Erasmo Avery phone number to get pt scheduled for pre-op clearance/consult r/t CKD3, Creat 1 39 and GFR 33  Eloise Shay declined assistance in making the appointment  I asked her to call back Zafar Trent RN, with appt time and date  Educated on post-op pain, early mobilization (POD0), indication for/use of incentive spirometer (10x/hour while awake) and indication for/use of foot/leg pumps (18 hours/day)  Eloise Shay denies having any questions/concerns at this time

## 2018-06-28 ENCOUNTER — TELEPHONE (OUTPATIENT)
Dept: OBGYN CLINIC | Facility: HOSPITAL | Age: 83
End: 2018-06-28

## 2018-06-28 ENCOUNTER — EVALUATION (OUTPATIENT)
Dept: PHYSICAL THERAPY | Age: 83
End: 2018-06-28
Payer: COMMERCIAL

## 2018-06-28 DIAGNOSIS — M25.562 CHRONIC PAIN OF LEFT KNEE: Primary | ICD-10-CM

## 2018-06-28 DIAGNOSIS — G89.29 CHRONIC PAIN OF LEFT KNEE: Primary | ICD-10-CM

## 2018-06-28 PROCEDURE — G8978 MOBILITY CURRENT STATUS: HCPCS | Performed by: PHYSICAL THERAPIST

## 2018-06-28 PROCEDURE — G8979 MOBILITY GOAL STATUS: HCPCS | Performed by: PHYSICAL THERAPIST

## 2018-06-28 PROCEDURE — 97161 PT EVAL LOW COMPLEX 20 MIN: CPT | Performed by: PHYSICAL THERAPIST

## 2018-06-28 NOTE — PROGRESS NOTES
PT Evaluation     Today's date: 2018  Patient name: Manuel Solitario  :   MRN: [id number]  Referring provider: Antonio Dye MD  Dx:   Encounter Diagnosis     ICD-10-CM    1  Chronic pain of left knee M25 [id number]     G89 [id number]                   Assessment  Impairments: abnormal gait, abnormal or restricted ROM, activity intolerance, impaired balance, impaired physical strength, lacks appropriate home exercise program, pain with function and weight-bearing intolerance  Functional limitations: Gait; IADLs; StairsPatient presents with symptom irritability no  Assessment details: Patient is a 80 y o  y/o female referred to PT by Dr Abhi Haas with a dx of Chronic L knee pain  Patient reports onset of pain complaints occurred years ago and has progressively worsened  The pain complaints are aggravated primarily with weight bearing activities  No other referral appears necessary at this time  Understanding of Dx/Px/POC: good   Prognosis: good    Goals  Short Term goals - 4 weeks  1  Patient will be independent and compliant with a HEP  2   Patient will report a 50% decrease in pain complaints  Long Term goals - 8 weeks  1  Patient will report elimination of pain complaints  2   Patient will ambulate community distance with use of a cane  3   Patient will return to all recreational activities without restriction  Plan  Patient would benefit from: skilled physical therapy  Planned modality interventions: cryotherapy  Planned therapy interventions: joint mobilization, manual therapy, ADL training, neuromuscular re-education, patient education, therapeutic exercise, graded activity and home exercise program  Frequency: 2x week  Duration in visits: 16  Duration in weeks: 8  Treatment plan discussed with: patient and family        Subjective Evaluation    History of Present Illness  Mechanism of injury: Chronic progressively worsening L knee pain      Recurrent probem    Quality of life: good    Pain  Current pain ratin  At best pain ratin  At worst pain ratin  Location: Diffuse L knee  Quality: dull ache, discomfort and sharp  Relieving factors: rest  Aggravating factors: standing, walking and stair climbing  Progression: worsening    Social Support  Steps to enter house: no  Stairs in house: no   Lives in: community-based residential facility and [facility]  Lives with: alone    Employment status: not working  Treatments  Previous treatment: physical therapy and injection treatment  Current treatment: physical therapy  Patient Goals  Patient goals for therapy: decreased edema, decreased pain, improved balance, increased motion, independence with ADLs/IADLs, increased strength and return to sport/leisure activities          Objective     Active Range of Motion   Left Knee   Flexion: 110 degrees   Extension: -10 degrees     Passive Range of Motion   Left Knee   Flexion: 112 degrees   Extension: -8 degrees     Mobility   Patellar Mobility:   Left Knee   Hypomobile: left medial, left lateral, left superior and left inferior    Strength/Myotome Testing     Left Hip   Planes of Motion   Flexion: 4  Extension: 3+  Abduction: 3+    Right Hip   Planes of Motion   Flexion: 4  Extension: 3+  Abduction: 3+    Ambulation   Weight-Bearing Status   Weight-Bearing Status (Left): weight-bearing as tolerated   Assistive device used: rollator walker with seat    Additional Weight-Bearing Status Details  Decreased hip and knee flexion L LE      Flowsheet Rows      Most Recent Value   PT/OT G-Codes   FOTO information reviewed  N/A   Assessment Type  Evaluation   G code set  Mobility: Walking & Moving Around   Mobility: Walking and Moving Around Current Status ()  CL   Mobility: Walking and Moving Around Goal Status ()  CJ          Precautions: DM; B THR; AAA; HTN; B TSA; CAD    Daily Treatment Diary     Manual              Patellar mobs             Manual knee flex/ext stretch Exercise Diary              Nu step             SAQ             SLR x 3             Heel slides                                                                                                                                                                                                                                 Modalities

## 2018-06-29 ENCOUNTER — TELEPHONE (OUTPATIENT)
Dept: FAMILY MEDICINE CLINIC | Facility: CLINIC | Age: 83
End: 2018-06-29

## 2018-06-29 ENCOUNTER — DOCUMENTATION (OUTPATIENT)
Dept: CARDIOLOGY CLINIC | Facility: CLINIC | Age: 83
End: 2018-06-29

## 2018-06-29 ENCOUNTER — OFFICE VISIT (OUTPATIENT)
Dept: CARDIOLOGY CLINIC | Facility: CLINIC | Age: 83
End: 2018-06-29
Payer: COMMERCIAL

## 2018-06-29 VITALS
DIASTOLIC BLOOD PRESSURE: 64 MMHG | HEART RATE: 69 BPM | BODY MASS INDEX: 24.82 KG/M2 | SYSTOLIC BLOOD PRESSURE: 150 MMHG | WEIGHT: 145.4 LBS | OXYGEN SATURATION: 97 % | HEIGHT: 64 IN

## 2018-06-29 DIAGNOSIS — I10 ESSENTIAL HYPERTENSION: ICD-10-CM

## 2018-06-29 DIAGNOSIS — N18.30 TYPE 2 DM WITH CKD STAGE 3 AND HYPERTENSION (HCC): Primary | ICD-10-CM

## 2018-06-29 DIAGNOSIS — I44.0 FIRST DEGREE AV BLOCK: ICD-10-CM

## 2018-06-29 DIAGNOSIS — I71.4 ABDOMINAL AORTIC ANEURYSM (AAA) WITHOUT RUPTURE (HCC): ICD-10-CM

## 2018-06-29 DIAGNOSIS — I12.9 TYPE 2 DM WITH CKD STAGE 3 AND HYPERTENSION (HCC): Primary | ICD-10-CM

## 2018-06-29 DIAGNOSIS — E11.22 TYPE 2 DM WITH CKD STAGE 3 AND HYPERTENSION (HCC): Primary | ICD-10-CM

## 2018-06-29 PROCEDURE — 99204 OFFICE O/P NEW MOD 45 MIN: CPT | Performed by: INTERNAL MEDICINE

## 2018-06-29 NOTE — PROGRESS NOTES
Cardiology Consultation     Dom Velez  [id number]  [birth year]  HEART & VASCULAR [facility] [address] [place] [address]    This pleasant 80-year-old woman is seen, preop clearance for left total knee replacement  In general she has been active in well as had no on cardiovascular issues  She does have a AAA repair 2 years ago that went well  She has had multiple orthopedic surgeries in the past and done well  Her cardiopulmonary review of symptoms is negative  She is not having angina at this visit she use a walker because of her knee  She has hypertension well controlled on current medications  She has noted some recent edema is working like compression hose  Her preop EKG is read as abnormal, anteroseptal infarction, there is no history of this and QRS is normal in V3  It does seems to be unchanged from 2015  1  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 hypertension ([address] )     2  Abdominal aortic aneurysm (AAA) without rupture ([facility] [address] )     3  Essential hypertension     4   First degree AV block       Patient Active Problem List   Diagnosis    Aneurysm of abdominal aorta (HCC)    Rotator cuff tear arthropathy    S/p left reverse total shoulder arthroplasty    Hypertension    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)    Hypothyroidism    Chronic kidney disease, stage 3    Hyperlipidemia    Hyponatremia    Peripheral vascular disease (HCC)    Generalized osteoarthritis    Chronic pain of left knee    Ambulatory dysfunction    Bilateral leg edema    Allergic rhinitis    Anxiety    Aortoiliac stenosis (HCC)    Constipation    Diabetic peripheral neuropathy (HCC)    Dry scalp    First degree AV block    Gait disturbance    Knee effusion, left    Lumbosacral spondylosis without myelopathy    Microalbuminuria    Osteopenia    Peripheral neuropathy    Seborrheic dermatitis of scalp    Spinal stenosis    Leg edema, left    Effusion of left knee    Localized osteoarthritis of left knee    Pes anserinus bursitis of left knee     Past Medical History:   Diagnosis Date    AAA (abdominal aortic aneurysm) (HCC)     s/p EVAR 3/10/16    Allergic urticaria     LAST ASSESSED: [id number]    Appendicitis     Arthritis     Coronary artery disease     Diabetes mellitus (HCC)     Eczema     Gross hematuria     LAST ASSESSED: [id number]    Hematuria     Hypertension     Hyponatremia     Hypothyroid     Osteoporosis     LAST ASSESSED: [id number]    PONV (postoperative nausea and vomiting)     Primary osteoarthritis of left knee     LAST ASSESSED: [id number]    Renal disorder     Renal insufficiency     Rotator cuff tear arthropathy     LEFT LAST ASSESSED: [id number]    Secondary osteoarthritis of right shoulder     LAST ASSESSED: [id number]    Syncope      Social History     Social History    Marital status:      Spouse name: N/A    Number of children: N/A    Years of education: N/A     Occupational History    RETIRED      Social History Main Topics    Smoking status: Never Smoker    Smokeless tobacco: Never Used    Alcohol use No    Drug use: No    Sexual activity: Not on file     Other Topics Concern    Not on file     Social History Narrative    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      Family History   Problem Relation Age of Onset    Coronary artery disease Mother     Diabetes Mother     Coronary artery disease Father     Cancer Sister     Arthritis Sister     Unexplained death Family         RAPID     Past Surgical History:   Procedure Laterality Date    APPENDECTOMY      BACK SURGERY      CATARACT EXTRACTION W/  INTRAOCULAR LENS IMPLANT Bilateral     KNEE ARTHROSCOPY Left     ONSET: 25OCT1999 THERAPEUTIC    LUMBAR LAMINECTOMY      MA AAA REPAIR,MODULR BIFURCATED PROSTH N/A 3/10/2016    Procedure: REPAIR ANEURYSM ENDOVASCULAR ABDOMINAL AORTIC  (EVAR) ;   Surgeon: Belén Rey Mena Hernandez MD;  [facility];  Service: Vascular    IL RECONSTR TOTAL SHOULDER IMPLANT Left 5/31/2016    Procedure: ARTHROPLASTY SHOULDER REVERSE;  Surgeon: Carlo Howell MD;  [facility];  Service: Orthopedics    REVERSE TOTAL SHOULDER ARTHROPLASTY Right     ROTATOR CUFF REPAIR Left     RC SURGERY RESOLVED: 1999    SHOULDER ARTHROPLASTY      5/15 - FOR RIGHT SHOULDER; 5/16 - FOR LEFT SHOULDER    SHOULDER ARTHROSCOPY      TOTAL HIP ARTHROPLASTY Left     ONSET: [id number]    TOTAL HIP ARTHROPLASTY  05/31/2006    REVISION       Current Outpatient Prescriptions:     acetaminophen (TYLENOL) 325 mg tablet, Take 2 tablets (650 mg total) by mouth every 6 (six) hours as needed for mild pain , Disp: 30 tablet, Rfl: 0    amLODIPine-atorvastatin (CADUET) 10-20 MG per tablet, Take 1 tablet by mouth daily at bedtime  , Disp: , Rfl:     ascorbic acid (VITAMIN C) 500 mg tablet, Take 500 mg by mouth daily, Disp: , Rfl:     B-12, Methylcobalamin, 1000 MCG SUBL, Take 1 tablet by mouth daily  B-12 500 MCG Oral Tablet TAKE 1 TABLET DAILY  Quantity: 30;  Refills: 0    Alessandro Freeze M D ; Active , Disp: , Rfl:     calcium citrate-vitamin D (CITRACAL+D) 315-200 MG-UNIT per tablet, Take 1 tablet by mouth daily   Calcium + D TABS  Refills: 0   , M D ; Active , Disp: , Rfl:     clobetasol (TEMOVATE) 0 05 % cream, Apply 1 application topically 2 (two) times a day, Disp: , Rfl:     diclofenac sodium (VOLTAREN) 1 %, Apply 2 g topically 4 (four) times a day, Disp: 100 g, Rfl: 3    Docusate Sodium 100 MG capsule, Take 1 tablet by mouth as needed  , Disp: , Rfl:     enoxaparin (LOVENOX) 40 mg/0 4 mL, Inject 0 4 mL (40 mg total) under the skin daily in the early morning for 30 days, Disp: 11 2 mL, Rfl: 0    ferrous sulfate 325 (65 Fe) mg tablet, Take 325 mg by mouth daily with breakfast, Disp: , Rfl:     fexofenadine (ALLEGRA) 180 MG tablet, Take 1 tablet by mouth as needed  , Disp: , Rfl:     Fluocinolone Acetonide Scalp (DERMA-SMOOTHE/FS SCALP) 0 01 % OIL, Apply topically, Disp: , Rfl:     fluticasone (FLONASE) 50 mcg/act nasal spray, Fluticasone Propionate 50 MCG/ACT Nasal Suspension use 2 spr  in each nostril daily  Quantity: 1;  Refills: 5    Doris Kenton M D ;  Started 23-Sep-2014 Active, Disp: , Rfl:     folic acid (FOLVITE) 1 mg tablet, Take by mouth daily, Disp: , Rfl:     ketoconazole (NIZORAL) 2 % shampoo, Apply 1 application topically once, Disp: , Rfl:     levothyroxine 137 mcg tablet, Take by mouth daily  Levothyroxine Sodium 137 MCG Oral Tablet take 1 tablet daily before breakfast  Quantity: 90;  Refills: 3    Doris Kenton M D ;  Started 3-Oct-2014 Active , Disp: , Rfl:     LORazepam (ATIVAN) 0 5 mg tablet, Take by mouth, Disp: , Rfl:     losartan (COZAAR) 100 MG tablet, Take 100 mg by mouth daily  Losartan Potassium 100 MG Oral Tablet take 1 tablet once daily  Quantity: 90;  Refills: 3    Doris Kenton M D ; Active , Disp: , Rfl:     Magnesium 250 MG TABS, 1 tablet daily  Magnesium 250 MG Oral Tablet  Quantity: 0;  Refills: 0   , M D ; Active , Disp: , Rfl:     Multiple Vitamins-Minerals (MULTIVITAMIN & MINERAL PO), 1 tablet daily  Multivitamin & Mineral Oral Liquid  Quantity: 0;  Refills: 0   , M D ; Active , Disp: , Rfl:     Polyethylene Glycol 3350 GRAN, Take 1 Dose by mouth daily as needed  Polyethylene Glycol 3350 Oral Powder MIX 1 CAPFUL (17GM) IN 8 OUNCES OF WATER, JUICE, OR TEA AND DRINK DAILY  Quantity: 557;  Refills: 0    Doris Kenton M D ;  Started 3-Dec-2015 Active , Disp: , Rfl:     pyrithione zinc (HEAD AND SHOULDERS) 1 % shampoo, Apply topically daily as needed for dandruff, Disp: , Rfl:     sodium chloride 1 g tablet, Take 1 tablet (1 g total) by mouth daily Sodium Chloride 1 GM Oral Tablet Take 1 tab   Daily  Quantity: 30;  Refills: 5    Doris Kenton M D ;  Started 30-Apr-2015 Active, Disp: 30 tablet, Rfl: 6    traMADol (ULTRAM) 50 mg tablet, Take 1/2 tab -1 tablet every 8 hr  PRN for pain (Patient taking differently: as needed Take 1/2 tab -1 tablet every 8 hr  PRN for pain ), Disp: 270 tablet, Rfl: 0  Allergies   Allergen Reactions    Bextra [Valdecoxib] Rash     GI INTOL    Percocet [Oxycodone-Acetaminophen] Rash and GI Intolerance     Vitals:    06/29/18 1056   BP: 150/64   BP Location: Left arm   Patient Position: Sitting   Cuff Size: Standard   Pulse: 69   SpO2: 97%   Weight: 66 kg (145 lb 6 4 oz)   Height: 5' 4" (1 626 m)       Labs:not applicable  Imaging: Xr Chest Pa & Lateral    Result Date: 6/29/2018  Narrative: CHEST INDICATION: Preoperative exam   M25 562: Pain in left knee  G89 29: Other chronic pain M17 12: Unilateral primary osteoarthritis, left knee  COMPARISON:  5/25/2016 EXAM PERFORMED/VIEWS:  XR CHEST PA & LATERAL FINDINGS: Cardiomediastinal silhouette appears unremarkable  The lungs are clear  No pneumothorax or pleural effusion  Bilateral shoulder prostheses  Degenerative changes of the spine  Chronic appearing compression deformity of T12  Impression: No acute cardiopulmonary disease  Workstation performed: [id number]       Review of Systems:  Review of Systems   Respiratory: Negative  Cardiovascular: Negative  Neurological: Negative for dizziness, syncope and light-headedness  Physical Exam:  Physical Exam   Constitutional: She appears well-nourished  Neck: Normal carotid pulses, no hepatojugular reflux and no JVD present  Cardiovascular: Normal rate, regular rhythm and normal heart sounds  Exam reveals no distant heart sounds  No murmur heard  Pulses:       Dorsalis pedis pulses are 2+ on the right side, and 2+ on the left side  Posterior tibial pulses are 2+ on the left side  Pulmonary/Chest: No respiratory distress  She has no wheezes  Abdominal: Soft  Normal aorta  She exhibits no distension  There is no hepatosplenomegaly     Musculoskeletal: She exhibits edema (There is 1 to 2+ edema bilaterally it is symmetrical it is not associated with JVD or HJ reflux  )  Discussion/Summary:  She is approved for surgery    No additional testing needed

## 2018-06-29 NOTE — PATIENT INSTRUCTIONS
The ankle swelling is not from her heart  We will send report to your orthopedic doctor so that she may proceed with surgery    You do not need to return to this office

## 2018-07-05 ENCOUNTER — TELEPHONE (OUTPATIENT)
Dept: FAMILY MEDICINE CLINIC | Facility: CLINIC | Age: 83
End: 2018-07-05

## 2018-07-05 DIAGNOSIS — R41.0 CONFUSION: Primary | ICD-10-CM

## 2018-07-05 NOTE — TELEPHONE ENCOUNTER
Please call patient's daughter  There is mild worsening of kidney function test compared to previous lab results  This could be related to declining renal function due to age  Recommend to stay well hydrated, avoid NSAID's  Patient may stop taking Tramadol and take Tylenol arthritis PRN for pain  Follow -up with nephrology as scheduled  Will addressed medications and blood work results when patient comes for pre-op evaluation

## 2018-07-05 NOTE — TELEPHONE ENCOUNTER
Please call patient's daughter  If patient had episodes of mental status change, recommend to check clean-catch urine sample and urine culture to r/o UTI  Orders placed in chart

## 2018-07-06 NOTE — TELEPHONE ENCOUNTER
Mya called this was just an FYI found out by [facility] the mattress topper was a monthly rental  Since the patient no longer needed it, it was returned

## 2018-07-10 ENCOUNTER — OFFICE VISIT (OUTPATIENT)
Dept: FAMILY MEDICINE CLINIC | Facility: CLINIC | Age: 83
End: 2018-07-10
Payer: COMMERCIAL

## 2018-07-10 VITALS
HEIGHT: 62 IN | WEIGHT: 144.8 LBS | HEART RATE: 72 BPM | TEMPERATURE: 98.6 F | RESPIRATION RATE: 16 BRPM | DIASTOLIC BLOOD PRESSURE: 78 MMHG | BODY MASS INDEX: 26.65 KG/M2 | SYSTOLIC BLOOD PRESSURE: 142 MMHG

## 2018-07-10 DIAGNOSIS — R26.2 AMBULATORY DYSFUNCTION: ICD-10-CM

## 2018-07-10 DIAGNOSIS — Z01.818 PREOP GENERAL PHYSICAL EXAM: Primary | ICD-10-CM

## 2018-07-10 DIAGNOSIS — N18.30 CHRONIC KIDNEY DISEASE, STAGE 3 (HCC): ICD-10-CM

## 2018-07-10 DIAGNOSIS — E78.2 MIXED HYPERLIPIDEMIA: ICD-10-CM

## 2018-07-10 DIAGNOSIS — M17.12 LOCALIZED OSTEOARTHRITIS OF LEFT KNEE: ICD-10-CM

## 2018-07-10 DIAGNOSIS — I12.9 TYPE 2 DM WITH CKD STAGE 3 AND HYPERTENSION (HCC): ICD-10-CM

## 2018-07-10 DIAGNOSIS — E11.22 TYPE 2 DM WITH CKD STAGE 3 AND HYPERTENSION (HCC): ICD-10-CM

## 2018-07-10 DIAGNOSIS — E03.9 ACQUIRED HYPOTHYROIDISM: ICD-10-CM

## 2018-07-10 DIAGNOSIS — I10 ESSENTIAL HYPERTENSION: ICD-10-CM

## 2018-07-10 DIAGNOSIS — N18.30 TYPE 2 DM WITH CKD STAGE 3 AND HYPERTENSION (HCC): ICD-10-CM

## 2018-07-10 PROCEDURE — 99214 OFFICE O/P EST MOD 30 MIN: CPT | Performed by: FAMILY MEDICINE

## 2018-07-10 NOTE — PROGRESS NOTES
Assessment/Plan:    Preop general physical exam  Patient is scheduled for L  total knee replacement on   July 23, 2018 with Dr Koffi Foy  Patient is medically stable for surgery  She was cleared by cardiologist Dr Radha Collins for upcoming surgery  No Aspirin 1 week prior to surgery  Hypertension  BP is stable  Continue Losartan 100 mg daily, Caduet 10/20 mg one tablet daily  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)  Lab Results   Component Value Date    HGBA1C 6 4 (H) 06/26/2018       No results for input(s): POCGLU in the last 72 hours  Blood Sugar Average: Last 72 hrs:    Type 2 DM - diet controlled  Continue to monitor blood sugar at home 2-3 times per week  Chronic kidney disease, stage 3  Recommended to stay well hydrated, avoid NSAIDs  Follow- up with his nephrologist Dr Alana Granados on July 18, 2018  Hypothyroidism  Continue Levothyroxine 137 mcg daily  Hyperlipidemia  Continue statin therapy  Ambulatory dysfunction  Patient ambulates with a walker  No recent falls  Diagnoses and all orders for this visit:    Preop general physical exam    Localized osteoarthritis of left knee    Essential hypertension    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)    Chronic kidney disease, stage 3    Acquired hypothyroidism    Mixed hyperlipidemia    Ambulatory dysfunction    Other orders  -     Cancel: POCT urine dip  -     Cancel: UA w Reflex to Microscopic w Reflex to Culture - Clinic Collect          Subjective:      Patient ID: Leslee Medeiros is a 80 y o  female  HPI     Patient presents for pre-op evaluation prior to L  total knee replacement surgery scheduled with Dr Koffi Foy  on July 23, 2018  Patient had cardiac clearance done by cardiologist   Dr Radha Collins on 6/29/18  Dr Radha Collins  cleared patient for surgery  PMH: HTN, Hypothyroidism, Type 2 DM - diet controlled,  CKD stage 3, Hyperlipidemia, ambulatory dysfunction        Patient has severe left knee pain secondary to severe osteoarthritis  Ambulates with a walker  No history of recent falls  Reviewed all current medications, blood work results from 6/26/18  Hemoglobin A1c 6 4, potassium 4 3, creatinine 1 39, fasting blood sugar 147, Hb  12 9, TSH 2 790  Patient recently got bilateral hearing aids  She is very happy with results  No more confusion  Urine culture was canceled  Patient does not have any urinary symptoms  HTN - blood pressure is stable  Patient denies chest pain, shortness of breath, dizziness  Type 2 DM -diet controlled  Patient checks blood sugar at home 2-3 times per week  Blood sugar ranges 116 -128  Hypothyroidism -currently on Levothyroxine 137 mcg daily  No significant fatigue  Weight has been stable  Patient has H/o PVD  She had endovascular  AAA repair performed by vascular surgeon Dr Joni Stafford in March 2016  No allergy to anesthesia drugs,  Denies easy bruising or bleeding  Denies tobacco use  CKD stage 3 - patient has office visit with nephrologist Dr Madyson Velasquez on 7/18/18  The following portions of the patient's history were reviewed and updated as appropriate: allergies, current medications, past family history, past medical history, past social history, past surgical history and problem list     Review of Systems   Constitutional: Negative for activity change, appetite change, chills, fatigue and fever  HENT: Positive for hearing loss (patient wears hearing aids)  Negative for congestion, ear pain, nosebleeds, sore throat, tinnitus and trouble swallowing  Eyes: Negative for pain, discharge, redness, itching and visual disturbance  Respiratory: Negative for cough, chest tightness, shortness of breath and wheezing  Cardiovascular: Positive for leg swelling (improved)  Negative for chest pain and palpitations  Gastrointestinal: Positive for constipation (occasionally)   Negative for abdominal pain, blood in stool, diarrhea, nausea and vomiting  Genitourinary: Negative for difficulty urinating, dysuria, frequency, hematuria and pelvic pain  Musculoskeletal: Positive for arthralgias (severe pain in L knee) and gait problem (patient ambulates with a walker)  Negative for myalgias  Skin: Negative for rash and wound  Neurological: Negative for dizziness, seizures, syncope and headaches  Hematological: Negative  Psychiatric/Behavioral: Negative  Objective:      /78 (BP Location: Left arm, Patient Position: Sitting, Cuff Size: Standard)   Pulse 72   Temp 98 6 °F (37 °C) (Tympanic)   Resp 16   Ht 5' 1 5" (1 562 m)   Wt 65 7 kg (144 lb 12 8 oz)   BMI 26 92 kg/m²          Physical Exam   Constitutional: She is oriented to person, place, and time  She appears well-developed and well-nourished  HENT:   Head: Normocephalic and atraumatic  Right Ear: External ear normal    Left Ear: External ear normal    Mouth/Throat: Oropharynx is clear and moist    Eyes: Conjunctivae are normal  Pupils are equal, round, and reactive to light  Neck: Normal range of motion  Neck supple  No JVD present  Cardiovascular: Normal rate, regular rhythm and normal heart sounds  No murmur heard  No carotid bruits BL  BL LE edema 1+   Pulmonary/Chest: Effort normal and breath sounds normal  She has no wheezes  She has no rales  Abdominal: Soft  Bowel sounds are normal  There is no tenderness  Musculoskeletal:   L knee is mildly swollen  Tender over medial and lateral joint line  Patient ambulates with a walker  Lymphadenopathy:     She has no cervical adenopathy  Neurological: She is alert and oriented to person, place, and time  Coordination normal    Skin: Skin is warm and dry  No rash noted  Psychiatric: She has a normal mood and affect  Her behavior is normal    Nursing note and vitals reviewed

## 2018-07-11 NOTE — ASSESSMENT & PLAN NOTE
Lab Results   Component Value Date    HGBA1C 6 4 (H) 06/26/2018       No results for input(s): POCGLU in the last 72 hours  Blood Sugar Average: Last 72 hrs:    Type 2 DM - diet controlled  Continue to monitor blood sugar at home 2-3 times per week

## 2018-07-11 NOTE — ASSESSMENT & PLAN NOTE
Patient is scheduled for L  total knee replacement on   July 23, 2018 with Dr Juanita Handley  Patient is medically stable for surgery  She was cleared by cardiologist Dr Suri Chahal for upcoming surgery  No Aspirin 1 week prior to surgery

## 2018-07-11 NOTE — ASSESSMENT & PLAN NOTE
Recommended to stay well hydrated, avoid NSAIDs  Follow- up with his nephrologist Dr Remi Campbell on July 18, 2018

## 2018-07-18 ENCOUNTER — OFFICE VISIT (OUTPATIENT)
Dept: NEPHROLOGY | Facility: CLINIC | Age: 83
End: 2018-07-18
Payer: COMMERCIAL

## 2018-07-18 VITALS
HEIGHT: 62 IN | WEIGHT: 147 LBS | SYSTOLIC BLOOD PRESSURE: 150 MMHG | DIASTOLIC BLOOD PRESSURE: 60 MMHG | BODY MASS INDEX: 27.05 KG/M2

## 2018-07-18 DIAGNOSIS — M25.562 CHRONIC PAIN OF LEFT KNEE: ICD-10-CM

## 2018-07-18 DIAGNOSIS — N28.1 RENAL CYST: ICD-10-CM

## 2018-07-18 DIAGNOSIS — I10 BENIGN ESSENTIAL HTN: ICD-10-CM

## 2018-07-18 DIAGNOSIS — R80.9 PROTEINURIA, UNSPECIFIED TYPE: Primary | ICD-10-CM

## 2018-07-18 DIAGNOSIS — N18.9 CHRONIC KIDNEY DISEASE, UNSPECIFIED CKD STAGE: ICD-10-CM

## 2018-07-18 DIAGNOSIS — G89.29 CHRONIC PAIN OF LEFT KNEE: ICD-10-CM

## 2018-07-18 LAB
SL AMB  POCT GLUCOSE, UA: ABNORMAL
SL AMB LEUKOCYTE ESTERASE,UA: ABNORMAL
SL AMB POCT BILIRUBIN,UA: ABNORMAL
SL AMB POCT BLOOD,UA: ABNORMAL
SL AMB POCT CLARITY,UA: CLEAR
SL AMB POCT COLOR,UA: YELLOW
SL AMB POCT KETONES,UA: ABNORMAL
SL AMB POCT NITRITE,UA: ABNORMAL
SL AMB POCT PH,UA: 6.5
SL AMB POCT SPECIFIC GRAVITY,UA: 1.01
SL AMB POCT URINE PROTEIN: ABNORMAL
SL AMB POCT UROBILINOGEN: NORMAL

## 2018-07-18 PROCEDURE — 81002 URINALYSIS NONAUTO W/O SCOPE: CPT | Performed by: INTERNAL MEDICINE

## 2018-07-18 PROCEDURE — 99214 OFFICE O/P EST MOD 30 MIN: CPT | Performed by: INTERNAL MEDICINE

## 2018-07-18 RX ORDER — AMLODIPINE BESYLATE 10 MG/1
10 TABLET ORAL DAILY
Qty: 4 TABLET | Refills: 0 | Status: SHIPPED | OUTPATIENT
Start: 2018-07-18 | End: 2018-08-13 | Stop reason: ALTCHOICE

## 2018-07-18 NOTE — PATIENT INSTRUCTIONS
1) on the day of surgery, please hold your losartan on the morning of surgery   Can resume day or two after if renal function is stable  2) HOLD YOUR AMLODIPINE/ATORVASTATIN UNTIL AFTER SURGERY  3) instead, take amlodipine only (starting 7/19 at night) nightly until day of surgery

## 2018-07-18 NOTE — PROGRESS NOTES
NEPHROLOGY OFFICE VISIT   Asif Hall 80 y o  female MRN: [id number]  7/18/2018    Reason for Visit: pre op evaluation of risk of LUISA    ASSESSMENT and PLAN:    I had the pleasure of seeing Ms Carey Peters today in the renal clinic for the continued management of CKD/pre op assessment  Since our last visit, there has been no ER visits or hospitalizations  He currently has no complaints at this time and is feeling well  Patient denies any chest pain, shortness of breath and swelling  The last blood work was done on june, which we have reviewed together  77-year-old female with a past medical history of diet-controlled diabetes, hypertension, hypothyroidism, arthritis, Raynaud's, Marco aortic aneurysm status post repair, who presents for initial evaluation 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 the 1990s  Nonsmoker  Rare alcohol use  1) CKD - Cr has been 1 2-1 4 since 1/2017 and prior to this was 0 8-1 1 mg/dL as far back as 2014  Used NSAIDs in 1990s more  No longer using  Never smoked    - check renal u/s due to slightly complex cyst on CT scan which can be completed postoperatively  we will help the patient's set this up  - Urine sediment - dipstick was 30+ protein, neg blood, neg nitrite, trace WBC  - drinks 4-5 cups of coffee, or tea currently, used to drink 10-12 drinks when was working prior    Surgical Plan from renal standpoint    -from the renal standpoint, the patient is high risk given advanced age, and likely CKD due to advanced age and hypertension  Patient is CKD stage 3  His creatinine has been stable for the last 2 years  1 2-1 4  Therefore, the patient appears to be optimized from the renal standpoint for surgery on Monday   -hold losartan on the day of surgery-patient is already aware of this  -hold atorvastatin    Patient is on a combination amlodipine and atorvastatin-therefore will give the patient a prescription for amlodipine which the daughter will change the pill box tonight  Atorvastatin/Amlodipine joint medicine can be restarted after the surgery  -avoid NSAIDs  -lab work 1 week post surgery with BMP, CBC, U PCR  -while the patient is in the hospital, check a BMP daily  -patient should have 500 cc normal saline given prior to the surgery  - lovenox - if Cr worsens post op, will need lower dose of lovenox  -we reviewed in detail the risk of acute kidney injury and dialysis if the renal function should worsen after surgery  2) Hyponatremia    - used to follows with Dr Angela Quinn  - on sodium chloride tab once daily  -monitor for now with 1 Sodium tablet daily  3) knee pain    - due for L knee - due for surgery  - takes tramadol for pain  - no NSAIDs    4) diarrhea - started one week ago (approx 8 days ago and took for 5 days and discontinued on Saturday  - started after iron tabs and resolved after holding iron tabs    5) edema     - vascular insufficiency ?  - pt is on amlodipine  -monitor for now    6) hypertension-continue current regimen  Blood pressures are 008 systolic today  Monitor for now  Would be hesitant to lower blood pressures further for now given gait disturbance already with the knee pain  Also the patient had mentioned syncope episodes prior when the patient was taking amlodipine and losartan both in the morning  It was a pleasure evaluating your patient in the office today  Thank you for allowing our team to participate in the care of Ms Theresa Morales  Please do not hesitate to contact our team if further issues/questions shall arise in the interim  No problem-specific Assessment & Plan notes found for this encounter  HPI:    Patient denies complaints  Presents for initial evaluation  Has knee pain  No nausea, no vomiting, no diarrhea recently  Though last week did have diarrhea while taking iron tablets which has now resolved      PATIENT INSTRUCTIONS:    Patient Instructions   1) on the day of surgery, please hold your losartan on the morning of surgery  Can resume day or two after if renal function is stable  2) HOLD YOUR AMLODIPINE/ATORVASTATIN UNTIL AFTER SURGERY  3) instead, take amlodipine only (starting 7/19 at night) nightly until day of surgery        OBJECTIVE:  Current Weight: Weight - Scale: 66 7 kg (147 lb)  Vitals:    07/18/18 1629 07/18/18 1707   BP:  150/60   Weight: 66 7 kg (147 lb)    Height: 5' 1 5" (1 562 m)     Body mass index is 27 33 kg/m²  REVIEW OF SYSTEMS:    Review of Systems   Constitutional: Negative  Negative for fatigue  HENT: Negative  Eyes: Negative  Respiratory: Negative  Negative for shortness of breath  Cardiovascular: Negative  Negative for leg swelling  Gastrointestinal: Negative  Endocrine: Negative  Genitourinary: Negative  Negative for difficulty urinating  Musculoskeletal: Negative  Skin: Negative  Allergic/Immunologic: Negative  Neurological: Negative  Hematological: Negative  Psychiatric/Behavioral: Negative  All other systems reviewed and are negative  PHYSICAL EXAM:      Physical Exam   Constitutional: She is oriented to person, place, and time  She appears well-developed and well-nourished  No distress  HENT:   Head: Normocephalic and atraumatic  Mouth/Throat: No oropharyngeal exudate  Eyes: Conjunctivae are normal  Right eye exhibits no discharge  Left eye exhibits no discharge  No scleral icterus  Neck: Normal range of motion  Neck supple  No JVD present  Cardiovascular: Normal rate and regular rhythm  Exam reveals no gallop and no friction rub  No murmur heard  Pulmonary/Chest: Effort normal and breath sounds normal  No respiratory distress  She has no wheezes  She has no rales  Abdominal: Soft  Bowel sounds are normal  She exhibits no distension  There is no tenderness  There is no rebound     Musculoskeletal: Normal range of motion  She exhibits no edema, tenderness or deformity  Neurological: She is alert and oriented to person, place, and time  Coordination normal    Skin: Skin is warm and dry  No rash noted  She is not diaphoretic  No erythema  No pallor  Psychiatric: She has a normal mood and affect  Her behavior is normal  Judgment and thought content normal    Nursing note and vitals reviewed  Medications:    Current Outpatient Prescriptions:     acetaminophen (TYLENOL) 325 mg tablet, Take 2 tablets (650 mg total) by mouth every 6 (six) hours as needed for mild pain , Disp: 30 tablet, Rfl: 0    amLODIPine-atorvastatin (CADUET) 10-20 MG per tablet, Take 1 tablet by mouth daily at bedtime  , Disp: , Rfl:     ascorbic acid (VITAMIN C) 500 mg tablet, Take 500 mg by mouth daily, Disp: , Rfl:     B-12, Methylcobalamin, 1000 MCG SUBL, Take 1 tablet by mouth daily  B-12 500 MCG Oral Tablet TAKE 1 TABLET DAILY  Quantity: 30;  Refills: 0    Mortimer Franci M D ; Active , Disp: , Rfl:     calcium citrate-vitamin D (CITRACAL+D) 315-200 MG-UNIT per tablet, Take 1 tablet by mouth daily  Calcium + D TABS  Refills: 0   , M D ; Active , Disp: , Rfl:     clobetasol (TEMOVATE) 0 05 % cream, Apply 1 application topically 2 (two) times a day, Disp: , Rfl:     diclofenac sodium (VOLTAREN) 1 %, Apply 2 g topically 4 (four) times a day, Disp: 100 g, Rfl: 3    Docusate Sodium 100 MG capsule, Take 1 tablet by mouth as needed  , Disp: , Rfl:     fexofenadine (ALLEGRA) 180 MG tablet, Take 1 tablet by mouth as needed  , Disp: , Rfl:     Fluocinolone Acetonide Scalp (DERMA-SMOOTHE/FS SCALP) 0 01 % OIL, Apply topically, Disp: , Rfl:     fluticasone (FLONASE) 50 mcg/act nasal spray, Fluticasone Propionate 50 MCG/ACT Nasal Suspension use 2 spr   in each nostril daily  Quantity: 1;  Refills: 5    Mortimer Assawoman M D ;  Started 23-Sep-2014 Active, Disp: , Rfl:     folic acid (FOLVITE) 1 mg tablet, Take by mouth daily, Disp: , Rfl:    ketoconazole (NIZORAL) 2 % shampoo, Apply 1 application topically once, Disp: , Rfl:     levothyroxine 137 mcg tablet, Take by mouth daily  Levothyroxine Sodium 137 MCG Oral Tablet take 1 tablet daily before breakfast  Quantity: 90;  Refills: 3    Rich Shannon M D ;  Started 3-Oct-2014 Active , Disp: , Rfl:     LORazepam (ATIVAN) 0 5 mg tablet, Take by mouth, Disp: , Rfl:     losartan (COZAAR) 100 MG tablet, Take 100 mg by mouth daily  Losartan Potassium 100 MG Oral Tablet take 1 tablet once daily  Quantity: 90;  Refills: 3    Rich Shannon M D ; Active , Disp: , Rfl:     Magnesium 250 MG TABS, 1 tablet daily  Magnesium 250 MG Oral Tablet  Quantity: 0;  Refills: 0   , M D ; Active , Disp: , Rfl:     Multiple Vitamins-Minerals (MULTIVITAMIN & MINERAL PO), 1 tablet daily  Multivitamin & Mineral Oral Liquid  Quantity: 0;  Refills: 0   , M D ; Active , Disp: , Rfl:     Polyethylene Glycol 3350 GRAN, Take 1 Dose by mouth daily as needed  Polyethylene Glycol 3350 Oral Powder MIX 1 CAPFUL (17GM) IN 8 OUNCES OF WATER, JUICE, OR TEA AND DRINK DAILY  Quantity: 557;  Refills: 0    Rich Ortega M D ;  Started 3-Dec-2015 Active , Disp: , Rfl:     pyrithione zinc (HEAD AND SHOULDERS) 1 % shampoo, Apply topically daily as needed for dandruff, Disp: , Rfl:     sodium chloride 1 g tablet, Take 1 tablet (1 g total) by mouth daily Sodium Chloride 1 GM Oral Tablet Take 1 tab  Daily  Quantity: 30;  Refills: 5    Rich Shannon M D ;  Started 30-Apr-2015 Active, Disp: 30 tablet, Rfl: 6    traMADol (ULTRAM) 50 mg tablet, Take 1/2 tab -1 tablet every 8 hr  PRN for pain (Patient taking differently: as needed Take 1/2 tab -1 tablet every 8 hr   PRN for pain ), Disp: 270 tablet, Rfl: 0    amLODIPine (NORVASC) 10 mg tablet, Take 1 tablet (10 mg total) by mouth daily for 4 doses, Disp: 4 tablet, Rfl: 0    enoxaparin (LOVENOX) 40 mg/0 4 mL, Inject 0 4 mL (40 mg total) under the skin daily in the early morning for 30 days, Disp: 11 2 mL, Rfl: 0    Laboratory Results:        Results for orders placed or performed in visit on 07/18/18   POCT urine dip   Result Value Ref Range    LEUKOCYTE ESTERASE,UA Trace      NITRITE,UA neg     SL AMB POCT UROBILINOGEN Normal     SL AMB POCT URINE PROTEIN 30+      PH,UA 6 5      BLOOD,UA neg      SPECIFIC GRAVITY,UA 1 010      KETONES,UA neg      BILIRUBIN,UA neg     GLUCOSE, UA neg      COLOR,UA yellow      CLARITY,UA clear

## 2018-07-18 NOTE — LETTER
July 18, 2018     Mainor Contreras MD  [facility] [id number]    Patient: Melia Martins   YOB: 1925   Date of Visit: 7/18/2018       Dear Dr Wilbur Hawkins: Thank you for referring Evan Martinez to me for evaluation  Below are my notes for this consultation  If you have questions, please do not hesitate to call me  I look forward to following your patient along with you  Sincerely,        Beckie Penny MD        CC: MD Beckie Calvillo MD  7/18/2018  5:40 PM  Sign at close encounter  [facility] 80 [facility]  [facility] MRN: [id number]  7/18/2018    Reason for Visit: pre op evaluation of risk of LUISA    ASSESSMENT and PLAN:    I had the pleasure of seeing Ms House Friday today in the renal clinic for the continued management of CKD/pre op assessment  Since our last visit, there has been no ER visits or hospitalizations  He currently has no complaints at this time and is feeling well  Patient denies any chest pain, shortness of breath and swelling  The last blood work was done on june, which we have reviewed together  51-year-old female with a past medical history of diet-controlled diabetes, hypertension, hypothyroidism, arthritis, Raynaud's, Marco aortic aneurysm status post repair, who presents for initial evaluation 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 the 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  Nonsmoker  Rare alcohol use  1) CKD - Cr has been 1 2-1 4 since 1/2017 and prior to this was 0 8-1 1 mg/dL as far back as 2014  Used NSAIDs in 1990s more  No longer using  Never smoked    - check renal u/s due to slightly complex cyst on CT scan which can be completed postoperatively   we will help the patient's set this up  - Urine sediment - dipstick was 30+ protein, neg blood, neg nitrite, trace WBC  - drinks 4-5 cups of coffee, or tea currently, used to drink 10-12 drinks when was working prior    Surgical Plan from renal standpoint    -from the renal standpoint, the patient is high risk given advanced age, and likely CKD due to advanced age and hypertension  Patient is CKD stage 3  His creatinine has been stable for the last 2 years  1 2-1 4  Therefore, the patient appears to be optimized from the renal standpoint for surgery on Monday   -hold losartan on the day of surgery-patient is already aware of this  -hold atorvastatin  Patient is on a combination amlodipine and atorvastatin-therefore will give the patient a prescription for amlodipine which the daughter will change the pill box tonight  Atorvastatin/Amlodipine joint medicine can be restarted after the surgery  -avoid NSAIDs  -lab work 1 week post surgery with BMP, CBC, U PCR  -while the patient is in the hospital, check a BMP daily  -patient should have 500 cc normal saline given prior to the surgery  - lovenox - if Cr worsens post op, will need lower dose of lovenox  -we reviewed in detail the risk of acute kidney injury and dialysis if the renal function should worsen after surgery  2) Hyponatremia    - used to follows with Dr Boni Villarreal  - on sodium chloride tab once daily  -monitor for now with 1 Sodium tablet daily  3) knee pain    - due for L knee - due for surgery  - takes tramadol for pain  - no NSAIDs    4) diarrhea - started one week ago (approx 8 days ago and took for 5 days and discontinued on Saturday  - started after iron tabs and resolved after holding iron tabs    5) edema     - vascular insufficiency ?  - pt is on amlodipine  -monitor for now    It was a pleasure evaluating your patient in the office today  Thank you for allowing our team to participate in the care of Ms Brian Cronin  Please do not hesitate to contact our team if further issues/questions shall arise in the interim  No problem-specific Assessment & Plan notes found for this encounter  HPI:    Patient denies complaints  Presents for initial evaluation  Has knee pain  No nausea, no vomiting, no diarrhea recently  Though last week did have diarrhea while taking iron tablets which has now resolved  PATIENT INSTRUCTIONS:    Patient Instructions   1) on the day of surgery, please hold your losartan on the morning of surgery  Can resume day or two after if renal function is stable  2) HOLD YOUR AMLODIPINE/ATORVASTATIN UNTIL AFTER SURGERY  3) instead, take amlodipine only (starting 7/19 at night) nightly until day of surgery        OBJECTIVE:  Current Weight: Weight - Scale: 66 7 kg (147 lb)  Vitals:    07/18/18 1629 07/18/18 1707   BP:  150/60   Weight: 66 7 kg (147 lb)    Height: 5' 1 5" (1 562 m)     Body mass index is 27 33 kg/m²  REVIEW OF SYSTEMS:    Review of Systems   Constitutional: Negative  Negative for fatigue  HENT: Negative  Eyes: Negative  Respiratory: Negative  Negative for shortness of breath  Cardiovascular: Negative  Negative for leg swelling  Gastrointestinal: Negative  Endocrine: Negative  Genitourinary: Negative  Negative for difficulty urinating  Musculoskeletal: Negative  Skin: Negative  Allergic/Immunologic: Negative  Neurological: Negative  Hematological: Negative  Psychiatric/Behavioral: Negative  All other systems reviewed and are negative  PHYSICAL EXAM:      Physical Exam   Constitutional: She is oriented to person, place, and time  She appears well-developed and well-nourished  No distress  HENT:   Head: Normocephalic and atraumatic  Mouth/Throat: No oropharyngeal exudate  Eyes: Conjunctivae are normal  Right eye exhibits no discharge  Left eye exhibits no discharge  No scleral icterus  Neck: Normal range of motion  Neck supple  No JVD present  Cardiovascular: Normal rate and regular rhythm  Exam reveals no gallop and no friction rub  No murmur heard  Pulmonary/Chest: Effort normal and breath sounds normal  No respiratory distress   She has no wheezes  She has no rales  Abdominal: Soft  Bowel sounds are normal  She exhibits no distension  There is no tenderness  There is no rebound  Musculoskeletal: Normal range of motion  She exhibits no edema, tenderness or deformity  Neurological: She is alert and oriented to person, place, and time  Coordination normal    Skin: Skin is warm and dry  No rash noted  She is not diaphoretic  No erythema  No pallor  Psychiatric: She has a normal mood and affect  Her behavior is normal  Judgment and thought content normal    Nursing note and vitals reviewed  Medications:    Current Outpatient Prescriptions:     acetaminophen (TYLENOL) 325 mg tablet, Take 2 tablets (650 mg total) by mouth every 6 (six) hours as needed for mild pain , Disp: 30 tablet, Rfl: 0    amLODIPine-atorvastatin (CADUET) 10-20 MG per tablet, Take 1 tablet by mouth daily at bedtime  , Disp: , Rfl:     ascorbic acid (VITAMIN C) 500 mg tablet, Take 500 mg by mouth daily, Disp: , Rfl:     B-12, Methylcobalamin, 1000 MCG SUBL, Take 1 tablet by mouth daily  B-12 500 MCG Oral Tablet TAKE 1 TABLET DAILY  Quantity: 30;  Refills: 0    Vandana Segura M D ; Active , Disp: , Rfl:     calcium citrate-vitamin D (CITRACAL+D) 315-200 MG-UNIT per tablet, Take 1 tablet by mouth daily  Calcium + D TABS  Refills: 0   , M D ; Active , Disp: , Rfl:     clobetasol (TEMOVATE) 0 05 % cream, Apply 1 application topically 2 (two) times a day, Disp: , Rfl:     diclofenac sodium (VOLTAREN) 1 %, Apply 2 g topically 4 (four) times a day, Disp: 100 g, Rfl: 3    Docusate Sodium 100 MG capsule, Take 1 tablet by mouth as needed  , Disp: , Rfl:     fexofenadine (ALLEGRA) 180 MG tablet, Take 1 tablet by mouth as needed  , Disp: , Rfl:     Fluocinolone Acetonide Scalp (DERMA-SMOOTHE/FS SCALP) 0 01 % OIL, Apply topically, Disp: , Rfl:     fluticasone (FLONASE) 50 mcg/act nasal spray, Fluticasone Propionate 50 MCG/ACT Nasal Suspension use 2 spr   in each nostril daily  Quantity: 1;  Refills: 5    Petra Holiness M D ;  Started 23-Sep-2014 Active, Disp: , Rfl:     folic acid (FOLVITE) 1 mg tablet, Take by mouth daily, Disp: , Rfl:     ketoconazole (NIZORAL) 2 % shampoo, Apply 1 application topically once, Disp: , Rfl:     levothyroxine 137 mcg tablet, Take by mouth daily  Levothyroxine Sodium 137 MCG Oral Tablet take 1 tablet daily before breakfast  Quantity: 90;  Refills: 3    Petra Holiness M D ;  Started 3-Oct-2014 Active , Disp: , Rfl:     LORazepam (ATIVAN) 0 5 mg tablet, Take by mouth, Disp: , Rfl:     losartan (COZAAR) 100 MG tablet, Take 100 mg by mouth daily  Losartan Potassium 100 MG Oral Tablet take 1 tablet once daily  Quantity: 90;  Refills: 3    Petra Holiness M D ; Active , Disp: , Rfl:     Magnesium 250 MG TABS, 1 tablet daily  Magnesium 250 MG Oral Tablet  Quantity: 0;  Refills: 0   , M D ; Active , Disp: , Rfl:     Multiple Vitamins-Minerals (MULTIVITAMIN & MINERAL PO), 1 tablet daily  Multivitamin & Mineral Oral Liquid  Quantity: 0;  Refills: 0   , M D ; Active , Disp: , Rfl:     Polyethylene Glycol 3350 GRAN, Take 1 Dose by mouth daily as needed  Polyethylene Glycol 3350 Oral Powder MIX 1 CAPFUL (17GM) IN 8 OUNCES OF WATER, JUICE, OR TEA AND DRINK DAILY  Quantity: 557;  Refills: 0    Petra Holiness M D ;  Started 3-Dec-2015 Active , Disp: , Rfl:     pyrithione zinc (HEAD AND SHOULDERS) 1 % shampoo, Apply topically daily as needed for dandruff, Disp: , Rfl:     sodium chloride 1 g tablet, Take 1 tablet (1 g total) by mouth daily Sodium Chloride 1 GM Oral Tablet Take 1 tab  Daily  Quantity: 30;  Refills: 5    Petra Holiness M D ;  Started 30-Apr-2015 Active, Disp: 30 tablet, Rfl: 6    traMADol (ULTRAM) 50 mg tablet, Take 1/2 tab -1 tablet every 8 hr  PRN for pain (Patient taking differently: as needed Take 1/2 tab -1 tablet every 8 hr   PRN for pain ), Disp: 270 tablet, Rfl: 0    amLODIPine (NORVASC) 10 mg tablet, Take 1 tablet (10 mg total) by mouth daily for 4 doses, Disp: 4 tablet, Rfl: 0    enoxaparin (LOVENOX) 40 mg/0 4 mL, Inject 0 4 mL (40 mg total) under the skin daily in the early morning for 30 days, Disp: 11 2 mL, Rfl: 0    Laboratory Results:        Results for orders placed or performed in visit on 07/18/18   POCT urine dip   Result Value Ref Range    LEUKOCYTE ESTERASE,UA Trace      NITRITE,UA neg      AMB POCT UROBILINOGEN Normal      AMB POCT URINE PROTEIN 30+      PH,UA 6 5      BLOOD,UA neg      SPECIFIC GRAVITY,UA 1 010      KETONES,UA neg      BILIRUBIN,UA neg     GLUCOSE, UA neg      COLOR,UA yellow      CLARITY,UA clear

## 2018-07-23 ENCOUNTER — APPOINTMENT (OUTPATIENT)
Dept: PHYSICAL THERAPY | Age: 83
End: 2018-07-23
Payer: COMMERCIAL

## 2018-07-23 ENCOUNTER — ANESTHESIA (OUTPATIENT)
Dept: PERIOP | Facility: HOSPITAL | Age: 83
DRG: 470 | End: 2018-07-23
Payer: COMMERCIAL

## 2018-07-23 ENCOUNTER — HOSPITAL ENCOUNTER (INPATIENT)
Facility: HOSPITAL | Age: 83
LOS: 2 days | Discharge: NURSING, REHAB OR LONG-TERM CARE | DRG: 470 | End: 2018-07-25
Attending: ORTHOPAEDIC SURGERY | Admitting: ORTHOPAEDIC SURGERY
Payer: COMMERCIAL

## 2018-07-23 DIAGNOSIS — E03.9 ACQUIRED HYPOTHYROIDISM: ICD-10-CM

## 2018-07-23 DIAGNOSIS — M17.12 LOCALIZED OSTEOARTHRITIS OF LEFT KNEE: ICD-10-CM

## 2018-07-23 DIAGNOSIS — N18.30 CHRONIC KIDNEY DISEASE, STAGE 3 (HCC): ICD-10-CM

## 2018-07-23 DIAGNOSIS — I10 ESSENTIAL HYPERTENSION: ICD-10-CM

## 2018-07-23 DIAGNOSIS — N18.30 TYPE 2 DM WITH CKD STAGE 3 AND HYPERTENSION (HCC): ICD-10-CM

## 2018-07-23 DIAGNOSIS — Z96.652 S/P TOTAL KNEE ARTHROPLASTY, LEFT: Primary | ICD-10-CM

## 2018-07-23 DIAGNOSIS — E11.22 TYPE 2 DM WITH CKD STAGE 3 AND HYPERTENSION (HCC): ICD-10-CM

## 2018-07-23 DIAGNOSIS — I12.9 TYPE 2 DM WITH CKD STAGE 3 AND HYPERTENSION (HCC): ICD-10-CM

## 2018-07-23 LAB
ABO GROUP BLD: NORMAL
BLD GP AB SCN SERPL QL: NEGATIVE
GLUCOSE SERPL-MCNC: 110 MG/DL (ref 65–140)
GLUCOSE SERPL-MCNC: 125 MG/DL (ref 65–140)
RH BLD: POSITIVE
SPECIMEN EXPIRATION DATE: NORMAL

## 2018-07-23 PROCEDURE — 86900 BLOOD TYPING SEROLOGIC ABO: CPT | Performed by: ORTHOPAEDIC SURGERY

## 2018-07-23 PROCEDURE — C1776 JOINT DEVICE (IMPLANTABLE): HCPCS | Performed by: ORTHOPAEDIC SURGERY

## 2018-07-23 PROCEDURE — G8978 MOBILITY CURRENT STATUS: HCPCS

## 2018-07-23 PROCEDURE — 82948 REAGENT STRIP/BLOOD GLUCOSE: CPT

## 2018-07-23 PROCEDURE — G8979 MOBILITY GOAL STATUS: HCPCS

## 2018-07-23 PROCEDURE — 0SRD0J9 REPLACEMENT OF LEFT KNEE JOINT WITH SYNTHETIC SUBSTITUTE, CEMENTED, OPEN APPROACH: ICD-10-PCS | Performed by: ORTHOPAEDIC SURGERY

## 2018-07-23 PROCEDURE — 86850 RBC ANTIBODY SCREEN: CPT | Performed by: ORTHOPAEDIC SURGERY

## 2018-07-23 PROCEDURE — 86901 BLOOD TYPING SEROLOGIC RH(D): CPT | Performed by: ORTHOPAEDIC SURGERY

## 2018-07-23 PROCEDURE — 27447 TOTAL KNEE ARTHROPLASTY: CPT | Performed by: ORTHOPAEDIC SURGERY

## 2018-07-23 PROCEDURE — 97163 PT EVAL HIGH COMPLEX 45 MIN: CPT

## 2018-07-23 PROCEDURE — C1713 ANCHOR/SCREW BN/BN,TIS/BN: HCPCS | Performed by: ORTHOPAEDIC SURGERY

## 2018-07-23 DEVICE — ATTUNE KNEE SYSTEM TIBIAL BASE FIXED BEARING SIZE 4 CEMENTED
Type: IMPLANTABLE DEVICE | Site: KNEE | Status: FUNCTIONAL
Brand: ATTUNE

## 2018-07-23 DEVICE — SMARTSET HV HIGH VISCOSITY BONE CEMENT 40G
Type: IMPLANTABLE DEVICE | Status: FUNCTIONAL
Brand: SMARTSET

## 2018-07-23 DEVICE — ATTUNE KNEE SYSTEM TIBIAL INSERT FIXED BEARING POSTERIOR STABILIZED 4 5MM AOX
Type: IMPLANTABLE DEVICE | Site: KNEE | Status: FUNCTIONAL
Brand: ATTUNE

## 2018-07-23 DEVICE — ATTUNE PATELLA MEDIALIZED DOME 35MM CEMENTED AOX
Type: IMPLANTABLE DEVICE | Site: PATELLA | Status: FUNCTIONAL
Brand: ATTUNE

## 2018-07-23 DEVICE — ATTUNE KNEE SYSTEM FEMORAL POSTERIOR STABILIZED SIZE 4 LEFT CEMENTED
Type: IMPLANTABLE DEVICE | Site: KNEE | Status: FUNCTIONAL
Brand: ATTUNE

## 2018-07-23 RX ORDER — OXYCODONE HYDROCHLORIDE 5 MG/1
5 TABLET ORAL EVERY 4 HOURS PRN
Status: DISCONTINUED | OUTPATIENT
Start: 2018-07-23 | End: 2018-07-25 | Stop reason: HOSPADM

## 2018-07-23 RX ORDER — LORATADINE 10 MG/1
10 TABLET ORAL DAILY
Status: DISCONTINUED | OUTPATIENT
Start: 2018-07-23 | End: 2018-07-25 | Stop reason: HOSPADM

## 2018-07-23 RX ORDER — MEPERIDINE HYDROCHLORIDE 25 MG/ML
12.5 INJECTION INTRAMUSCULAR; INTRAVENOUS; SUBCUTANEOUS AS NEEDED
Status: DISCONTINUED | OUTPATIENT
Start: 2018-07-23 | End: 2018-07-23 | Stop reason: HOSPADM

## 2018-07-23 RX ORDER — ONDANSETRON 2 MG/ML
4 INJECTION INTRAMUSCULAR; INTRAVENOUS EVERY 8 HOURS PRN
Status: DISCONTINUED | OUTPATIENT
Start: 2018-07-23 | End: 2018-07-25 | Stop reason: HOSPADM

## 2018-07-23 RX ORDER — LOSARTAN POTASSIUM 50 MG/1
100 TABLET ORAL DAILY
Status: DISCONTINUED | OUTPATIENT
Start: 2018-07-24 | End: 2018-07-23

## 2018-07-23 RX ORDER — ROPIVACAINE HYDROCHLORIDE 5 MG/ML
INJECTION, SOLUTION EPIDURAL; INFILTRATION; PERINEURAL AS NEEDED
Status: DISCONTINUED | OUTPATIENT
Start: 2018-07-23 | End: 2018-07-23 | Stop reason: SURG

## 2018-07-23 RX ORDER — EPHEDRINE SULFATE 50 MG/ML
INJECTION, SOLUTION INTRAVENOUS AS NEEDED
Status: DISCONTINUED | OUTPATIENT
Start: 2018-07-23 | End: 2018-07-23 | Stop reason: SURG

## 2018-07-23 RX ORDER — SODIUM CHLORIDE, SODIUM LACTATE, POTASSIUM CHLORIDE, CALCIUM CHLORIDE 600; 310; 30; 20 MG/100ML; MG/100ML; MG/100ML; MG/100ML
1.5 INJECTION, SOLUTION INTRAVENOUS CONTINUOUS
Status: DISCONTINUED | OUTPATIENT
Start: 2018-07-23 | End: 2018-07-25 | Stop reason: HOSPADM

## 2018-07-23 RX ORDER — FENTANYL CITRATE/PF 50 MCG/ML
25 SYRINGE (ML) INJECTION
Status: DISCONTINUED | OUTPATIENT
Start: 2018-07-23 | End: 2018-07-23 | Stop reason: HOSPADM

## 2018-07-23 RX ORDER — GABAPENTIN 300 MG/1
300 CAPSULE ORAL ONCE
Status: COMPLETED | OUTPATIENT
Start: 2018-07-23 | End: 2018-07-23

## 2018-07-23 RX ORDER — MAGNESIUM HYDROXIDE 1200 MG/15ML
LIQUID ORAL AS NEEDED
Status: DISCONTINUED | OUTPATIENT
Start: 2018-07-23 | End: 2018-07-23 | Stop reason: HOSPADM

## 2018-07-23 RX ORDER — SODIUM CHLORIDE, SODIUM LACTATE, POTASSIUM CHLORIDE, CALCIUM CHLORIDE 600; 310; 30; 20 MG/100ML; MG/100ML; MG/100ML; MG/100ML
125 INJECTION, SOLUTION INTRAVENOUS CONTINUOUS
Status: DISCONTINUED | OUTPATIENT
Start: 2018-07-23 | End: 2018-07-25 | Stop reason: HOSPADM

## 2018-07-23 RX ORDER — ACETAMINOPHEN 325 MG/1
650 TABLET ORAL EVERY 6 HOURS SCHEDULED
Status: DISCONTINUED | OUTPATIENT
Start: 2018-07-23 | End: 2018-07-25 | Stop reason: HOSPADM

## 2018-07-23 RX ORDER — OXYCODONE HYDROCHLORIDE 5 MG/1
TABLET ORAL
Qty: 30 TABLET | Refills: 0 | Status: SHIPPED | OUTPATIENT
Start: 2018-07-23 | End: 2018-12-17 | Stop reason: ALTCHOICE

## 2018-07-23 RX ORDER — ACETAMINOPHEN 325 MG/1
975 TABLET ORAL ONCE
Status: DISCONTINUED | OUTPATIENT
Start: 2018-07-23 | End: 2018-07-23 | Stop reason: HOSPADM

## 2018-07-23 RX ORDER — SODIUM CHLORIDE 9 MG/ML
INJECTION, SOLUTION INTRAVENOUS CONTINUOUS PRN
Status: DISCONTINUED | OUTPATIENT
Start: 2018-07-23 | End: 2018-07-23 | Stop reason: SURG

## 2018-07-23 RX ORDER — DOCUSATE SODIUM 100 MG/1
100 CAPSULE, LIQUID FILLED ORAL 2 TIMES DAILY
Status: DISCONTINUED | OUTPATIENT
Start: 2018-07-23 | End: 2018-07-25 | Stop reason: HOSPADM

## 2018-07-23 RX ORDER — HYDRALAZINE HYDROCHLORIDE 25 MG/1
25 TABLET, FILM COATED ORAL EVERY 8 HOURS PRN
Status: DISCONTINUED | OUTPATIENT
Start: 2018-07-23 | End: 2018-07-25 | Stop reason: HOSPADM

## 2018-07-23 RX ORDER — CEFAZOLIN SODIUM 1 G/3ML
INJECTION, POWDER, FOR SOLUTION INTRAMUSCULAR; INTRAVENOUS AS NEEDED
Status: DISCONTINUED | OUTPATIENT
Start: 2018-07-23 | End: 2018-07-23 | Stop reason: SURG

## 2018-07-23 RX ORDER — ONDANSETRON 2 MG/ML
4 INJECTION INTRAMUSCULAR; INTRAVENOUS ONCE AS NEEDED
Status: DISCONTINUED | OUTPATIENT
Start: 2018-07-23 | End: 2018-07-23 | Stop reason: HOSPADM

## 2018-07-23 RX ORDER — ALBUTEROL SULFATE 2.5 MG/3ML
2.5 SOLUTION RESPIRATORY (INHALATION) ONCE AS NEEDED
Status: DISCONTINUED | OUTPATIENT
Start: 2018-07-23 | End: 2018-07-23 | Stop reason: HOSPADM

## 2018-07-23 RX ORDER — BUPIVACAINE HYDROCHLORIDE 7.5 MG/ML
INJECTION, SOLUTION INTRASPINAL AS NEEDED
Status: DISCONTINUED | OUTPATIENT
Start: 2018-07-23 | End: 2018-07-23 | Stop reason: SURG

## 2018-07-23 RX ORDER — LABETALOL HYDROCHLORIDE 5 MG/ML
5 INJECTION, SOLUTION INTRAVENOUS
Status: DISCONTINUED | OUTPATIENT
Start: 2018-07-23 | End: 2018-07-23 | Stop reason: HOSPADM

## 2018-07-23 RX ORDER — OXYCODONE HYDROCHLORIDE 5 MG/1
10 TABLET ORAL EVERY 4 HOURS PRN
Status: DISCONTINUED | OUTPATIENT
Start: 2018-07-23 | End: 2018-07-25 | Stop reason: HOSPADM

## 2018-07-23 RX ORDER — AMLODIPINE BESYLATE 10 MG/1
10 TABLET ORAL DAILY
Status: DISCONTINUED | OUTPATIENT
Start: 2018-07-24 | End: 2018-07-23

## 2018-07-23 RX ORDER — SENNOSIDES 8.6 MG
1 TABLET ORAL DAILY
Status: DISCONTINUED | OUTPATIENT
Start: 2018-07-23 | End: 2018-07-25 | Stop reason: HOSPADM

## 2018-07-23 RX ADMIN — OXYCODONE HYDROCHLORIDE 5 MG: 5 TABLET ORAL at 16:41

## 2018-07-23 RX ADMIN — EPHEDRINE SULFATE 5 MG: 50 INJECTION, SOLUTION INTRAMUSCULAR; INTRAVENOUS; SUBCUTANEOUS at 11:02

## 2018-07-23 RX ADMIN — ACETAMINOPHEN 650 MG: 325 TABLET, FILM COATED ORAL at 17:10

## 2018-07-23 RX ADMIN — SODIUM CHLORIDE, SODIUM LACTATE, POTASSIUM CHLORIDE, AND CALCIUM CHLORIDE 1.5 ML/KG/HR: .6; .31; .03; .02 INJECTION, SOLUTION INTRAVENOUS at 12:57

## 2018-07-23 RX ADMIN — BUPIVACAINE HYDROCHLORIDE IN DEXTROSE 1.4 ML: 7.5 INJECTION, SOLUTION SUBARACHNOID at 09:57

## 2018-07-23 RX ADMIN — SODIUM CHLORIDE 500 ML: 0.9 INJECTION, SOLUTION INTRAVENOUS at 09:19

## 2018-07-23 RX ADMIN — ROPIVACAINE HYDROCHLORIDE 20 ML: 5 INJECTION, SOLUTION EPIDURAL; INFILTRATION; PERINEURAL at 09:43

## 2018-07-23 RX ADMIN — SODIUM CHLORIDE: 0.9 INJECTION, SOLUTION INTRAVENOUS at 09:31

## 2018-07-23 RX ADMIN — CEFAZOLIN 2000 MG: 1 INJECTION, POWDER, FOR SOLUTION INTRAVENOUS at 09:55

## 2018-07-23 RX ADMIN — ACETAMINOPHEN 650 MG: 325 TABLET, FILM COATED ORAL at 13:49

## 2018-07-23 RX ADMIN — DOCUSATE SODIUM 100 MG: 100 CAPSULE, LIQUID FILLED ORAL at 17:10

## 2018-07-23 RX ADMIN — AMLODIPINE BESYLATE: 10 TABLET ORAL at 21:16

## 2018-07-23 RX ADMIN — GABAPENTIN 300 MG: 300 CAPSULE ORAL at 09:18

## 2018-07-23 RX ADMIN — CEFAZOLIN SODIUM 2000 MG: 2 SOLUTION INTRAVENOUS at 17:10

## 2018-07-23 RX ADMIN — OXYCODONE HYDROCHLORIDE 5 MG: 5 TABLET ORAL at 21:16

## 2018-07-23 RX ADMIN — ACETAMINOPHEN 650 MG: 325 TABLET, FILM COATED ORAL at 23:34

## 2018-07-23 RX ADMIN — SODIUM CHLORIDE: 0.9 INJECTION, SOLUTION INTRAVENOUS at 11:10

## 2018-07-23 NOTE — PHYSICAL THERAPY NOTE
PHYSICAL THERAPY Evaluation NOTE  Patient Name: Melia DUDLEY Date: 7/23/2018 07/23/18 2532   Note Type   Note type Eval only   Pain Assessment   Pain Assessment 0-10   Pain Score 6   Home Living   Type of Home Apartment  ([facility])   Home Equipment Walker   Additional Comments Pt  lives in an [address] alone with use of RW for ambulation   Prior Function   Level of [place] Independent with ADLs and functional mobility   Lives With Alone   Receives Help From Family   ADL Assistance Independent   IADLs Needs assistance   Falls in the last 6 months 1 to 4   Vocational Retired   Comments PTA patient INDEP with ADLs, Functional moibility with RW and assistance for some IADLs  Restrictions/Precautions   Weight Bearing Precautions Per Order Yes   LLE Weight Bearing Per Order WBAT   Other Precautions Chair Alarm; Bed Alarm;Telemetry;Multiple lines;Pain; Fall Risk;Hard of hearing;Visual impairment   General   Additional Pertinent History Pt  is a 79 yo F with L knee pain  Injection on 4/17/18 with some relief  Constant pain with standing and walking recently  Elective L TKA on 7/23/18      Family/Caregiver Present No   Cognition   Overall Cognitive Status WFL   Arousal/Participation Cooperative   Orientation Level Oriented X4   Memory Within functional limits   Following Commands Follows multistep commands with increased time or repetition   Comments Pt  was identified with full name and birthdate   RUE Assessment   RUE Assessment WFL   LUE Assessment   LUE Assessment WFL   RLE Assessment   RLE Assessment WFL   LLE Assessment   LLE Assessment WFL  (decreased ROM due to pain post-op  )   Coordination   Movements are Fluid and Coordinated 1   Sensation WFL   Light Touch   RLE Light Touch Grossly intact   LLE Light Touch Grossly intact   Bed Mobility   Supine to Sit 3  Moderate assistance   Additional items LE management;Verbal cues; Increased time required;Assist x 1;HOB elevated  (for sequencing and hand position)   Sit to Supine Unable to assess  (Pt  left seated OOB following PT session)   Transfers   Sit to Stand 4  Minimal assistance   Additional items Assist x 1;Verbal cues  (for hand placement and body positioning)   Stand to Sit 4  Minimal assistance   Additional items Assist x 1;Verbal cues  (to reach back for controlled descent)   Stand pivot 3  Moderate assistance   Additional items Assist x 2  (SBA of 2nd for chair and line management)   Additional Comments Pt  performed sit<>stand with Isaias with VCs for hand placement and posture  Pt  reported slight dizziness with change in position  Pt  attempted ambulation with RW and Isaias however increased dizziness required chair and Stand pivot transfer for safe descent  Pt  was reclined and legs of chair elevated for superimposed venous return  Pt  instructed to perform ankle pumps in conjuction with postural change  Noted decreased responsiveness requiring continous verbal cues for reorientation  With increased time in position patients resposiveness returned to baseline and patient was returned to a seated position with all needs met and left with dinner tray  Ambulation/Elevation   Gait pattern Improper Weight shift; Forward Flexion;Narrow TANG;Shuffling; Short stride; Step to;Excessively slow   Gait Assistance 3  Moderate assist   Additional items Assist x 1  (SBA of 2nd for chair and line management)   Assistive Device Rolling walker   Distance 2'  (symptomatic dizziness and was returned to recliner )   Balance   Static Sitting Fair -   Dynamic Sitting Fair -   Static Standing Poor +   Dynamic Standing Poor   Ambulatory Poor   Endurance Deficit   Endurance Deficit Yes   Endurance Deficit Description treatment limited due to symptomatic orthostasis   Activity Tolerance   Activity Tolerance Treatment limited secondary to medical complications (Comment) Medical Staff Made Aware Spoke to Rolan Sandifer, PT    Nurse Made Aware Spoke to Ivanna Davis, [facility]   Assessment   Prognosis Good   Problem List Decreased strength;Decreased range of motion;Decreased endurance; Impaired balance;Decreased mobility; Decreased coordination; Impaired vision;Orthopedic restrictions;Pain   Assessment Evan Martinez is a 81 yo F with L knee pain  Injection on 4/17/18 with some relief  Constant pain with standing and walking recently  Elective L TKA on 7/23/18  comorbidities include Hyperlipidemia, Htn, Dysarthymia, DM2, Hx of hypothyroidism, arthritis, and anxiety  Personal factors include use of AD at baseline, assistance with IADLs at baseline, living in an ILF, advanced age, and pain  Currently, patient demonstrates Decreased strength;Decreased range of motion;Decreased endurance; Impaired balance;Decreased mobility; Decreased coordination; Impaired vision;Orthopedic restrictions;Pain all limiting patients ability to perform function mobility with increased independence  Due to Dx, comorbidities, personal factors, and activity limitations patient clinical presentation is unstable  Pt  Requires Isaias/ModA for all functional mobility and demonstrated symptomatic orthostasis due to positional change  Pt  Will benefit from continued skilled therapy to address deficits in functional mobility for increased independence with functional tasks  Discharge recommendation is to be determined pending patient progression  Barthel index is 35  Goals   Patient Goals to get to walking    STG Expiration Date 08/02/18   Short Term Goal #1 Pt  Will perform bed mobility independently to allow patient to transfer OOB  Pt  Will transfer sit<>stand with independently to allow patient to increase functional mobility  Pt  Will ambulate 150 ft  With LRAD Mod Independent to allow patient to perform functional tasks within apartment  Pt  Will increase LE strength by 1/2 level to promote achievement of above stated goals   Pt  Will increase all balance by 1 level to promote achievement of above stated goals  Pt  Will increase barthel index to 60 to demonstrate increased independence with functional mobility  Treatment Day 0   Plan   Treatment/Interventions Functional transfer training;LE strengthening/ROM; Therapeutic exercise; Endurance training;Cognitive reorientation;Patient/family training;Equipment eval/education; Bed mobility;Gait training;Spoke to nursing   PT Frequency Twice a day;7x/wk   Recommendation   Recommendation Other (Comment)  (To be determined pending patients progress)   Equipment Recommended Walker   Barthel Index   Feeding 10   Bathing 0   Grooming Score 0   Dressing Score 5   Bladder Score 0   Bowels Score 10   Toilet Use Score 5   Transfers (Bed/Chair) Score 5   Mobility (Level Surface) Score 0   Stairs Score 0   Barthel Index Score 35   Anjelica Jain, SPT 7/23/2018

## 2018-07-23 NOTE — ANESTHESIA POSTPROCEDURE EVALUATION
Post-Op Assessment Note      CV Status:  Stable    Mental Status:  Alert    Hydration Status:  Stable    PONV Controlled:  None    Airway Patency:  Patent    Post Op Vitals Reviewed: Yes          Staff: Anesthesiologist, CRNA           BP      Temp (!) 97 3 °F (36 3 °C) (07/23/18 1122)    Pulse 90 (07/23/18 1122)   Resp (!) 24 (07/23/18 1122)    SpO2 99 % (07/23/18 1122)

## 2018-07-23 NOTE — ANESTHESIA PREPROCEDURE EVALUATION
Review of Systems/Medical History  Patient summary reviewed    No history of anesthetic complications     Cardiovascular  EKG reviewed, Exercise tolerance (METS): good,  Hyperlipidemia, Hypertension , No past MI , CAD , Dysrhythmias , No angina ,   Comment: PVD s/p [facility] 3/2016 - follow up CT OK,  Pulmonary  Negative pulmonary ROS        GI/Hepatic    No GERD ,             Endo/Other  Diabetes (no meds required) , History of thyroid disease , hypothyroidism,   Comment: Chronic hyponatremia    GYN       Hematology   Musculoskeletal    Arthritis     Neurology   Psychology   Anxiety,            Lab Results   Component Value Date    WBC 9 00 06/26/2018    HGB 12 9 06/26/2018     06/26/2018     Lab Results   Component Value Date     (L) 06/26/2018    K 4 3 06/26/2018    BUN 25 06/26/2018    CREATININE 1 39 (H) 06/26/2018    GLUCOSE 131 07/28/2016     Lab Results   Component Value Date    PTT 31 06/26/2018      Lab Results   Component Value Date    INR 1 00 06/26/2018     Blood type AB    Lab Results   Component Value Date    HGBA1C 6 4 (H) 06/26/2018     Physical Exam    Airway    Mallampati score: I  TM Distance: >3 FB  Neck ROM: limited     Dental   No notable dental hx     Cardiovascular      Pulmonary      Other Findings        Anesthesia Plan  ASA Score- 2     Anesthesia Type- regional and spinal with ASA Monitors  Additional Monitors:   Airway Plan:     Comment: Patient seen and examined  History reviewed  Adductor canal or femoral nerve block to be done pre-op for post-op pain  Spinal to be done in OR with sedation during the case  Plan discussed with the patient  Risks explained, consent obtained        Plan Factors-    Induction-     Postoperative Plan-     Informed Consent- Anesthetic plan and risks discussed with patient  I personally reviewed this patient with the CRNA  Discussed and agreed on the Anesthesia Plan with the CRNA  Aislinn Marie

## 2018-07-23 NOTE — H&P (VIEW-ONLY)
Office Visit     6/19/2018  [address] Karen Omalley MD   Orthopedic Surgery   Chronic pain of left knee +1 more   Dx   Left Knee - Follow-up; Referred by Liv Wisdom MD   Reason for Visit    Progress Notes        80 y  o female follow up for Left knee pain  Patient notes she had no relief from  Left knee injection on 4/17/2018 for about a month and then slowly started to see improvement  She notes constant pain when walking and standing  At times Left knee nelson when walking  Patient notes she is starting to have Right hip pain when walking  She is taking Tramadol and using Voltaren Gel  She describes significant pain and significant dysfunction in her left knee  She also reports that her left knee gives way and she has a fear of falling      Physical exam:  Medial lateral Tenderness Left knee  Left knee swelling  Skin intact  ROM with Crepitus  Left leg bowing     Imaging  N/A x-rays left knee show me significant medial patellofemoral compartment arthritis     Procedure  N/A     Assessment/Plan:   80 y  o female Chronic Left knee OA  Schedule for Left TKA  Follow up PO   Despite attempts in alleviating pain and dysfunction with non aggressive treatments, this patient continues to have considerable pain and dysfunction  Her left knee dysfunction put risk for falling which may create further injuries  Despite her age, this point time I think elective left total knee replacement surgery is indicated  After review of the risks and benefits, consent was that the above-mentioned surgery  No guarantees given  I would welcome the opportunity see back in the office a postoperative patient      Instructions         Return for Recheck PO      After Visit Summary (Printed 6/19/2018)   Additional Documentation     Vitals:    /64    Pulse 98    Ht 5' 2" (1 575 m)    Wt 65 8 kg (145 lb)    BMI 26 52 kg/m²    BSA 1 67 m²    Flowsheets:    Custom Formula Data SmartForms:    PRINCESS PRE-CHARTING [employer] SCRIBE ATTESTATION       Encounter Info:    Billing Info,    History,    Allergies,    Detailed Report       Orders Placed         Protime-INR      Basic metabolic panel      CBC and differential      Comprehensive metabolic panel      HEMOGLOBIN A1C W/ EAG ESTIMATION       XR chest pa & lateral       EKG 12 lead      Case request operating room: ARTHROPLASTY KNEE TOTAL Once      All Encounter Results   Medication Changes        None      Medication List   Visit Diagnoses         Chronic pain of left knee      Localized osteoarthritis of left knee      Problem List

## 2018-07-23 NOTE — OP NOTE
OPERATIVE REPORT  PATIENT NAME: Raciel Wallace    :    MRN: [id number]  Pt Location: [facility] OR ROOM 18    SURGERY DATE: 2018    Surgeon(s) and Role:     * Kyra Molina MD - Primary     * Dick Mirza PA-C - Assisting     * Adrienne Solomon MD - Assisting    Preop Diagnosis:  Chronic pain of left knee [M25 562, G89 29]  Localized osteoarthritis of left knee [M17 12]    Post-Op Diagnosis Codes:     * Chronic pain of left knee [M25 562, G89 29]     * Localized osteoarthritis of left knee [M17 12]    Procedure(s) (LRB):  KNEE : COMPLETE REPLACEMENT (Left)    Specimen(s):  * No specimens in log *    Estimated Blood Loss:   Minimal    Drains:  Closed/Suction Drain Left Knee Accordion (Active)   Number of days: 0       Urethral Catheter Latex 12 Fr  (Active)   Number of days: 0       Anesthesia Type:   Spinal w/ Femoral Nerve Block    Operative Indications:  Chronic pain of left knee [M25 562, G89 29]  Localized osteoarthritis of left knee [M17 12]      Operative Findings:  depuy attune   Femur-4   Poly-5   Tibia-4   [id number]    Complications:   None    Procedure and Technique: Following induction of adequate level of spinal anesthesia, Chapin catheters and sterilely introduced into this patient's bladder  Antibiotics were administered  The left thigh was then fitted with a thigh-high tourniquet  The left lower extremity was then prepped and draped sterilely  The left lower extremity was exsanguinated gravity, the tourniquet inflated to 300 mm mercury  A midline knee incision was created the knee in flexion  Full-thickness flaps raised get the extensor mechanism  A medial arthrotomy was created open the knee joint  Bony soft tissue releases were performed where necessary  The distal femoral cut was made 6° valgus  This was made over short intramedullary mee  The proximal tibia cut was then next  As this was a varus knee, 2 mm reference medially    Care was taken in order to protect the integrity medial collateral, lateral collateral, and posterior structures during these maneuvers  The distal femoral sizing guide was used, the appropriate form 1 cutting blocks impacted  The anterior, posterior, chamfer cuts then made  The box cut was then made for the posterior stabilized unit  With the trial components in, the knee was taken through range of motion, and found to be capable full extension, good flexion, no mid flexion valgus instability  The patella was then resurfaced will utilize the manufacture's equipment, was found to be a size 35 mm button  The trial components removed and the knee was prepared for insertion of cemented components  Cemented tibia, cemented femur, trial poly, and cemented patella placed  Excess cement was removed, the knee was brought to extension  The cement was allowed to cure  The trial poly was taken out, the knee was packed off  The tourniquet was deflated, hemostasis was secured  The insert polyethylene was then snapped into position  The knee was taken through a final range of motion, found to be capable of full extension, good flexion, no mid flexion valgus instability, and excellent patellar tracking  Satisfied with the extent of surgery, the wounds then flushed with saline and closed  A Betadine soak was initiated  A drain was placed deep brought out via a separate lateral stab incision  The arthrotomy was closed number Vicryl suture  The subcu tissue closed 2 Vicryl suture  The skin was closed staples  A sterile dressings applied    She was then transferred to recovery room in stable condition with plans to consist of physical therapy weight-bearing to tolerance, she will require DVT prophylaxis with Lovenox   I was present for the entire procedure    Patient Disposition:  PACU     SIGNATURE: Henry Vázquez MD  DATE: July 23, 2018  TIME: 11:08 AM

## 2018-07-23 NOTE — PERIOPERATIVE NURSING NOTE
As per / md paul, ok to d/c patient to floor once she is able to move knees b/l  Pt  Able to move right knee at this time, and patient able to feel b/l calves/shins at this time  Will continue to monitor

## 2018-07-23 NOTE — ANESTHESIA PROCEDURE NOTES
Spinal Block    Patient location during procedure: OR  Start time: 7/23/2018 9:54 AM  Reason for block: at surgeon's request and primary anesthetic  Staffing  Anesthesiologist: Elvia Katz  Performed: anesthesiologist   Preanesthetic Checklist  Completed: patient identified, site marked, surgical consent, pre-op evaluation, timeout performed, IV checked, risks and benefits discussed and monitors and equipment checked  Spinal Block  Patient position: sitting  Prep: ChloraPrep  Patient monitoring: cardiac monitor, frequent blood pressure checks, continuous pulse ox and heart rate  Approach: midline  Location: L3-4  Injection technique: single-shot  Needle  Needle type: pencil-tip   Needle gauge: 25 G  Needle length: 10 cm  Assessment  Sensory level: T4  Injection Assessment:  negative aspiration for heme, no paresthesia on injection and positive aspiration for clear CSF    Post-procedure:  site cleaned

## 2018-07-23 NOTE — PLAN OF CARE
Problem: PHYSICAL THERAPY ADULT  Goal: Performs mobility at highest level of function for planned discharge setting  See evaluation for individualized goals  Treatment/Interventions: Functional transfer training, LE strengthening/ROM, Therapeutic exercise, Endurance training, Cognitive reorientation, Patient/family training, Equipment eval/education, Bed mobility, Gait training, Spoke to nursing  Equipment Recommended: Jhonny Mu       See flowsheet documentation for full assessment, interventions and recommendations  Yue Begum, SPT 7/23/2018    Prognosis: Good  Problem List: Decreased strength, Decreased range of motion, Decreased endurance, Impaired balance, Decreased mobility, Decreased coordination, Impaired vision, Orthopedic restrictions, Pain  Assessment: Jose Collier is a 79 yo F with L knee pain  Injection on 4/17/18 with some relief  Constant pain with standing and walking recently  Elective L TKA on 7/23/18  comorbidities include Hyperlipidemia, Htn, Dysarthymia, DM2, Hx of hypothyroidism, arthritis, and anxiety  Personal factors include use of AD at baseline, assistance with IADLs at baseline, living in an ILF, advanced age, and pain  Currently, patient demonstrates Decreased strength;Decreased range of motion;Decreased endurance; Impaired balance;Decreased mobility; Decreased coordination; Impaired vision;Orthopedic restrictions;Pain all limiting patients ability to perform function mobility with increased independence  Due to Dx, comorbidities, personal factors, and activity limitations patient clinical presentation is unstable  Pt  Requires Isaias/ModA for all functional mobility and demonstrated symptomatic orthostasis due to positional change  Pt  Will benefit from continued skilled therapy to address deficits in functional mobility for increased independence with functional tasks  Discharge recommendation is to be determined pending patient progression  Barthel index is 35  Recommendation: Other (Comment) (To be determined pending patients progress)          See flowsheet documentation for full assessment

## 2018-07-23 NOTE — CONSULTS
Office Visit     6/19/2018  [address] Karen Omalley MD   Orthopedic Surgery   Chronic pain of left knee +1 more   Dx   Left Knee - Follow-up; Referred by Samanta Magana MD   Reason for Visit    Progress Notes        80 y  o female follow up for Left knee pain  Patient notes she had no relief from  Left knee injection on 4/17/2018 for about a month and then slowly started to see improvement  She notes constant pain when walking and standing  At times Left knee nelson when walking  Patient notes she is starting to have Right hip pain when walking  She is taking Tramadol and using Voltaren Gel  She describes significant pain and significant dysfunction in her left knee  She also reports that her left knee gives way and she has a fear of falling      Physical exam:  Medial lateral Tenderness Left knee  Left knee swelling  Skin intact  ROM with Crepitus  Left leg bowing     Imaging  N/A x-rays left knee show me significant medial patellofemoral compartment arthritis     Procedure  N/A     Assessment/Plan:   80 y  o female Chronic Left knee OA  Schedule for Left TKA  Follow up PO   Despite attempts in alleviating pain and dysfunction with non aggressive treatments, this patient continues to have considerable pain and dysfunction  Her left knee dysfunction put risk for falling which may create further injuries  Despite her age, this point time I think elective left total knee replacement surgery is indicated  After review of the risks and benefits, consent was that the above-mentioned surgery  No guarantees given  I would welcome the opportunity see back in the office a postoperative patient      Instructions         Return for Recheck PO      After Visit Summary (Printed 6/19/2018)   Additional Documentation     Vitals:    /64    Pulse 98    Ht 5' 2" (1 575 m)    Wt 65 8 kg (145 lb)    BMI 26 52 kg/m²    BSA 1 67 m²    Flowsheets:    Custom Formula Data SmartForms:    PRINCESS PRE-CHARTING [employer] SCRIBE ATTESTATION       Encounter Info:    Billing Info,    History,    Allergies,    Detailed Report       Orders Placed         Protime-INR      Basic metabolic panel      CBC and differential      Comprehensive metabolic panel      HEMOGLOBIN A1C W/ EAG ESTIMATION       XR chest pa & lateral       EKG 12 lead      Case request operating room: ARTHROPLASTY KNEE TOTAL Once      All Encounter Results   Medication Changes        None      Medication List   Visit Diagnoses         Chronic pain of left knee      Localized osteoarthritis of left knee      Problem List

## 2018-07-23 NOTE — DISCHARGE INSTRUCTIONS
Discharge Instructions - Orthopedics  Maria M Rasmussen 80 y o  female MRN: [id number]  Unit/Bed#: Operating Room    Weight Bearing Status:                                           Weight Bearing as tolerated to the left lower extremity  DVT prophylaxis:  Complete course of Lovenox as directed    Pain:  Continue analgesics as directed    Showering Instructions:   Do not shower until POD #3    Dressing Instructions:   Keep dressing clean, dry and intact until follow up appointment  Driving Instructions:  No driving until cleared by Orthopaedic Surgery  PT/OT:  Continue PT/OT on outpatient basis as directed    Appt Instructions:    If you do not have your appointment, please call the clinic at [phone]  Otherwise followup as scheduled below:

## 2018-07-23 NOTE — ANESTHESIA PROCEDURE NOTES
Peripheral Block    Patient location during procedure: holding area  Start time: 7/23/2018 9:40 AM  Reason for block: at surgeon's request and post-op pain management  Staffing  Anesthesiologist: Bailey Fuller  Performed: anesthesiologist   Preanesthetic Checklist  Completed: patient identified, site marked, surgical consent, pre-op evaluation, timeout performed, IV checked, risks and benefits discussed and monitors and equipment checked  Peripheral Block  Patient position: supine  Prep: ChloraPrep  Patient monitoring: continuous pulse ox, frequent blood pressure checks, cardiac monitor and heart rate  Block type: adductor canal block  Laterality: left  Injection technique: single-shot  Procedures: ultrasound guided  Ultrasound permanent image saved  Needle  Needle type: Stimuplex   Needle gauge: 22 G  Needle length: 10 cm  Needle localization: ultrasound guidance  Needle insertion depth: 5 cm  Assessment  Injection assessment: incremental injection, local visualized surrounding nerve on ultrasound, negative aspiration for heme and no paresthesia on injection  Paresthesia pain: none  Heart rate change: no  Slow fractionated injection: yes  Post-procedure:  site cleaned  patient tolerated the procedure well with no immediate complications

## 2018-07-24 PROBLEM — Z96.652 S/P TOTAL KNEE REPLACEMENT, LEFT: Status: ACTIVE | Noted: 2018-07-24

## 2018-07-24 LAB
ANION GAP SERPL CALCULATED.3IONS-SCNC: 6 MMOL/L (ref 4–13)
BUN SERPL-MCNC: 18 MG/DL (ref 5–25)
CALCIUM SERPL-MCNC: 8.3 MG/DL (ref 8.3–10.1)
CHLORIDE SERPL-SCNC: 104 MMOL/L (ref 100–108)
CO2 SERPL-SCNC: 26 MMOL/L (ref 21–32)
CREAT SERPL-MCNC: 1.04 MG/DL (ref 0.6–1.3)
ERYTHROCYTE [DISTWIDTH] IN BLOOD BY AUTOMATED COUNT: 13.8 % (ref 11.6–15.1)
GFR SERPL CREATININE-BSD FRML MDRD: 47 ML/MIN/1.73SQ M
GLUCOSE SERPL-MCNC: 157 MG/DL (ref 65–140)
HCT VFR BLD AUTO: 27.5 % (ref 34.8–46.1)
HGB BLD-MCNC: 9.1 G/DL (ref 11.5–15.4)
MCH RBC QN AUTO: 32.3 PG (ref 26.8–34.3)
MCHC RBC AUTO-ENTMCNC: 33.1 G/DL (ref 31.4–37.4)
MCV RBC AUTO: 98 FL (ref 82–98)
PLATELET # BLD AUTO: 161 THOUSANDS/UL (ref 149–390)
PMV BLD AUTO: 10.8 FL (ref 8.9–12.7)
POTASSIUM SERPL-SCNC: 4.4 MMOL/L (ref 3.5–5.3)
RBC # BLD AUTO: 2.82 MILLION/UL (ref 3.81–5.12)
SODIUM SERPL-SCNC: 136 MMOL/L (ref 136–145)
WBC # BLD AUTO: 11.29 THOUSAND/UL (ref 4.31–10.16)

## 2018-07-24 PROCEDURE — 80048 BASIC METABOLIC PNL TOTAL CA: CPT | Performed by: STUDENT IN AN ORGANIZED HEALTH CARE EDUCATION/TRAINING PROGRAM

## 2018-07-24 PROCEDURE — G8987 SELF CARE CURRENT STATUS: HCPCS

## 2018-07-24 PROCEDURE — 97116 GAIT TRAINING THERAPY: CPT

## 2018-07-24 PROCEDURE — 97530 THERAPEUTIC ACTIVITIES: CPT

## 2018-07-24 PROCEDURE — 97167 OT EVAL HIGH COMPLEX 60 MIN: CPT

## 2018-07-24 PROCEDURE — G8988 SELF CARE GOAL STATUS: HCPCS

## 2018-07-24 PROCEDURE — 97535 SELF CARE MNGMENT TRAINING: CPT

## 2018-07-24 PROCEDURE — 85027 COMPLETE CBC AUTOMATED: CPT | Performed by: STUDENT IN AN ORGANIZED HEALTH CARE EDUCATION/TRAINING PROGRAM

## 2018-07-24 RX ORDER — SODIUM CHLORIDE, SODIUM LACTATE, POTASSIUM CHLORIDE, CALCIUM CHLORIDE 600; 310; 30; 20 MG/100ML; MG/100ML; MG/100ML; MG/100ML
1000 INJECTION, SOLUTION INTRAVENOUS ONCE
Status: COMPLETED | OUTPATIENT
Start: 2018-07-24 | End: 2018-07-24

## 2018-07-24 RX ADMIN — OXYCODONE HYDROCHLORIDE 10 MG: 5 TABLET ORAL at 01:28

## 2018-07-24 RX ADMIN — CEFAZOLIN SODIUM 2000 MG: 2 SOLUTION INTRAVENOUS at 02:42

## 2018-07-24 RX ADMIN — OXYCODONE HYDROCHLORIDE 5 MG: 5 TABLET ORAL at 05:25

## 2018-07-24 RX ADMIN — LEVOTHYROXINE SODIUM 137 MCG: 112 TABLET ORAL at 05:24

## 2018-07-24 RX ADMIN — LORATADINE 10 MG: 10 TABLET ORAL at 09:29

## 2018-07-24 RX ADMIN — DOCUSATE SODIUM 100 MG: 100 CAPSULE, LIQUID FILLED ORAL at 17:31

## 2018-07-24 RX ADMIN — OXYCODONE HYDROCHLORIDE 5 MG: 5 TABLET ORAL at 09:29

## 2018-07-24 RX ADMIN — HYDRALAZINE HYDROCHLORIDE 25 MG: 25 TABLET ORAL at 00:12

## 2018-07-24 RX ADMIN — SODIUM CHLORIDE, SODIUM LACTATE, POTASSIUM CHLORIDE, AND CALCIUM CHLORIDE 1000 ML: .6; .31; .03; .02 INJECTION, SOLUTION INTRAVENOUS at 13:34

## 2018-07-24 RX ADMIN — ENOXAPARIN SODIUM 40 MG: 40 INJECTION SUBCUTANEOUS at 09:30

## 2018-07-24 RX ADMIN — OXYCODONE HYDROCHLORIDE 5 MG: 5 TABLET ORAL at 20:50

## 2018-07-24 RX ADMIN — DOCUSATE SODIUM 100 MG: 100 CAPSULE, LIQUID FILLED ORAL at 09:29

## 2018-07-24 RX ADMIN — AMLODIPINE BESYLATE: 10 TABLET ORAL at 21:55

## 2018-07-24 RX ADMIN — ACETAMINOPHEN 650 MG: 325 TABLET, FILM COATED ORAL at 05:25

## 2018-07-24 RX ADMIN — SENNOSIDES 8.6 MG: 8.6 TABLET, FILM COATED ORAL at 17:31

## 2018-07-24 RX ADMIN — ACETAMINOPHEN 650 MG: 325 TABLET, FILM COATED ORAL at 11:20

## 2018-07-24 RX ADMIN — ACETAMINOPHEN 650 MG: 325 TABLET, FILM COATED ORAL at 17:31

## 2018-07-24 NOTE — PROGRESS NOTES
Internal Medicine Progress Note  Patient: Amos Mejia  Age/sex: 80 y o  female  Medical Record #: [id number]      ASSESSMENT/PLAN:  Amos Mejia is seen and examined and mangement for following issues:    # Lt knee OA s/p Lt TKA: Pain control adequet per pt  Follow bowel regimen to help decrease narcotic induced constipation  - Hgb 9 1  DVT prophylaxis in place and reviewed      # HTN: BP stable;  Pt takes Caduet 10/20  Individual components given here  Will continue amlodipine with hold parameters  Pt also on losartan 100mg daily currently on  hold to decrease the risk of LUISA in the post-op period  Hydralazine 25mg every 8 hours as needed for SBP > 160      # Hyponatremia: Chronic  Continue NaCl 1g daily  Will monitor - currently 136      # CKD stage III: Follows with Nephrology  Baseline Cr 1 2 - 1 4  Will continue to monitor - currently below baseline  # Leukocytosis: Mild  Likely reactive  No signs of infx  Monitor WBC and fever curve post op while encouraging use of incentive spirometer  Subjective: Patient seen and examined   New or overnight issues include the following: did well overnight; no issues this AM    ROS:   GI: denies abdominal pain, change bowel habits or reflux symptoms  Neuro: No new neurologic changes  Respiratory: No Cough, SOB  Cardiovascular: No CP, palpitations     Scheduled Meds:    Current Facility-Administered Medications:  acetaminophen 650 mg Oral Q6H [facility] Keo Hoffmann MD    amLODIPine-atorvastatin (CADUET 10/20) combo dose  Oral HS Carmela Waldrop PA-C    docusate sodium 100 mg Oral BID Keo Hoffmann MD    enoxaparin 40 mg Subcutaneous Daily Keo Hoffmann MD    hydrALAZINE 25 mg Oral Q8H PRN Carmela Waldrop PA-C    HYDROmorphone 1 mg Intravenous Q3H PRN Keo Hoffmann MD    lactated ringers 125 mL/hr Intravenous Continuous Alma Duval MD Last Rate: Stopped (07/23/18 4475)   lactated ringers 1 5 mL/kg/hr Intravenous Continuous Magdiel Allison Edelmira White MD Last Rate: 1 5 mL/kg/hr (07/23/18 1257)   levothyroxine 137 mcg Oral Early Morning Gerhard Todd MD    loratadine 10 mg Oral Daily Gerhard Todd MD    ondansetron 4 mg Intravenous Q8H PRN Gerhard Todd MD    oxyCODONE 10 mg Oral Q4H PRN Gerhard Todd MD    oxyCODONE 5 mg Oral Q4H PRN Gerhard Todd MD    senna 1 tablet Oral Daily Gerhard Todd MD        Labs:       Results from last 7 days  Lab Units 07/24/18  0452   WBC Thousand/uL 11 29*   HEMOGLOBIN g/dL 9 1*   HEMATOCRIT % 27 5*   PLATELETS Thousands/uL 161       Results from last 7 days  Lab Units 07/24/18  0452   SODIUM mmol/L 136   POTASSIUM mmol/L 4 4   CHLORIDE mmol/L 104   CO2 mmol/L 26   BUN mg/dL 18   CREATININE mg/dL 1 04   GLUCOSE RANDOM mg/dL 157*   CALCIUM mg/dL 8 3                Glucose (mg/dL)   Date Value   07/24/2018 157 (H)   07/28/2016 131   06/01/2016 162 (H)   05/25/2016 157 (H)   01/07/2016 153 (H)   10/01/2015 103   05/15/2015 133   05/14/2015 162 (H)       Labs reviewed    Physical Examination:  Vitals:   Vitals:    07/24/18 0007 07/24/18 0124 07/24/18 0313 07/24/18 0719   BP: 168/74 142/62 145/63 145/58   BP Location: Right arm Right arm Left arm    Pulse: 75  77 77   Resp: 18 18 18   Temp: 98 2 °F (36 8 °C)  98 1 °F (36 7 °C) 98 1 °F (36 7 °C)   TempSrc: Oral  Oral    SpO2: 98%  96% 98%   Weight:       Height:           PERRLA EOMI no JVD  RESP: CTAB, no R/R/W  CV: +S1 S2, regular rate, no rubs  ABD: soft, NT, ND, normal BS   EXT: DP intact incision CDI  Neuro: AAO X# no focality      [ X ] Total time spent: 30 Mins and greater than 50% of this time was spent counseling/coordinating care  ** Please Note: Dragon 360 Dictation voice to text software may have been used in the creation of this document   **

## 2018-07-24 NOTE — OCCUPATIONAL THERAPY NOTE
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)    Hypothyroidism    Chronic kidney disease, stage 3    Hyperlipidemia    Hyponatremia    Peripheral vascular disease (HCC)    Generalized osteoarthritis    Chronic pain of left knee    Ambulatory dysfunction    Bilateral leg edema    Allergic rhinitis    Anxiety    Aortoiliac stenosis ([facility])    Constipation    Diabetic peripheral neuropathy ([facility])    Dry scalp    First degree AV block    Gait disturbance    Knee effusion, left    Lumbosacral spondylosis without myelopathy    Microalbuminuria    Osteopenia    Peripheral neuropathy    Seborrheic dermatitis of scalp    Spinal stenosis    Leg edema, left    Effusion of left knee    Localized osteoarthritis of left knee    Pes anserinus bursitis of left knee    Preop general physical exam    S/P total knee replacement, left     Past Medical History  Past Medical History:   Diagnosis Date    AAA (abdominal aortic aneurysm) ([facility])     s/p EVAR 3/10/16    Allergic urticaria     LAST ASSESSED: [id number]    Appendicitis     Arthritis     CKD (chronic kidney disease), stage III     Coronary artery disease     Diabetes mellitus (HCC)     Eczema     Gross hematuria     LAST ASSESSED: [id number]    Hematuria     Hypertension     Hyponatremia     Hypothyroid     Osteoporosis     LAST ASSESSED: [id number]    PONV (postoperative nausea and vomiting)     Primary osteoarthritis of left knee     LAST ASSESSED: [id number]    Renal disorder     Renal insufficiency     Rotator cuff tear arthropathy     LEFT LAST ASSESSED: [id number]    Secondary osteoarthritis of right shoulder     LAST ASSESSED: [id number]    Syncope      Past Surgical History  Past Surgical History:   Procedure Laterality Date    APPENDECTOMY      BACK SURGERY      CATARACT EXTRACTION W/  INTRAOCULAR LENS IMPLANT Bilateral     KNEE ARTHROSCOPY Left     ONSET: [id number] THERAPEUTIC    LUMBAR LAMINECTOMY      OR AAA REPAIR,MODULR BIFURCATED PROSTH N/A 3/10/2016    Procedure: REPAIR ANEURYSM ENDOVASCULAR ABDOMINAL AORTIC  (EVAR) ; Surgeon: Nafisa Metzger MD;  [facility];  Service: Vascular    OR RECONSTR TOTAL SHOULDER IMPLANT Left 5/31/2016    Procedure: ARTHROPLASTY SHOULDER REVERSE;  Surgeon: Tia Chaparro MD;  [facility];  Service: Orthopedics    OR TOTAL KNEE ARTHROPLASTY Left 7/23/2018    Procedure: KNEE : COMPLETE REPLACEMENT;  Surgeon: Tete Brady MD;  [facility];  Service: Orthopedics    REVERSE TOTAL SHOULDER ARTHROPLASTY Right     ROTATOR CUFF REPAIR Left     RC SURGERY RESOLVED: 1999    SHOULDER ARTHROPLASTY      5/15 - FOR RIGHT SHOULDER; 5/16 - FOR LEFT SHOULDER    SHOULDER ARTHROSCOPY      TOTAL HIP ARTHROPLASTY Left     ONSET: [id number]    TOTAL HIP ARTHROPLASTY  05/31/2006    REVISION      07/24/18 1555   Note Type   Note type Eval/Treat   Restrictions/Precautions   Weight Bearing Precautions Per Order Yes   LLE Weight Bearing Per Order WBAT   Other Precautions Chair Alarm;WBS;Multiple lines;Telemetry; Fall Risk;Pain  (Chair alarm on at end of therapy session )   Pain Assessment   Pain Assessment 0-10   Pain Score 9   Pain Type Acute pain;Surgical pain   Pain Location Knee   Pain Orientation Left   Hospital Pain Intervention(s) Repositioned; Ambulation/increased activity   Response to Interventions increased pain w/ weight bearing limiting activity    Home Living   Type of Home Apartment   Home Layout One level;Elevator   Bathroom Shower/Tub Walk-in shower   Bathroom Toilet Raised   Bathroom Equipment Shower chair;Grab bars around toilet   [address] Walker;Cane  (rollator )   Additional Comments Pt lives alone in an independent living apartment w/ elevator access   Prior Function   Level of [place] Independent with ADLs and functional mobility   Lives With Alone   Receives Help From Family   ADL Assistance Independent   IADLs Independent   Falls in the last 6 months 1 to 4   Vocational Retired   Comments Pt was I w/ ADLS and IADLS, & required use of SPC vs  rollator PTA   Lifestyle   Autonomy Pt was I w/ ADLS/IADLS PTA  Pt reports (-) driving    Reciprocal Relationships Pt lives alone however has supportive family who provide transportation as needed   Service to Others Pt is retired   Intrinsic Gratification Pt reports limited engagement in activities PTA 2* pain    Psychosocial   Psychosocial (WDL) WDL   ADL   Eating Assistance 7  [facility] 7  [facility] 5  Supervision/Setup   LB [address] 2  [facility] [address] 5  Supervision/Setup    [facility] 1  [address]  2  Maximal Assistance   Bed Mobility   Supine to Sit Unable to assess   Sit to Supine Unable to assess   Additional Comments Pt OOB upon arrival and return to chair at end of therapy session    Transfers   Sit to Stand 3  Moderate assistance   Additional items Assist x 1; Increased time required;Verbal cues   Stand to Sit 3  Moderate assistance   Additional items Assist x 1; Increased time required;Verbal cues; Impulsive   Additional Comments Pt's BP supine 186/74 HR 96  BP sitting 194/79 HR 95  BP standing 181/77   BP after very short ambulation 134/60   RN aware   Functional Mobility   Functional Mobility 3  Moderate assistance   Additional Comments Ax2 for ~2 feet w/ significant pain      Additional items Rolling walker   Balance   Static Sitting Fair -   Dynamic Sitting Poor +   Static Standing Poor +   Dynamic Standing Poor   Ambulatory Poor -   Activity Tolerance Activity Tolerance Patient limited by fatigue;Patient limited by pain  (BP (see comments above))   Nurse Made Aware yes, notified of BP changes    RUE Assessment   RUE Assessment WFL   LUE Assessment   LUE Assessment WFL   Hand Function   Gross Motor Coordination Functional   Fine Motor Coordination Functional   Cognition   Overall Cognitive Status WFL   Arousal/Participation Responsive; Alert; Cooperative   Attention Within functional limits   Orientation Level Oriented X4   Memory Within functional limits   Following Commands Follows one step commands without difficulty   Comments Pt is alert, oriented and cooperative  Pt is significantly anxious regarding D/C plan, pain and BP issues  Requires max verbal cues for redirection and engagement    Assessment   Limitation Decreased ADL status; Decreased endurance;Decreased self-care trans;Decreased high-level ADLs   Prognosis Fair   Assessment Pt is a 79 y/o female seen for OT eval s/p adm to [facility] w/ Localized osteoarthritis of left knee receiving KNEE : COMPLETE REPLACEMENT (Left) on 7/23/18  Comorbidities include a h/o   Pt with active OT orders, WBAT L LE and activity as tolerated orders  Pt lives alone in an independent living apartment w/ elevator access  Pt was I w/ ADLS and IADLS, & required use of SPC vs  rollator PTA  Pt does admit to recent decline in functioning PTA  Pt is currently demonstrating the following occupational deficits: S UB ADLS, total A LB ADLS, mod A functional transfers and mod A x2 functional mobility using rw (~2 feet)  Pt with deficits and limitations in all baseline areas of occupation 2*  significant pain, decreased endurance/activity tolerance, decreased functional forward reach, decreased ADL status, impaired balance, decreased mobility status, WBS, orthopedic restrictions, BP, dizziness, and anxiety  Pt's BP supine 186/74 HR 96  BP sitting 194/79 HR 95  BP standing 181/77   BP after very short ambulation 134/60    The following Occupational Performance Areas to address include: bathing/shower, dressing, functional mobility, community mobility, clothing management, meal prep and household maintenance  Pt scored overall 50/100 on the Barthel Index  Based on the aforementioned OT evaluation, functional performance deficits, and assessments, pt has been identified as a high complexity evaluation  Defer D/C recommendations at this time pending progress and improvement of medical symptoms  Pt to continue to benefit from acute immediate OT services to address the following goals 3-5x/week to  w/in 7-10 days:    Goals   Patient Goals to have less pain    Plan   Treatment Interventions ADL retraining;Functional transfer training; Endurance training;Patient/family training;Equipment evaluation/education; Compensatory technique education;Continued evaluation; Activityengagement; Energy conservation   Goal Expiration Date 18   OT Frequency 3-5x/wk   Recommendation   OT Discharge Recommendation Other (Comment)  (Defer pending progress)   OT - OK to Discharge No   Barthel Index   Feeding 10   Bathing 0   Grooming Score 5   Dressing Score 5   Bladder Score 10   Bowels Score 10   Toilet Use Score 5   Transfers (Bed/Chair) Score 5   Mobility (Level Surface) Score 0   Stairs Score 0   Barthel Index Score 50   Modified [place] Scale   Modified [place] Scale 4       GOALS:    1) Pt will improve activity tolerance to G for min 30 min txment sessions to increase participation in ADLs  2) Pt will complete ADLs/self care w/ mod I using adaptive equipment and DME as needed w/ G hyiene/thoroughness w/ min cues fro cog support  3) Pt will complete toileting w/ mod I w/ G hygiene/thoroughness using DME as needed  4) Pt will improve functional transfers on/off all surfaces using DME as needed w/ G balance/safety including toileting w/ mod I  5) Pt will improve functional mobility during ADL/IADL/leisure tasks using DME as needed w/ g balance/safety w/ mod I  6) Pt will demonstrate G carryover of pt/caregiver education and training as appropriate w/ mod I w/o cues w/ G tolerance  7) Pt will demonstrate 100% carryover of learned E C  techniques s/p skilled education w/o cues t/o functional ADL/ IADL/leisure interest tasks w/ mod I  8) Pt will demonstrate 100% carryover of precautions s/p review w/o cues w/ mod I w/ G tolerance/participation t/o functional ADL/IADL/leisure tasks  9) Pt will demonstrate G high level balance and tolerance t/o fx'l I/ADL/leisure tasks w/ mod I w/ DME PRN w/ G balance/safety w/o cues          Jose Antonio Beck, MS, OTR/L

## 2018-07-24 NOTE — PHYSICAL THERAPY NOTE
PT Progress Note     07/24/18 0494   Pain Assessment   Pain Assessment 0-10   Pain Score 2   Pain Type Acute pain;Surgical pain   Pain Location Knee   Pain Orientation Left   Hospital Pain Intervention(s) Ambulation/increased activity   Response to Interventions unchanged   Restrictions/Precautions   Weight Bearing Precautions Per Order Yes   LLE Weight Bearing Per Order WBAT   Other Precautions Impulsive; Fall Risk;Pain;WBS   General   Chart Reviewed Yes   Response to Previous Treatment Patient with no complaints from previous session  Family/Caregiver Present Yes   Cognition   Overall Cognitive Status WFL   Arousal/Participation Alert   Attention Within functional limits   Orientation Level Oriented X4   Memory Within functional limits   Following Commands Follows multistep commands with increased time or repetition   Subjective   Subjective pt in chair agreeable for PT amb   Transfers   Sit to Stand 4  Minimal assistance   Additional items Increased time required;Verbal cues   Stand to Sit 4  Minimal assistance   Additional items Increased time required;Verbal cues   Stand pivot 4  Minimal assistance   Additional items Increased time required;Verbal cues  (RW)   Ambulation/Elevation   Gait pattern Improper Weight shift; Antalgic;Decreased L stance; Short stride; Step to;Excessively slow   Gait Assistance 4  Minimal assist   Additional items Verbal cues; Tactile cues   Assistive Device Rolling walker   Distance 3 ft 4 ft 5 ft   Balance   Static Standing Fair -  (RW)   Dynamic Standing Poor +  (RW)   Endurance Deficit   Endurance Deficit Yes   Endurance Deficit Description antalgic LE instability   Activity Tolerance   Activity Tolerance Treatment limited secondary to medical complications (Comment)  (symptomatic orthostasis )   Nurse Made Aware Dimas Leavitt   Assessment   Prognosis Good   Problem List Decreased strength;Decreased range of motion;Decreased endurance; Impaired balance;Decreased mobility; Decreased coordination; Impaired vision;Orthopedic restrictions;Pain   Assessment pain better controlled, c/o mild soreness at start of session, increaesd w WB decreases w sitting offloading LLE; requires min contact guard assist w transf and amb using RW 3 amb trials; each attempt pt self- limited by c/o dizziness, weakness; pt exhibits significant anxiety w motor task performance; requires much encouragement; pt educated on benefits of mob and perseverence; will benefit from cont PT IP rehab when med cleared   Goals   Patient Goals I want to walk   STG Expiration Date 08/02/18   Short Term Goal #1 per PT assessment 7/23/18: Pt  Will perform bed mobility independently to allow patient to transfer OOB  Pt  Will transfer sit<>stand with independently to allow patient to increase functional mobility  Pt  Will ambulate 150 ft  With LRAD Mod Independent to allow patient to perform functional tasks within apartment  Pt  Will increase LE strength by 1/2 level to promote achievement of above stated goals  Pt  Will increase all balance by 1 level to promote achievement of above stated goals  Pt  Will increase barthel index to 60 to demonstrate increased independence with functional mobility  Plan   Treatment/Interventions Functional transfer training;Elevations;LE strengthening/ROM; Therapeutic exercise; Endurance training;Cognitive reorientation;Patient/family training;Equipment eval/education; Bed mobility;Gait training; Compensatory technique education;Continued evaluation;Spoke to nursing;Spoke to case management; Family   Progress Slow progress, decreased activity tolerance   PT Frequency Twice a day   Recommendation   Recommendation Post acute IP rehab   Equipment Recommended Walker   PT - OK to Discharge Yes

## 2018-07-24 NOTE — PLAN OF CARE
Problem: OCCUPATIONAL THERAPY ADULT  Goal: Performs self-care activities at highest level of function for planned discharge setting  See evaluation for individualized goals  Treatment Interventions: ADL retraining, Functional transfer training, Endurance training, Patient/family training, Equipment evaluation/education, Compensatory technique education, Continued evaluation, Activityengagement, Energy conservation          See flowsheet documentation for full assessment, interventions and recommendations  Limitation: Decreased ADL status, Decreased endurance, Decreased self-care trans, Decreased high-level ADLs  Prognosis: Fair  Assessment: Pt is a 81 y/o female seen for OT eval s/p adm to [facility] w/ Localized osteoarthritis of left knee receiving KNEE : COMPLETE REPLACEMENT (Left) on 7/23/18  Comorbidities include a h/o   Pt with active OT orders, WBAT L LE and activity as tolerated orders  Pt lives alone in an independent living apartment w/ elevator access  Pt was I w/ ADLS and IADLS, & required use of SPC vs  rollator PTA  Pt does admit to recent decline in functioning PTA  Pt is currently demonstrating the following occupational deficits: S UB ADLS, total A LB ADLS, mod A functional transfers and mod A x2 functional mobility using rw (~2 feet)  Pt with deficits and limitations in all baseline areas of occupation 2*  significant pain, decreased endurance/activity tolerance, decreased functional forward reach, decreased ADL status, impaired balance, decreased mobility status, WBS, orthopedic restrictions, BP, dizziness, and anxiety  Pt's BP supine 186/74 HR 96  BP sitting 194/79 HR 95  BP standing 181/77   BP after very short ambulation 134/60   The following Occupational Performance Areas to address include: bathing/shower, dressing, functional mobility, community mobility, clothing management, meal prep and household maintenance  Pt scored overall 50/100 on the Barthel Index   Based on the aforementioned OT evaluation, functional performance deficits, and assessments, pt has been identified as a high complexity evaluation  Defer D/C recommendations at this time pending progress and improvement of medical symptoms   Pt to continue to benefit from acute immediate OT services to address the following goals 3-5x/week to  w/in 7-10 days:      OT Discharge Recommendation: Other (Comment) (Defer pending progress)  OT - OK to Discharge: No        Gisella Espinosa MS, OTR/L

## 2018-07-24 NOTE — PLAN OF CARE
Discharge to home or other facility with appropriate resources Progressing      Maintains or returns to baseline urinary function Progressing      Absence of urinary retention Progressing      Maintains hematologic stability Progressing      Absence or prevention of progression during hospitalization Progressing      Absence of fever/infection during neutropenic period Progressing      Patient/family/caregiver demonstrates understanding of disease process, treatment plan, medications, and discharge instructions Progressing      Maintain or return mobility to safest level of function Progressing      Maintain proper alignment of affected body part Progressing      Verbalizes/displays adequate comfort level or baseline comfort level Progressing      Patient will remain free of falls Progressing      Maintain or return to baseline ADL function Progressing      Maintain or return mobility status to optimal level Progressing      Skin integrity remains intact Progressing      Incision(s), wounds(s) or drain site(s) healing without S/S of infection Progressing      Oral mucous membranes remain intact Progressing

## 2018-07-24 NOTE — PROGRESS NOTES
Orthopedics   [place]it [place] 80 y o  female MRN: [id number]  Unit/Bed#: [id number]      Subjective:  80 y  o female post operative day 1 left total knee arthroplasty  Pt doing well  Pain controlled      Labs:    0  Lab Value Date/Time   HCT 27 5 (L) 07/24/2018 0452   HCT 39 7 06/26/2018 1007   HCT 38 7 07/28/2016 0900   HCT 38 0 10/01/2015 1333   HCT 31 0 (L) 05/15/2015 0455   HCT 33 5 (L) 05/14/2015 0514   HGB 9 1 (L) 07/24/2018 0452   HGB 12 9 06/26/2018 1007   HGB 12 9 07/28/2016 0900   HGB 12 8 10/01/2015 1333   HGB 10 5 (L) 05/15/2015 0455   HGB 11 5 05/14/2015 0514   INR 1 00 06/26/2018 1007   INR 1 00 04/21/2015 0947   WBC 11 29 (H) 07/24/2018 0452   WBC 9 00 06/26/2018 1007   WBC 7 06 07/28/2016 0900   WBC 6 16 10/01/2015 1333   WBC 9 83 05/15/2015 0455   WBC 10 11 05/14/2015 0514       Meds:    Current Facility-Administered Medications:     acetaminophen (TYLENOL) tablet 650 mg, 650 mg, Oral, Q6H [address], Sharona Ibarra MD, 650 mg at 07/24/18 0525    amLODIPine-atorvastatin (CADUET 10/20) combo dose, , Oral, HS, Carmela Waldrop PA-C    docusate sodium (COLACE) capsule 100 mg, 100 mg, Oral, BID, Sharona Ibarra MD, 100 mg at 07/23/18 1710    enoxaparin (LOVENOX) subcutaneous injection 40 mg, 40 mg, Subcutaneous, Daily, Sharona Ibarra MD    hydrALAZINE (APRESOLINE) tablet 25 mg, 25 mg, Oral, Q8H PRN, Carmela Waldrop PA-C, 25 mg at 07/24/18 0012    HYDROmorphone (DILAUDID) injection 1 mg, 1 mg, Intravenous, Q3H PRN, Sharona Ibarra MD    lactated ringers infusion, 125 mL/hr, Intravenous, Continuous, Nata Liu MD, Stopped at 07/23/18 1859    lactated ringers infusion, 1 5 mL/kg/hr, Intravenous, Continuous, Sharona Ibarra MD, Last Rate: 97 4 mL/hr at 07/23/18 1257, 1 5 mL/kg/hr at 07/23/18 1257    levothyroxine tablet 137 mcg, 137 mcg, Oral, Early Morning, Sharona Ibarra MD, 137 mcg at 07/24/18 0524    loratadine (CLARITIN) tablet 10 mg, 10 mg, Oral, Daily, Zetta Roller Lupe Parks MD    ondansetron [facility]) injection 4 mg, 4 mg, Intravenous, Q8H PRN, Jefferson Mckeon MD    oxyCODONE (ROXICODONE) IR tablet 10 mg, 10 mg, Oral, Q4H PRN, Jefferson Mckeon MD, 10 mg at 07/24/18 0128    oxyCODONE (ROXICODONE) IR tablet 5 mg, 5 mg, Oral, Q4H PRN, Jefferson Mckeon MD, 5 mg at 07/24/18 0525    senna (SENOKOT) tablet 8 6 mg, 1 tablet, Oral, Daily, Jefferson Mckeon MD    Blood Culture:   No results found for: BLOODCX    Wound Culture:   No results found for: WOUNDCULT    Ins and Outs:  I/O last 24 hours: In: 1560 [P O :360; I V :1200]  Out: 3385 [Urine:2450; Emesis/NG output:10; Drains:875; Blood:50]          Physical:  Vitals:    07/24/18 0313   BP: 145/63   Pulse: 77   Resp: 18   Temp: 98 1 °F (36 7 °C)   SpO2: 96%     left lower extremity:  · Dressings C/D/I  · Toes warm and well perfused  · HVx1    _*_*_*_*_*_*_*_*_*_*_*_*_*_*_*_*_*_*_*_*_*_*_*_*_*_*_*_*_*_*_*_*_*_*_*_*_*_*_*_*_*    Assessment: 80 y  o female post operative day 1 left total knee arthroplasty   Doing well    Plan:  · Weight Bearing as tolerated  · Up and out of bed  · DVT prophylaxis  · Analgesics  · PT/OT  · Patient noted to have acute blood loss anemia due to a drop in Hbg of > 2 0g from preop levels, will monitor vital signs and resuscitate with IV fluids as needed    Zoina Earl

## 2018-07-24 NOTE — PHYSICAL THERAPY NOTE
PT Progress Note     07/24/18 1150   Pain Assessment   Pain Assessment 0-10   Pain Score 7   Hospital Pain Intervention(s) Repositioned   Response to Interventions increased pain w WB   Restrictions/Precautions   Weight Bearing Precautions Per Order Yes   LLE Weight Bearing Per Order WBAT   General   Chart Reviewed Yes   Response to Previous Treatment Patient with no complaints from previous session  Family/Caregiver Present No   Cognition   Overall Cognitive Status WFL   Attention Within functional limits   Orientation Level Oriented X4   Memory Within functional limits   Following Commands Follows multistep commands with increased time or repetition   Subjective   Subjective pt in bed, nsg cleared, pt agreeable for PT amb   Bed Mobility   Supine to Sit 4  Minimal assistance   Additional items HOB elevated; Increased time required;Verbal cues;LE management; Bedrails   Transfers   Sit to Stand 3  Moderate assistance   Additional items Increased time required;Verbal cues   Stand to Sit 4  Minimal assistance   Additional items Increased time required;Verbal cues   Stand pivot 4  Minimal assistance   Additional items Increased time required;Verbal cues  (RW)   Ambulation/Elevation   Gait pattern Improper Weight shift; Antalgic;Decreased foot clearance;Decreased L stance; Short stride; Step to;Excessively slow; Poor UE support   Gait Assistance 4  Minimal assist   Additional items Verbal cues; Tactile cues   Assistive Device Rolling walker   Distance 4 ft   Balance   Dynamic Sitting Fair  (forward reach )   Static Standing Fair -  (RW)   Dynamic Standing Poor +  (RW)   Endurance Deficit   Endurance Deficit Yes   Activity Tolerance   Activity Tolerance Treatment limited secondary to medical complications (Comment)  (symptomatic orthostasis )   Nurse Made Aware yes   Assessment   Prognosis Good   Problem List Decreased strength;Decreased range of motion;Decreased endurance; Impaired balance;Decreased mobility; Decreased coordination; Impaired vision;Orthopedic restrictions;Pain   Assessment pt cont to experience high pain levels w mob efforts; requires min assist w sup-sit EOB using HOB elev rail support and assisted w LE support; able to maintain self-supported EOB sit; BP seated 173/69 [place] 85 pt stood initially w mod assist reported dizziness, pt seated back down BP measured 125/57 [place] 99; BP measured again 3 min later 119/56 [place] 89 symptoms improved in sitting; pt then proceeded to stand w min assist and c/o dizziness BP measured in standing 139/59 [place] 79; required min assist to amb 4 ft using RW LLE WBAT- gait pattern antalgic, walker reliant, w step to pattern short stride decreased foot clearance; pt again c/o dizziness, assisted to chair and BP measured in sitting 108/52 [place] 82; symptoms again improved in sitting nsg made aware and f/u w MD; pt is currently not appropriate for further amb 2* orthostasis; this session pt extensively educated on discharge planning; pt to discuss w dtr to set up arrangements for d/c home; pt has 3 children local however all work and she will be dicussing having them share care arrangements; rec cont PT will assess d/c disposition pend med stability and progression of mob   Goals   Patient Goals I want to walk   STG Expiration Date 08/02/18   Short Term Goal #1 per PT assessment 7/23/18 : Pt  Will perform bed mobility independently to allow patient to transfer OOB  Pt  Will transfer sit<>stand with independently to allow patient to increase functional mobility  Pt  Will ambulate 150 ft  With LRAD Mod Independent to allow patient to perform functional tasks within apartment  Pt  Will increase LE strength by 1/2 level to promote achievement of above stated goals  Pt  Will increase all balance by 1 level to promote achievement of above stated goals  Pt  Will increase barthel index to 60 to demonstrate increased independence with functional mobility      Plan   Treatment/Interventions Functional transfer training;LE strengthening/ROM; Elevations; Endurance training; Therapeutic exercise;Cognitive reorientation;Patient/family training;Equipment eval/education; Bed mobility;Gait training; Compensatory technique education;Continued evaluation;Spoke to nursing;OT   Progress Slow progress, medical status limitations   PT Frequency Twice a day   Recommendation   Recommendation (pend med stability and pt progress)   Equipment Recommended Yasmin Ramires

## 2018-07-24 NOTE — PLAN OF CARE
Problem: PHYSICAL THERAPY ADULT  Goal: Performs mobility at highest level of function for planned discharge setting  See evaluation for individualized goals  Treatment/Interventions: Functional transfer training, LE strengthening/ROM, Therapeutic exercise, Endurance training, Cognitive reorientation, Patient/family training, Equipment eval/education, Bed mobility, Gait training, Spoke to nursing  Equipment Recommended: Ira Mohan       See flowsheet documentation for full assessment, interventions and recommendations  Alyssa Dillard, [facility] 7/23/2018     Prognosis: Good  Problem List: Decreased strength, Decreased range of motion, Decreased endurance, Impaired balance, Decreased mobility, Decreased coordination, Impaired vision, Orthopedic restrictions, Pain  Assessment: pain better controlled, c/o mild soreness at start of session, increaesd w WB decreases w sitting offloading LLE; requires min contact guard assist w transf and amb using RW 3 amb trials; each attempt pt self- limited by c/o dizziness, weakness; pt exhibits significant anxiety w motor task performance; requires much encouragement; pt educated on benefits of mob and perseverence; will benefit from cont PT IP rehab when med cleared        Recommendation: Post acute IP rehab     PT - OK to Discharge: Yes    See flowsheet documentation for full assessment

## 2018-07-24 NOTE — CASE MANAGEMENT
Initial Clinical Review    Age/Sex: 80 y o  female admitted on 7/23 for elective surgery - OR    Surgery Date: 7/23    Procedure: S/P KNEE : COMPLETE REPLACEMENT (Left Knee)    Anesthesia: Regional, Spinal    Admission Orders: Date/Time/Statement: Inpatient 7/23/18 @ 1123 Med Surg    Orders Placed This Encounter   Procedures    Inpatient Admission     Standing Status:   Standing     Number of Occurrences:   1     Order Specific Question:   Admitting Physician     Answer:   Efraín Jaramillo [[id number]]     Order Specific Question:   Level of Care     Answer:   Med Surg [16]     Order Specific Question:   Estimated length of stay     Answer:   Inpatient Only Surgery       Vital Signs: /58   Pulse 77   Temp 98 1 °F (36 7 °C)   Resp 18   Ht 5' 1 5" (1 562 m)   Wt 64 9 kg (143 lb)   SpO2 98%   BMI 26 58 kg/m²     Diet:        Diet Orders            Start     Ordered    07/23/18 1129  Diet Regular; Regular House  Diet effective now     Question Answer Comment   Diet Type Regular    Regular Regular House    RD to adjust diet per protocol?  Yes        07/23/18 1128          Mobility: Weight Bearing Restrictions LLE     DVT Prophylaxis: Foot Pump    Scheduled Meds:  Current Facility-Administered Medications:  Cefazolin  Intravenous x2   acetaminophen 650 mg Oral Q6H [address]   amLODIPine-atorvastatin (CADUET 10/20) combo dose  Oral HS   docusate sodium 100 mg Oral BID   enoxaparin 40 mg Subcutaneous Daily   levothyroxine 137 mcg Oral Early Morning   loratadine 10 mg Oral Daily   senna 1 tablet Oral Daily     Continuous Infusions:  lactated ringers 1 5 mL/kg/hr Last Rate: 1 5 mL/kg/hr (07/23/18 1257)     PRN Meds:  hydrALAZINE Iv x1    HYDROmorphone    ondansetron    oxyCODONE po x4

## 2018-07-24 NOTE — PROGRESS NOTES
Pt care rounds completed with Dr Karen Robles, hemovac d/c'd, anticipate discharge tomorrow if cleared and medically stable

## 2018-07-24 NOTE — PLAN OF CARE
Problem: PHYSICAL THERAPY ADULT  Goal: Performs mobility at highest level of function for planned discharge setting  See evaluation for individualized goals  Treatment/Interventions: Functional transfer training, LE strengthening/ROM, Therapeutic exercise, Endurance training, Cognitive reorientation, Patient/family training, Equipment eval/education, Bed mobility, Gait training, Spoke to nursing  Equipment Recommended: Yana Kingsley       See flowsheet documentation for full assessment, interventions and recommendations      Kathy Barboza, SPT 7/23/2018     Prognosis: Good  Problem List: Decreased strength, Decreased range of motion, Decreased endurance, Impaired balance, Decreased mobility, Decreased coordination, Impaired vision, Orthopedic restrictions, Pain  Assessment: pt cont to experience high pain levels w mob efforts; requires min assist w sup-sit EOB using HOB elev rail support and assisted w LE support; able to maintain self-supported EOB sit; BP seated 173/69 [place] 85 pt stood initially w mod assist reported dizziness, pt seated back down BP measured 125/57 [place] 99; BP measured again 3 min later 119/56 [place] 89 symptoms improved in sitting; pt then proceeded to stand w min assist and c/o dizziness BP measured in standing 139/59 [place] 79; required min assist to amb 4 ft using RW LLE WBAT- gait pattern antalgic, walker reliant, w step to pattern short stride decreased foot clearance; pt again c/o dizziness, assisted to chair and BP measured in sitting 108/52 [place] 82; symptoms again improved in sitting nsg made aware and f/u w MD; pt is currently not appropriate for further amb 2* orthostasis; this session pt extensively educated on discharge planning; pt to discuss w dtr to set up arrangements for d/c home; pt has 3 children local however all work and she will be dicussing having them share care arrangements; rec cont PT will assess d/c disposition pend med stability and progression of mob        Recommendation:  (pend med stability and pt progress)          See flowsheet documentation for full assessment

## 2018-07-25 VITALS
SYSTOLIC BLOOD PRESSURE: 156 MMHG | OXYGEN SATURATION: 96 % | HEIGHT: 62 IN | TEMPERATURE: 98.3 F | HEART RATE: 79 BPM | RESPIRATION RATE: 18 BRPM | WEIGHT: 143 LBS | BODY MASS INDEX: 26.31 KG/M2 | DIASTOLIC BLOOD PRESSURE: 66 MMHG

## 2018-07-25 PROBLEM — M17.12 LOCALIZED OSTEOARTHRITIS OF LEFT KNEE: Status: RESOLVED | Noted: 2018-03-15 | Resolved: 2018-07-25

## 2018-07-25 PROBLEM — M25.562 CHRONIC PAIN OF LEFT KNEE: Status: RESOLVED | Noted: 2018-02-08 | Resolved: 2018-07-25

## 2018-07-25 PROBLEM — G89.29 CHRONIC PAIN OF LEFT KNEE: Status: RESOLVED | Noted: 2018-02-08 | Resolved: 2018-07-25

## 2018-07-25 LAB
ANION GAP SERPL CALCULATED.3IONS-SCNC: 6 MMOL/L (ref 4–13)
BUN SERPL-MCNC: 15 MG/DL (ref 5–25)
CALCIUM SERPL-MCNC: 8.4 MG/DL (ref 8.3–10.1)
CHLORIDE SERPL-SCNC: 105 MMOL/L (ref 100–108)
CO2 SERPL-SCNC: 25 MMOL/L (ref 21–32)
CREAT SERPL-MCNC: 0.92 MG/DL (ref 0.6–1.3)
ERYTHROCYTE [DISTWIDTH] IN BLOOD BY AUTOMATED COUNT: 14 % (ref 11.6–15.1)
GFR SERPL CREATININE-BSD FRML MDRD: 54 ML/MIN/1.73SQ M
GLUCOSE SERPL-MCNC: 127 MG/DL (ref 65–140)
HCT VFR BLD AUTO: 25.9 % (ref 34.8–46.1)
HGB BLD-MCNC: 8.5 G/DL (ref 11.5–15.4)
MCH RBC QN AUTO: 31.8 PG (ref 26.8–34.3)
MCHC RBC AUTO-ENTMCNC: 32.8 G/DL (ref 31.4–37.4)
MCV RBC AUTO: 97 FL (ref 82–98)
PLATELET # BLD AUTO: 144 THOUSANDS/UL (ref 149–390)
PMV BLD AUTO: 11.1 FL (ref 8.9–12.7)
POTASSIUM SERPL-SCNC: 3.3 MMOL/L (ref 3.5–5.3)
RBC # BLD AUTO: 2.67 MILLION/UL (ref 3.81–5.12)
SODIUM SERPL-SCNC: 136 MMOL/L (ref 136–145)
WBC # BLD AUTO: 11.82 THOUSAND/UL (ref 4.31–10.16)

## 2018-07-25 PROCEDURE — 85027 COMPLETE CBC AUTOMATED: CPT | Performed by: STUDENT IN AN ORGANIZED HEALTH CARE EDUCATION/TRAINING PROGRAM

## 2018-07-25 PROCEDURE — 97535 SELF CARE MNGMENT TRAINING: CPT

## 2018-07-25 PROCEDURE — 97110 THERAPEUTIC EXERCISES: CPT

## 2018-07-25 PROCEDURE — 80048 BASIC METABOLIC PNL TOTAL CA: CPT | Performed by: STUDENT IN AN ORGANIZED HEALTH CARE EDUCATION/TRAINING PROGRAM

## 2018-07-25 PROCEDURE — 97116 GAIT TRAINING THERAPY: CPT

## 2018-07-25 PROCEDURE — 97530 THERAPEUTIC ACTIVITIES: CPT

## 2018-07-25 RX ORDER — POTASSIUM CHLORIDE 20 MEQ/1
20 TABLET, EXTENDED RELEASE ORAL ONCE
Status: COMPLETED | OUTPATIENT
Start: 2018-07-25 | End: 2018-07-25

## 2018-07-25 RX ADMIN — DOCUSATE SODIUM 100 MG: 100 CAPSULE, LIQUID FILLED ORAL at 09:09

## 2018-07-25 RX ADMIN — ACETAMINOPHEN 650 MG: 325 TABLET, FILM COATED ORAL at 17:20

## 2018-07-25 RX ADMIN — SENNOSIDES 8.6 MG: 8.6 TABLET, FILM COATED ORAL at 09:09

## 2018-07-25 RX ADMIN — LEVOTHYROXINE SODIUM 137 MCG: 112 TABLET ORAL at 05:35

## 2018-07-25 RX ADMIN — DOCUSATE SODIUM 100 MG: 100 CAPSULE, LIQUID FILLED ORAL at 17:20

## 2018-07-25 RX ADMIN — LORATADINE 10 MG: 10 TABLET ORAL at 09:09

## 2018-07-25 RX ADMIN — ACETAMINOPHEN 650 MG: 325 TABLET, FILM COATED ORAL at 00:00

## 2018-07-25 RX ADMIN — ACETAMINOPHEN 650 MG: 325 TABLET, FILM COATED ORAL at 11:02

## 2018-07-25 RX ADMIN — ENOXAPARIN SODIUM 40 MG: 40 INJECTION SUBCUTANEOUS at 09:09

## 2018-07-25 RX ADMIN — OXYCODONE HYDROCHLORIDE 5 MG: 5 TABLET ORAL at 05:42

## 2018-07-25 RX ADMIN — ACETAMINOPHEN 650 MG: 325 TABLET, FILM COATED ORAL at 05:34

## 2018-07-25 RX ADMIN — POTASSIUM CHLORIDE 20 MEQ: 1500 TABLET, EXTENDED RELEASE ORAL at 06:59

## 2018-07-25 NOTE — PLAN OF CARE
Problem: OCCUPATIONAL THERAPY ADULT  Goal: Performs self-care activities at highest level of function for planned discharge setting  See evaluation for individualized goals  Treatment Interventions: ADL retraining, Functional transfer training, Endurance training, Patient/family training, Equipment evaluation/education, Compensatory technique education, Continued evaluation, Activityengagement, Energy conservation          See flowsheet documentation for full assessment, interventions and recommendations  Outcome: Progressing  Limitation: Decreased ADL status, Decreased endurance, Decreased self-care trans, Decreased high-level ADLs  Prognosis: Fair  Assessment: Pt participated in OT Tx session focusing on morning ADL routine (please see above for details)  Pt continues to require significant assistance in all LB ADLS 2* significant pain, decreased functional forward reach, impaired standing balance, and impaired standing tolerance  Pt does not report significant dizziness this session and able to participate in ADLS w/ upright posture or in standing today  At this time pt presents w/ significant limitations in her ability to care for herself  Pt denies ablility to have assistance from anyone upon D/C  Anticipate at this time that pt will require STR upon D/C to increase independence for safe D/C home  Will continue to follow and address previously stated goals        OT Discharge Recommendation: Other (Comment) (STR vs  home pending progress)  OT - OK to Discharge: No        Jean Vieira MS, OTR/L

## 2018-07-25 NOTE — PLAN OF CARE
Problem: PHYSICAL THERAPY ADULT  Goal: Performs mobility at highest level of function for planned discharge setting  See evaluation for individualized goals  Treatment/Interventions: Functional transfer training, LE strengthening/ROM, Therapeutic exercise, Endurance training, Cognitive reorientation, Patient/family training, Equipment eval/education, Bed mobility, Gait training, Spoke to nursing  Equipment Recommended: Saintclair Raisin       See flowsheet documentation for full assessment, interventions and recommendations  STEVIE Feliz 7/23/2018     Outcome: Progressing  Prognosis: Good  Problem List: Decreased strength, Decreased range of motion, Decreased endurance, Impaired balance, Decreased mobility, Impaired judgement, Decreased safety awareness, Orthopedic restrictions, Pain  Assessment: Pt continues to make slight improvements with mobility this session, ambulating household distances slightly quicker & with improved stride length this session  Pt still reuqires instructions for proper hand placement and seequencing with SPT & sitting to improve safety, but pt able to perform all transfers without physical assist   Pt has improved outlook on mobility in recent sessions, and has not complaned of lightheadedness or dizziness today  BP remained more stable when taken 162/73 seated, 156/66 standing, then 155/64 seated again after extended rest   Lastly , pt instructed in & performed HEP exercises to improve LLE strength & L knee ROM to faciliate improved mobility  Pt will continue to benefit from inpatient services to improve functional mobility, safety awareness, activity tolerance, strength & ROM of LLE, all to maximze independence  Barriers to Discharge: Decreased caregiver support     Recommendation: Post acute IP rehab     PT - OK to Discharge: Yes    See flowsheet documentation for full assessment

## 2018-07-25 NOTE — PHYSICAL THERAPY NOTE
Physical Therapy Progress Note     07/25/18 0845   Pain Assessment   Pain Assessment FLACC   Pain Rating: FLACC (Rest) - Face 0   Pain Rating: FLACC (Rest) - Legs 0   Pain Rating: FLACC (Rest) - Activity 0   Pain Rating: FLACC (Rest) - Cry 0   Pain Rating: FLACC (Rest) - Consolability 0   Score: FLACC (Rest) 0   Pain Rating: FLACC (Activity) - Face 1   Pain Rating: FLACC (Activity) - Legs 1   Pain Rating: FLACC (Activity) - Activity 1   Pain Rating: FLACC (Activity) - Cry 1   Pain Rating: FLACC (Activity) - Consolability 1   Score: FLACC (Activity) 5   Restrictions/Precautions   LLE Weight Bearing Per Order WBAT   Other Precautions Impulsive;WBS;Fall Risk;Pain   General   Family/Caregiver Present No   Subjective   Subjective Pt stated she felt better this morning with decreased pain, eager to walk further this AM    Bed Mobility   Supine to Sit 4  Minimal assistance   Additional items Assist x 1;LE management   Transfers   Sit to Stand 4  Minimal assistance   Additional items Assist x 1; Increased time required   Stand to Sit 4  Minimal assistance   Additional items Assist x 1; Increased time required; Impulsive   Stand pivot 4  Minimal assistance   Additional items Assist x 1; Increased time required; Impulsive   Ambulation/Elevation   Gait pattern Improper Weight shift; Antalgic;Decreased foot clearance; Short stride; Step to;Excessively slow   Gait Assistance 4  Minimal assist   Additional items Assist x 1  (+ chair follow)   Assistive Device Rolling walker   Distance 5', 30', 40'   Balance   Static Sitting Fair   Static Standing Fair -   Ambulatory Poor +   Endurance Deficit   Endurance Deficit Yes   Endurance Deficit Description antalic LLE, dyspnea, orthostatic BP   Activity Tolerance   Activity Tolerance Patient limited by fatigue;Patient limited by pain   Nurse [address], RN   Assessment   Prognosis Good   Problem List Decreased strength;Decreased range of motion;Decreased endurance; Impaired balance;Decreased mobility; Decreased coordination; Impaired judgement;Decreased safety awareness;Pain;Orthopedic restrictions; Impaired hearing   Assessment Pt demonstrated improved mobility and activity tolerance during this session, ambulating total of 70' with one seated break due to increse pain & fatigue  Pt required max encouragement for ambulation and continued participation  Pt still demonstrates decreased safety awareness with transfers, requiring instructions throughout for sequencing for hand placement and sequencing  Pt educated in importance of continued participation & mobility with therapy to decrease pain & maximize independence  Pt will continue to benefit from therapy services to improve strength, ROM, mobility, safety awareness, and activity tolerance to return home safely  Barriers to Discharge Decreased caregiver support   Goals   Patient Goals To walk more so I can go home   STG Expiration Date 08/02/18   Short Term Goal #1 per PT assessment 7/23/18: Pt  Will perform bed mobility independently to allow patient to transfer OOB  Pt  Will transfer sit<>stand with independently to allow patient to increase functional mobility  Pt  Will ambulate 150 ft  With LRAD Mod Independent to allow patient to perform functional tasks within apartment  Pt  Will increase LE strength by 1/2 level to promote achievement of above stated goals  Pt  Will increase all balance by 1 level to promote achievement of above stated goals  Pt  Will increase barthel index to 60 to demonstrate increased independence with functional mobility  Treatment Day 4   Plan   Treatment/Interventions Functional transfer training;LE strengthening/ROM; Therapeutic exercise; Endurance training;Patient/family training;Equipment eval/education; Bed mobility;Gait training   Progress Progressing toward goals   PT Frequency Twice a day   Recommendation   Recommendation Post acute IP rehab   Equipment Recommended Eric Belts, PTA

## 2018-07-25 NOTE — PROGRESS NOTES
Orthopedics   Ronal Madera 80 y o  female MRN: [id number]  Unit/Bed#: [id number]      Subjective:  80 y  o female post operative day 2 left total knee arthroplasty  Pt doing well  Pain controlled   Did physical therapy    Labs:    0  Lab Value Date/Time   HCT 25 9 (L) 07/25/2018 0510   HCT 27 5 (L) 07/24/2018 0452   HCT 39 7 06/26/2018 1007   HCT 38 0 10/01/2015 1333   HCT 31 0 (L) 05/15/2015 0455   HCT 33 5 (L) 05/14/2015 0514   HGB 8 5 (L) 07/25/2018 0510   HGB 9 1 (L) 07/24/2018 0452   HGB 12 9 06/26/2018 1007   HGB 12 8 10/01/2015 1333   HGB 10 5 (L) 05/15/2015 0455   HGB 11 5 05/14/2015 0514   INR 1 00 06/26/2018 1007   INR 1 00 04/21/2015 0947   WBC 11 82 (H) 07/25/2018 0510   WBC 11 29 (H) 07/24/2018 0452   WBC 9 00 06/26/2018 1007   WBC 6 16 10/01/2015 1333   WBC 9 83 05/15/2015 0455   WBC 10 11 05/14/2015 0514       Meds:    Current Facility-Administered Medications:     acetaminophen (TYLENOL) tablet 650 mg, 650 mg, Oral, Q6H [address], Bipin Rojas MD, 650 mg at 07/25/18 0534    amLODIPine-atorvastatin (CADUET 10/20) combo dose, , Oral, HS, Carmela Waldrop PA-C    docusate sodium (COLACE) capsule 100 mg, 100 mg, Oral, BID, Bipin Rojas MD, 100 mg at 07/24/18 1731    enoxaparin (LOVENOX) subcutaneous injection 40 mg, 40 mg, Subcutaneous, Daily, Bipin Rojas MD, 40 mg at 07/24/18 0930    hydrALAZINE (APRESOLINE) tablet 25 mg, 25 mg, Oral, Q8H PRN, Carmela Waldrop PA-C, 25 mg at 07/24/18 0012    HYDROmorphone (DILAUDID) injection 1 mg, 1 mg, Intravenous, Q3H PRN, Bipin Rojas MD    lactated ringers infusion, 125 mL/hr, Intravenous, Continuous, Yoselyn Ch MD, Stopped at 07/23/18 1859    lactated ringers infusion, 1 5 mL/kg/hr, Intravenous, Continuous, Bipin Rojas MD, Last Rate: 97 4 mL/hr at 07/23/18 1257, 1 5 mL/kg/hr at 07/23/18 1257    levothyroxine tablet 137 mcg, 137 mcg, Oral, Early Morning, Bipin Rojas MD, 137 mcg at 07/25/18 0516    loratadine (CLARITIN) tablet 10 mg, 10 mg, Oral, Daily, Rubina Rodriguez MD, 10 mg at 07/24/18 0929    ondansetron (ZOFRAN) injection 4 mg, 4 mg, Intravenous, Q8H PRN, Rubina Rodriguez MD    oxyCODONE (ROXICODONE) IR tablet 10 mg, 10 mg, Oral, Q4H PRN, Rubina Rodriguez MD, 10 mg at 07/24/18 0128    oxyCODONE (ROXICODONE) IR tablet 5 mg, 5 mg, Oral, Q4H PRN, Rubina Rodriguez MD, 5 mg at 07/25/18 0542    potassium chloride (K-DUR,KLOR-CON) CR tablet 20 mEq, 20 mEq, Oral, Once, Bethany Rodriguez MD    senna (SENOKOT) tablet 8 6 mg, 1 tablet, Oral, Daily, Rubina Rodriguez MD, 8 6 mg at 07/24/18 1731    Blood Culture:   No results found for: BLOODCX    Wound Culture:   No results found for: WOUNDCULT    Ins and Outs:  I/O last 24 hours: In: 1320 [P O :1320]  Out: 3350 [Urine:3150; Drains:200]          Physical:  Vitals:    07/25/18 0149   BP: 149/66   Pulse:    Resp:    Temp:    SpO2:      left lower extremity:  · Incision C/D/I  · Toes warm and well perfused  · Sensation s/sp/dp/s intact  · Motor intact EHL/FHL plantarflexion dorsiflexion      _*_*_*_*_*_*_*_*_*_*_*_*_*_*_*_*_*_*_*_*_*_*_*_*_*_*_*_*_*_*_*_*_*_*_*_*_*_*_*_*_*    Assessment: 80 y  o female post operative day 2 left total knee arthroplasty   Doing well    Plan:  · Weight Bearing as tolerated  · Up and out of bed  · DVT prophylaxis  · Analgesics  · PT/OT as able  · Patient noted to have acute blood loss anemia due to a drop in Hbg of > 2 0g from preop levels, will monitor vital signs and resuscitate with IV fluids as needed    Lilo Fletcher

## 2018-07-25 NOTE — OCCUPATIONAL THERAPY NOTE
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)    Hypothyroidism    Chronic kidney disease, stage 3    Hyperlipidemia    Hyponatremia    Peripheral vascular disease (HCC)    Generalized osteoarthritis    Ambulatory dysfunction    Bilateral leg edema    Allergic rhinitis    Anxiety    Aortoiliac stenosis (HCC)    Constipation    Diabetic peripheral neuropathy (HCC)    Dry scalp    First degree AV block    Gait disturbance    Knee effusion, left    Lumbosacral spondylosis without myelopathy    Microalbuminuria    Osteopenia    Peripheral neuropathy    Seborrheic dermatitis of scalp    Spinal stenosis    Leg edema, left    Effusion of left knee    Pes anserinus bursitis of left knee    Preop general physical exam    S/P total knee replacement, left           07/25/18 1020   Restrictions/Precautions   Weight Bearing Precautions Per Order Yes   LLE Weight Bearing Per Order WBAT   Other Precautions WBS; Fall Risk;Pain   Pain Assessment   Pain Assessment 0-10   Pain Score 5   Pain Type Acute pain;Surgical pain   Pain Location Knee   Pain Orientation Left   Hospital Pain Intervention(s) Ambulation/increased activity;Repositioned;Cold applied; Shower/Bath   Response to Interventions tolerated   ADL   Where Assessed Chair   Grooming Assistance 7  Independent   Grooming Comments I after set up    [address]  Supervision/Setup   UB Bathing Deficit Setup; Increased time to complete   LB Bathing Assistance 3  Moderate Assistance   LB Bathing Deficit Setup; Increased time to complete;Right lower leg including foot; Left lower leg including foot;Use of adaptive equipment   LB Bathing Comments Pt requires A to bath B/L LE lower portions 2* decreased funcitonal reach and requires A for steadying/balance to perform LB bathing to anterior perineal area and buttocks in standing w/ rw   UB Dressing Assistance 5  Supervision/Setup   UB Dressing Deficit Increased time to complete;Pull down in back   UB Dressing Comments Incidental A to pull down bra in back    LB Dressing Assistance 2  Maximal Assistance   LB Dressing Deficit Setup; Increased time to complete; Don/doff R sock; Don/doff L sock; Thread RLE into pants; Thread LLE into pants; Thread RLE into underwear; Thread LLE into underwear;Pull up over hips   LB Dressing Comments Pt requires A to thread B/L LE into underpants and pants  Pt is able to pull pants up to hips, however requires some A to pull over hips  Pt requries total A to don/doff B/L socks  Pt requires A for steadying/balance in standing and use of rw   Transfers   Sit to Stand 4  Minimal assistance   Additional items Assist x 1; Increased time required;Verbal cues   Stand to Sit 4  Minimal assistance   Additional items Assist x 1; Increased time required;Verbal cues   Additional Comments Pt performed sit to stand transfers x4 t/o session  Cognition   Overall Cognitive Status WFL   Arousal/Participation Alert; Responsive; Cooperative   Attention Within functional limits   Orientation Level Oriented X4   Memory Within functional limits   Following Commands Follows all commands and directions without difficulty   Activity Tolerance   Activity Tolerance Patient limited by fatigue;Patient limited by pain   Medical Staff Made Aware RN, CM   Assessment   Assessment Pt participated in OT Tx session focusing on morning ADL routine (please see above for details)  Pt continues to require significant assistance in all LB ADLS 2* significant pain, decreased functional forward reach, impaired standing balance, and impaired standing tolerance   Pt does not report significant dizziness this session and able to participate in ADLS w/ upright posture or in standing today  At this time pt presents w/ significant limitations in her ability to care for herself  Pt denies ablility to have assistance from anyone upon D/C  Anticipate at this time that pt will require STR upon D/C to increase independence for safe D/C home  Will continue to follow and address previously stated goals  Plan   Treatment Interventions ADL retraining;Functional transfer training; Endurance training;Patient/family training;Equipment evaluation/education; Compensatory technique education;Continued evaluation; Energy conservation; Activityengagement   Goal Expiration Date 08/03/18   Treatment Day 1   OT Frequency 3-5x/wk   Recommendation   OT Discharge Recommendation Other (Comment)  (STR vs  home pending progress)   Barthel Index   Feeding 10   Bathing 0   Grooming Score 5   Dressing Score 5   Bladder Score 10   Bowels Score 10   Toilet Use Score 5   Transfers (Bed/Chair) Score 5   Mobility (Level Surface) Score 0   Stairs Score 0   Barthel Index Score 50   Modified Dorado Scale   Modified Janice Scale 4       Jina Smith MS, OTR/L

## 2018-07-25 NOTE — DISCHARGE SUMMARY
ORTHOPEDICS DISCHARGE SUMMARY  Suhas Ramirez 80 y o  female MRN: [id number]  Unit/Bed#: IB6-554-33    Attending Physician: Ivon Martin    Admitting diagnosis: Chronic pain of left knee [M25 562, G89 29]  Localized osteoarthritis of left knee [M17 12]    Discharge diagnosis: Chronic pain of left knee [M25 562, G89 29]  Localized osteoarthritis of left knee [M17 12]    Date of admission: 7/23/2018    Date of discharge: 07/25/18         Procedure: Left primary total knee arthroplasty    HPI:  This is a 80y o  year old female that presented to the office with signs and symptoms of left knee osteoarthritis  They tried and failed conservative treatment measures and wished to proceed with surgical intervention  The risks, benefits, and complications of the procedure were discussed with the patient and informed consent was obtained  Hospital Course: The patient was admitted to the hospital on 7/23/2018 and underwent an uncomplicated left total knee arthroplasty  They were transferred to the floor after a brief stay in the post-anesthesia care unit  Their pain was well managed with IV and oral pain medications  They began therapy on post operative day #1  Lovenox was also started for DVT prophylaxis post operative day #1  Hemovac drain was removed on POD1  Post operative course was uneventful  On discharge date pt was cleared by PT and the medicine team and determined to be safe for discharge  Daily discussion was had with the patient, nursing staff, orthopaedic team, and family members if present  All questions were answered to the patients satisifaction          0  Lab Value Date/Time   HGB 8 5 (L) 07/25/2018 0510   HGB 9 1 (L) 07/24/2018 0452   HGB 12 9 06/26/2018 1007   HGB 12 9 07/28/2016 0900   HGB 10 1 (L) 06/01/2016 0529   HGB 12 3 05/25/2016 1451   HGB 9 9 (L) 03/12/2016 0440   HGB 9 9 (L) 03/11/2016 0531   HGB 10 6 (L) 03/10/2016 1137   HGB 13 8 03/03/2016 1100   HGB 12 8 10/01/2015 1333   HGB 10 5 (L) 05/15/2015 0455   HGB 11 5 05/14/2015 0514   HGB 13 5 04/21/2015 0947   HGB 13 1 10/02/2014 1319   HGB 12 0 04/02/2014 0710   HGB 11 7 03/30/2014 0547   HGB 12 9 03/28/2014 0633   HGB 12 1 03/27/2014 0628   HGB 11 6 03/26/2014 0630   HGB 11 1 (L) 03/25/2014 0703   HGB 11 0 (L) 03/24/2014 0618   HGB 14 9 03/23/2014 0710       Greater than 2 gram drop which qualifies for diagnosis of acute blood loss anemia  Vital signs remained stable and pt was resuscitated with IVF as needed     Discharge Instructions: The patient was discharged weight bearing as tolerated to the left lower extremity  Lovenox will be continued for 28 days  Continue PT/OT  Take pain medications as instructed  Discharge Medications: For the complete list of discharge medications, please refer to the patient's medication reconciliation

## 2018-07-25 NOTE — PROGRESS NOTES
Internal Medicine Progress Note  Patient: Davon Mobley  Age/sex: 80 y o  female  Medical Record #: [id number]      ASSESSMENT/PLAN:  Davon Mobley is seen and examined and mangement for following issues:    # Lt knee OA s/p Lt TKA: Pain control adequate  Follow bowel regimen to help decrease narcotic induced constipation, no BM yet  - Hgb 8 5  DVT prophylaxis in place and reviewed      # HTN: BP stable;  Pt takes Caduet 10/20  Individual components given here  Will continue amlodipine with hold parameters  Pt also on losartan 100mg daily currently on  hold to decrease the risk of LUISA in the post-op period  Hydralazine 25mg every 8 hours as needed for SBP > 160  OK to resume losartan on d/c     # Hyponatremia: Chronic  Continue NaCl 1g daily  Remains stable at 136      # CKD stage III: Follows with Nephrology  Baseline Cr 1 2 - 1 4  Will continue to monitor - currently below baseline at 0 9    # Leukocytosis:  Mild, improving  Likely reactive  No signs of infx  Monitor WBC and fever curve post op while encouraging use of incentive spirometer  Subjective: Patient seen and examined   Slept well, anxious to wash up this a m     ROS:   GI: denies abdominal pain, change bowel habits or reflux symptoms  Neuro: No new neurologic changes  Respiratory: No Cough, SOB  Cardiovascular: No CP, palpitations   Musculoskeletal: +L knee pain    Scheduled Meds:    Current Facility-Administered Medications:  acetaminophen 650 mg Oral Q6H [address] Quincy Severino MD    amLODIPine-atorvastatin (CADUET 10/20) combo dose  Oral HS Carmela Waldrop PA-C    docusate sodium 100 mg Oral BID Quincy Severino MD    enoxaparin 40 mg Subcutaneous Daily Quincy Severino MD    hydrALAZINE 25 mg Oral Q8H PRN Carmela Waldrop PA-C    HYDROmorphone 1 mg Intravenous Q3H PRN Quincy Severino MD    lactated ringers 125 mL/hr Intravenous Continuous Rebecca Post MD Last Rate: Stopped (07/23/18 2960)   lactated ringers 1 5 mL/kg/hr Intravenous Continuous Petrona Gu MD Last Rate: 1 5 mL/kg/hr (07/23/18 1257)   levothyroxine 137 mcg Oral Early Morning Petrona Gu MD    loratadine 10 mg Oral Daily Petrona Gu MD    ondansetron 4 mg Intravenous Q8H PRN Petrona Gu MD    oxyCODONE 10 mg Oral Q4H PRN Petrona Gu MD    oxyCODONE 5 mg Oral Q4H PRN Petrona Gu MD    senna 1 tablet Oral Daily Petrona Gu MD        Labs:       Results from last 7 days  Lab Units 07/25/18  0510 07/24/18  0452   WBC Thousand/uL 11 82* 11 29*   HEMOGLOBIN g/dL 8 5* 9 1*   HEMATOCRIT % 25 9* 27 5*   PLATELETS Thousands/uL 144* 161       Results from last 7 days  Lab Units 07/25/18  0513 07/24/18  0452   SODIUM mmol/L 136 136   POTASSIUM mmol/L 3 3* 4 4   CHLORIDE mmol/L 105 104   CO2 mmol/L 25 26   BUN mg/dL 15 18   CREATININE mg/dL 0 92 1 04   GLUCOSE RANDOM mg/dL 127 157*   CALCIUM mg/dL 8 4 8 3                  Glucose (mg/dL)   Date Value   07/25/2018 127   07/24/2018 157 (H)   07/28/2016 131   06/01/2016 162 (H)   01/07/2016 153 (H)   10/01/2015 103   05/15/2015 133   05/14/2015 162 (H)       Labs reviewed    Physical Examination:  Vitals:   Vitals:    07/24/18 2157 07/24/18 2339 07/25/18 0149 07/25/18 0717   BP: (!) 183/79 (!) 184/77 149/66 140/64   BP Location: Right arm Left arm Right arm Left arm   Pulse: 87 82  70   Resp:  18  18   Temp:  99 °F (37 2 °C)  98 1 °F (36 7 °C)   TempSrc:  Oral  Oral   SpO2:  94%  95%   Weight:       Height:           PERRLA EOMI no JVD  RESP: CTAB, no R/R/W  CV: +S1 S2, regular rate, no rubs  ABD: soft, NT, ND, normal BS   EXT: DP intact incision CDI  Neuro: AAO X# no focality      [ X ] Total time spent: 30 Mins and greater than 50% of this time was spent counseling/coordinating care  ** Please Note: Dragon 360 Dictation voice to text software may have been used in the creation of this document   **

## 2018-07-25 NOTE — PHYSICAL THERAPY NOTE
Physical Therapy Progress Note     07/25/18 5105   Pain Assessment   Pain Assessment 0-10   Pain Score 8   Pain Type Acute pain   Pain Location Knee   Pain Orientation Left   Effect of Pain on Daily Activities decreaseed mobility, standing tolerance   Patient's Stated Pain Goal No pain   Hospital Pain Intervention(s) Cold applied; Rest;Distraction; Emotional support   Response to Interventions tolerated, decreased with rest   Restrictions/Precautions   LLE Weight Bearing Per Order WBAT   Other Precautions Impulsive;WBS;Fall Risk;Pain   General   Family/Caregiver Present No   Subjective   Subjective Pt stated she continues to feel better, more confident with each session  Transfers   Sit to Stand 5  Supervision   Additional items Assist x 1; Increased time required;Armrests   Stand to Sit 4  Minimal assistance   Additional items Assist x 1; Increased time required; Impulsive;Verbal cues   Stand pivot 4  Minimal assistance   Additional items Assist x 1; Increased time required;Verbal cues; Impulsive   Toilet transfer 4  Minimal assistance   Additional items Assist x 1; Increased time required;Commode   Ambulation/Elevation   Gait pattern Improper Weight shift; Antalgic;Decreased L stance; Inconsistent darrin; Foward flexed; Short stride; Step to;Excessively slow   Gait Assistance 4  Minimal assist   Additional items Assist x 1  (+ chair follow)   Assistive Device Rolling walker   Distance 3' x 2, 30', 50'   Balance   Static Sitting Fair   Static Standing Fair -   Ambulatory Poor +   Endurance Deficit   Endurance Deficit Yes   Endurance Deficit Description antalgic gait, LE weakness, dsypnea   Activity Tolerance   Activity Tolerance Patient limited by fatigue;Patient limited by pain   Nurse [address] , RN   Exercises   THR Sitting;10 reps;AROM; Left   Assessment   Prognosis Good   Problem List Decreased strength;Decreased range of motion;Decreased endurance; Impaired balance;Decreased mobility; Impaired judgement;Decreased safety awareness;Orthopedic restrictions;Pain   Assessment Pt continues to make slight improvements with mobility this session, ambulating household distances slightly quicker & with improved stride length this session  Pt still reuqires instructions for proper hand placement and seequencing with SPT & sitting to improve safety, but pt able to perform all transfers without physical assist   Pt has improved outlook on mobility in recent sessions, and has not complaned of lightheadedness or dizziness today  BP remained more stable when taken 162/73 seated, 156/66 standing, then 155/64 seated again after extended rest   Lastly , pt instructed in & performed HEP exercises to improve LLE strength & L knee ROM to faciliate improved mobility  Pt will continue to benefit from inpatient services to improve functional mobility, safety awareness, activity tolerance, strength & ROM of LLE, all to maximze independence  Barriers to Discharge Decreased caregiver support   Goals   Patient Goals To get to rehab so she can get stronger to go home   STG Expiration Date 08/02/18   Treatment Day 5   Plan   Treatment/Interventions Functional transfer training;LE strengthening/ROM; Therapeutic exercise; Endurance training;Patient/family training;Equipment eval/education; Bed mobility;Gait training   Progress Progressing toward goals   PT Frequency Twice a day   Recommendation   Recommendation Post acute IP rehab   Equipment Recommended Susy Bingham PTA

## 2018-07-25 NOTE — SOCIAL WORK
CM met with patient and explained cm role  Pt alert and oriented  Pt reports she lives alone in independent living at [address]  Pt reports DME: rw, rollator, commode, and hospital bed, denies VNA, and rehab  Pt reports being independent PTA, reports good support from family and friends, she transport to appointments via daughters, does not drive and has transport home with daughter  Pharmacy is [facility] in [place]  POA is pts daughter's Lg Nevarez [phone], and Marissa Hollins [phone]  Denies hx/admission for drugs/etoh and mental health  Pt's daughter Lg Nevarez is requesting inpt rehab, as patient is at independent living and does not have help  Per pts daughter Lg Nevarez, she cannot care for her in her home, and cannot go to her home to care for pt  Pt and daughter had rehab choices: [facility], [facility], and [address] put referrals via [address]  CM reviewed d/c planning process including the following: identifying help at home, patient preference for d/c planning needs, Discharge Lounge, [facility] to Bed program, availability of treatment team to discuss questions or concerns patient and/or family may have regarding understanding medications and recognizing signs and symptoms once discharged  CM also encouraged patient to follow up with all recommended appointments after discharge  Patient advised of importance for patient and family to participate in managing patients medical well being

## 2018-07-25 NOTE — SOCIAL WORK
Per ortho, pt medically cleared  Transport arrange with [facility] for [employer] via [address] to [facility]  CM notified patient, daughter Sumi Vanegas, physician, and facility  Report# [phone] ext   228, Fax# [phone]

## 2018-07-25 NOTE — PLAN OF CARE
Problem: PHYSICAL THERAPY ADULT  Goal: Performs mobility at highest level of function for planned discharge setting  See evaluation for individualized goals  Treatment/Interventions: Functional transfer training, LE strengthening/ROM, Therapeutic exercise, Endurance training, Cognitive reorientation, Patient/family training, Equipment eval/education, Bed mobility, Gait training, Spoke to nursing  Equipment Recommended: Karyna Norman       See flowsheet documentation for full assessment, interventions and recommendations  Jewel Gill, [facility] 7/23/2018     Outcome: Progressing  Prognosis: Good  Problem List: Decreased strength, Decreased range of motion, Decreased endurance, Impaired balance, Decreased mobility, Decreased coordination, Impaired judgement, Decreased safety awareness, Pain, Orthopedic restrictions, Impaired hearing  Assessment: Pt demonstrated improved mobility and activity tolerance during this session, ambulating total of 70' with one seated break due to increse pain & fatigue  Pt required max encouragement for ambulation and continued participation  Pt still demonstrates decreased safety awareness with transfers, requiring instructions throughout for sequencing for hand placement and sequencing  Pt educated in importance of continued participation & mobility with therapy to decrease pain & maximize independence  Pt will continue to benefit from therapy services to improve strength, ROM, mobility, safety awareness, and activity tolerance to return home safely  Barriers to Discharge: Decreased caregiver support     Recommendation: Post acute IP rehab     PT - OK to Discharge: Yes    See flowsheet documentation for full assessment

## 2018-07-26 ENCOUNTER — APPOINTMENT (OUTPATIENT)
Dept: PHYSICAL THERAPY | Age: 83
End: 2018-07-26
Payer: COMMERCIAL

## 2018-07-26 ENCOUNTER — TRANSITIONAL CARE MANAGEMENT (OUTPATIENT)
Dept: FAMILY MEDICINE CLINIC | Facility: CLINIC | Age: 83
End: 2018-07-26

## 2018-07-30 ENCOUNTER — APPOINTMENT (OUTPATIENT)
Dept: PHYSICAL THERAPY | Age: 83
End: 2018-07-30
Payer: COMMERCIAL

## 2018-07-31 ENCOUNTER — OFFICE VISIT (OUTPATIENT)
Dept: OBGYN CLINIC | Facility: HOSPITAL | Age: 83
End: 2018-07-31

## 2018-07-31 ENCOUNTER — HOSPITAL ENCOUNTER (OUTPATIENT)
Dept: RADIOLOGY | Facility: HOSPITAL | Age: 83
Discharge: HOME | End: 2018-07-31
Attending: ORTHOPAEDIC SURGERY
Payer: COMMERCIAL

## 2018-07-31 VITALS
SYSTOLIC BLOOD PRESSURE: 145 MMHG | DIASTOLIC BLOOD PRESSURE: 68 MMHG | BODY MASS INDEX: 26.31 KG/M2 | HEART RATE: 76 BPM | WEIGHT: 143 LBS | HEIGHT: 62 IN

## 2018-07-31 DIAGNOSIS — Z47.1 AFTERCARE FOLLOWING LEFT KNEE JOINT REPLACEMENT SURGERY: Primary | ICD-10-CM

## 2018-07-31 DIAGNOSIS — Z96.652 AFTERCARE FOLLOWING LEFT KNEE JOINT REPLACEMENT SURGERY: Primary | ICD-10-CM

## 2018-07-31 DIAGNOSIS — Z47.1 AFTERCARE FOLLOWING LEFT KNEE JOINT REPLACEMENT SURGERY: ICD-10-CM

## 2018-07-31 DIAGNOSIS — Z96.652 AFTERCARE FOLLOWING LEFT KNEE JOINT REPLACEMENT SURGERY: ICD-10-CM

## 2018-07-31 PROCEDURE — 99024 POSTOP FOLLOW-UP VISIT: CPT | Performed by: ORTHOPAEDIC SURGERY

## 2018-07-31 PROCEDURE — 73560 X-RAY EXAM OF KNEE 1 OR 2: CPT

## 2018-07-31 NOTE — PROGRESS NOTES
80 y o female presents for 1 week postoperative visit status post left TKA (op date: 7/23/2018)  Patient continues to progress well at her rehab facility  Pain is well controlled  No complaints at this time  Review of Systems  Review of systems negative unless otherwise specified in HPI    Past Medical History  Past Medical History:   Diagnosis Date    AAA (abdominal aortic aneurysm) ([address] )     s/p EVAR 3/10/16    Allergic urticaria     LAST ASSESSED: [id number]    Appendicitis     Arthritis     CKD (chronic kidney disease), stage III     Coronary artery disease     Diabetes mellitus (HCC)     Eczema     Gross hematuria     LAST ASSESSED: [id number]    Hematuria     Hypertension     Hyponatremia     Hypothyroid     Osteoporosis     LAST ASSESSED: [id number]    PONV (postoperative nausea and vomiting)     Primary osteoarthritis of left knee     LAST ASSESSED: [id number]    Renal disorder     Renal insufficiency     Rotator cuff tear arthropathy     LEFT LAST ASSESSED: [id number]    Secondary osteoarthritis of right shoulder     LAST ASSESSED: 26OOE0941    Syncope        Past Surgical History  Past Surgical History:   Procedure Laterality Date    APPENDECTOMY      BACK SURGERY      CATARACT EXTRACTION W/  INTRAOCULAR LENS IMPLANT Bilateral     KNEE ARTHROSCOPY Left     ONSET: [id number] THERAPEUTIC    LUMBAR LAMINECTOMY      CT AAA REPAIR,MODULR BIFURCATED PROSTH N/A 3/10/2016    Procedure: REPAIR ANEURYSM ENDOVASCULAR ABDOMINAL AORTIC  (EVAR) ;   Surgeon: Patria Lopez MD;  [facility];  Service: Vascular    CT RECONSTR TOTAL SHOULDER IMPLANT Left 5/31/2016    Procedure: ARTHROPLASTY SHOULDER REVERSE;  Surgeon: Ashley Miranda MD;  [facility];  Service: Orthopedics    CT TOTAL KNEE ARTHROPLASTY Left 7/23/2018    Procedure: KNEE : COMPLETE REPLACEMENT;  Surgeon: Salena Chang MD;  [facility];  Service: Orthopedics    REVERSE TOTAL SHOULDER ARTHROPLASTY Right  ROTATOR CUFF REPAIR Left     RC SURGERY RESOLVED: 1999    SHOULDER ARTHROPLASTY      5/15 - FOR RIGHT SHOULDER; 5/16 - FOR LEFT SHOULDER    SHOULDER ARTHROSCOPY      TOTAL HIP ARTHROPLASTY Left     ONSET: [id number]    TOTAL HIP ARTHROPLASTY  05/31/2006    REVISION       Current Medications  Current Outpatient Prescriptions on File Prior to Visit   Medication Sig Dispense Refill    acetaminophen (TYLENOL) 325 mg tablet Take 2 tablets (650 mg total) by mouth every 6 (six) hours as needed for mild pain  30 tablet 0    amLODIPine (NORVASC) 10 mg tablet Take 1 tablet (10 mg total) by mouth daily for 4 doses 4 tablet 0    amLODIPine-atorvastatin (CADUET) 10-20 MG per tablet Take 1 tablet by mouth daily at bedtime   ascorbic acid (VITAMIN C) 500 mg tablet Take 500 mg by mouth daily      B-12, Methylcobalamin, 1000 MCG SUBL Take 1 tablet by mouth daily  B-12 500 MCG Oral Tablet TAKE 1 TABLET DAILY  Quantity: 30;  Refills: 0    Noretta Oksana M D ; Active       calcium citrate-vitamin D (CITRACAL+D) 315-200 MG-UNIT per tablet Take 1 tablet by mouth daily  Calcium + D TABS  Refills: 0   , M D ; Active       clobetasol (TEMOVATE) 0 05 % cream Apply 1 application topically 2 (two) times a day      diclofenac sodium (VOLTAREN) 1 % Apply 2 g topically 4 (four) times a day 100 g 3    Docusate Sodium 100 MG capsule Take 1 tablet by mouth as needed        enoxaparin (LOVENOX) 40 mg/0 4 mL Inject 0 4 mL (40 mg total) under the skin daily in the early morning for 30 days 11 2 mL 0    fexofenadine (ALLEGRA) 180 MG tablet Take 1 tablet by mouth as needed        Fluocinolone Acetonide Scalp (DERMA-SMOOTHE/FS SCALP) 0 01 % OIL Apply topically      fluticasone (FLONASE) 50 mcg/act nasal spray Fluticasone Propionate 50 MCG/ACT Nasal Suspension use 2 spr   in each nostril daily  Quantity: 1;  Refills: 5    Noretta Oksana M D ;  Started [id number] Active      folic acid (FOLVITE) 1 mg tablet Take by mouth daily      ketoconazole (NIZORAL) 2 % shampoo Apply 1 application topically once      levothyroxine 137 mcg tablet Take by mouth daily  Levothyroxine Sodium 137 MCG Oral Tablet take 1 tablet daily before breakfast  Quantity: 90;  Refills: 3    Joe MEDEL ;  Started 3-Oct-2014 Active       losartan (COZAAR) 100 MG tablet Take 100 mg by mouth daily  Losartan Potassium 100 MG Oral Tablet take 1 tablet once daily  Quantity: 90;  Refills: 3    Joe GUILLEN D ; Active       Magnesium 250 MG TABS 1 tablet daily  Magnesium 250 MG Oral Tablet  Quantity: 0;  Refills: 0   , M D ; Active       Multiple Vitamins-Minerals (MULTIVITAMIN & MINERAL PO) 1 tablet daily  Multivitamin & Mineral Oral Liquid  Quantity: 0;  Refills: 0   , M D ; Active       oxyCODONE (ROXICODONE) 5 mg immediate release tablet Take 1 pill po Q6 Hrs prn 30 tablet 0    Polyethylene Glycol 3350 GRAN Take 1 Dose by mouth daily as needed  Polyethylene Glycol 3350 Oral Powder MIX 1 CAPFUL (17GM) IN 8 OUNCES OF WATER, JUICE, OR TEA AND DRINK DAILY  Quantity: 557;  Refills: 0    Joe MEDEL ;  Started 3-Dec-2015 Active       sodium chloride 1 g tablet Take 1 tablet (1 g total) by mouth daily Sodium Chloride 1 GM Oral Tablet Take 1 tab  Daily  Quantity: 30;  Refills: 5    Joe MEDEL ;  Started 30-Apr-2015 Active 30 tablet 6     No current facility-administered medications on file prior to visit          Recent Labs [facility])    0  Lab Value Date/Time   HCT 25 9 (L) 07/25/2018 0510   HCT 38 0 10/01/2015 1333   HGB 8 5 (L) 07/25/2018 0510   HGB 12 8 10/01/2015 1333   WBC 11 82 (H) 07/25/2018 0510   WBC 6 16 10/01/2015 1333   INR 1 00 06/26/2018 1007   INR 1 00 04/21/2015 0947   GLUCOSE 127 07/25/2018 0513   GLUCOSE 153 (H) 01/07/2016 1401   HGBA1C 6 4 (H) 06/26/2018 1007   HGBA1C 5 8 (H) 10/03/2017 7514         Physical exam  · General: Awake, Alert, Oriented  · Eyes: Pupils equal, round and reactive to light  · Heart: regular rate and rhythm  · Lungs: No audible wheezing  · Abdomen: soft  right Knee exam  · Incision c/d/i, staples intact  · Expected level of post-operative swelling about the knee  · 5-100 degrees of knee flexion   · Stable to varus/valgus stress  · Able to perform straight leg raise  · Motor ans sensation intact   · Limb warm and well perfused    Imaging  Radiographs of the right knee were personally reviewed by myself and Dr Andrea Brennan which show a stable prosthesis in excellent alignment     79 yo F 1 week s/p right TKA  · WBAT RLE  · Continue PT  · Please follow-up in 1 week for continued post-operative treatment

## 2018-08-03 ENCOUNTER — APPOINTMENT (OUTPATIENT)
Dept: PHYSICAL THERAPY | Age: 83
End: 2018-08-03
Payer: COMMERCIAL

## 2018-08-08 ENCOUNTER — OFFICE VISIT (OUTPATIENT)
Dept: OBGYN CLINIC | Facility: HOSPITAL | Age: 83
End: 2018-08-08

## 2018-08-08 VITALS
WEIGHT: 143 LBS | DIASTOLIC BLOOD PRESSURE: 66 MMHG | HEART RATE: 76 BPM | BODY MASS INDEX: 26.31 KG/M2 | HEIGHT: 62 IN | SYSTOLIC BLOOD PRESSURE: 134 MMHG

## 2018-08-08 DIAGNOSIS — Z47.1 AFTERCARE FOLLOWING LEFT KNEE JOINT REPLACEMENT SURGERY: Primary | ICD-10-CM

## 2018-08-08 DIAGNOSIS — Z96.652 AFTERCARE FOLLOWING LEFT KNEE JOINT REPLACEMENT SURGERY: Primary | ICD-10-CM

## 2018-08-08 PROCEDURE — 99024 POSTOP FOLLOW-UP VISIT: CPT | Performed by: PHYSICIAN ASSISTANT

## 2018-08-08 NOTE — PROGRESS NOTES
Subjective;    72-year-old female 2 weeks status post left total knee replacement  She has freedom from her preprocedural pain  She has very little need for medications  She has been doing physical therapy for range of motion of the left knee  She had the serology your blood drawn and/or within the last 2 days yet I have no results, to review    Past Medical History:   Diagnosis Date    AAA (abdominal aortic aneurysm) (HCC)     s/p EVAR 3/10/16    Allergic urticaria     LAST ASSESSED: [id number]    Appendicitis     Arthritis     CKD (chronic kidney disease), stage III     Coronary artery disease     Diabetes mellitus (HCC)     Eczema     Gross hematuria     LAST ASSESSED: [id number]    Hematuria     Hypertension     Hyponatremia     Hypothyroid     Osteoporosis     LAST ASSESSED: [id number]    PONV (postoperative nausea and vomiting)     Primary osteoarthritis of left knee     LAST ASSESSED: 27NVU0763    Renal disorder     Renal insufficiency     Rotator cuff tear arthropathy     LEFT LAST ASSESSED: [id number]    Secondary osteoarthritis of right shoulder     LAST ASSESSED: 57ONN7261    Syncope        Past Surgical History:   Procedure Laterality Date    APPENDECTOMY      BACK SURGERY      CATARACT EXTRACTION W/  INTRAOCULAR LENS IMPLANT Bilateral     KNEE ARTHROSCOPY Left     ONSET: [id number] THERAPEUTIC    LUMBAR LAMINECTOMY      [facility] AAA REPAIR,MODULR BIFURCATED PROSTH N/A 3/10/2016    Procedure: REPAIR ANEURYSM ENDOVASCULAR ABDOMINAL AORTIC  (EVAR) ;   Surgeon: Ghanshyam Pina MD;  [facility];  Service: Vascular    VA RECONSTR TOTAL SHOULDER IMPLANT Left 5/31/2016    Procedure: ARTHROPLASTY SHOULDER REVERSE;  Surgeon: Maxi Hoang MD;  [facility];  Service: Orthopedics    VA TOTAL KNEE ARTHROPLASTY Left 7/23/2018    Procedure: KNEE : COMPLETE REPLACEMENT;  Surgeon: Marlyn Rodriguez MD;  [facility];  Service: Orthopedics    REVERSE TOTAL SHOULDER ARTHROPLASTY Right     ROTATOR CUFF REPAIR Left     RC SURGERY RESOLVED: 1999    SHOULDER ARTHROPLASTY      5/15 - FOR RIGHT SHOULDER; 5/16 - FOR LEFT SHOULDER    SHOULDER ARTHROSCOPY      TOTAL HIP ARTHROPLASTY Left     ONSET: [id number]    TOTAL HIP ARTHROPLASTY  05/31/2006    REVISION       Family History   Problem Relation Age of Onset    Coronary artery disease Mother     Diabetes Mother     Coronary artery disease Father     Cancer Sister     Arthritis Sister     Unexplained death Family         RAPID    Cancer Daughter        Social History   Substance Use Topics    Smoking status: Never Smoker    Smokeless tobacco: Never Used    Alcohol use No     Exam;    Adult female patient with a healed incision  Staples removed Steri-Strips applied  She has no gross discoloration  She has appropriate unilateral limb swelling of modest proportion  She has no left calf pain and negative Homans test    Impression;    2 weeks status post left total knee replacement    Plan;    Transition to outpatient PT  Next follow-up will be in 4 weeks  Continue DVT prophylaxis  Follow-up to labs were drawn at [facility]    Was my privilege to assist her in care at her expressed permission

## 2018-08-09 ENCOUNTER — OFFICE VISIT (OUTPATIENT)
Dept: PHYSICAL THERAPY | Age: 83
End: 2018-08-09
Payer: COMMERCIAL

## 2018-08-09 DIAGNOSIS — Z96.652 S/P TKR (TOTAL KNEE REPLACEMENT), LEFT: Primary | ICD-10-CM

## 2018-08-09 PROCEDURE — 97140 MANUAL THERAPY 1/> REGIONS: CPT | Performed by: PHYSICAL THERAPIST

## 2018-08-09 PROCEDURE — 97110 THERAPEUTIC EXERCISES: CPT | Performed by: PHYSICAL THERAPIST

## 2018-08-09 NOTE — PROGRESS NOTES
Daily Note     Today's date: 2018  Patient name: Chitra Arellano  :   MRN: [id number]  Referring provider: Marielena Ann MD  Dx:   Encounter Diagnosis     ICD-10-CM    1  S/P TKR (total knee replacement), left [id number]                   Subjective: Patient returns to PT after undergoing L TKR on 18  She spent 2 weeks in sub acute rehab  Presents to PT today WBAT with use of front wheeled rolling walker  Inspection of the incision site finds steri strips intact and without evidence of infection  Objective: See treatment diary below  Precautions     Specialty Daily Treatment Diary     Manual         Patellar mobs 5 min       Knee flex/ext stretch 10 min                                   Exercise Diary         Nu step NT       SAQ 0# 2x10       SLR  2x10                                                       Gait  2 laps                                                                                           Modalities                                        Assessment: Tolerated treatment well  Patient exhibited good technique with therapeutic exercises      Plan: Continue per plan of care

## 2018-08-13 ENCOUNTER — OFFICE VISIT (OUTPATIENT)
Dept: FAMILY MEDICINE CLINIC | Facility: CLINIC | Age: 83
End: 2018-08-13
Payer: COMMERCIAL

## 2018-08-13 ENCOUNTER — OFFICE VISIT (OUTPATIENT)
Dept: PHYSICAL THERAPY | Age: 83
End: 2018-08-13
Payer: COMMERCIAL

## 2018-08-13 VITALS
HEART RATE: 76 BPM | SYSTOLIC BLOOD PRESSURE: 126 MMHG | HEIGHT: 62 IN | DIASTOLIC BLOOD PRESSURE: 68 MMHG | WEIGHT: 146.4 LBS | OXYGEN SATURATION: 97 % | RESPIRATION RATE: 16 BRPM | TEMPERATURE: 97.7 F | BODY MASS INDEX: 26.94 KG/M2

## 2018-08-13 DIAGNOSIS — E11.22 TYPE 2 DM WITH CKD STAGE 3 AND HYPERTENSION (HCC): ICD-10-CM

## 2018-08-13 DIAGNOSIS — I12.9 TYPE 2 DM WITH CKD STAGE 3 AND HYPERTENSION (HCC): ICD-10-CM

## 2018-08-13 DIAGNOSIS — N18.30 TYPE 2 DM WITH CKD STAGE 3 AND HYPERTENSION (HCC): ICD-10-CM

## 2018-08-13 DIAGNOSIS — D64.9 POSTOPERATIVE ANEMIA: ICD-10-CM

## 2018-08-13 DIAGNOSIS — E03.9 ACQUIRED HYPOTHYROIDISM: ICD-10-CM

## 2018-08-13 DIAGNOSIS — Z96.652 S/P TOTAL KNEE REPLACEMENT, LEFT: Primary | ICD-10-CM

## 2018-08-13 DIAGNOSIS — N18.30 CHRONIC KIDNEY DISEASE, STAGE 3 (HCC): ICD-10-CM

## 2018-08-13 DIAGNOSIS — Z96.652 S/P TKR (TOTAL KNEE REPLACEMENT), LEFT: Primary | ICD-10-CM

## 2018-08-13 DIAGNOSIS — I10 ESSENTIAL HYPERTENSION: ICD-10-CM

## 2018-08-13 PROCEDURE — 97110 THERAPEUTIC EXERCISES: CPT | Performed by: PHYSICAL THERAPIST

## 2018-08-13 PROCEDURE — 99496 TRANSJ CARE MGMT HIGH F2F 7D: CPT | Performed by: FAMILY MEDICINE

## 2018-08-13 PROCEDURE — 97140 MANUAL THERAPY 1/> REGIONS: CPT | Performed by: PHYSICAL THERAPIST

## 2018-08-13 RX ORDER — IRON POLYSACCHARIDE COMPLEX 150 MG
150 CAPSULE ORAL DAILY
Qty: 30 CAPSULE | Refills: 3 | Status: SHIPPED | OUTPATIENT
Start: 2018-08-13 | End: 2018-10-22 | Stop reason: ALTCHOICE

## 2018-08-13 RX ORDER — LOSARTAN POTASSIUM 100 MG/1
100 TABLET ORAL DAILY
Qty: 90 TABLET | Refills: 3 | Status: SHIPPED | OUTPATIENT
Start: 2018-08-13 | End: 2019-08-27 | Stop reason: SDUPTHER

## 2018-08-13 RX ORDER — LEVOTHYROXINE SODIUM 137 UG/1
137 TABLET ORAL DAILY
Qty: 90 TABLET | Refills: 3 | Status: SHIPPED | OUTPATIENT
Start: 2018-08-13 | End: 2019-01-10 | Stop reason: ALTCHOICE

## 2018-08-13 NOTE — PROGRESS NOTES
Chief Complaint   Patient presents with    Transition of Care Management       Assessment/Plan:     Patient presents for TCM visit  She was discharged home from [facility] 1 week ago  S/P total knee replacement, left  Patient underwent left total knee arthroplasty on July 23, 2018  She is doing well  post-op  Continue outpatient physical therapy 2 times per week  Follow- up with orthopedic surgeon Dr Nadege Valentin next month  Hypertension  BP remains stable  Continue Losartan 100 mg daily, Caduet 10/20 mg one tablet daily  Hypothyroidism  Continue Levothyroxine 137 mcg daily  Prescription refilled for 90 days with 3 refills  Chronic kidney disease, stage 3  Recheck BMP in 2 weeks  Avoid NSAID's  Advised patient to stay well hydrated  Postoperative anemia  Last blood test done at [facility] on 8/8/18  Hb 9 3, Hct 27 8  Patient could not tolerate OTC Ferrous sulfate, developed diarrhea  Will send prescription to the pharmacy for Ferrex 150 mg to take 1 capsule daily  Discussed foods which are rich in iron  Recheck CBC with dif in 2 weeks  Consider hematology evaluation if Hb drops  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)  Lab Results   Component Value Date    HGBA1C 6 4 (H) 06/26/2018       No results for input(s): POCGLU in the last 72 hours  Blood Sugar Average: Last 72 hrs:    Type 2 DM - diet controlled  Monitor blood sugar at home 2-3 times per week  Avoid concentrated sweets  Schedule follow-up visit in 4 months  Diagnoses and all orders for this visit:    S/P total knee replacement, left    Essential hypertension  -     losartan (COZAAR) 100 MG tablet; Take 1 tablet (100 mg total) by mouth daily Losartan Potassium 100 MG Oral Tablet take 1 tablet once daily  Quantity: 90;  Refills: 3    Vidal MEDEL ; Active    Acquired hypothyroidism  -     levothyroxine 137 mcg tablet;  Take 1 tablet (137 mcg total) by mouth daily Levothyroxine Sodium 137 MCG Oral Tablet take 1 tablet daily before breakfast  Quantity: 90;  Refills: 3    Amaya MEDEL ;  Started 3-Oct-2014 Active    Chronic kidney disease, stage 3  -     Comprehensive metabolic panel; Future    Postoperative anemia  -     CBC and differential; Future  -     Iron Panel; Future  -     iron polysaccharides (FERREX 150) 150 mg capsule; Take 1 capsule (150 mg total) by mouth daily    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 hypertension ([facility] [address] )         Subjective:     Patient ID: [id number] Tucker is a 80 y o  female  HPI     Patient presents for TCM visit accompanied by her daughter  Patient was hospitalized at [facility] 7/23/18 -7/25/18  Satient underwent left total knee arthroplasty by orthopedic surgeon Dr Katharine Davidson on 7/23/18  She was discharged to [facility] which she completed 1 week ago  Patient started outpatient physical therapy 2 times per week  She was seen by orthopedic surgeon on August 8, 2018, staples were removed  Patient is happy with surgery results  C/o pain in knee with PT, but otherwise she is doing well  Reviewed current medications with patient and her daughter  Patient has post-op anemia  Blood work done at [facility] on August 2, 2018 showed Hb  7 7  Repeat blood test from 8/8/18 showed Hemoglobin at 9 3, Hct 27 8  Patient states that she could not tolerate OTC Ferrous sulfate, developed diarrhea  She was recommended by Dr Gildardo James at [facility] to include red meat in her diet  Patient has HTN, CKD stage 3, Hypothyroidism, Type 2 DM - diet controlled  HTN - BP is stable  Patient takes Losartan 100 mg daily, Caduet 10/20 mg one tablet daily  Denies CP, SOB, dizziness  Hypothyroidism- currently on Levothyroxine 137 mcg daily  Patient requesting a refill  Type 2 DM - diet controlled  Hemoglobin A1c was 6 4 in June 2018      CKD stage 3  -patient was seen by nephrologist   Dr Niko Avilez for pre-op evaluation on July 18, 2018  Dr Niko Avilez recommended to schedule retroperitoneal ultrasound and check blood work post-op  Patient states that she does not want to proceed with any additional diagnostic testing due to advanced age and if retroperitoneal U/S  would be abnormal, she is not interested in any more surgeries  Review of Systems   Constitutional: Positive for fatigue  Negative for activity change, appetite change, chills and fever  Diaphoresis: mild  HENT: Positive for hearing loss  Negative for congestion, ear pain, sore throat, tinnitus and trouble swallowing  Eyes: Negative for pain, discharge, redness, itching and visual disturbance  Respiratory: Negative for cough, chest tightness, shortness of breath and wheezing  Cardiovascular: Positive for leg swelling (improved)  Negative for chest pain and palpitations  Gastrointestinal: Positive for constipation (occasionally)  Negative for abdominal pain, blood in stool, diarrhea, nausea and vomiting  Genitourinary: Negative for difficulty urinating, dysuria, flank pain, frequency and hematuria  Musculoskeletal: Positive for arthralgias (mild pain in L knee) and gait problem (patient ambulates with a walker)  Negative for myalgias  Skin: Negative for rash and wound  Neurological: Negative for dizziness, syncope and headaches  Hematological: Negative  Psychiatric/Behavioral: Negative  Objective:     Physical Exam   Constitutional: She appears well-developed and well-nourished  HENT:   Head: Normocephalic and atraumatic  Right Ear: External ear normal    Mouth/Throat: Oropharynx is clear and moist    Eyes: Conjunctivae are normal  Pupils are equal, round, and reactive to light  Neck: Normal range of motion  Neck supple  No JVD present  Cardiovascular: Normal rate, regular rhythm and normal heart sounds  No murmur heard    Trace pretibial edema BL   Pulmonary/Chest: Effort normal and breath sounds normal  She has no wheezes  She has no rales  Abdominal: Soft  Bowel sounds are normal  There is no tenderness  Musculoskeletal:   Patient ambulates with a wlker  L knee is swollen  Mild tenderness over lateral aspect of L knee  Skin: Skin is warm and dry  Incision on L knee is healing well  Steri-strips in place  Psychiatric: She has a normal mood and affect  Nursing note and vitals reviewed  Vitals:    08/13/18 1421 08/13/18 1505   BP: 146/58 126/68   BP Location: Left arm Right arm   Patient Position: Sitting Sitting   Cuff Size: Adult Standard   Pulse: 82 76   Resp: 20 16   Temp: 97 7 °F (36 5 °C)    TempSrc: Oral    SpO2: 97%    Weight: 66 4 kg (146 lb 6 4 oz)    Height: 5' 1 5" (1 562 m)        Transitional Care Management Review: Manuel Solitario is a 80 y o  female here for TCM follow up       During the TCM phone call patient stated:    Date and time hospital follow up call was made:  7/26/2018  9:05 AM  Hospital care reviewed:  Records reviewed  Patient was hopsitalized at:  [facility]  Date of admission:  7/23/18  Date of discharge:  7/25/18  Diagnosis:  Chronic pain of left knee [M25 562, G89 29], Localized osteoarthritis of left knee [M17 12]  Disposition:  Rehabilitation center (Comment: Discharged to [facility] for 7 days discharge date 07/30/18)  Scheduled for follow up?:  Yes  I have advised the patient to call PCP with any new or worsening symptoms (please type in name along with any credentials):  LAURY Valladares  Counseling:  Family  Counseling topics:  instructions for management             Gabib Ruiz MD

## 2018-08-13 NOTE — PROGRESS NOTES
Daily Note     Today's date: 2018  Patient name: Ramiro Galaviz  :   MRN: [id number]  Referring provider: Royal Rodriguez MD  Dx:   Encounter Diagnosis     ICD-10-CM    1  S/P TKR (total knee replacement), left [id number]                   Subjective: Patient reports doing more walking at home  Using rollator walker today  Objective: See treatment diary below  Precautions     Specialty Daily Treatment Diary     Manual        Patellar mobs 5 min 5 min      Knee flex/ext stretch 10 min 10 min                                  Exercise Diary        Nu step NT 12 min      SAQ 0# 2x10 2# 5s x20      SLR  2x10 2x10      Supine hip abd  2x10      Front/lat step ups  2x10                                      Gait  2 laps 1 lap                                                                                          Modalities                                        Assessment: Able to perform step ups on 6 inch step with mild difficulty      Plan: Continue per plan of care

## 2018-08-14 ENCOUNTER — TELEPHONE (OUTPATIENT)
Dept: FAMILY MEDICINE CLINIC | Facility: CLINIC | Age: 83
End: 2018-08-14

## 2018-08-14 NOTE — ASSESSMENT & PLAN NOTE
Patient underwent left total knee arthroplasty on July 23, 2018  She is doing well  post-op  Continue outpatient physical therapy 2 times per week  Follow- up with orthopedic surgeon Dr Koffi Foy next month

## 2018-08-14 NOTE — ASSESSMENT & PLAN NOTE
Lab Results   Component Value Date    HGBA1C 6 4 (H) 06/26/2018       No results for input(s): POCGLU in the last 72 hours  Blood Sugar Average: Last 72 hrs:    Type 2 DM - diet controlled  Monitor blood sugar at home 2-3 times per week  Avoid concentrated sweets

## 2018-08-14 NOTE — TELEPHONE ENCOUNTER
Please call [address] [place] [address] [employer] [facility] to give verbal order for Ferrex 150 mg one caps  Daily  # 30, 5 ref

## 2018-08-14 NOTE — ASSESSMENT & PLAN NOTE
Last blood test done at [facility] on 8/8/18  Hb 9 3, Hct 27 8  Patient could not tolerate OTC Ferrous sulfate, developed diarrhea  Will send prescription to the pharmacy for Ferrex 150 mg to take 1 capsule daily  Discussed foods which are rich in iron  Recheck CBC with dif in 2 weeks  Consider hematology evaluation if Hb drops

## 2018-08-16 ENCOUNTER — OFFICE VISIT (OUTPATIENT)
Dept: PHYSICAL THERAPY | Age: 83
End: 2018-08-16
Payer: COMMERCIAL

## 2018-08-16 DIAGNOSIS — Z96.652 S/P TKR (TOTAL KNEE REPLACEMENT), LEFT: Primary | ICD-10-CM

## 2018-08-16 PROCEDURE — 97110 THERAPEUTIC EXERCISES: CPT | Performed by: PHYSICAL THERAPIST

## 2018-08-16 PROCEDURE — 97140 MANUAL THERAPY 1/> REGIONS: CPT | Performed by: PHYSICAL THERAPIST

## 2018-08-16 NOTE — PROGRESS NOTES
Daily Note     Today's date: 2018  Patient name: Breonna Thomas  :   MRN: [id number]  Referring provider: Joe Swain MD  Dx:   Encounter Diagnosis     ICD-10-CM    1  S/P TKR (total knee replacement), left [id number]                   Subjective: Reports soreness after last visit, however, resolved after 2 days  Objective: See treatment diary below  Precautions     Specialty Daily Treatment Diary     Manual       Patellar mobs 5 min 5 min 5 min     Knee flex/ext stretch 10 min 10 min 10 min                                 Exercise Diary       Nu step NT 12 min 12 min     SAQ 0# 2x10 2# 5s x20 2# 5s x20     SLR  2x10 2x10 2x10     Supine hip abd  2x10 2x10     Front/lat step ups  2x10 2x10                                     Gait  2 laps 1 lap 1 lap                                                                                         Modalities                                        Assessment:  ROM progressing       Plan: Continue per plan of care

## 2018-08-21 ENCOUNTER — OFFICE VISIT (OUTPATIENT)
Dept: PHYSICAL THERAPY | Age: 83
End: 2018-08-21
Payer: COMMERCIAL

## 2018-08-21 DIAGNOSIS — Z96.652 AFTERCARE FOLLOWING LEFT KNEE JOINT REPLACEMENT SURGERY: ICD-10-CM

## 2018-08-21 DIAGNOSIS — R26.2 AMBULATORY DYSFUNCTION: ICD-10-CM

## 2018-08-21 DIAGNOSIS — Z47.1 AFTERCARE FOLLOWING LEFT KNEE JOINT REPLACEMENT SURGERY: ICD-10-CM

## 2018-08-21 PROCEDURE — 97140 MANUAL THERAPY 1/> REGIONS: CPT

## 2018-08-21 PROCEDURE — 97110 THERAPEUTIC EXERCISES: CPT

## 2018-08-23 ENCOUNTER — EVALUATION (OUTPATIENT)
Dept: PHYSICAL THERAPY | Age: 83
End: 2018-08-23
Payer: COMMERCIAL

## 2018-08-23 DIAGNOSIS — Z47.1 AFTERCARE FOLLOWING LEFT KNEE JOINT REPLACEMENT SURGERY: Primary | ICD-10-CM

## 2018-08-23 DIAGNOSIS — Z96.652 AFTERCARE FOLLOWING LEFT KNEE JOINT REPLACEMENT SURGERY: Primary | ICD-10-CM

## 2018-08-23 PROCEDURE — 97140 MANUAL THERAPY 1/> REGIONS: CPT | Performed by: PHYSICAL THERAPIST

## 2018-08-23 PROCEDURE — 97110 THERAPEUTIC EXERCISES: CPT | Performed by: PHYSICAL THERAPIST

## 2018-08-23 NOTE — PROGRESS NOTES
Daily Note     Today's date: 2018  Patient name: Chun Dick  :   MRN: [id number]  Referring provider: Ximena Preston MD  Dx:   Encounter Diagnosis     ICD-10-CM    1  Aftercare following left knee joint replacement surgery Z47 1     Z96 652                   Subjective: Significant soreness after last visit limiting her ability to do her daily walks for exercise    Objective: See treatment diary below  Precautions     Specialty Daily Treatment Diary     Manual     Patellar mobs 5 min 5 min 5 min 5min 5 min   Knee flex/ext stretch 10 min 10 min 10 min 10m 10 min                               Exercise Diary     Nu step NT 12 min 12 min 12m 10 min   SAQ 0# 2x10 2# 5s x20 2# 5s x20 21/2# 5s x20 2 5# 5s x20   SLR  2x10 2x10 2x10 3x10 3x10   Supine hip abd  2x10 2x10 2x10 2x10   Front/lat step ups  2x10 2x10 6in 2x10 x20 6 inch                                   Gait  2 laps 1 lap 1 lap NT                                                                                        Modalities                                        Assessment:  Need to control amount of soreness  Plan: Continue per plan of care

## 2018-08-28 ENCOUNTER — APPOINTMENT (OUTPATIENT)
Dept: LAB | Age: 83
End: 2018-08-28
Payer: COMMERCIAL

## 2018-08-28 ENCOUNTER — OFFICE VISIT (OUTPATIENT)
Dept: PHYSICAL THERAPY | Age: 83
End: 2018-08-28
Payer: COMMERCIAL

## 2018-08-28 DIAGNOSIS — D64.9 POSTOPERATIVE ANEMIA: ICD-10-CM

## 2018-08-28 DIAGNOSIS — Z47.1 AFTERCARE FOLLOWING LEFT KNEE JOINT REPLACEMENT SURGERY: Primary | ICD-10-CM

## 2018-08-28 DIAGNOSIS — N18.30 CHRONIC KIDNEY DISEASE, STAGE 3 (HCC): Primary | ICD-10-CM

## 2018-08-28 DIAGNOSIS — Z96.652 AFTERCARE FOLLOWING LEFT KNEE JOINT REPLACEMENT SURGERY: Primary | ICD-10-CM

## 2018-08-28 DIAGNOSIS — N18.30 CHRONIC KIDNEY DISEASE, STAGE 3 (HCC): ICD-10-CM

## 2018-08-28 LAB
ALBUMIN SERPL BCP-MCNC: 4.1 G/DL (ref 3.5–5)
ALP SERPL-CCNC: 88 U/L (ref 46–116)
ALT SERPL W P-5'-P-CCNC: 24 U/L (ref 12–78)
ANION GAP SERPL CALCULATED.3IONS-SCNC: 8 MMOL/L (ref 4–13)
AST SERPL W P-5'-P-CCNC: 16 U/L (ref 5–45)
BASOPHILS # BLD AUTO: 0.07 THOUSANDS/ΜL (ref 0–0.1)
BASOPHILS NFR BLD AUTO: 1 % (ref 0–1)
BILIRUB SERPL-MCNC: 0.78 MG/DL (ref 0.2–1)
BUN SERPL-MCNC: 30 MG/DL (ref 5–25)
CALCIUM SERPL-MCNC: 9.7 MG/DL (ref 8.3–10.1)
CHLORIDE SERPL-SCNC: 101 MMOL/L (ref 100–108)
CO2 SERPL-SCNC: 25 MMOL/L (ref 21–32)
CREAT SERPL-MCNC: 1.49 MG/DL (ref 0.6–1.3)
EOSINOPHIL # BLD AUTO: 0.17 THOUSAND/ΜL (ref 0–0.61)
EOSINOPHIL NFR BLD AUTO: 2 % (ref 0–6)
ERYTHROCYTE [DISTWIDTH] IN BLOOD BY AUTOMATED COUNT: 14.4 % (ref 11.6–15.1)
FERRITIN SERPL-MCNC: 157 NG/ML (ref 8–388)
GFR SERPL CREATININE-BSD FRML MDRD: 30 ML/MIN/1.73SQ M
GLUCOSE SERPL-MCNC: 126 MG/DL (ref 65–140)
HCT VFR BLD AUTO: 36.3 % (ref 34.8–46.1)
HGB BLD-MCNC: 11.5 G/DL (ref 11.5–15.4)
IMM GRANULOCYTES # BLD AUTO: 0.04 THOUSAND/UL (ref 0–0.2)
IMM GRANULOCYTES NFR BLD AUTO: 0 % (ref 0–2)
IRON SATN MFR SERPL: 23 %
IRON SERPL-MCNC: 79 UG/DL (ref 50–170)
LYMPHOCYTES # BLD AUTO: 1.95 THOUSANDS/ΜL (ref 0.6–4.47)
LYMPHOCYTES NFR BLD AUTO: 20 % (ref 14–44)
MCH RBC QN AUTO: 32 PG (ref 26.8–34.3)
MCHC RBC AUTO-ENTMCNC: 31.7 G/DL (ref 31.4–37.4)
MCV RBC AUTO: 101 FL (ref 82–98)
MONOCYTES # BLD AUTO: 1 THOUSAND/ΜL (ref 0.17–1.22)
MONOCYTES NFR BLD AUTO: 10 % (ref 4–12)
NEUTROPHILS # BLD AUTO: 6.68 THOUSANDS/ΜL (ref 1.85–7.62)
NEUTS SEG NFR BLD AUTO: 67 % (ref 43–75)
NRBC BLD AUTO-RTO: 0 /100 WBCS
PLATELET # BLD AUTO: 276 THOUSANDS/UL (ref 149–390)
PMV BLD AUTO: 10.8 FL (ref 8.9–12.7)
POTASSIUM SERPL-SCNC: 4.4 MMOL/L (ref 3.5–5.3)
PROT SERPL-MCNC: 8.4 G/DL (ref 6.4–8.2)
RBC # BLD AUTO: 3.59 MILLION/UL (ref 3.81–5.12)
SODIUM SERPL-SCNC: 134 MMOL/L (ref 136–145)
TIBC SERPL-MCNC: 339 UG/DL (ref 250–450)
WBC # BLD AUTO: 9.91 THOUSAND/UL (ref 4.31–10.16)

## 2018-08-28 PROCEDURE — 97140 MANUAL THERAPY 1/> REGIONS: CPT

## 2018-08-28 PROCEDURE — 97110 THERAPEUTIC EXERCISES: CPT

## 2018-08-28 PROCEDURE — 85025 COMPLETE CBC W/AUTO DIFF WBC: CPT

## 2018-08-28 PROCEDURE — 83540 ASSAY OF IRON: CPT

## 2018-08-28 PROCEDURE — 80053 COMPREHEN METABOLIC PANEL: CPT

## 2018-08-28 PROCEDURE — 82728 ASSAY OF FERRITIN: CPT

## 2018-08-28 PROCEDURE — 36415 COLL VENOUS BLD VENIPUNCTURE: CPT

## 2018-08-28 PROCEDURE — 83550 IRON BINDING TEST: CPT

## 2018-08-28 NOTE — PROGRESS NOTES
Daily Note     Today's date: 2018  Patient name: Bakari Kwok  :   MRN: [id number]  Referring provider: Jorge Motta MD  Dx:   Encounter Diagnosis     ICD-10-CM    1  Aftercare following left knee joint replacement surgery Z47 1     Z96 652                   Subjective: Reports pain has been better with L knee    Objective: See treatment diary below  Precautions     Specialty Daily Treatment Diary     Manual     Patellar mobs 5 min 5 min 5 min 5min 5 min   Knee flex/ext stretch 10 min 10 min 10 min 10m 10 min                               Exercise Diary     Nu step 10m 12 min 12 min 12m 10 min   SAQ 3# 2x10 2# 5s x20 2# 5s x20 21/2# 5s x20 2 5# 5s x20   SLR  3x10 2x10 2x10 3x10 3x10   Supine hip abd 2x10 2x10 2x10 2x10 2x10   Front/lat step ups 6in x20 2x10 2x10 6in 2x10 x20 6 inch   Squats in bars NT       Heel slides with pillow case and slide board 1# x20       Step onto 8in step fro ROM 20               Gait  2 laps 1 lap 1 lap NT                                                                                        Modalities                                        Assessment:  Less pain noted with knee flexion PROM, Began process of promoting more knee flexion ROM through functional ex to limit R knee soreness    Plan: Continue per plan of care

## 2018-08-29 NOTE — PROGRESS NOTES
PT Re-Evaluation     Today's date: 2018  Patient name: Catherine eBe  :   MRN: [id number]  Referring provider: Mortimer Burlington, MD  Dx:   Encounter Diagnosis     ICD-10-CM    1  Aftercare following left knee joint replacement surgery Z47 1     Z96 652        Start Time: 1030  Stop Time: 1140  Total time in clinic (min): 70 minutes    Assessment  Impairments: abnormal gait, abnormal or restricted ROM, impaired balance, impaired physical strength and pain with function  Functional limitations: IADLs; Stairs; GaitPatient presents with symptom irritability no  Assessment details: Patient making good progress in all aspects of PT  Will continue to benefit from skilled PT to address ROM and strength limitations to improve function  Understanding of Dx/Px/POC: good   Prognosis: good    Goals  Short Term goals - 4 weeks  1  Patient will be independent and compliant with a HEP  MET  2  Patient will report a 50% decrease in pain complaints  MET    Long Term goals - 8 weekall work related as  1  Patient will report elimination of pain complaints  PARTIALLY MET  2  Patient will return to all IADLs without restriction  PARTIALLY MET  3  Patient will return to all recreational activities without restriction   PARTIALLY MET    Plan  Patient would benefit from: skilled physical therapy  Planned modality interventions: cryotherapy  Planned therapy interventions: manual therapy, joint mobilization, neuromuscular re-education, balance/weight bearing training, patient education, therapeutic exercise, graded exercise and home exercise program  Frequency: 2x week  Duration in visits: 12  Duration in weeks: 6  Treatment plan discussed with: patient        Subjective Evaluation    History of Present Illness  Mechanism of injury: L TKA surgery   Pain  Current pain ratin  At best pain ratin  At worst pain ratin  Quality: dull ache  Relieving factors: ice and rest  Aggravating factors: stair climbing, walking and standing  Progression: improved    Treatments  Previous treatment: physical therapy, medication and home therapy  Current treatment: physical therapy  Discharged from (in last 30 days): home health care  Patient Goals  Patient goals for therapy: decreased edema, decreased pain, improved balance, increased motion, increased strength, independence with ADLs/IADLs and return to sport/leisure activities          Objective     Active Range of Motion   Left Knee   Flexion: 105 degrees   Extension: -8 degrees     Passive Range of Motion   Left Knee   Flexion: 109 degrees   Extension: -5 degrees     Mobility   Patellar Mobility:   Left Knee   Hypomobile: left medial, left lateral, left superior and left inferior    Strength/Myotome Testing     Left Knee   Flexion: 4  Prone flexion: 4    Right Knee   Normal strength    Ambulation   Weight-Bearing Status   Weight-Bearing Status (Left): weight-bearing as tolerated   Weight-Bearing Status (Right): weight-bearing as tolerated    Assistive device used: front-wheeled walker    Ambulation: Level Surfaces   Ambulation with assistive device: independent  Ambulation without assistive device: minimum assist    Ambulation: Stairs   Ascend stairs: contact guard assist  Pattern: non-reciprocal  Railings: two rails  Descend stairs: contact guard assist  Pattern: non-reciprocal  Railings: two rails

## 2018-08-30 ENCOUNTER — OFFICE VISIT (OUTPATIENT)
Dept: PHYSICAL THERAPY | Age: 83
End: 2018-08-30
Payer: COMMERCIAL

## 2018-08-30 DIAGNOSIS — Z96.652 AFTERCARE FOLLOWING LEFT KNEE JOINT REPLACEMENT SURGERY: Primary | ICD-10-CM

## 2018-08-30 DIAGNOSIS — Z47.1 AFTERCARE FOLLOWING LEFT KNEE JOINT REPLACEMENT SURGERY: Primary | ICD-10-CM

## 2018-08-30 PROCEDURE — 97110 THERAPEUTIC EXERCISES: CPT | Performed by: PHYSICAL THERAPIST

## 2018-08-30 PROCEDURE — G8990 OTHER PT/OT CURRENT STATUS: HCPCS | Performed by: PHYSICAL THERAPIST

## 2018-08-30 PROCEDURE — G8991 OTHER PT/OT GOAL STATUS: HCPCS | Performed by: PHYSICAL THERAPIST

## 2018-08-30 PROCEDURE — 97140 MANUAL THERAPY 1/> REGIONS: CPT | Performed by: PHYSICAL THERAPIST

## 2018-08-30 NOTE — PROGRESS NOTES
Daily Note     Today's date: 2018  Patient name: Breonna Thomas  :   MRN: [id number]  Referring provider: Joe Swain MD  Dx:   Encounter Diagnosis     ICD-10-CM    1  Aftercare following left knee joint replacement surgery Z47 1     Z96 652                   Subjective: No new complaints  Objective: See treatment diary below  Precautions     Specialty Daily Treatment Diary     Manual     Patellar mobs 5 min 5 min 5 min 5min 5 min   Knee flex/ext stretch 10 min 10 min 10 min 10m 10 min                               Exercise Diary     Nu step 10m 12 min 12 min 12m 10 min   SAQ 3# 2x10 4# 5s x20 2# 5s x20 21/2# 5s x20 2 5# 5s x20   SLR  3x10 1# 2x10 2x10 3x10 3x10   Supine hip abd 2x10 2x10 2x10 2x10 2x10   Front/lat step ups 6in x20 2x10 2x10 6in 2x10 x20 6 inch   Squats in bars NT       Heel slides with pillow case and slide board 1# x20 1# x20      Step onto 8in step fro ROM 20               Gait  2 laps 1 lap 1 lap NT                                                                                        Modalities                                        Assessment:  Near full extension passively - extensor lag remains  Plan: Continue per plan of care

## 2018-08-30 NOTE — PROGRESS NOTES
PT Re-Evaluation     Today's date: 2018  Patient name: Chitra Arellano  :   MRN: [id number]  Referring provider: Stephanie Nascimento MD  Dx:   Encounter Diagnosis     ICD-10-CM    1  Aftercare following left knee joint replacement surgery Z47 1     Z96 652                   Assessment  Impairments: abnormal gait, abnormal or restricted ROM, impaired balance, impaired physical strength and pain with function  Functional limitations: IADLs; Stairs; GaitPatient presents with symptom irritability no  Assessment details: Patient making good progress in all aspects of PT  Will continue to benefit from skilled PT to address ROM and strength limitations to improve function  Understanding of Dx/Px/POC: good   Prognosis: good    Goals  Short Term goals - 4 weeks  1  Patient will be independent and compliant with a HEP  MET  2  Patient will report a 50% decrease in pain complaints  MET    Long Term goals - 8 weekall work related as  1  Patient will report elimination of pain complaints  PARTIALLY MET  2  Patient will return to all IADLs without restriction  PARTIALLY MET  3  Patient will return to all recreational activities without restriction   PARTIALLY MET    Plan  Patient would benefit from: skilled physical therapy  Planned modality interventions: cryotherapy  Planned therapy interventions: manual therapy, joint mobilization, neuromuscular re-education, balance/weight bearing training, patient education, therapeutic exercise, graded exercise and home exercise program  Frequency: 2x week  Duration in visits: 12  Duration in weeks: 6  Treatment plan discussed with: patient        Subjective Evaluation    History of Present Illness  Mechanism of injury: L TKA surgery   Pain  Current pain ratin  At best pain ratin  At worst pain ratin  Quality: dull ache  Relieving factors: ice and rest  Aggravating factors: stair climbing, walking and standing  Progression: improved    Treatments  Previous treatment: physical therapy, medication and home therapy  Current treatment: physical therapy  Discharged from (in last 30 days): home health care  Patient Goals  Patient goals for therapy: decreased edema, decreased pain, improved balance, increased motion, increased strength, independence with ADLs/IADLs and return to sport/leisure activities          Objective     Active Range of Motion   Left Knee   Flexion: 105 degrees   Extension: -8 degrees     Passive Range of Motion   Left Knee   Flexion: 109 degrees   Extension: -5 degrees     Mobility   Patellar Mobility:   Left Knee   Hypomobile: left medial, left lateral, left superior and left inferior    Strength/Myotome Testing     Left Knee   Flexion: 4  Prone flexion: 4    Right Knee   Normal strength    Ambulation   Weight-Bearing Status   Weight-Bearing Status (Left): weight-bearing as tolerated   Weight-Bearing Status (Right): weight-bearing as tolerated    Assistive device used: front-wheeled walker    Ambulation: Level Surfaces   Ambulation with assistive device: independent  Ambulation without assistive device: minimum assist    Ambulation: Stairs   Ascend stairs: contact guard assist  Pattern: non-reciprocal  Railings: two rails  Descend stairs: contact guard assist  Pattern: non-reciprocal  Railings: two rails

## 2018-09-04 ENCOUNTER — OFFICE VISIT (OUTPATIENT)
Dept: PHYSICAL THERAPY | Age: 83
End: 2018-09-04
Payer: COMMERCIAL

## 2018-09-04 DIAGNOSIS — Z47.1 AFTERCARE FOLLOWING LEFT KNEE JOINT REPLACEMENT SURGERY: Primary | ICD-10-CM

## 2018-09-04 DIAGNOSIS — Z96.652 AFTERCARE FOLLOWING LEFT KNEE JOINT REPLACEMENT SURGERY: Primary | ICD-10-CM

## 2018-09-04 PROCEDURE — 97110 THERAPEUTIC EXERCISES: CPT

## 2018-09-04 PROCEDURE — 97140 MANUAL THERAPY 1/> REGIONS: CPT

## 2018-09-04 NOTE — PROGRESS NOTES
Daily Note     Today's date: 2018  Patient name: Maria M Rasmussen  :   MRN: [id number]  Referring provider: Fredy Stein MD  Dx:   Encounter Diagnosis     ICD-10-CM    1  Aftercare following left knee joint replacement surgery Z47 1     Z96 652                   Subjective: Reports that she has been sore all weekend in L upper thigh  The soreness hampered her walking all weekend  To MD tomorrow    Objective: See treatment diary below  Precautions     Specialty Daily Treatment Diary     Manual     Patellar mobs 5 min 5 min 5 min 5min 5 min   Knee flex/ext stretch 10 min 10 min 10 min 10m 10 min   S/l hip/lknee flex str   [id number]                         Exercise Diary     Nu step 10m 12 min 12 min 12m 10 min   SAQ 3# 2x10 4# 5s x20 2# 5s x20 21/2# 5s x20 2 5# 5s x20   SLR  3x10 1# 2x10 1#2x10 3x10 3x10   Supine hip abd 2x10 2x10 2x10 2x10 2x10   Front/lat step ups 6in x20 2x10 2x10 6in 2x10 x20 6 inch   Squats in bars NT       Heel slides with pillow case and slide board 1# x20 1# x20 1# x20     Step onto 8in step fro ROM 20  10             Gait  2 laps 1 lap 1 lap NT                                                                                        Modalities                                        Assessment:  Near full extension passively - extensor lag remains  Added in s/l stretch to stretch L hip flexor and quad and that was effective in eliminating pt pain today  Will await MD results    Plan: Continue per plan of care

## 2018-09-05 ENCOUNTER — OFFICE VISIT (OUTPATIENT)
Dept: OBGYN CLINIC | Facility: HOSPITAL | Age: 83
End: 2018-09-05

## 2018-09-05 VITALS — HEIGHT: 62 IN | WEIGHT: 146 LBS | BODY MASS INDEX: 26.87 KG/M2

## 2018-09-05 DIAGNOSIS — Z47.1 AFTERCARE FOLLOWING LEFT KNEE JOINT REPLACEMENT SURGERY: Primary | ICD-10-CM

## 2018-09-05 DIAGNOSIS — M21.70 LOWER LIMB LENGTH DIFFERENCE: ICD-10-CM

## 2018-09-05 DIAGNOSIS — E11.65 TYPE 2 DIABETES MELLITUS WITH HYPERGLYCEMIA, WITHOUT LONG-TERM CURRENT USE OF INSULIN (HCC): Primary | ICD-10-CM

## 2018-09-05 DIAGNOSIS — I10 ESSENTIAL HYPERTENSION: Primary | ICD-10-CM

## 2018-09-05 DIAGNOSIS — I10 ESSENTIAL HYPERTENSION: ICD-10-CM

## 2018-09-05 DIAGNOSIS — Z96.652 AFTERCARE FOLLOWING LEFT KNEE JOINT REPLACEMENT SURGERY: Primary | ICD-10-CM

## 2018-09-05 PROCEDURE — 99024 POSTOP FOLLOW-UP VISIT: CPT | Performed by: ORTHOPAEDIC SURGERY

## 2018-09-05 RX ORDER — AMLODIPINE BESYLATE AND ATORVASTATIN CALCIUM 10; 20 MG/1; MG/1
1 TABLET, FILM COATED ORAL
Qty: 90 TABLET | Refills: 3 | Status: SHIPPED | OUTPATIENT
Start: 2018-09-05 | End: 2019-08-27 | Stop reason: SDUPTHER

## 2018-09-05 NOTE — PROGRESS NOTES
Six weeks following left total knee replacement, this patient describes a feeling of lengthening of her left leg the  She feels she vaults and drops down on her shortened right side  She has undergone prior bilateral total hip replacements, describes no pain in the right groin  Left knee has a healed anterior incision  Extension is within 3° of full, flexion is 115°  There is no tenderness left calf  She does have lengthening left side relative to the right side  Assessment/plan:  Feeling of lengthening following left total knee replacement, unsure whether a inequality existed before him  That being said, 3A since heel lift is prescribed for the patient  She will continue to work with physical therapy to remote motion and strength to her left knee    I would welcome the opportunity see back in 6 weeks time, this include x-rays two views left knee upon arrival for 3 month checkup

## 2018-09-06 ENCOUNTER — OFFICE VISIT (OUTPATIENT)
Dept: PHYSICAL THERAPY | Age: 83
End: 2018-09-06
Payer: COMMERCIAL

## 2018-09-06 DIAGNOSIS — Z47.1 AFTERCARE FOLLOWING LEFT KNEE JOINT REPLACEMENT SURGERY: Primary | ICD-10-CM

## 2018-09-06 DIAGNOSIS — Z96.652 AFTERCARE FOLLOWING LEFT KNEE JOINT REPLACEMENT SURGERY: Primary | ICD-10-CM

## 2018-09-06 PROCEDURE — 97140 MANUAL THERAPY 1/> REGIONS: CPT

## 2018-09-06 PROCEDURE — 97110 THERAPEUTIC EXERCISES: CPT

## 2018-09-06 RX ORDER — AMLODIPINE BESYLATE AND ATORVASTATIN CALCIUM 10; 20 MG/1; MG/1
1 TABLET, FILM COATED ORAL
Qty: 90 TABLET | Refills: 3 | OUTPATIENT
Start: 2018-09-06

## 2018-09-06 NOTE — PROGRESS NOTES
Daily Note     Today's date: 2018  Patient name: Leslee Medeiros  :   MRN: [id number]  Referring provider: Bev Ruby MD  Dx:   Encounter Diagnosis     ICD-10-CM    1  Aftercare following left knee joint replacement surgery Z47 1     D00 [id number]                   Subjective: Reports MD pleased with her ROM for L knee  Pt also reports less pain with her therapy sessions as healing occurs    Objective: See treatment diary below  Precautions     Specialty Daily Treatment Diary     Manual     Patellar mobs 5 min 5 min 5 min 5min 5 min   Knee flex/ext stretch 10 min 10 min 10 min 10m 10 min   S/l hip/lknee flex str   [id number]                        Exercise Diary     Nu step 10m 12 min 12 min 12m 10 min   SAQ 3# 2x10 4# 5s x20 4# 5s x20 3# 5s x20 2 5# 5s x20   SLR  3x10 1# 2x10 1#2x10 1# 3x10 3x10   Supine hip abd 2x10 2x10 2x10 2x10 2x10   Front/lat step ups 6in x20 2x10 2x10 6in 2x10 x20 6 inch   Squats in bars NT   20    Heel slides with pillow case  1# x20 1# x20 1# x20 1# x20    Step onto 8in step for ROM 20  10 10            Gait  2 laps 1 lap 1 lap NT                                                                                        Modalities                                        Assessment:  Near full extension passively - extensor lag remains  Using s/l stretch to stretch L hip flexor and quad and that was effective in eliminating pt pain with previous  Continues to experience fatigue after each session  Plan: Continue per plan of care

## 2018-09-07 ENCOUNTER — TELEPHONE (OUTPATIENT)
Dept: OBGYN CLINIC | Facility: HOSPITAL | Age: 83
End: 2018-09-07

## 2018-09-07 NOTE — TELEPHONE ENCOUNTER
Caller: Joe Adamson, daughter  Call back number: [phone]  Patient's doctor: Dr Amelia Ramires    Patient's daughter is asking what kind of shoe lift the patient needs  She is asking if it is a boot or a shoe insert  Also, where is she supposed to get this at? She states that she tried calling a few places and no one has these  Does [facility] do this? If so, can the script be sent to them   Please advise

## 2018-09-07 NOTE — TELEPHONE ENCOUNTER
Called and relayed message to Yeol Rodas  she was very appreciative and states she will go to [facility] on Monday for this

## 2018-09-07 NOTE — TELEPHONE ENCOUNTER
This patient requires a very simple heel lift  I hope that [facility] has this since doc  She does not need a boot  Please call patient's daughter and advise of above    Thank you

## 2018-09-11 ENCOUNTER — OFFICE VISIT (OUTPATIENT)
Dept: PHYSICAL THERAPY | Age: 83
End: 2018-09-11
Payer: COMMERCIAL

## 2018-09-11 DIAGNOSIS — E11.65 TYPE 2 DIABETES MELLITUS WITH HYPERGLYCEMIA, WITHOUT LONG-TERM CURRENT USE OF INSULIN (HCC): Primary | ICD-10-CM

## 2018-09-11 DIAGNOSIS — Z47.1 AFTERCARE FOLLOWING LEFT KNEE JOINT REPLACEMENT SURGERY: Primary | ICD-10-CM

## 2018-09-11 DIAGNOSIS — Z96.652 AFTERCARE FOLLOWING LEFT KNEE JOINT REPLACEMENT SURGERY: Primary | ICD-10-CM

## 2018-09-11 PROCEDURE — 97110 THERAPEUTIC EXERCISES: CPT | Performed by: PHYSICAL THERAPIST

## 2018-09-11 PROCEDURE — 97140 MANUAL THERAPY 1/> REGIONS: CPT | Performed by: PHYSICAL THERAPIST

## 2018-09-11 NOTE — PROGRESS NOTES
Daily Note     Today's date: 2018  Patient name: Yasmin Aguilar  : [id number]  MRN: [id number]  Referring provider: Brooke Crowder MD  Dx:   Encounter Diagnosis     ICD-10-CM    1  Aftercare following left knee joint replacement surgery Z47 1     Z96 652                   Subjective: Reports having a fall this AM in room when she became " dizzy"  Pt has an underlying chronicvestibular issue  Pt was uninjured in fall  Objective: See treatment diary below  Precautions     Specialty Daily Treatment Diary     Manual  8/28 8/30 9/4 9/6 9/10   Patellar mobs 5 min 5 min 5 min 5min 5 min   Knee flex/ext stretch 10 min 10 min 10 min 10m 10 min   S/l hip/lknee flex str   [id number] 13tulq3 20sycy5                       Exercise Diary  8/28 8/30 9/4 9/6 9/10   bike 10m 12 min 12 min 12m 10 min   SAQ 3# 2x10 4# 5s x20 4# 5s x20 3# 5s x20 3# 5s x20   SLR  3x10 1# 2x10 1#2x10 1# 3x10 1#3x10   Supine hip abd 2x10 2x10 2x10 2x10 2x10   Front/lat step ups 6in x20 2x10 2x10 6in 2x10 x20 6 inch   Squats in bars NT   20 20   Heel slides with pillow case  1# x20 1# x20 1# x20 1# x20 1# x20   Step onto 8in step for ROM 20  10 10 10           Gait  2 laps 1 lap 1 lap NT                                                                                        Modalities                                        Assessment:  Near full extension passively - extensor lag remains  Using s/l stretch to stretch L hip flexor and quad and that was effective in eliminating pt pain with previous  Continues to experience fatigue after each session  No increased L knee pain from fall or tx  session    Plan: Continue per plan of care

## 2018-09-12 ENCOUNTER — TELEPHONE (OUTPATIENT)
Dept: FAMILY MEDICINE CLINIC | Facility: CLINIC | Age: 83
End: 2018-09-12

## 2018-09-12 NOTE — TELEPHONE ENCOUNTER
[facility] needs us to clarify the one touch ultra test strips due to medicare regulations   They can be reached at [phone]

## 2018-09-13 ENCOUNTER — OFFICE VISIT (OUTPATIENT)
Dept: PHYSICAL THERAPY | Age: 83
End: 2018-09-13
Payer: COMMERCIAL

## 2018-09-13 ENCOUNTER — DOCUMENTATION (OUTPATIENT)
Dept: VASCULAR SURGERY | Facility: CLINIC | Age: 83
End: 2018-09-13

## 2018-09-13 DIAGNOSIS — Z96.652 AFTERCARE FOLLOWING LEFT KNEE JOINT REPLACEMENT SURGERY: Primary | ICD-10-CM

## 2018-09-13 DIAGNOSIS — Z47.1 AFTERCARE FOLLOWING LEFT KNEE JOINT REPLACEMENT SURGERY: Primary | ICD-10-CM

## 2018-09-13 PROCEDURE — 97110 THERAPEUTIC EXERCISES: CPT

## 2018-09-13 PROCEDURE — 97140 MANUAL THERAPY 1/> REGIONS: CPT

## 2018-09-13 NOTE — PROGRESS NOTES
Daily Note     Today's date: 2018  Patient name: Joe Woodall  :   MRN: [id number]  Referring provider: Doris Handley MD  Dx:   Encounter Diagnosis     ICD-10-CM    1  Aftercare following left knee joint replacement surgery Z47 1     Z96 652                   Subjective: Reports getting lift for her shoe from [address]   Pt feels her walking has improved    Objective: See treatment diary below  Precautions     Specialty Daily Treatment Diary     Manual  9/13 8/30 9/4 9/6 9/10   Patellar mobs 5 min 5 min 5 min 5min 5 min   Knee flex/ext stretch 10 min 10 min 10 min 10m 10 min   S/l hip/lknee flex str 50iopv1  79wmvc2 57pjug9 55sgfa5                       Exercise Diary  9/13 8/30 9/4 9/6 9/10   bike 10m 12 min 12 min 12m 10 min   SAQ 3# 2x10 4# 5s x20 4# 5s x20 3# 5s x20 3# 5s x20   SLR   1#3x10 1# 2x10 1#2x10 1# 3x10 1#3x10   Supine hip abd 1#2x10 2x10 2x10 2x10 2x10   Front/lat step ups 6in x20 2x10 2x10 6in 2x10 x20 6 inch   Squats in bars NT   20 20   Heel slides with pillow case  1# x20 1# x20 1# x20 1# x20 1# x20   Step onto 8in step for ROM 20  10 10 10           Gait  2 laps 1 lap 1 lap NT                                                                                        Modalities                                        Assessment:  Near full extension passively - extensor lag remains  Using s/l stretch to stretch L hip flexor and quad and that was effective in eliminating pt pain with previous  Continues to experience fatigue after each session  Plan: Continue per plan of care

## 2018-09-13 NOTE — PROGRESS NOTES
Spoke with the daughter and patient is refusing to have anymore studies done due to her age  I did let the daughter to now that if she starts with sudden onset of severe abdominal pain or low back pain she is to go the ER immediately

## 2018-09-14 ENCOUNTER — APPOINTMENT (OUTPATIENT)
Dept: LAB | Age: 83
End: 2018-09-14
Payer: COMMERCIAL

## 2018-09-14 DIAGNOSIS — N18.30 CHRONIC KIDNEY DISEASE, STAGE 3 (HCC): ICD-10-CM

## 2018-09-14 LAB
ANION GAP SERPL CALCULATED.3IONS-SCNC: 12 MMOL/L (ref 4–13)
BUN SERPL-MCNC: 25 MG/DL (ref 5–25)
CALCIUM SERPL-MCNC: 9.6 MG/DL (ref 8.3–10.1)
CHLORIDE SERPL-SCNC: 105 MMOL/L (ref 100–108)
CO2 SERPL-SCNC: 22 MMOL/L (ref 21–32)
CREAT SERPL-MCNC: 1.22 MG/DL (ref 0.6–1.3)
GFR SERPL CREATININE-BSD FRML MDRD: 39 ML/MIN/1.73SQ M
GLUCOSE P FAST SERPL-MCNC: 130 MG/DL (ref 65–99)
POTASSIUM SERPL-SCNC: 4.2 MMOL/L (ref 3.5–5.3)
SODIUM SERPL-SCNC: 139 MMOL/L (ref 136–145)

## 2018-09-14 PROCEDURE — 36415 COLL VENOUS BLD VENIPUNCTURE: CPT

## 2018-09-14 PROCEDURE — 80048 BASIC METABOLIC PNL TOTAL CA: CPT

## 2018-09-18 ENCOUNTER — OFFICE VISIT (OUTPATIENT)
Dept: PHYSICAL THERAPY | Age: 83
End: 2018-09-18
Payer: COMMERCIAL

## 2018-09-18 DIAGNOSIS — Z96.652 AFTERCARE FOLLOWING LEFT KNEE JOINT REPLACEMENT SURGERY: Primary | ICD-10-CM

## 2018-09-18 DIAGNOSIS — Z47.1 AFTERCARE FOLLOWING LEFT KNEE JOINT REPLACEMENT SURGERY: Primary | ICD-10-CM

## 2018-09-18 PROCEDURE — 97140 MANUAL THERAPY 1/> REGIONS: CPT

## 2018-09-18 PROCEDURE — 97110 THERAPEUTIC EXERCISES: CPT

## 2018-09-18 NOTE — PROGRESS NOTES
Daily Note     Today's date: 2018  Patient name: Leeroy Win  :   MRN: [id number]  Referring provider: Ariel Gill MD  Dx:   Encounter Diagnosis     ICD-10-CM    1  Aftercare following left knee joint replacement surgery Z47 1     Z96 652                   Subjective: Reports getting lift for her shoe from [address]   Pt feels her walking has improved  Reports her balance still gives her trouble from time to time    Objective: See treatment diary below  Precautions     Specialty Daily Treatment Diary     Manual  9/13 9/18 9/4 9/6 9/10   Patellar mobs 5 min 5 min 5 min 5min 5 min   Knee flex/ext stretch 10 min 10 min 10 min 10m 10 min   S/l hip/lknee flex str 06mnxk0 27rzyg2 45wjwk4 80ades9 16rajk6                       Exercise Diary  9/13 9/18 9/4 9/6 9/10   bike 10m 10 min 12 min 12m 10 min   SAQ 3# 2x10 4# 5s x20 4# 5s x20 3# 5s x20 3# 5s x20   SLR   1#3x10 1# 2x10 1#2x10 1# 3x10 1#3x10   Supine hip abd 1#2x10 1# 2x10 2x10 2x10 2x10   Front/lat step ups 6in x20 6in 2x10 2x10 6in 2x10 x20 6 inch   Squats in bars NT   20 20   Heel slides with pillow case  1# x20 1# x20 1# x20 1# x20 1# x20   Step onto 8in step for ROM 20  10 10 10           Gait  2 laps 1 lap 1 lap NT                                                                                        Modalities                                        Assessment:  Near full extension passively - extensor lag remains  Continues to experience fatigue after each session  Still with intermittent cramping in L LE at times in calf    Plan: Continue per plan of care

## 2018-09-20 ENCOUNTER — OFFICE VISIT (OUTPATIENT)
Dept: PHYSICAL THERAPY | Age: 83
End: 2018-09-20
Payer: COMMERCIAL

## 2018-09-20 DIAGNOSIS — Z47.1 AFTERCARE FOLLOWING LEFT KNEE JOINT REPLACEMENT SURGERY: Primary | ICD-10-CM

## 2018-09-20 DIAGNOSIS — Z96.652 AFTERCARE FOLLOWING LEFT KNEE JOINT REPLACEMENT SURGERY: Primary | ICD-10-CM

## 2018-09-20 PROCEDURE — 97110 THERAPEUTIC EXERCISES: CPT

## 2018-09-20 PROCEDURE — 97140 MANUAL THERAPY 1/> REGIONS: CPT

## 2018-09-20 NOTE — PROGRESS NOTES
Daily Note     Today's date: 2018  Patient name: Dodson Lanes  :   MRN: [id number]  Referring provider: Gregory Harvey MD  Dx:   Encounter Diagnosis     ICD-10-CM    1  Aftercare following left knee joint replacement surgery Z47 1     Z96 652                   Subjective: Reports her groin discomfort on LLE has been improved    Objective: See treatment diary below  Precautions     Specialty Daily Treatment Diary     Manual  9/13 9/18 9/20 9/6 9/10   Patellar mobs 5 min 5 min 5 min 5min 5 min   Knee flex/ext stretch 10 min 10 min 10 min 10m 10 min   S/l hip/lknee flex str 37ecfu3 87khqp3 63pmju1 82kgko1 29uryr9                       Exercise Diary  9/13 9/18 9/20 9/6 9/10   bike 10m 10 min 12 min 12m 10 min   SAQ 3# 2x10 4# 5s x20 4# 5s x20 3# 5s x20 3# 5s x20   SLR   1#3x10 1# 3x10 11/2#3x10 1# 3x10 1#3x10   Supine hip abd 1#2x10 1# 2x10 2x10 2x10 2x10   Front/lat step ups 6in x20 6in 2x10 25ea 6in 2x10 x20 6 inch   Squats in bars NT  20 20 20   Heel slides with pillow case  1# x20 1# x20 11/2# x20 1# x20 1# x20   Step onto 8in step for ROM 20   10 10           Gait  2 laps 1 lap 1 lap NT                                                                                        Modalities                                        Assessment:    Continues to experience fatigue after each session  Added in squats to help pt with getting out of chair by improving quad strength    Plan: Continue per plan of care

## 2018-09-21 ENCOUNTER — TELEPHONE (OUTPATIENT)
Dept: FAMILY MEDICINE CLINIC | Facility: CLINIC | Age: 83
End: 2018-09-21

## 2018-09-21 NOTE — TELEPHONE ENCOUNTER
Euf[address] Mckeon 82 (L: [phone] ; F: [phone]) phoned regarding the script for a low air loss mattress - please fax the most recent office notes

## 2018-09-25 ENCOUNTER — OFFICE VISIT (OUTPATIENT)
Dept: PHYSICAL THERAPY | Age: 83
End: 2018-09-25
Payer: COMMERCIAL

## 2018-09-25 DIAGNOSIS — Z47.1 AFTERCARE FOLLOWING LEFT KNEE JOINT REPLACEMENT SURGERY: Primary | ICD-10-CM

## 2018-09-25 DIAGNOSIS — Z96.652 AFTERCARE FOLLOWING LEFT KNEE JOINT REPLACEMENT SURGERY: Primary | ICD-10-CM

## 2018-09-25 PROCEDURE — 97140 MANUAL THERAPY 1/> REGIONS: CPT

## 2018-09-25 PROCEDURE — 97110 THERAPEUTIC EXERCISES: CPT

## 2018-09-25 NOTE — PROGRESS NOTES
Daily Note     Today's date: 2018  Patient name: Chitra Arellano  :   MRN: [id number]  Referring provider: Stephanie Nascimento MD  Dx:   Encounter Diagnosis     ICD-10-CM    1  Aftercare following left knee joint replacement surgery Z47 1     Z96 652                   Subjective: Reports her knee is doing great having no complaints of pain  Pt does note that she does had thigh pain ever since LV noting that she thinks she over stretched her thigh  Objective: See treatment diary below  Precautions     Specialty Daily Treatment Diary     Manual  /10   Patellar mobs 5 min 5 min 5 min 5 min 5 min   Knee flex/ext stretch 10 min 10 min 10 min 10 min 10 min   S/l hip/lknee flex str 54mnuw2 13rltj3 75lrem6 20 sec x5 86gqpg1                       Exercise Diary   9/10   bike 10m 10 min 12 min 12 min 10 min   SAQ 3# 2x10 4# 5s x20 4# 5s x20 4# 5s x20 3# 5s x20   SLR   1#3x10 1# 3x10 11/2#3x10 11/2# 3x10 1#3x10   Supine hip abd 1#2x10 1# 2x10 2x10 2x10 2x10   Front/lat step ups 6in x20 6in 2x10 25ea 2x10 x20 6 inch   Squats in bars NT  20 20 20   Heel slides with pillow case  1# x20 1# x20 11/2# x20 11/2# x20 1# x20   Step onto 8in step for ROM 20    10           Gait  2 laps 1 lap 1 lap 1 lap                                                                                        Modalities                                        Assessment:    Continues to experience fatigue after each session  Continued with squats to help pt with getting out of chair by improving quad strength  Pt demonstrated good functional form all with exercises performed  She used HHAx1 to perform step ups today with increased weakness and fatigue noted  Plan: Continue per plan of care

## 2018-09-27 ENCOUNTER — OFFICE VISIT (OUTPATIENT)
Dept: PHYSICAL THERAPY | Age: 83
End: 2018-09-27
Payer: COMMERCIAL

## 2018-09-27 DIAGNOSIS — Z47.1 AFTERCARE FOLLOWING LEFT KNEE JOINT REPLACEMENT SURGERY: Primary | ICD-10-CM

## 2018-09-27 DIAGNOSIS — Z96.652 AFTERCARE FOLLOWING LEFT KNEE JOINT REPLACEMENT SURGERY: Primary | ICD-10-CM

## 2018-09-27 PROCEDURE — 97110 THERAPEUTIC EXERCISES: CPT

## 2018-09-27 PROCEDURE — G8978 MOBILITY CURRENT STATUS: HCPCS | Performed by: PHYSICAL THERAPIST

## 2018-09-27 PROCEDURE — 97140 MANUAL THERAPY 1/> REGIONS: CPT

## 2018-09-27 PROCEDURE — G8979 MOBILITY GOAL STATUS: HCPCS | Performed by: PHYSICAL THERAPIST

## 2018-09-27 NOTE — PROGRESS NOTES
Daily Note     Today's date: 2018  Patient name: Pamela Vazquez  :   MRN: [id number]  Referring provider: Levi Naik MD  Dx:   Encounter Diagnosis     ICD-10-CM    1  Aftercare following left knee joint replacement surgery Z47 1     Z96 652                   Subjective: Reports her knee is doing great having no complaints of pain  She also notes that she has been seeing an improvement since coming to therapy  Objective: See treatment diary below  Precautions     Specialty Daily Treatment Diary     Manual     Patellar mobs 5 min 5 min 5 min 5 min PROM 5 min PRM   Knee flex/ext stretch 10 min 10 min 10 min 10 min 10 min   S/l hip/lknee flex str 68kcnr8 24zwpi1 17uncm5 20 sec x5 20 sec x5                       Exercise Diary     bike 10m 10 min 12 min 12 min 12   SAQ 3# 2x10 4# 5s x20 4# 5s x20 4# 5s x20 4# 5s x25   SLR   1#3x10 1# 3x10 11/2#3x10 11/2# 3x10 11/2# 3x10   Supine hip abd 1#2x10 1# 2x10 2x10 2x10 2x10   Front/lat step ups 6in x20 6in 2x10 25ea 2x10 6" 2x10   Squats in bars NT  20 20 20   Heel slides with pillow case  1# x20 1# x20 11/2# x20 11/2# x20 11/2# x20   Step onto 8in step for ROM 20               Gait  2 laps 1 lap 1 lap 1 lap 1 lap                                                                                       Modalities                                        Assessment:    Continues to experience fatigue after each session  Continued with squats to help pt with getting out of chair by improving quad strength  Pt demonstrated good functional form all with exercises performed  She used HHAx1 to perform step ups today with increased weakness and fatigue noted  Cuing was also noted to avoid hand compensation with step ups  Plan: Continue per plan of care

## 2018-10-03 ENCOUNTER — OFFICE VISIT (OUTPATIENT)
Dept: PHYSICAL THERAPY | Age: 83
End: 2018-10-03
Payer: COMMERCIAL

## 2018-10-03 DIAGNOSIS — Z47.1 AFTERCARE FOLLOWING LEFT KNEE JOINT REPLACEMENT SURGERY: Primary | ICD-10-CM

## 2018-10-03 DIAGNOSIS — Z96.652 AFTERCARE FOLLOWING LEFT KNEE JOINT REPLACEMENT SURGERY: Primary | ICD-10-CM

## 2018-10-03 PROCEDURE — 97140 MANUAL THERAPY 1/> REGIONS: CPT | Performed by: PHYSICAL THERAPIST

## 2018-10-03 PROCEDURE — 97110 THERAPEUTIC EXERCISES: CPT | Performed by: PHYSICAL THERAPIST

## 2018-10-03 NOTE — PROGRESS NOTES
Daily Note     Today's date: 10/3/2018  Patient name: Priyanka Merlos  :   MRN: [id number]  Referring provider: Russ Jnag MD  Dx:   No diagnosis found  Subjective: No new complaints  Objective: See treatment diary below  Precautions     Specialty Daily Treatment Diary     Manual  10/3 9/18 9/20 9/25 9/27   Patellar mobs 5 min 5 min 5 min 5 min PROM 5 min PRM   Knee flex/ext stretch 10 min 10 min 10 min 10 min 10 min   S/l hip/lknee flex str [id number] [id number] 08elxk2 20 sec x5 20 sec x5                       Exercise Diary  10/3 9/18 9/20 9/25 9/27   bike 10m 10 min 12 min 12 min 12   SAQ 4# 2x10 4# 5s x20 4# 5s x20 4# 5s x20 4# 5s x25   SLR   1 5#3x10 1# 3x10 11/2#3x10 11/2# 3x10 11/2# 3x10   Supine hip abd 1 5#2x10 1# 2x10 2x10 2x10 2x10   Front/lat step ups 6in x30/20 6in 2x10 25ea 2x10 6" 2x10   Squats in bars 2x10  20 20 20   Heel slides with pillow case  1 5# x30 1# x20 11/2# x20 11/2# x20 11/2# x20   Step onto 8in step for ROM                Gait  2 laps 1 lap 1 lap 1 lap 1 lap                                                                                       Modalities                                        Assessment:    Good progress with all aspects of PT  Plan: Continue per plan of care

## 2018-10-04 ENCOUNTER — OFFICE VISIT (OUTPATIENT)
Dept: PHYSICAL THERAPY | Age: 83
End: 2018-10-04
Payer: COMMERCIAL

## 2018-10-04 DIAGNOSIS — Z47.1 AFTERCARE FOLLOWING LEFT KNEE JOINT REPLACEMENT SURGERY: Primary | ICD-10-CM

## 2018-10-04 DIAGNOSIS — Z96.652 AFTERCARE FOLLOWING LEFT KNEE JOINT REPLACEMENT SURGERY: Primary | ICD-10-CM

## 2018-10-04 PROCEDURE — 97110 THERAPEUTIC EXERCISES: CPT | Performed by: PHYSICAL THERAPIST

## 2018-10-04 PROCEDURE — G8978 MOBILITY CURRENT STATUS: HCPCS | Performed by: PHYSICAL THERAPIST

## 2018-10-04 PROCEDURE — G8979 MOBILITY GOAL STATUS: HCPCS | Performed by: PHYSICAL THERAPIST

## 2018-10-04 NOTE — PROGRESS NOTES
Daily Note     Today's date: 10/4/2018  Patient name: Agnieszka Guido  :   MRN: [id number]  Referring provider: Meg Recio MD  Dx:   No diagnosis found  Subjective: No new complaints  Objective: See treatment diary below  Precautions     Specialty Daily Treatment Diary     Manual  10/3 10/4 9/20 9/25 9/27   Patellar mobs 5 min 5 min 5 min 5 min PROM 5 min PRM   Knee flex/ext stretch 10 min 10 min 10 min 10 min 10 min   S/l hip/lknee flex str [id number] 15vajz5 35apcv6 20 sec x5 20 sec x5                       Exercise Diary  10/3 10/4 9/20 9/25 9/27   bike 10m 12 min 12 min 12 min 12   SAQ 4# 2x10 4# 5s x20 4# 5s x20 4# 5s x20 4# 5s x25   SLR   1 5#3x10 2# 2x10 11/2#3x10 11/2# 3x10 11/2# 3x10   Supine hip abd 1 5#2x10 2# 2x10 2x10 2x10 2x10   Front/lat step ups 6in x30/20 6in 2x10 25ea 2x10 6" 2x10   Squats in bars 2x10  20 20 20   Heel slides with pillow case  1 5# x30 1# x20 11/2# x20 11/2# x20 11/2# x20   Step onto 8in step for ROM                Gait  2 laps 1 lap with cane CGA 1 lap 1 lap 1 lap                                                                                       Modalities                                        Assessment:    Attempted gait with SPC - required CGA    Plan: Continue per plan of care

## 2018-10-09 ENCOUNTER — OFFICE VISIT (OUTPATIENT)
Dept: PHYSICAL THERAPY | Age: 83
End: 2018-10-09
Payer: COMMERCIAL

## 2018-10-09 DIAGNOSIS — Z96.652 AFTERCARE FOLLOWING LEFT KNEE JOINT REPLACEMENT SURGERY: Primary | ICD-10-CM

## 2018-10-09 DIAGNOSIS — Z47.1 AFTERCARE FOLLOWING LEFT KNEE JOINT REPLACEMENT SURGERY: Primary | ICD-10-CM

## 2018-10-09 PROCEDURE — 97110 THERAPEUTIC EXERCISES: CPT | Performed by: PHYSICAL THERAPIST

## 2018-10-09 PROCEDURE — 97140 MANUAL THERAPY 1/> REGIONS: CPT | Performed by: PHYSICAL THERAPIST

## 2018-10-09 NOTE — PROGRESS NOTES
Daily Note     Today's date: 10/9/2018  Patient name: Maribell Schaefer  :   MRN: [id number]  Referring provider: Molly Patel MD  Dx:   Encounter Diagnosis     ICD-10-CM    1  Aftercare following left knee joint replacement surgery Z47 1     Z96 652                   Subjective: Patient with a busy day ahead with a lot of walking     Objective: See treatment diary below  Precautions     Specialty Daily Treatment Diary     Manual  10/3 10/9 9/20 9/25 9/27   Patellar mobs 5 min 5 min 5 min 5 min PROM 5 min PRM   Knee flex/ext stretch 10 min 10 min 10 min 10 min 10 min   S/l hip/lknee flex str [id number] 52zftn6 03zpkd2 20 sec x5 20 sec x5                       Exercise Diary  10/3 10/9 9/20 9/25 9/27   bike 10m 10 min 12 min 12 min 12   SAQ 4# 2x10 5# 5s x20 4# 5s x20 4# 5s x20 4# 5s x25   SLR   1 5#3x10 2 5# 3x10 11/2#3x10 11/2# 3x10 11/2# 3x10   Supine hip abd 1 5#2x10 2 5# 2x10 2x10 2x10 2x10   Front/lat step ups 6in x30/20 6in 2x10 25ea 2x10 6" 2x10   Squats in bars 2x10  20 20 20   Heel slides with pillow case  1 5# x30 1# x20 11/2# x20 11/2# x20 11/2# x20   Step onto 8in step for ROM                Gait  2 laps 1 lap 1 lap 1 lap 1 lap                                                                                       Modalities                                        Assessment:  SOB with increased activity    Plan: Continue per plan of care

## 2018-10-09 NOTE — PROGRESS NOTES
PT Re-Evaluation     Today's date: 10/9/2018  Patient name: Julissa Pineda  : [birth year]  MRN: [id number]  Referring provider: Shirlene Thrasher MD  Dx:   No diagnosis found  Assessment  Impairments: abnormal gait, abnormal or restricted ROM, impaired balance, impaired physical strength, lacks appropriate home exercise program, pain with function and weight-bearing intolerance  Functional limitations: IADLs; Gait; Stairs  Symptom irritability: low  Assessment details: Patient progressing well with all aspects of PT  Will continue to benefit from skilled PT 2x weekly for 3 weeks then likely d/c to HEP  Understanding of Dx/Px/POC: good   Prognosis: good    Goals  Goals  Short Term goals - 4 weeks  1  Patient will be independent and compliant with a HEP  MET  2  Patient will report a 50% decrease in pain complaints  MET    Long Term goals - 8 weeks  1  Patient will report elimination of pain complaints  PARTIALLY MET  2  Patient will ambulate community distance with use of a cane  NOT MET  3  Patient will return to all recreational activities without restriction   PARTIALLY MET    Plan  Planned modality interventions: cryotherapy  Planned therapy interventions: neuromuscular re-education, manual therapy, joint mobilization, balance/weight bearing training, therapeutic exercise, home exercise program and functional ROM exercises  Frequency: 2x week  Duration in visits: 6  Duration in weeks: 3  Plan of Care beginning date: 10/8/2018  Plan of Care expiration date: 2018  Treatment plan discussed with: patient        Subjective Evaluation    History of Present Illness  Mechanism of injury: Mild intermittent pain persists with higher level activity  Not a recurrent problem   Quality of life: good    Pain  Current pain ratin  At best pain ratin  At worst pain rating: 3  Quality: discomfort  Relieving factors: rest  Aggravating factors: stair climbing  Progression: improved    Treatments  Current treatment: physical therapy  Patient Goals  Patient goals for therapy: decreased pain, increased motion, increased strength, independence with ADLs/IADLs and return to sport/leisure activities          Objective     Observations   Left Knee   Positive for edema       Active Range of Motion   Left Knee   Flexion: 110 degrees   Extension: -8 degrees     Passive Range of Motion   Left Knee   Flexion: 113 degrees   Extension: -5 degrees     Strength/Myotome Testing     Left Knee   Flexion: 4+  Extension: 4+    Ambulation     Ambulation: Level Surfaces   Ambulation with assistive device: independent  Ambulation without assistive device: minimum assist    Ambulation: Stairs   Ascend stairs: contact guard assist  Pattern: non-reciprocal  Railings: two rails  Descend stairs: minimum assist  Pattern: non-reciprocal  Railings: two rails

## 2018-10-09 NOTE — PROGRESS NOTES
PT Re-Evaluation     Today's date: 10/9/2018  Patient name: Julissa Pineda  :   MRN: [id number]  Referring provider: Shirlene Thrasher MD  Dx:   Encounter Diagnosis     ICD-10-CM    1  Aftercare following left knee joint replacement surgery Z47 1     Z96 [id number]        Start Time: 0900  Stop Time: 0950  Total time in clinic (min): 50 minutes    Assessment  Impairments: abnormal gait, abnormal or restricted ROM, impaired balance, impaired physical strength, lacks appropriate home exercise program, pain with function and weight-bearing intolerance  Functional limitations: IADLs; Gait; Stairs  Symptom irritability: low  Assessment details: Patient progressing well with all aspects of PT  Will continue to benefit from skilled PT 2x weekly for 3 weeks then likely d/c to HEP  Understanding of Dx/Px/POC: good   Prognosis: good    Goals  Goals  Short Term goals - 4 weeks  1  Patient will be independent and compliant with a HEP  MET  2  Patient will report a 50% decrease in pain complaints  MET    Long Term goals - 8 weeks  1  Patient will report elimination of pain complaints  PARTIALLY MET  2  Patient will ambulate community distance with use of a cane  NOT MET  3  Patient will return to all recreational activities without restriction   PARTIALLY MET    Plan  Planned modality interventions: cryotherapy  Planned therapy interventions: neuromuscular re-education, manual therapy, joint mobilization, balance/weight bearing training, therapeutic exercise, home exercise program and functional ROM exercises  Frequency: 2x week  Duration in visits: 6  Duration in weeks: 3  Plan of Care beginning date: 10/8/2018  Plan of Care expiration date: 2018  Treatment plan discussed with: patient        Subjective Evaluation    History of Present Illness  Mechanism of injury: Mild intermittent pain persists with higher level activity  Not a recurrent problem   Quality of life: good    Pain  Current pain rating: 0  At best pain ratin  At worst pain rating: 3  Quality: discomfort  Relieving factors: rest  Aggravating factors: stair climbing  Progression: improved    Treatments  Current treatment: physical therapy  Patient Goals  Patient goals for therapy: decreased pain, increased motion, increased strength, independence with ADLs/IADLs and return to sport/leisure activities          Objective     Observations   Left Knee   Positive for edema       Active Range of Motion   Left Knee   Flexion: 110 degrees   Extension: -8 degrees     Passive Range of Motion   Left Knee   Flexion: 113 degrees   Extension: -5 degrees     Strength/Myotome Testing     Left Knee   Flexion: 4+  Extension: 4+    Ambulation     Ambulation: Level Surfaces   Ambulation with assistive device: independent  Ambulation without assistive device: minimum assist    Ambulation: Stairs   Ascend stairs: contact guard assist  Pattern: non-reciprocal  Railings: two rails  Descend stairs: minimum assist  Pattern: non-reciprocal  Railings: two rails      Flowsheet Rows      Most Recent Value   PT/OT G-Codes   Current Score  55   Projected Score  48   FOTO information reviewed  Yes   Assessment Type  Re-evaluation   G code set  Mobility: Walking & Moving Around   Mobility: Walking and Moving Around Current Status ()  CK   Mobility: Walking and Moving Around Goal Status ()  CK

## 2018-10-11 ENCOUNTER — OFFICE VISIT (OUTPATIENT)
Dept: PHYSICAL THERAPY | Age: 83
End: 2018-10-11
Payer: COMMERCIAL

## 2018-10-11 DIAGNOSIS — Z47.1 AFTERCARE FOLLOWING LEFT KNEE JOINT REPLACEMENT SURGERY: Primary | ICD-10-CM

## 2018-10-11 DIAGNOSIS — Z96.652 AFTERCARE FOLLOWING LEFT KNEE JOINT REPLACEMENT SURGERY: Primary | ICD-10-CM

## 2018-10-11 PROCEDURE — 97140 MANUAL THERAPY 1/> REGIONS: CPT | Performed by: PHYSICAL THERAPIST

## 2018-10-11 PROCEDURE — 97110 THERAPEUTIC EXERCISES: CPT | Performed by: PHYSICAL THERAPIST

## 2018-10-11 PROCEDURE — 97112 NEUROMUSCULAR REEDUCATION: CPT | Performed by: PHYSICAL THERAPIST

## 2018-10-11 NOTE — PROGRESS NOTES
PT Discharge    Today's date: 10/11/2018  Patient name: Benjie Dean  :   MRN: [id number]  Referring provider: Bobbi Fu MD  Dx: No diagnosis found  Assessment  Impairments: impaired physical strength    Assessment details: All formal PT goals have been achieved  Patient to continue with HEP        Plan  Planned therapy interventions: home exercise program  Treatment plan discussed with: patient        Subjective Evaluation    History of Present Illness  Mechanism of injury: Denies pain complaints  Quality of life: good    Pain  Current pain ratin  At best pain ratin  At worst pain ratin          Objective     Active Range of Motion   Left Knee   Flexion: 112 degrees   Extension: -5 degrees     Right Knee   Normal active range of motion    Passive Range of Motion   Left Knee   Flexion: 115 degrees   Extension: 0 degrees     Strength/Myotome Testing     Left Knee   Flexion: 4+  Extension: 4  Quadriceps contraction: good          Precautions: L TKA    Daily Treatment Diary   Specialty Daily Treatment Diary      Manual  10/3 10/9 10/11 9/25 9/27   Patellar mobs 5 min 5 min 5 min 5 min PROM 5 min PRM   Knee flex/ext stretch 10 min 10 min 10 min 10 min 10 min   S/l hip/lknee flex str 71ohsm9 07malz2 61npbu4 20 sec x5 20 sec x5                                     Exercise Diary  10/3 10/9 10/11 9/25 9/27   bike 10m 10 min 12 min 12 min 12   SAQ 4# 2x10 5# 5s x20 4# 5s x20 4# 5s x20 4# 5s x25   SLR   1 5#3x10 2 5# 3x10 11/2#3x10 11/2# 3x10 11/2# 3x10   Supine hip abd 1 5#2x10 2 5# 2x10 2x10 2x10 2x10   Front/lat step ups 6in x30/20 6in 2x10 25ea 2x10 6" 2x10   Squats in bars 2x10   20 20 20   Heel slides with pillow case  1 5# x30 1# x20 11/2# x20 11/2# x20 11/2# x20   Step onto 8in step for ROM                           Gait  2 laps 1 lap 1 lap 1 lap 1 lap

## 2018-10-17 ENCOUNTER — OFFICE VISIT (OUTPATIENT)
Dept: OBGYN CLINIC | Facility: HOSPITAL | Age: 83
End: 2018-10-17

## 2018-10-17 ENCOUNTER — HOSPITAL ENCOUNTER (OUTPATIENT)
Dept: RADIOLOGY | Facility: HOSPITAL | Age: 83
Discharge: HOME | End: 2018-10-17
Attending: ORTHOPAEDIC SURGERY
Payer: COMMERCIAL

## 2018-10-17 VITALS
BODY MASS INDEX: 26.87 KG/M2 | HEART RATE: 87 BPM | WEIGHT: 146 LBS | DIASTOLIC BLOOD PRESSURE: 49 MMHG | HEIGHT: 62 IN | SYSTOLIC BLOOD PRESSURE: 168 MMHG

## 2018-10-17 DIAGNOSIS — Z96.652 AFTERCARE FOLLOWING LEFT KNEE JOINT REPLACEMENT SURGERY: Primary | ICD-10-CM

## 2018-10-17 DIAGNOSIS — Z96.652 AFTERCARE FOLLOWING LEFT KNEE JOINT REPLACEMENT SURGERY: ICD-10-CM

## 2018-10-17 DIAGNOSIS — E87.1 HYPONATREMIA: ICD-10-CM

## 2018-10-17 DIAGNOSIS — Z47.1 AFTERCARE FOLLOWING LEFT KNEE JOINT REPLACEMENT SURGERY: ICD-10-CM

## 2018-10-17 DIAGNOSIS — Z47.1 AFTERCARE FOLLOWING LEFT KNEE JOINT REPLACEMENT SURGERY: Primary | ICD-10-CM

## 2018-10-17 PROCEDURE — 73560 X-RAY EXAM OF KNEE 1 OR 2: CPT

## 2018-10-17 PROCEDURE — 99024 POSTOP FOLLOW-UP VISIT: CPT | Performed by: ORTHOPAEDIC SURGERY

## 2018-10-17 RX ORDER — SODIUM CHLORIDE 1000 MG
1 TABLET, SOLUBLE MISCELLANEOUS DAILY
Qty: 30 TABLET | Refills: 6 | Status: SHIPPED | OUTPATIENT
Start: 2018-10-17 | End: 2019-05-24 | Stop reason: SDUPTHER

## 2018-10-17 NOTE — PROGRESS NOTES
Three months removed from left total knee replacement, this 80-year-old female describes very little pain in her left knee  Examination finds left knee with a healed anterior incision  Extension full flexion greater than 120°  There is no mid flexion valgus instability  There is very little patellofemoral chatter  There is no tenderness in the left calf  I have personally reviewed x-rays left knee my interpretation is as follows: Two views left knee show total knee implant satisfactory position  Assessment/plan:  3 months removed left total knee replacement, this patient is well clinical radiographic exam   She will continue right ear lucita use of the left knee   I would welcome the opportunity see back in 3 months time and this will include x-rays two views left knee upon arrival for her 6 month checkup

## 2018-10-17 NOTE — TELEPHONE ENCOUNTER
Requesting sodium chloride 1g tablet, Qty 30, Refills 6,     Take 1 tablet by mouth daily    [facility]

## 2018-10-19 ENCOUNTER — TELEPHONE (OUTPATIENT)
Dept: FAMILY MEDICINE CLINIC | Facility: CLINIC | Age: 83
End: 2018-10-19

## 2018-10-19 NOTE — TELEPHONE ENCOUNTER
FYI: Patient's daughter phoned, her mother has had small amounts of blood in her urine since Tuesday  I offered a 10 a m  Appointment tomorrow with Dr Bert Chowdhury, but patient wanted to wait to see Dr Tatiana Leone  I then offered patient the opportunity to call Monday morning for a same day sick appointment

## 2018-10-22 ENCOUNTER — OFFICE VISIT (OUTPATIENT)
Dept: FAMILY MEDICINE CLINIC | Facility: CLINIC | Age: 83
End: 2018-10-22
Payer: COMMERCIAL

## 2018-10-22 VITALS
BODY MASS INDEX: 27.27 KG/M2 | TEMPERATURE: 98.4 F | RESPIRATION RATE: 16 BRPM | SYSTOLIC BLOOD PRESSURE: 140 MMHG | DIASTOLIC BLOOD PRESSURE: 62 MMHG | HEART RATE: 76 BPM | HEIGHT: 62 IN | WEIGHT: 148.2 LBS

## 2018-10-22 DIAGNOSIS — R31.9 HEMATURIA, UNSPECIFIED TYPE: ICD-10-CM

## 2018-10-22 DIAGNOSIS — D64.9 POSTOPERATIVE ANEMIA: ICD-10-CM

## 2018-10-22 DIAGNOSIS — N18.30 CHRONIC KIDNEY DISEASE, STAGE 3 (HCC): ICD-10-CM

## 2018-10-22 DIAGNOSIS — I10 ESSENTIAL HYPERTENSION: ICD-10-CM

## 2018-10-22 DIAGNOSIS — N30.01 ACUTE CYSTITIS WITH HEMATURIA: Primary | ICD-10-CM

## 2018-10-22 PROBLEM — N39.0 URINARY TRACT INFECTION WITHOUT HEMATURIA: Status: ACTIVE | Noted: 2018-10-22

## 2018-10-22 LAB
BACTERIA UR QL AUTO: ABNORMAL /HPF
BILIRUB UR QL STRIP: NEGATIVE
CLARITY UR: ABNORMAL
COLOR UR: YELLOW
GLUCOSE UR STRIP-MCNC: NEGATIVE MG/DL
HGB UR QL STRIP.AUTO: ABNORMAL
HYALINE CASTS #/AREA URNS LPF: ABNORMAL /LPF
KETONES UR STRIP-MCNC: NEGATIVE MG/DL
LEUKOCYTE ESTERASE UR QL STRIP: ABNORMAL
NITRITE UR QL STRIP: NEGATIVE
NON-SQ EPI CELLS URNS QL MICRO: ABNORMAL /HPF
PH UR STRIP.AUTO: 6 [PH] (ref 4.5–8)
PROT UR STRIP-MCNC: ABNORMAL MG/DL
RBC #/AREA URNS AUTO: ABNORMAL /HPF
SL AMB  POCT GLUCOSE, UA: ABNORMAL
SL AMB LEUKOCYTE ESTERASE,UA: ABNORMAL
SL AMB POCT BILIRUBIN,UA: ABNORMAL
SL AMB POCT BLOOD,UA: ABNORMAL
SL AMB POCT CLARITY,UA: ABNORMAL
SL AMB POCT COLOR,UA: YELLOW
SL AMB POCT KETONES,UA: ABNORMAL
SL AMB POCT NITRITE,UA: ABNORMAL
SL AMB POCT PH,UA: 6
SL AMB POCT SPECIFIC GRAVITY,UA: 1.02
SL AMB POCT URINE PROTEIN: 15
SL AMB POCT UROBILINOGEN: 0.2
SP GR UR STRIP.AUTO: 1.02 (ref 1–1.03)
UROBILINOGEN UR QL STRIP.AUTO: 0.2 E.U./DL
WBC #/AREA URNS AUTO: ABNORMAL /HPF

## 2018-10-22 PROCEDURE — 87186 SC STD MICRODIL/AGAR DIL: CPT | Performed by: FAMILY MEDICINE

## 2018-10-22 PROCEDURE — 99214 OFFICE O/P EST MOD 30 MIN: CPT | Performed by: FAMILY MEDICINE

## 2018-10-22 PROCEDURE — 1160F RVW MEDS BY RX/DR IN RCRD: CPT | Performed by: FAMILY MEDICINE

## 2018-10-22 PROCEDURE — 87086 URINE CULTURE/COLONY COUNT: CPT | Performed by: FAMILY MEDICINE

## 2018-10-22 PROCEDURE — 81001 URINALYSIS AUTO W/SCOPE: CPT | Performed by: FAMILY MEDICINE

## 2018-10-22 PROCEDURE — 4040F PNEUMOC VAC/ADMIN/RCVD: CPT | Performed by: FAMILY MEDICINE

## 2018-10-22 PROCEDURE — 81002 URINALYSIS NONAUTO W/O SCOPE: CPT | Performed by: FAMILY MEDICINE

## 2018-10-22 PROCEDURE — 1036F TOBACCO NON-USER: CPT | Performed by: FAMILY MEDICINE

## 2018-10-22 PROCEDURE — 87077 CULTURE AEROBIC IDENTIFY: CPT | Performed by: FAMILY MEDICINE

## 2018-10-22 RX ORDER — CEPHALEXIN 500 MG/1
CAPSULE ORAL
Qty: 21 CAPSULE | Refills: 0 | Status: SHIPPED | OUTPATIENT
Start: 2018-10-22 | End: 2018-10-23 | Stop reason: SDUPTHER

## 2018-10-22 NOTE — ASSESSMENT & PLAN NOTE
Patient had post-op anemia  S/P  left total knee of arthroplasty in 7/18  Blood work done in August showed hemoglobin 11 5  Patient reports adverse side effects from Ferrex 150 mg daily  Recommended to discontinue iron supplement and check CBC with dif, Iron level in 4-6 weeks

## 2018-10-22 NOTE — PROGRESS NOTES
Assessment/Plan:    Urinary tract infection with hematuria  Urine dipstick is positive for leukocytes 2+  Negative for blood  Will send urine for urinalysis and urine culture  Start Keflex 500 mg 1 capsule 3 times daily  Recommended to stay well hydrated  Chronic kidney disease, stage 3  Creatinine checked last month 1 22- -stable  Recommended to avoid NSAID's  Stay well hydrated  Hypertension  Continue Caduet 10/20 mg 1 tablet daily, Losartan 100 mg daily  Postoperative anemia  Patient had post-op anemia  S/P  left total knee of arthroplasty in 7/18  Blood work done in August showed hemoglobin 11 5  Patient reports adverse side effects from Ferrex 150 mg daily  Recommended to discontinue iron supplement and check CBC with dif, Iron level in 4-6 weeks  Diagnoses and all orders for this visit:    Acute cystitis with hematuria  -     Urinalysis with microscopic  -     cephalexin (KEFLEX) 500 mg capsule; Take 1 caps  3 times daily    Hematuria, unspecified type  -     POCT urine dip  -     Urine culture; Future    Chronic kidney disease, stage 3 (HCC)  -     CBC and differential; Future    Essential hypertension    Postoperative anemia  -     CBC and differential; Future  -     Iron; Future          Subjective:      Patient ID: Maribell Schaefer is a 80 y o  female  HPI     Patient presents to the office accompanied by her daughter c/o frequent urination, burning sensation for the past 2-3 weeks  She reports some blood in urine a few times  No fever, chills  No abdominal pain  No h/o frequent UTI's  Patient has HTN, CKD stage 3,    Reviewed all current medications with patient and her daughter  Patient  underwent left total knee arthroplasty in 7/18  She had post- op anemia  She has been followed by orthopedic surgeon   Dr Norm Denise  Denies pain in left knee, ambulates with a walker  She completed physical therapy last month  HTN - blood pressure is stable    Currently taking Losartan 100 mg daily, Caduet 10/20 mg one tablet daily  Patient had blood work done on 9/14/18  Potassium 4 2, sodium 139, creatinine 1 22 - stable  CBC with dif  was checked on August 28, 2018  Hemoglobin 11 5 ( improved from 8 5 in 7/18)  Patient has been taking Ferrex 150 mg 1 capsule daily  She reports loose bowels and urgency to go to the bathroom, wants to stop taking iron supplement  Denies tobacco use  The following portions of the patient's history were reviewed and updated as appropriate: allergies, current medications, past medical history, past social history, past surgical history and problem list     Review of Systems   Constitutional: Positive for fatigue (mild)  Negative for activity change, appetite change, chills and fever  HENT: Positive for hearing loss  Negative for congestion, ear pain, sore throat, tinnitus and trouble swallowing  Eyes: Negative for pain, discharge, redness, itching and visual disturbance  Respiratory: Negative for cough, chest tightness, shortness of breath and wheezing  Cardiovascular: Positive for leg swelling (improved)  Negative for chest pain and palpitations  Gastrointestinal: Negative for abdominal pain, blood in stool, nausea and vomiting  Loose bowel movements, urgency    Genitourinary: Positive for dysuria, frequency and hematuria  Negative for difficulty urinating, flank pain and pelvic pain  Musculoskeletal: Positive for arthralgias (pain in L knee improved ) and gait problem (ambulates with a walker)  Negative for myalgias  Skin: Negative for rash and wound  Neurological: Negative for dizziness, syncope and headaches  Hematological: Negative  Psychiatric/Behavioral: Negative            Objective:      /62 (BP Location: Right arm, Patient Position: Sitting, Cuff Size: Standard)   Pulse 76   Temp 98 4 °F (36 9 °C) (Tympanic)   Resp 16   Ht 5' 1 5" (1 562 m)   Wt 67 2 kg (148 lb 3 2 oz)   BMI 27 55 kg/m²        Physical Exam   Constitutional: She is oriented to person, place, and time  She appears well-developed and well-nourished  HENT:   Head: Normocephalic and atraumatic  Eyes: Pupils are equal, round, and reactive to light  Conjunctivae are normal    Cardiovascular: Normal rate, regular rhythm and normal heart sounds  No murmur heard  Trace pretibial BL LE edema   Pulmonary/Chest: Effort normal and breath sounds normal  She has no wheezes  She has no rales  Abdominal: Soft  Bowel sounds are normal  There is no tenderness  No CVA tenderness BL   Musculoskeletal:   Patient ambulates with a walker  L knee: no swelling, no tenderness  Neurological: She is alert and oriented to person, place, and time  Skin: Skin is warm and dry  No rash noted  Psychiatric: She has a normal mood and affect  Nursing note and vitals reviewed

## 2018-10-22 NOTE — ASSESSMENT & PLAN NOTE
Urine dipstick is positive for leukocytes 2+  Negative for blood  Will send urine for urinalysis and urine culture  Start Keflex 500 mg 1 capsule 3 times daily  Recommended to stay well hydrated

## 2018-10-23 ENCOUNTER — TELEPHONE (OUTPATIENT)
Dept: FAMILY MEDICINE CLINIC | Facility: CLINIC | Age: 83
End: 2018-10-23

## 2018-10-23 DIAGNOSIS — N30.01 ACUTE CYSTITIS WITH HEMATURIA: ICD-10-CM

## 2018-10-23 RX ORDER — CEPHALEXIN 500 MG/1
CAPSULE ORAL
Qty: 21 CAPSULE | Refills: 0 | Status: SHIPPED | OUTPATIENT
Start: 2018-10-23 | End: 2018-10-30

## 2018-10-23 NOTE — TELEPHONE ENCOUNTER
Patient's daughter Marylee Cheek said they were in to see Dr Jaron Palacio and she ordered an antibody for patient at [facility]  When they went to the pharmacy they said they did not receive anything from the doctor  Can be reached at [phone] any questions  Please advise

## 2018-10-23 NOTE — TELEPHONE ENCOUNTER
Called [facility] and they will contact [facility], as I verbally had them cancel the prescription this morning at [address]  I made it clear patient needs this ASAP so if they can fill it for her, even if they don't use insurance if they can't cancel from [facility] in time, it would cost them $9 68

## 2018-10-23 NOTE — TELEPHONE ENCOUNTER
Patient's daughter is calling about the antibody that was called in to [facility] instead of [facility]  Patient went to [facility] and they said it was filled by [facility]  Is upset  Can be reached at [phone] please advise

## 2018-10-23 NOTE — TELEPHONE ENCOUNTER
Please call patient's daughter  I resent Rx for Keflex to [facility] pharmacy

## 2018-10-23 NOTE — TELEPHONE ENCOUNTER
This was sent to [facility], so I called patient's daughter Jesus to let her know, then called [facility] to cancel it through there, and attempted to call it into [facility] for them, but the pharmacy is closed currently  Can we E-Scribe it there?

## 2018-10-24 LAB — BACTERIA UR CULT: ABNORMAL

## 2018-10-30 ENCOUNTER — TELEPHONE (OUTPATIENT)
Dept: FAMILY MEDICINE CLINIC | Facility: CLINIC | Age: 83
End: 2018-10-30

## 2018-10-30 NOTE — TELEPHONE ENCOUNTER
Spoke with patient's daughter and she when the antibiotics were first ordered it was sent to her mail order, then we sent it to right pharmacy  Higinio Lynn was confused and just wanted to make sure that she does not need the ones from Mail order   I advised her that her mother only needed to take the 7 days

## 2018-10-30 NOTE — TELEPHONE ENCOUNTER
Patient finished her 7 days of antibiotics and she is feeling well  Does she need to continue taking antibiotics? Please contact patient's daughter, Jesus at [phone]

## 2018-11-27 ENCOUNTER — APPOINTMENT (OUTPATIENT)
Dept: LAB | Age: 83
End: 2018-11-27
Payer: COMMERCIAL

## 2018-11-27 DIAGNOSIS — N18.30 CHRONIC KIDNEY DISEASE, STAGE 3 (HCC): ICD-10-CM

## 2018-11-27 DIAGNOSIS — D64.9 POSTOPERATIVE ANEMIA: ICD-10-CM

## 2018-11-27 LAB
BASOPHILS # BLD AUTO: 0.08 THOUSANDS/ΜL (ref 0–0.1)
BASOPHILS NFR BLD AUTO: 1 % (ref 0–1)
EOSINOPHIL # BLD AUTO: 0.23 THOUSAND/ΜL (ref 0–0.61)
EOSINOPHIL NFR BLD AUTO: 3 % (ref 0–6)
ERYTHROCYTE [DISTWIDTH] IN BLOOD BY AUTOMATED COUNT: 13.8 % (ref 11.6–15.1)
HCT VFR BLD AUTO: 39.3 % (ref 34.8–46.1)
HGB BLD-MCNC: 12.5 G/DL (ref 11.5–15.4)
IMM GRANULOCYTES # BLD AUTO: 0.03 THOUSAND/UL (ref 0–0.2)
IMM GRANULOCYTES NFR BLD AUTO: 0 % (ref 0–2)
IRON SERPL-MCNC: 63 UG/DL (ref 50–170)
LYMPHOCYTES # BLD AUTO: 2.33 THOUSANDS/ΜL (ref 0.6–4.47)
LYMPHOCYTES NFR BLD AUTO: 25 % (ref 14–44)
MCH RBC QN AUTO: 30.6 PG (ref 26.8–34.3)
MCHC RBC AUTO-ENTMCNC: 31.8 G/DL (ref 31.4–37.4)
MCV RBC AUTO: 96 FL (ref 82–98)
MONOCYTES # BLD AUTO: 0.8 THOUSAND/ΜL (ref 0.17–1.22)
MONOCYTES NFR BLD AUTO: 9 % (ref 4–12)
NEUTROPHILS # BLD AUTO: 5.7 THOUSANDS/ΜL (ref 1.85–7.62)
NEUTS SEG NFR BLD AUTO: 62 % (ref 43–75)
NRBC BLD AUTO-RTO: 0 /100 WBCS
PLATELET # BLD AUTO: 228 THOUSANDS/UL (ref 149–390)
PMV BLD AUTO: 11.1 FL (ref 8.9–12.7)
RBC # BLD AUTO: 4.08 MILLION/UL (ref 3.81–5.12)
WBC # BLD AUTO: 9.17 THOUSAND/UL (ref 4.31–10.16)

## 2018-11-27 PROCEDURE — 83540 ASSAY OF IRON: CPT

## 2018-11-27 PROCEDURE — 85025 COMPLETE CBC W/AUTO DIFF WBC: CPT

## 2018-11-27 PROCEDURE — 36415 COLL VENOUS BLD VENIPUNCTURE: CPT

## 2018-12-17 ENCOUNTER — OFFICE VISIT (OUTPATIENT)
Dept: FAMILY MEDICINE CLINIC | Facility: CLINIC | Age: 83
End: 2018-12-17
Payer: COMMERCIAL

## 2018-12-17 VITALS
HEIGHT: 61 IN | BODY MASS INDEX: 28.09 KG/M2 | RESPIRATION RATE: 16 BRPM | SYSTOLIC BLOOD PRESSURE: 140 MMHG | DIASTOLIC BLOOD PRESSURE: 70 MMHG | HEART RATE: 80 BPM | OXYGEN SATURATION: 99 % | WEIGHT: 148.8 LBS | TEMPERATURE: 97.6 F

## 2018-12-17 DIAGNOSIS — N18.30 TYPE 2 DM WITH CKD STAGE 3 AND HYPERTENSION (HCC): ICD-10-CM

## 2018-12-17 DIAGNOSIS — J30.1 SEASONAL ALLERGIC RHINITIS DUE TO POLLEN: ICD-10-CM

## 2018-12-17 DIAGNOSIS — I10 ESSENTIAL HYPERTENSION: Primary | ICD-10-CM

## 2018-12-17 DIAGNOSIS — E11.22 TYPE 2 DM WITH CKD STAGE 3 AND HYPERTENSION (HCC): ICD-10-CM

## 2018-12-17 DIAGNOSIS — M15.9 GENERALIZED OSTEOARTHRITIS: ICD-10-CM

## 2018-12-17 DIAGNOSIS — N18.30 CHRONIC KIDNEY DISEASE, STAGE 3 (HCC): ICD-10-CM

## 2018-12-17 DIAGNOSIS — E03.9 ACQUIRED HYPOTHYROIDISM: ICD-10-CM

## 2018-12-17 DIAGNOSIS — R26.2 AMBULATORY DYSFUNCTION: ICD-10-CM

## 2018-12-17 DIAGNOSIS — I12.9 TYPE 2 DM WITH CKD STAGE 3 AND HYPERTENSION (HCC): ICD-10-CM

## 2018-12-17 DIAGNOSIS — E78.2 MIXED HYPERLIPIDEMIA: ICD-10-CM

## 2018-12-17 PROCEDURE — 1160F RVW MEDS BY RX/DR IN RCRD: CPT | Performed by: FAMILY MEDICINE

## 2018-12-17 PROCEDURE — 1036F TOBACCO NON-USER: CPT | Performed by: FAMILY MEDICINE

## 2018-12-17 PROCEDURE — 99214 OFFICE O/P EST MOD 30 MIN: CPT | Performed by: FAMILY MEDICINE

## 2018-12-17 RX ORDER — FLUTICASONE PROPIONATE 50 MCG
2 SPRAY, SUSPENSION (ML) NASAL DAILY
Qty: 16 G | Refills: 5 | Status: SHIPPED | OUTPATIENT
Start: 2018-12-17 | End: 2020-06-29 | Stop reason: SDUPTHER

## 2018-12-17 NOTE — ASSESSMENT & PLAN NOTE
Patient was recommended to stay well hydrated, avoid NSAID's  Will continue to monitor BUN, creatinine

## 2018-12-17 NOTE — ASSESSMENT & PLAN NOTE
Lab Results   Component Value Date    HGBA1C 6 4 (H) 06/26/2018       No results for input(s): POCGLU in the last 72 hours  Blood Sugar Average: Last 72 hrs:    Type 2 DM - diet controlled  Monitor blood sugar at home 2-3 times per week  Follow a low carb diet  Check eye exam by ophthalmologist annually  Follow-up with podiatry for routine foot care

## 2018-12-17 NOTE — PROGRESS NOTES
Chief Complaint   Patient presents with    Follow-up     4 month follow up     Health Maintenance   Topic Date Due   [facility] Annual Wellness Visit (AWV)  12/27/1925    DTaP,Tdap,and Td Vaccines (1 - Tdap) 12/27/1946    Pneumococcal PPSV23/PCV13 65+ Years / Low and Medium Risk (2 of 2 - PPSV23) 09/01/2016    DM Eye Exam  09/15/2016    Urinary Incontinence Screening  02/07/2018    HEMOGLOBIN A1C  12/26/2018    URINE MICROALBUMIN  06/26/2019    Fall Risk  06/28/2019    Depression Screening PHQ  06/28/2019    Diabetic Foot Exam  10/05/2019    INFLUENZA VACCINE  Completed     Assessment/Plan:    Hypertension  BP is stable  Continue Losartan 100 mg daily, Caduet 10/20 mg daily  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)  Lab Results   Component Value Date    HGBA1C 6 4 (H) 06/26/2018       No results for input(s): POCGLU in the last 72 hours  Blood Sugar Average: Last 72 hrs:    Type 2 DM - diet controlled  Monitor blood sugar at home 2-3 times per week  Follow a low carb diet  Check eye exam by ophthalmologist annually  Follow-up with podiatry for routine foot care  Hyperlipidemia  Continue statin  Check CMP, lipid panel  Hypothyroidism  Continue Levothyroxine 137 mcg daily  Check TSH level  Chronic kidney disease, stage 3  Patient was recommended to stay well hydrated, avoid NSAID's  Will continue to monitor BUN, creatinine  Generalized osteoarthritis  The patient c/o R hip pain  Schedule appointment with orthopedic surgeon next month  Ambulatory dysfunction  Discussed fall precautions  Continue to use a walker for ambulation  Schedule follow-up visit in 4 months       Diagnoses and all orders for this visit:    Essential hypertension    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 hypertension ([address] )    Mixed hyperlipidemia    Acquired hypothyroidism    Chronic kidney disease, stage 3 (HCC)    Generalized osteoarthritis    Ambulatory dysfunction        Subjective: Patient ID: Pollo Morrison is a 80 y o  female  HPI     Patient presents accompanied by her daughter for   4 months follow-up of chronic medical conditions  PMHx: HTN, CKD stage 3, Hypothyroidism, Type 2 DM - diet controlled, Hyperli[idemia, generalized OA, ambulatory dysfunction, peripheral neuropathy  Reviewed current medications, blood work results from November 27, 2018  Hemoglobin 12 5 ( improved from 11 5 in 8/18)  Iron 63  HTN - blood pressure is stable  Patient takes Losartan 100 mg daily, Caduet 10/20 mg daily  Denies chest pain, SOB, dizziness  Type 2 DM - diet controlled  Hb A1c was 6 4 in 6/18  Patient checks blood sugar at home 2-3 times per week  Blood sugar ranges 113-130's  Hypothyroidism - currently taking Levothyroxine 137 mcg daily  Weight has been stable  C/o mild fatigue  Patient has PVD, S/P endovascular AAA repair performed by vascular surgeon Dr Fish Ramirez in March 2016  Patient walks with a walker  No recent falls  She is S/P left total knee replacement in July 2018  Denies left knee pain  She does not take any medications for pain currently  Patient states that she has been having some pain in right hip  Her right leg is shorter than left and she walks with a limp  Patient will schedule appointment with orthopedic surgeon next month to discuss orthopedic shoes  Prevnar 13 done in 9/15  The following portions of the patient's history were reviewed and updated as appropriate: allergies, current medications, past medical history, past social history, past surgical history and problem list     Review of Systems   Constitutional: Positive for fatigue (mild)  Negative for activity change, appetite change, chills and fever  HENT: Positive for hearing loss  Negative for congestion, ear pain, nosebleeds, sore throat, tinnitus and trouble swallowing  Eyes: Negative for pain, discharge, redness, itching and visual disturbance  Respiratory: Negative for cough, chest tightness, shortness of breath and wheezing  Cardiovascular: Negative for chest pain, palpitations and leg swelling  Gastrointestinal: Negative for abdominal pain, blood in stool, constipation, diarrhea, nausea and vomiting  Genitourinary: Positive for frequency  Negative for difficulty urinating, dysuria, flank pain and hematuria  Musculoskeletal: Positive for arthralgias (R hip), back pain (occasionally) and gait problem (amulates with a walker)  Negative for joint swelling and myalgias  Skin: Negative for rash and wound  Neurological: Positive for numbness (in feet, hands)  Negative for dizziness, syncope and headaches  Hematological: Negative  Psychiatric/Behavioral: Negative  Objective:      /70 (BP Location: Left arm, Patient Position: Sitting, Cuff Size: Adult)   Pulse 80   Temp 97 6 °F (36 4 °C) (Oral)   Resp 16   Ht 5' 1" (1 549 m)   Wt 67 5 kg (148 lb 12 8 oz)   SpO2 99%   BMI 28 12 kg/m²          Physical Exam   Constitutional: She appears well-nourished  HENT:   Head: Normocephalic and atraumatic  Right Ear: External ear normal    Left Ear: External ear normal    Mouth/Throat: Oropharynx is clear and moist    Eyes: Pupils are equal, round, and reactive to light  Conjunctivae are normal    Neck: Normal range of motion  Neck supple  No JVD present  Cardiovascular: Normal rate, regular rhythm and normal heart sounds  No murmur heard  No carotid bruits  BL  No BL  LE edema   Pulmonary/Chest: Effort normal and breath sounds normal  She has no wheezes  She has no rales  Abdominal: Soft  Bowel sounds are normal  There is no tenderness  Musculoskeletal:   Patient walks with a limp  Ambulates with a walker  Skin: Skin is warm and dry  No rash noted  Psychiatric: She has a normal mood and affect  Her behavior is normal    Vitals reviewed

## 2018-12-18 ENCOUNTER — TELEPHONE (OUTPATIENT)
Dept: FAMILY MEDICINE CLINIC | Facility: CLINIC | Age: 83
End: 2018-12-18

## 2018-12-18 NOTE — TELEPHONE ENCOUNTER
This prescription had printed instead of sending electronically, so I faxed it to the pharmacy and let Loly Collier know

## 2018-12-18 NOTE — TELEPHONE ENCOUNTER
Patient's daughter said they were in yesterday to see Dr MENDEZ and she prescribed fluticasone 50 mcg and it was to go to [facility]  Daughter called to say [facility] did not received a script  Daughter can be reached at [phone] any questions

## 2019-01-10 ENCOUNTER — TRANSCRIBE ORDERS (OUTPATIENT)
Dept: ADMINISTRATIVE | Age: 84
End: 2019-01-10

## 2019-01-10 ENCOUNTER — APPOINTMENT (OUTPATIENT)
Dept: LAB | Age: 84
End: 2019-01-10
Payer: COMMERCIAL

## 2019-01-10 DIAGNOSIS — I10 ESSENTIAL HYPERTENSION: ICD-10-CM

## 2019-01-10 DIAGNOSIS — I12.9 PARENCHYMAL RENAL HYPERTENSION, STAGE 1 THROUGH STAGE 4 OR UNSPECIFIED CHRONIC KIDNEY DISEASE: ICD-10-CM

## 2019-01-10 DIAGNOSIS — N18.30 CHRONIC KIDNEY DISEASE, STAGE III (MODERATE) (HCC): ICD-10-CM

## 2019-01-10 DIAGNOSIS — N18.30 TYPE 2 DM WITH CKD STAGE 3 AND HYPERTENSION (HCC): ICD-10-CM

## 2019-01-10 DIAGNOSIS — E03.9 ACQUIRED HYPOTHYROIDISM: ICD-10-CM

## 2019-01-10 DIAGNOSIS — E78.2 MIXED HYPERLIPIDEMIA: ICD-10-CM

## 2019-01-10 DIAGNOSIS — I12.9 TYPE 2 DM WITH CKD STAGE 3 AND HYPERTENSION (HCC): ICD-10-CM

## 2019-01-10 DIAGNOSIS — N18.6 TYPE 2 DIABETES MELLITUS WITH ESRD (END-STAGE RENAL DISEASE) (HCC): Primary | ICD-10-CM

## 2019-01-10 DIAGNOSIS — E11.22 TYPE 2 DM WITH CKD STAGE 3 AND HYPERTENSION (HCC): ICD-10-CM

## 2019-01-10 DIAGNOSIS — E11.22 TYPE 2 DIABETES MELLITUS WITH ESRD (END-STAGE RENAL DISEASE) (HCC): Primary | ICD-10-CM

## 2019-01-10 DIAGNOSIS — E03.9 ACQUIRED HYPOTHYROIDISM: Primary | ICD-10-CM

## 2019-01-10 LAB
ALBUMIN SERPL BCP-MCNC: 4.3 G/DL (ref 3.5–5)
ALP SERPL-CCNC: 71 U/L (ref 46–116)
ALT SERPL W P-5'-P-CCNC: 24 U/L (ref 12–78)
ANION GAP SERPL CALCULATED.3IONS-SCNC: 9 MMOL/L (ref 4–13)
AST SERPL W P-5'-P-CCNC: 14 U/L (ref 5–45)
BACTERIA UR QL AUTO: ABNORMAL /HPF
BILIRUB SERPL-MCNC: 1.31 MG/DL (ref 0.2–1)
BILIRUB UR QL STRIP: NEGATIVE
BUN SERPL-MCNC: 37 MG/DL (ref 5–25)
CALCIUM SERPL-MCNC: 9.6 MG/DL (ref 8.3–10.1)
CHLORIDE SERPL-SCNC: 104 MMOL/L (ref 100–108)
CHOLEST SERPL-MCNC: 152 MG/DL (ref 50–200)
CLARITY UR: CLEAR
CO2 SERPL-SCNC: 25 MMOL/L (ref 21–32)
COLOR UR: YELLOW
CREAT SERPL-MCNC: 1.37 MG/DL (ref 0.6–1.3)
CREAT UR-MCNC: 70.2 MG/DL
EST. AVERAGE GLUCOSE BLD GHB EST-MCNC: 137 MG/DL
GFR SERPL CREATININE-BSD FRML MDRD: 33 ML/MIN/1.73SQ M
GLUCOSE P FAST SERPL-MCNC: 114 MG/DL (ref 65–99)
GLUCOSE UR STRIP-MCNC: NEGATIVE MG/DL
HBA1C MFR BLD: 6.4 % (ref 4.2–6.3)
HDLC SERPL-MCNC: 87 MG/DL (ref 40–60)
HGB UR QL STRIP.AUTO: NEGATIVE
HYALINE CASTS #/AREA URNS LPF: ABNORMAL /LPF
KETONES UR STRIP-MCNC: NEGATIVE MG/DL
LDLC SERPL CALC-MCNC: 49 MG/DL (ref 0–100)
LEUKOCYTE ESTERASE UR QL STRIP: ABNORMAL
MICROALBUMIN UR-MCNC: 33.3 MG/L (ref 0–20)
MICROALBUMIN/CREAT 24H UR: 47 MG/G CREATININE (ref 0–30)
NITRITE UR QL STRIP: NEGATIVE
NON-SQ EPI CELLS URNS QL MICRO: ABNORMAL /HPF
NONHDLC SERPL-MCNC: 65 MG/DL
PH UR STRIP.AUTO: 6.5 [PH] (ref 4.5–8)
POTASSIUM SERPL-SCNC: 4.3 MMOL/L (ref 3.5–5.3)
PROT SERPL-MCNC: 7.7 G/DL (ref 6.4–8.2)
PROT UR STRIP-MCNC: NEGATIVE MG/DL
RBC #/AREA URNS AUTO: ABNORMAL /HPF
SODIUM SERPL-SCNC: 138 MMOL/L (ref 136–145)
SP GR UR STRIP.AUTO: 1.02 (ref 1–1.03)
T4 FREE SERPL-MCNC: 1.83 NG/DL (ref 0.76–1.46)
TRIGL SERPL-MCNC: 80 MG/DL
TSH SERPL DL<=0.05 MIU/L-ACNC: 0.25 UIU/ML (ref 0.36–3.74)
UROBILINOGEN UR QL STRIP.AUTO: 0.2 E.U./DL
WBC #/AREA URNS AUTO: ABNORMAL /HPF

## 2019-01-10 PROCEDURE — 80061 LIPID PANEL: CPT

## 2019-01-10 PROCEDURE — 82570 ASSAY OF URINE CREATININE: CPT | Performed by: FAMILY MEDICINE

## 2019-01-10 PROCEDURE — 84443 ASSAY THYROID STIM HORMONE: CPT

## 2019-01-10 PROCEDURE — 80053 COMPREHEN METABOLIC PANEL: CPT

## 2019-01-10 PROCEDURE — 81001 URINALYSIS AUTO W/SCOPE: CPT | Performed by: FAMILY MEDICINE

## 2019-01-10 PROCEDURE — 36415 COLL VENOUS BLD VENIPUNCTURE: CPT

## 2019-01-10 PROCEDURE — 84439 ASSAY OF FREE THYROXINE: CPT

## 2019-01-10 PROCEDURE — 83036 HEMOGLOBIN GLYCOSYLATED A1C: CPT

## 2019-01-10 PROCEDURE — 82043 UR ALBUMIN QUANTITATIVE: CPT | Performed by: FAMILY MEDICINE

## 2019-01-10 RX ORDER — LEVOTHYROXINE SODIUM 0.12 MG/1
125 TABLET ORAL DAILY
Qty: 30 TABLET | Refills: 6 | Status: SHIPPED | OUTPATIENT
Start: 2019-01-10 | End: 2019-03-13 | Stop reason: SDUPTHER

## 2019-01-22 ENCOUNTER — OFFICE VISIT (OUTPATIENT)
Dept: OBGYN CLINIC | Facility: HOSPITAL | Age: 84
End: 2019-01-22
Payer: COMMERCIAL

## 2019-01-22 ENCOUNTER — HOSPITAL ENCOUNTER (OUTPATIENT)
Dept: RADIOLOGY | Facility: HOSPITAL | Age: 84
Discharge: HOME | End: 2019-01-22
Attending: ORTHOPAEDIC SURGERY
Payer: COMMERCIAL

## 2019-01-22 VITALS
HEIGHT: 61 IN | DIASTOLIC BLOOD PRESSURE: 72 MMHG | SYSTOLIC BLOOD PRESSURE: 146 MMHG | HEART RATE: 77 BPM | BODY MASS INDEX: 28.12 KG/M2

## 2019-01-22 DIAGNOSIS — M21.70 LEG LENGTH INEQUALITY: ICD-10-CM

## 2019-01-22 DIAGNOSIS — M25.551 RIGHT HIP PAIN: ICD-10-CM

## 2019-01-22 DIAGNOSIS — Z96.652 AFTERCARE FOLLOWING LEFT KNEE JOINT REPLACEMENT SURGERY: Primary | ICD-10-CM

## 2019-01-22 DIAGNOSIS — Z47.1 AFTERCARE FOLLOWING LEFT KNEE JOINT REPLACEMENT SURGERY: Primary | ICD-10-CM

## 2019-01-22 DIAGNOSIS — M25.562 CHRONIC PAIN OF LEFT KNEE: ICD-10-CM

## 2019-01-22 DIAGNOSIS — Z47.1 AFTERCARE FOLLOWING LEFT KNEE JOINT REPLACEMENT SURGERY: ICD-10-CM

## 2019-01-22 DIAGNOSIS — G89.29 CHRONIC PAIN OF LEFT KNEE: ICD-10-CM

## 2019-01-22 DIAGNOSIS — Z96.652 AFTERCARE FOLLOWING LEFT KNEE JOINT REPLACEMENT SURGERY: ICD-10-CM

## 2019-01-22 DIAGNOSIS — M70.61 TROCHANTERIC BURSITIS OF RIGHT HIP: ICD-10-CM

## 2019-01-22 PROCEDURE — 73560 X-RAY EXAM OF KNEE 1 OR 2: CPT

## 2019-01-22 PROCEDURE — 99214 OFFICE O/P EST MOD 30 MIN: CPT | Performed by: ORTHOPAEDIC SURGERY

## 2019-01-22 NOTE — PROGRESS NOTES
ASSESSMENT/PLAN:    Assessment:   80-year-old female 6 months since left total knee arthroplasty, currently with leg-length discrepancy    Plan:   - orthotics RLE  - f/u in 6 months for revaluation repeat x-ray left knee        _____________________________________________________  CHIEF COMPLAINT:  Chief Complaint   Patient presents with    Left Knee - Post-op         SUBJECTIVE:   Too Cuevas is a 80y o  year old female who  presents 6 months after left total knee arthroplasty  Patient is doing well, no no pain left knee  She does complain of leg length discrepancy  PAST MEDICAL HISTORY:  Past Medical History:   Diagnosis Date    AAA [facility]abdominal aortic aneurysm) ([facility])     s/p EVAR 3/10/16    Allergic urticaria     LAST ASSESSED: [id number]    Appendicitis     Arthritis     CKD (chronic kidney disease), stage III ([facility])     Coronary artery disease     Diabetes mellitus ([facility])     Eczema     Gross hematuria     LAST ASSESSED: [id number]    Hematuria     Hypertension     Hyponatremia     Hypothyroid     Osteoporosis     LAST ASSESSED: [id number]    PONV (postoperative nausea and vomiting)     Primary osteoarthritis of left knee     LAST ASSESSED: [id number]    Renal disorder     Renal insufficiency     Rotator cuff tear arthropathy     LEFT LAST ASSESSED: [id number]    Secondary osteoarthritis of right shoulder     LAST ASSESSED: [id number]    Syncope        PAST SURGICAL HISTORY:  Past Surgical History:   Procedure Laterality Date    APPENDECTOMY      BACK SURGERY      CATARACT EXTRACTION W/  INTRAOCULAR LENS IMPLANT Bilateral     KNEE ARTHROSCOPY Left     ONSET: [id number] THERAPEUTIC    LUMBAR LAMINECTOMY      [place] AAA REPAIR,MODULR BIFURCATED PROSTH N/A 3/10/2016    Procedure: REPAIR ANEURYSM ENDOVASCULAR ABDOMINAL AORTIC  (EVAR) ;   Surgeon: Michell Crisostomo MD;  [facility];  Service: Vascular    [place] RECONSTR TOTAL SHOULDER IMPLANT Left 5/31/2016    Procedure: ARTHROPLASTY SHOULDER REVERSE;  Surgeon: Husam Abraham MD;  [facility];  Service: Orthopedics    MI TOTAL KNEE ARTHROPLASTY Left 7/23/2018    Procedure: KNEE : COMPLETE REPLACEMENT;  Surgeon: Karen Garrido MD;  [facility];  Service: Orthopedics    REVERSE TOTAL SHOULDER ARTHROPLASTY Right     ROTATOR CUFF REPAIR Left     RC SURGERY RESOLVED: 1999    SHOULDER ARTHROPLASTY      5/15 - FOR RIGHT SHOULDER; 5/16 - FOR LEFT SHOULDER    SHOULDER ARTHROSCOPY      TOTAL HIP ARTHROPLASTY Left     ONSET: [id number]    TOTAL HIP ARTHROPLASTY  05/31/2006    REVISION       FAMILY HISTORY:  Family History   Problem Relation Age of Onset    Coronary artery disease Mother     Diabetes Mother     Coronary artery disease Father     Cancer Sister     Arthritis Sister     Unexplained death Family         RAPID    Cancer Daughter        SOCIAL HISTORY:  Social History   Substance Use Topics    Smoking status: Never Smoker    Smokeless tobacco: Never Used    Alcohol use No       MEDICATIONS:    Current Outpatient Prescriptions:     acetaminophen (TYLENOL) 325 mg tablet, Take 2 tablets (650 mg total) by mouth every 6 (six) hours as needed for mild pain , Disp: 30 tablet, Rfl: 0    amLODIPine-atorvastatin (CADUET) 10-20 MG per tablet, Take 1 tablet by mouth daily at bedtime, Disp: 90 tablet, Rfl: 3    calcium citrate-vitamin D (CITRACAL+D) 315-200 MG-UNIT per tablet, Take 1 tablet by mouth daily   Calcium + D TABS  Refills: 0   , M D ; Active , Disp: , Rfl:     clobetasol (TEMOVATE) 0 05 % cream, Apply 1 application topically 2 (two) times a day, Disp: , Rfl:     diclofenac sodium (VOLTAREN) 1 %, Apply 2 g topically 4 (four) times a day (Patient not taking: Reported on 12/17/2018 ), Disp: 100 g, Rfl: 3    fexofenadine (ALLEGRA) 180 MG tablet, Take 1 tablet by mouth as needed  , Disp: , Rfl:     Fluocinolone Acetonide Scalp (DERMA-SMOOTHE/FS SCALP) 0 01 % OIL, Apply topically, Disp: , Rfl:     fluticasone (FLONASE) 50 mcg/act nasal spray, 2 sprays into each nostril daily Fluticasone Propionate 50 MCG/ACT Nasal Suspension use 2 spr  in each nostril daily  Quantity: 1;  Refills: 5    Ezzie Battles M D ;  Started 23-Sep-2014 Active, Disp: 16 g, Rfl: 5    Foot Care Products [facility] GEL HEEL CUP MED/LG) MISC, 3A since heel lift for right side, Disp: 1 each, Rfl: 0    ketoconazole (NIZORAL) 2 % shampoo, Apply 1 application topically once, Disp: , Rfl:     levothyroxine 125 mcg tablet, Take 1 tablet (125 mcg total) by mouth daily, Disp: 30 tablet, Rfl: 6    losartan (COZAAR) 100 MG tablet, Take 1 tablet (100 mg total) by mouth daily Losartan Potassium 100 MG Oral Tablet take 1 tablet once daily  Quantity: 90;  Refills: 3    Ezzie Battles M D ; Active, Disp: 90 tablet, Rfl: 3    Magnesium 250 MG TABS, 1 tablet daily  Magnesium 250 MG Oral Tablet  Quantity: 0;  Refills: 0   , M D ; Active , Disp: , Rfl:     Multiple Vitamins-Minerals (MULTIVITAMIN & MINERAL PO), 1 tablet daily  Multivitamin & Mineral Oral Liquid  Quantity: 0;  Refills: 0   , M D ; Active , Disp: , Rfl:     ONE TOUCH ULTRA TEST test strip, USE AS DIRECTED, Disp: 100 each, Rfl: 3    sodium chloride 1 g tablet, Take 1 tablet (1 g total) by mouth daily Sodium Chloride 1 GM Oral Tablet Take 1 tab  Daily  Quantity: 30;  Refills: 5    Ezzie Battles M D ;  Started 30-Apr-2015 Active, Disp: 30 tablet, Rfl: 6    ALLERGIES:  Allergies   Allergen Reactions    Bextra [Valdecoxib] Rash     GI INTOL    Percocet [Oxycodone-Acetaminophen] Rash and GI Intolerance       REVIEW OF SYSTEMS:  Pertinent items are noted in HPI  A comprehensive review of systems was negative      LABS:  HgA1c:   Lab Results   Component Value Date    HGBA1C 6 4 (H) 01/10/2019     BMP:   Lab Results   Component Value Date    GLUCOSE 153 (H) 01/07/2016    CALCIUM 9 6 01/10/2019     (L) 10/03/2017    K 4 3 01/10/2019    CO2 25 01/10/2019     01/10/2019    BUN 37 (H) 01/10/2019    CREATININE 1 37 (H) 01/10/2019       _____________________________________________________  PHYSICAL EXAMINATION:  General: well developed and well nourished, alert, oriented times 3 and appears comfortable  Psychiatric: Normal  HEENT: Trachea Midline, No torticollis  Cardiovascular: No discernable arrhythmia  Pulmonary: No wheezing or stridor  Skin: No masses, erthema, lacerations, fluctation, ulcerations  Neurovascular: Sensation Intact to the Median, Ulnar, Radial Nerve, Motor Intact to the Median, Ulnar, Radial Nerve and Pulses Intact    MUSCULOSKELETAL EXAMINATION:  LLE:  Extension 0°, flexion 120°, stable varus valgus stress at 0 and 30°, leg lengths was several cm difference on gross exam      _____________________________________________________  STUDIES REVIEWED:  L knee today:  No daniel plan lucencies or fractures around the left total knee arthroplasty      PROCEDURES PERFORMED:  Procedures  No Procedures performed today

## 2019-01-25 ENCOUNTER — TELEPHONE (OUTPATIENT)
Dept: OBGYN CLINIC | Facility: HOSPITAL | Age: 84
End: 2019-01-25

## 2019-01-25 NOTE — TELEPHONE ENCOUNTER
Spoke w/ pt's daughter, given appt Tuesday morning 1/29/2019 at 10:30AM in the [facility] [place] [place]  Adam Lopez

## 2019-01-25 NOTE — TELEPHONE ENCOUNTER
Patient sees Dr Farris Medico  Patient is trying to get her orthotics but they need the exact measurements  They need numbers showing what the discrepancy is with her leg

## 2019-01-25 NOTE — TELEPHONE ENCOUNTER
I reviewed the message    I called the patient    I will need her to come to the [place] office next week    I will need to do a scanogram and use the shoe box to measure her leg length discrepancy    The daughter found this very satisfactory    We will then relay the information to Dominga garcia

## 2019-01-29 ENCOUNTER — OFFICE VISIT (OUTPATIENT)
Dept: OBGYN CLINIC | Facility: HOSPITAL | Age: 84
End: 2019-01-29
Payer: COMMERCIAL

## 2019-01-29 ENCOUNTER — HOSPITAL ENCOUNTER (OUTPATIENT)
Dept: RADIOLOGY | Facility: HOSPITAL | Age: 84
Discharge: HOME | End: 2019-01-29
Attending: ORTHOPAEDIC SURGERY
Payer: COMMERCIAL

## 2019-01-29 VITALS
WEIGHT: 148 LBS | HEART RATE: 83 BPM | HEIGHT: 61 IN | SYSTOLIC BLOOD PRESSURE: 150 MMHG | BODY MASS INDEX: 27.94 KG/M2 | DIASTOLIC BLOOD PRESSURE: 72 MMHG

## 2019-01-29 DIAGNOSIS — M21.70 LEG LENGTH DISCREPANCY: ICD-10-CM

## 2019-01-29 DIAGNOSIS — R26.2 AMBULATORY DYSFUNCTION: ICD-10-CM

## 2019-01-29 DIAGNOSIS — M21.70 LEG LENGTH DISCREPANCY: Primary | ICD-10-CM

## 2019-01-29 PROCEDURE — 99212 OFFICE O/P EST SF 10 MIN: CPT | Performed by: ORTHOPAEDIC SURGERY

## 2019-01-29 PROCEDURE — 77073 BONE LENGTH STUDIES: CPT

## 2019-01-29 NOTE — PROGRESS NOTES
Subjective;    24-year-old female patient accompanied by a younger female individual   She is well known to the practice has a history of total hip arthroplasty bilaterally  She is an individual that has been alteration in gait, and feels short on the right side  She has had this addressed by a removable insert to the heel  She feels that she would benefit by having a full sole orthotic or shoe buildup  She is seen today for the purposes of clinical evaluation and imaging to support a proper prescription for her right shoe  A scanogram was performed on arrival to the office  Past Medical History:   Diagnosis Date    AAA (abdominal aortic aneurysm) (HCC)     s/p EVAR 3/10/16    Allergic urticaria     LAST ASSESSED: [id number]    Appendicitis     Arthritis     CKD [facility]chronic [facility] disease[facility] stage III ([facility])     Coronary artery disease     Diabetes mellitus ([facility])     Eczema     Gross hematuria     LAST ASSESSED: [id number]    Hematuria     Hypertension     Hyponatremia     Hypothyroid     Osteoporosis     LAST ASSESSED: [id number]    PONV (postoperative nausea and vomiting)     Primary osteoarthritis of left knee     LAST ASSESSED: [id number]    Renal disorder     Renal insufficiency     Rotator cuff tear arthropathy     LEFT LAST ASSESSED: [id number]    Secondary osteoarthritis of right shoulder     LAST ASSESSED: [id number]    Syncope        Past Surgical History:   Procedure Laterality Date    APPENDECTOMY      BACK SURGERY      CATARACT EXTRACTION W/  INTRAOCULAR LENS IMPLANT Bilateral     KNEE ARTHROSCOPY Left     ONSET: [id number] THERAPEUTIC    LUMBAR LAMINECTOMY      [place] AAA REPAIR,MODULR BIFURCATED PROSTH N/A 3/10/2016    Procedure: REPAIR ANEURYSM ENDOVASCULAR ABDOMINAL AORTIC  (EVAR) ;   Surgeon: Alyson Reynolds MD;  [facility];  Service: Vascular    OR RECONSTR TOTAL SHOULDER IMPLANT Left 5/31/2016    Procedure: ARTHROPLASTY SHOULDER REVERSE;  Surgeon: Agnieszka Kahn MD; [facility];  Service: Orthopedics    [place] TOTAL KNEE ARTHROPLASTY Left 7/23/2018    Procedure: KNEE : COMPLETE REPLACEMENT;  Surgeon: Levi Silveira MD;  [facility];  Service: Orthopedics    REVERSE TOTAL SHOULDER ARTHROPLASTY Right     ROTATOR CUFF REPAIR Left     RC SURGERY RESOLVED: 1999    SHOULDER ARTHROPLASTY      5/15 - FOR RIGHT SHOULDER; 5/16 - FOR LEFT SHOULDER    SHOULDER ARTHROSCOPY      TOTAL HIP ARTHROPLASTY Left     ONSET: [id number]    TOTAL HIP ARTHROPLASTY  05/31/2006    REVISION       Family History   Problem Relation Age of Onset    Coronary artery disease Mother     Diabetes Mother     Coronary artery disease Father     Cancer Sister     Arthritis Sister     Unexplained death Family         RAPID    Cancer Daughter        Social History   Substance Use Topics    Smoking status: Never Smoker    Smokeless tobacco: Never Used    Alcohol use No     Exam;    Seated comfortably in the chair it is noticed that the length of her hip to patella is shorter on the right than on the left  These are not symmetrical by inspection  When she stands when the right knee is locked out she has to bend the left knee to accommodate the shorter length, she also found that she was unable to do abduction hip exercises on the left because of the shorter distance of her right lower extremity  She is known to have a spondylotic scoliotic spine, this is seen incidentally on other imaging such as the AP pelvis  A graduated shoe box was used and the most comfortable fit was 3 8th of an inch heel and sole  A buildup of 5 8th of an inch and beyond over corrected her and accentuated in gave her a hip hiking on the right side  She was observed to walk 100 ft with the buildup from the shoe box it was provided  Scanogram; despite all the inspection and clinical findings the scanogram allude to just mm of difference    Therefore her leg length discrepancy is accentuated by clinical findings by her lumbosacral spine pelvis and function  Impression; Alteration in gait  Leg length inequality  History of total hip replacements right and left hip    Plan;    I discussed her case with [employer] shoes  I have provided them with a prescription for 3/8 inch buildup heel and sole right shoe only  This will negate her need to use the heel inser,   t the interchangeable heal buildup that she currently has    Both the patient and her accompanying family member felt well served at today's visit  Her experience was supervised by and plan formulated by the attending surgeon, it was my privilege to assist him in its delivery

## 2019-02-11 DIAGNOSIS — E11.9 TYPE 2 DIABETES MELLITUS WITHOUT COMPLICATION, WITHOUT LONG-TERM CURRENT USE OF INSULIN (HCC): Primary | ICD-10-CM

## 2019-03-12 ENCOUNTER — LAB (OUTPATIENT)
Dept: LAB | Age: 84
End: 2019-03-12
Payer: COMMERCIAL

## 2019-03-12 DIAGNOSIS — E03.9 ACQUIRED HYPOTHYROIDISM: ICD-10-CM

## 2019-03-12 LAB
T4 FREE SERPL-MCNC: 1.4 NG/DL (ref 0.76–1.46)
TSH SERPL DL<=0.05 MIU/L-ACNC: 0.55 UIU/ML (ref 0.36–3.74)

## 2019-03-12 PROCEDURE — 84443 ASSAY THYROID STIM HORMONE: CPT

## 2019-03-12 PROCEDURE — 84439 ASSAY OF FREE THYROXINE: CPT

## 2019-03-12 PROCEDURE — 36415 COLL VENOUS BLD VENIPUNCTURE: CPT

## 2019-03-13 DIAGNOSIS — E03.9 ACQUIRED HYPOTHYROIDISM: ICD-10-CM

## 2019-03-13 RX ORDER — LEVOTHYROXINE SODIUM 0.12 MG/1
125 TABLET ORAL DAILY
Qty: 90 TABLET | Refills: 3 | Status: SHIPPED | OUTPATIENT
Start: 2019-03-13 | End: 2020-04-13 | Stop reason: SDUPTHER

## 2019-03-13 NOTE — RESULT ENCOUNTER NOTE
Gave patient's daughter the results and instructions  She requested a 90 day supply refill sent to [facility]

## 2019-03-20 ENCOUNTER — OFFICE VISIT (OUTPATIENT)
Dept: OBGYN CLINIC | Facility: HOSPITAL | Age: 84
End: 2019-03-20
Payer: COMMERCIAL

## 2019-03-20 VITALS
WEIGHT: 144.8 LBS | SYSTOLIC BLOOD PRESSURE: 150 MMHG | HEIGHT: 61 IN | HEART RATE: 83 BPM | DIASTOLIC BLOOD PRESSURE: 66 MMHG | BODY MASS INDEX: 27.34 KG/M2

## 2019-03-20 DIAGNOSIS — M21.70 LEG LENGTH DISCREPANCY: Primary | ICD-10-CM

## 2019-03-20 DIAGNOSIS — M25.551 RIGHT HIP PAIN: ICD-10-CM

## 2019-03-20 DIAGNOSIS — R26.2 AMBULATORY DYSFUNCTION: ICD-10-CM

## 2019-03-20 PROCEDURE — 99213 OFFICE O/P EST LOW 20 MIN: CPT | Performed by: ORTHOPAEDIC SURGERY

## 2019-03-20 NOTE — PROGRESS NOTES
Assessment:  1  Leg length discrepancy  Ambulatory referral to Physical Therapy   2  Right hip pain  Ambulatory referral to Physical Therapy       Plan:  She is to start physical therapy to help strengthen her right hip and gait training with shoe lift  She may continue activity as tolerated  She is to continue with rolater walker assistance while ambulating  She may take NSAIDS for pain control as needed  To do next visit:  Return in about 3 months (around 6/20/2019) for Recheck right hip , limb length discrepancy       The above stated was discussed in layman's terms and the patient expressed understanding  All questions were answered to the patient's satisfaction  Scribe Attestation    I,:   Pily Martinez am acting as a scribe while in the presence of the attending physician :        I,:   Mey García MD personally performed the services described in this documentation    as scribed in my presence :              Subjective: Enma Pompa is a 80 y o  female who presents in the office for follow up for limb length discrepancy right shorter than left  She had a lift put in the right shoe  She states she feels apprehensive with her right hip given out  She continues to walk with a Rolator walker        Review of systems negative unless otherwise specified in HPI    Past Medical History:   Diagnosis Date    AAA (abdominal aortic aneurysm) ([facility])     s/p EVAR 3/10/16    Allergic urticaria     LAST ASSESSED: [id number]    Appendicitis     Arthritis     CKD (chronic kidney disease), stage III ([facility])     Coronary artery disease     Diabetes mellitus ([facility] [address] )     Eczema     Gross hematuria     LAST ASSESSED: [id number]    Hematuria     Hypertension     Hyponatremia     Hypothyroid     Osteoporosis     LAST ASSESSED: [id number]    PONV (postoperative nausea and vomiting)     Primary osteoarthritis of left knee     LAST ASSESSED: [id number]    Renal disorder     Renal insufficiency     Rotator cuff tear arthropathy     LEFT LAST ASSESSED: [id number]    Secondary osteoarthritis of right shoulder     LAST ASSESSED: [id number]    Syncope        Past Surgical History:   Procedure Laterality Date    APPENDECTOMY      BACK SURGERY      CATARACT EXTRACTION W/  INTRAOCULAR LENS IMPLANT Bilateral     KNEE ARTHROSCOPY Left     ONSET: [id number] THERAPEUTIC    LUMBAR LAMINECTOMY      [place] AAA REPAIR,MODULR BIFURCATED PROSTH N/A 3/10/2016    Procedure: REPAIR ANEURYSM ENDOVASCULAR ABDOMINAL AORTIC  (EVAR) ;   Surgeon: Ora Hood MD;  [facility];  Service: Vascular    [place] RECONSTR TOTAL SHOULDER IMPLANT Left 5/31/2016    Procedure: ARTHROPLASTY SHOULDER REVERSE;  Surgeon: Ad Smiley MD;  [facility];  Service: Orthopedics    [place] TOTAL KNEE ARTHROPLASTY Left 7/23/2018    Procedure: KNEE : COMPLETE REPLACEMENT;  Surgeon: Micheal Davis MD;  [facility];  Service: Orthopedics    REVERSE TOTAL SHOULDER ARTHROPLASTY Right     ROTATOR CUFF REPAIR Left     [facility] SURGERY RESOLVED: 1999    SHOULDER ARTHROPLASTY      5/15 - FOR RIGHT SHOULDER; 5/16 - FOR LEFT SHOULDER    SHOULDER ARTHROSCOPY      TOTAL HIP ARTHROPLASTY Left     ONSET: [id number]    TOTAL HIP ARTHROPLASTY  05/31/2006    REVISION       Family History   Problem Relation Age of Onset    Coronary artery disease Mother     Diabetes Mother     Coronary artery disease Father     Cancer Sister     Arthritis Sister     Unexplained death Family         RAPID    Cancer Daughter        Social History     Occupational History    Occupation: RETIRED   Tobacco Use    Smoking status: Never Smoker    Smokeless tobacco: Never Used   Substance and Sexual Activity    Alcohol use: No    Drug use: No    Sexual activity: Not on file         Current Outpatient Medications:     acetaminophen (TYLENOL) 325 mg tablet, Take 2 tablets (650 mg total) by mouth every 6 (six) hours as needed for mild pain , Disp: 30 tablet, Rfl: 0   amLODIPine-atorvastatin (CADUET) 10-20 MG per tablet, Take 1 tablet by mouth daily at bedtime, Disp: 90 tablet, Rfl: 3    calcium citrate-vitamin D (CITRACAL+D) 315-200 MG-UNIT per tablet, Take 1 tablet by mouth daily  Calcium + D TABS  Refills: 0   , M D ; Active , Disp: , Rfl:     clobetasol (TEMOVATE) 0 05 % cream, Apply 1 application topically 2 (two) times a day, Disp: , Rfl:     diclofenac sodium (VOLTAREN) 1 %, Apply 2 g topically 4 (four) times a day (Patient not taking: Reported on 12/17/2018 ), Disp: 100 g, Rfl: 3    fexofenadine (ALLEGRA) 180 MG tablet, Take 1 tablet by mouth as needed  , Disp: , Rfl:     Fluocinolone Acetonide Scalp (DERMA-SMOOTHE/FS SCALP) 0 01 % OIL, Apply topically, Disp: , Rfl:     fluticasone (FLONASE) 50 mcg/act nasal spray, 2 sprays into each nostril daily Fluticasone Propionate 50 MCG/ACT Nasal Suspension use 2 spr  in each nostril daily  Quantity: 1;  Refills: 5    Juna Hurter M D ;  Started 23-Sep-2014 Active, Disp: 16 g, Rfl: 5    Foot Care Products [facility] GEL HEEL CUP MED/LG) MISC, 3A since heel lift for right side, Disp: 1 each, Rfl: 0    ketoconazole (NIZORAL) 2 % shampoo, Apply 1 application topically once, Disp: , Rfl:     levothyroxine 125 mcg tablet, Take 1 tablet (125 mcg total) by mouth daily for 90 days, Disp: 90 tablet, Rfl: 3    losartan (COZAAR) 100 MG tablet, Take 1 tablet (100 mg total) by mouth daily Losartan Potassium 100 MG Oral Tablet take 1 tablet once daily  Quantity: 90;  Refills: 3    Juna Hurter M D ; Active, Disp: 90 tablet, Rfl: 3    Magnesium 250 MG TABS, 1 tablet daily  Magnesium 250 MG Oral Tablet  Quantity: 0;  Refills: 0   , M D ; Active , Disp: , Rfl:     Multiple Vitamins-Minerals (MULTIVITAMIN & MINERAL PO), 1 tablet daily   Multivitamin & Mineral Oral Liquid  Quantity: 0;  Refills: 0   , M D ; Active , Disp: , Rfl:     ONE TOUCH ULTRA TEST test strip, USE AS DIRECTED, Disp: 100 each, Rfl: 3    [place] [employer] LANCETS FINE MISC, TEST BLOOD SUGAR ONE TIME DAILY OR AS NEEDED, Disp: 100 each, Rfl: 2    sodium chloride 1 g tablet, Take 1 tablet (1 g total) by mouth daily Sodium Chloride 1 GM Oral Tablet Take 1 tab  Daily  Quantity: 30;  Refills: 5    Tanja GUILLEN GIANFRANCO ;  Started 30-Apr-2015 Active, Disp: 30 tablet, Rfl: 6    Allergies   Allergen Reactions    Bextra [Valdecoxib] Rash     GI INTOL    Percocet [Oxycodone-Acetaminophen] Rash and GI Intolerance            Vitals:    03/20/19 1537   BP: 150/66   Pulse: 83       Objective:                    Right Hip Exam     Tenderness   The patient is experiencing tenderness in the lateral (minimal palpable pain present  )  Range of Motion   The patient has normal right hip ROM  Muscle Strength   Abduction: 4/5   Adduction: 4/5   Flexion: 4/5     Tests   JAVY: negative  Joslyn: negative    Other   Erythema: absent  Scars: present  Sensation: normal  Pulse: present            Diagnostics, reviewed and taken today if performed as documented:    None performed            Procedures, if performed today:    Procedures    None performed    Scribe Attestation    I,:   Matteo De La Paz am acting as a scribe while in the presence of the attending physician :        I,:   Fabi Perez MD personally performed the services described in this documentation    as scribed in my presence :          Portions of the record may have been created with voice recognition software   Occasional wrong word or "sound a like" substitutions may have occurred due to the inherent limitations of voice recognition software   Read the chart carefully and recognize, using context, where substitutions have occurred

## 2019-03-28 ENCOUNTER — EVALUATION (OUTPATIENT)
Dept: PHYSICAL THERAPY | Age: 84
End: 2019-03-28
Payer: COMMERCIAL

## 2019-03-28 DIAGNOSIS — M25.551 RIGHT HIP PAIN: Primary | ICD-10-CM

## 2019-03-28 DIAGNOSIS — M21.70 LEG LENGTH DISCREPANCY: ICD-10-CM

## 2019-03-28 PROCEDURE — 97161 PT EVAL LOW COMPLEX 20 MIN: CPT | Performed by: PHYSICAL THERAPIST

## 2019-03-28 NOTE — PROGRESS NOTES
PT Evaluation     Today's date: 3/28/2019  Patient name: Sheela Munoz  :   MRN: [id number]  Referring provider: Fely Boudreaux MD  Dx:   Encounter Diagnosis     ICD-10-CM    1  Right hip pain M25 551 Ambulatory referral to Physical Therapy   2  Leg length discrepancy M21 70 Ambulatory referral to Physical Therapy                  Assessment  Assessment details: Patient seen for PT evaluation for B hip weakness  PT assessed leg length discrepancy, is 1 5" off - L LE shorter 1 5" than R LE  Trialed patient using heel lift in shoe, feeling better with addition of heel lift  Patient does present with trendelenberg gait pattern during L > R SLS  Patient is able to correct for trendelenberg gait pattern; however, unable to maintain strength in hip to be able to ambulate consistently without gait deficit  PT trialed using SPC for gait- patient very fearful, gait dysfunction increases, requiring MIN A for balance and support  PT recommended patient to trial using shoe modification and heel lift in shoe to monitor her response to strengthening, balance training in PT  Impairments: abnormal gait, abnormal or restricted ROM, activity intolerance, impaired balance, impaired physical strength, lacks appropriate home exercise program and pain with function  Functional limitations: Patient feels her hips become weaker after walking 10+ minutes  Has limited her walking tolerance as a result  Understanding of Dx/Px/POC: good   Prognosis: good    Goals  Impairment Goals to be met within 4 weeks  - Decrease pain to 0/10 at rest and with activity   - Improve ROM by 5-10 degrees   - Increase strength to 5/5 throughout  - Patient to be able to sustain balance x 15 sec firm surface     Functional Goals to be met within 4-6 weeks     - Return to Prior Level of Function  - Increase Functional Status Measure to: expected  - Patient will be independent with HEP  - Patient to be able to tolerate walking x 15 min without hip weakness  Plan  Patient would benefit from: skilled physical therapy  Planned modality interventions: cryotherapy and thermotherapy: hydrocollator packs  Planned therapy interventions: abdominal trunk stabilization, balance, joint mobilization, manual therapy, neuromuscular re-education, patient education, postural training, strengthening, stretching, therapeutic activities, therapeutic exercise, therapeutic training, home exercise program and gait training  Frequency: 2x week  Duration in weeks: 6  Treatment plan discussed with: patient        Subjective Evaluation    History of Present Illness  Mechanism of injury: Patient noticing increase in difficulty with walking, related to hip weakness  Patient also concerned about her leg length discrepancy  Patient has her sneaker corrected for just under 1" and has a separate heel cup that she's been using  Patient has been exercising at home with her previous HEP, feels that she's not able to exercise correctly to make proper gains towards strengthening her hip     Pain  Current pain ratin  At best pain ratin  At worst pain ratin  Location: hip pain/weakness  Quality: dull ache  Aggravating factors: standing, walking, stair climbing and lifting  Progression: worsening    Social Support  Steps to enter house: no  Stairs in house: no   Lives in: apartment  Lives with: alone    Employment status: not working    Diagnostic Tests  X-ray: normal  Treatments  No previous or current treatments  Patient Goals  Patient goals for therapy: decreased pain, improved balance, increased motion, increased strength and return to sport/leisure activities          Objective     Postural Observations  Seated posture: fair  Standing posture: fair  Correction of posture: makes symptoms better        Neurological Testing     Sensation     Lumbar   Left   Intact: light touch    Right   Intact: light touch    Active Range of Motion   Left Hip   Normal active range of motion    Right Hip   Normal active range of motion  Left Knee   Normal active range of motion    Right Knee   Normal active range of motion  Extension: 0 degrees     Strength/Myotome Testing     Left Hip   Planes of Motion   Flexion: 3+  Abduction: 3+  Adduction: 4    Right Hip   Planes of Motion   Flexion: 4-  Abduction: 4-  Adduction: 4    Left Knee   Flexion: 4-  Extension: 4    Right Knee   Flexion: 4-  Extension: 4    Ambulation     Observational Gait   Gait: antalgic   Decreased walking speed, stride length, left stance time, right stance time, left swing time, right swing time, left step length and right step length  Left foot contact pattern: heel to toe  Right foot contact pattern: heel to toe    Quality of Movement During Gait   Trunk    Trunk (Left): Positive left lateral lean over stance limb  Pelvis    Pelvis (Left): Positive Trendelenburg  Precautions none    Specialty Daily Treatment Diary       Manual 3/28                               Exercise Diary                 Nu step                Sup hip abd Add wt? Heel slides Add wt? Standing hip 3 way        Sidestepping                 Step ups FW        Step ups lateral                Leg press?                 Squats hi/lo                SLS                                 Modalities

## 2019-04-02 ENCOUNTER — OFFICE VISIT (OUTPATIENT)
Dept: PHYSICAL THERAPY | Age: 84
End: 2019-04-02
Payer: COMMERCIAL

## 2019-04-02 DIAGNOSIS — M21.70 LEG LENGTH DISCREPANCY: ICD-10-CM

## 2019-04-02 DIAGNOSIS — M25.551 RIGHT HIP PAIN: Primary | ICD-10-CM

## 2019-04-02 PROCEDURE — 97110 THERAPEUTIC EXERCISES: CPT | Performed by: PHYSICAL THERAPIST

## 2019-04-04 ENCOUNTER — OFFICE VISIT (OUTPATIENT)
Dept: PHYSICAL THERAPY | Age: 84
End: 2019-04-04
Payer: COMMERCIAL

## 2019-04-04 DIAGNOSIS — M25.551 RIGHT HIP PAIN: Primary | ICD-10-CM

## 2019-04-04 DIAGNOSIS — M21.70 LEG LENGTH DISCREPANCY: ICD-10-CM

## 2019-04-04 PROCEDURE — 97110 THERAPEUTIC EXERCISES: CPT | Performed by: PHYSICAL THERAPIST

## 2019-04-09 ENCOUNTER — OFFICE VISIT (OUTPATIENT)
Dept: PHYSICAL THERAPY | Age: 84
End: 2019-04-09
Payer: COMMERCIAL

## 2019-04-09 DIAGNOSIS — M25.551 RIGHT HIP PAIN: Primary | ICD-10-CM

## 2019-04-09 DIAGNOSIS — M21.70 LEG LENGTH DISCREPANCY: ICD-10-CM

## 2019-04-09 PROCEDURE — 97110 THERAPEUTIC EXERCISES: CPT | Performed by: PHYSICAL THERAPIST

## 2019-04-11 ENCOUNTER — OFFICE VISIT (OUTPATIENT)
Dept: PHYSICAL THERAPY | Age: 84
End: 2019-04-11
Payer: COMMERCIAL

## 2019-04-11 DIAGNOSIS — M25.551 RIGHT HIP PAIN: Primary | ICD-10-CM

## 2019-04-11 DIAGNOSIS — M21.70 LEG LENGTH DISCREPANCY: ICD-10-CM

## 2019-04-11 PROCEDURE — 97110 THERAPEUTIC EXERCISES: CPT | Performed by: SPECIALIST/TECHNOLOGIST

## 2019-04-16 ENCOUNTER — OFFICE VISIT (OUTPATIENT)
Dept: PHYSICAL THERAPY | Age: 84
End: 2019-04-16
Payer: COMMERCIAL

## 2019-04-16 DIAGNOSIS — M21.70 LEG LENGTH DISCREPANCY: ICD-10-CM

## 2019-04-16 DIAGNOSIS — M25.551 RIGHT HIP PAIN: Primary | ICD-10-CM

## 2019-04-16 PROCEDURE — 97110 THERAPEUTIC EXERCISES: CPT | Performed by: SPECIALIST/TECHNOLOGIST

## 2019-04-18 ENCOUNTER — OFFICE VISIT (OUTPATIENT)
Dept: PHYSICAL THERAPY | Age: 84
End: 2019-04-18
Payer: COMMERCIAL

## 2019-04-18 DIAGNOSIS — M25.551 RIGHT HIP PAIN: Primary | ICD-10-CM

## 2019-04-18 DIAGNOSIS — M21.70 LEG LENGTH DISCREPANCY: ICD-10-CM

## 2019-04-18 PROCEDURE — 97110 THERAPEUTIC EXERCISES: CPT | Performed by: PHYSICAL THERAPIST

## 2019-04-23 ENCOUNTER — OFFICE VISIT (OUTPATIENT)
Dept: PHYSICAL THERAPY | Age: 84
End: 2019-04-23
Payer: COMMERCIAL

## 2019-04-23 DIAGNOSIS — M25.551 RIGHT HIP PAIN: Primary | ICD-10-CM

## 2019-04-23 DIAGNOSIS — M21.70 LEG LENGTH DISCREPANCY: ICD-10-CM

## 2019-04-23 PROCEDURE — 97110 THERAPEUTIC EXERCISES: CPT | Performed by: SPECIALIST/TECHNOLOGIST

## 2019-04-24 ENCOUNTER — OFFICE VISIT (OUTPATIENT)
Dept: FAMILY MEDICINE CLINIC | Facility: CLINIC | Age: 84
End: 2019-04-24
Payer: COMMERCIAL

## 2019-04-24 VITALS
BODY MASS INDEX: 27.75 KG/M2 | SYSTOLIC BLOOD PRESSURE: 136 MMHG | TEMPERATURE: 98.9 F | WEIGHT: 147 LBS | HEART RATE: 76 BPM | OXYGEN SATURATION: 98 % | HEIGHT: 61 IN | DIASTOLIC BLOOD PRESSURE: 70 MMHG | RESPIRATION RATE: 16 BRPM

## 2019-04-24 DIAGNOSIS — N18.30 CHRONIC KIDNEY DISEASE, STAGE 3 (HCC): ICD-10-CM

## 2019-04-24 DIAGNOSIS — E11.22 TYPE 2 DM WITH CKD STAGE 3 AND HYPERTENSION (HCC): ICD-10-CM

## 2019-04-24 DIAGNOSIS — E03.9 ACQUIRED HYPOTHYROIDISM: ICD-10-CM

## 2019-04-24 DIAGNOSIS — N18.30 TYPE 2 DM WITH CKD STAGE 3 AND HYPERTENSION (HCC): ICD-10-CM

## 2019-04-24 DIAGNOSIS — M15.9 GENERALIZED OSTEOARTHRITIS: ICD-10-CM

## 2019-04-24 DIAGNOSIS — I12.9 TYPE 2 DM WITH CKD STAGE 3 AND HYPERTENSION (HCC): ICD-10-CM

## 2019-04-24 DIAGNOSIS — E78.2 MIXED HYPERLIPIDEMIA: ICD-10-CM

## 2019-04-24 DIAGNOSIS — R26.2 AMBULATORY DYSFUNCTION: ICD-10-CM

## 2019-04-24 DIAGNOSIS — I10 ESSENTIAL HYPERTENSION: Primary | ICD-10-CM

## 2019-04-24 DIAGNOSIS — Z00.00 MEDICARE ANNUAL WELLNESS VISIT, SUBSEQUENT: ICD-10-CM

## 2019-04-24 PROCEDURE — 1170F FXNL STATUS ASSESSED: CPT | Performed by: FAMILY MEDICINE

## 2019-04-24 PROCEDURE — 99214 OFFICE O/P EST MOD 30 MIN: CPT | Performed by: FAMILY MEDICINE

## 2019-04-24 PROCEDURE — G0439 PPPS, SUBSEQ VISIT: HCPCS | Performed by: FAMILY MEDICINE

## 2019-04-24 PROCEDURE — 1125F AMNT PAIN NOTED PAIN PRSNT: CPT | Performed by: FAMILY MEDICINE

## 2019-04-25 ENCOUNTER — EVALUATION (OUTPATIENT)
Dept: PHYSICAL THERAPY | Age: 84
End: 2019-04-25
Payer: COMMERCIAL

## 2019-04-25 DIAGNOSIS — M21.70 LEG LENGTH DISCREPANCY: ICD-10-CM

## 2019-04-25 DIAGNOSIS — M25.551 RIGHT HIP PAIN: Primary | ICD-10-CM

## 2019-04-25 PROCEDURE — 97110 THERAPEUTIC EXERCISES: CPT | Performed by: SPECIALIST/TECHNOLOGIST

## 2019-04-30 ENCOUNTER — OFFICE VISIT (OUTPATIENT)
Dept: PHYSICAL THERAPY | Age: 84
End: 2019-04-30
Payer: COMMERCIAL

## 2019-04-30 DIAGNOSIS — M25.551 RIGHT HIP PAIN: Primary | ICD-10-CM

## 2019-04-30 DIAGNOSIS — M21.70 LEG LENGTH DISCREPANCY: ICD-10-CM

## 2019-04-30 PROCEDURE — 97110 THERAPEUTIC EXERCISES: CPT | Performed by: SPECIALIST/TECHNOLOGIST

## 2019-05-02 ENCOUNTER — OFFICE VISIT (OUTPATIENT)
Dept: PHYSICAL THERAPY | Age: 84
End: 2019-05-02
Payer: COMMERCIAL

## 2019-05-02 DIAGNOSIS — M25.551 RIGHT HIP PAIN: Primary | ICD-10-CM

## 2019-05-02 DIAGNOSIS — M21.70 LEG LENGTH DISCREPANCY: ICD-10-CM

## 2019-05-02 PROCEDURE — 97110 THERAPEUTIC EXERCISES: CPT | Performed by: SPECIALIST/TECHNOLOGIST

## 2019-05-07 ENCOUNTER — OFFICE VISIT (OUTPATIENT)
Dept: PHYSICAL THERAPY | Age: 84
End: 2019-05-07
Payer: COMMERCIAL

## 2019-05-07 DIAGNOSIS — M25.551 RIGHT HIP PAIN: Primary | ICD-10-CM

## 2019-05-07 DIAGNOSIS — M21.70 LEG LENGTH DISCREPANCY: ICD-10-CM

## 2019-05-07 PROCEDURE — 97112 NEUROMUSCULAR REEDUCATION: CPT | Performed by: SPECIALIST/TECHNOLOGIST

## 2019-05-07 PROCEDURE — 97110 THERAPEUTIC EXERCISES: CPT | Performed by: SPECIALIST/TECHNOLOGIST

## 2019-05-09 ENCOUNTER — OFFICE VISIT (OUTPATIENT)
Dept: PHYSICAL THERAPY | Age: 84
End: 2019-05-09
Payer: COMMERCIAL

## 2019-05-09 DIAGNOSIS — M21.70 LEG LENGTH DISCREPANCY: ICD-10-CM

## 2019-05-09 DIAGNOSIS — M25.551 RIGHT HIP PAIN: Primary | ICD-10-CM

## 2019-05-09 PROCEDURE — 97110 THERAPEUTIC EXERCISES: CPT | Performed by: SPECIALIST/TECHNOLOGIST

## 2019-05-09 PROCEDURE — 97112 NEUROMUSCULAR REEDUCATION: CPT | Performed by: SPECIALIST/TECHNOLOGIST

## 2019-05-14 ENCOUNTER — OFFICE VISIT (OUTPATIENT)
Dept: PHYSICAL THERAPY | Age: 84
End: 2019-05-14
Payer: COMMERCIAL

## 2019-05-14 DIAGNOSIS — M21.70 LEG LENGTH DISCREPANCY: ICD-10-CM

## 2019-05-14 DIAGNOSIS — M25.551 RIGHT HIP PAIN: Primary | ICD-10-CM

## 2019-05-14 PROCEDURE — 97110 THERAPEUTIC EXERCISES: CPT | Performed by: SPECIALIST/TECHNOLOGIST

## 2019-05-14 PROCEDURE — 97112 NEUROMUSCULAR REEDUCATION: CPT | Performed by: SPECIALIST/TECHNOLOGIST

## 2019-05-16 ENCOUNTER — OFFICE VISIT (OUTPATIENT)
Dept: PHYSICAL THERAPY | Age: 84
End: 2019-05-16
Payer: COMMERCIAL

## 2019-05-16 DIAGNOSIS — M25.551 RIGHT HIP PAIN: Primary | ICD-10-CM

## 2019-05-16 DIAGNOSIS — M21.70 LEG LENGTH DISCREPANCY: ICD-10-CM

## 2019-05-16 PROCEDURE — 97110 THERAPEUTIC EXERCISES: CPT | Performed by: SPECIALIST/TECHNOLOGIST

## 2019-05-16 PROCEDURE — 97112 NEUROMUSCULAR REEDUCATION: CPT | Performed by: SPECIALIST/TECHNOLOGIST

## 2019-05-17 ENCOUNTER — TELEPHONE (OUTPATIENT)
Dept: OBGYN CLINIC | Facility: HOSPITAL | Age: 84
End: 2019-05-17

## 2019-05-21 ENCOUNTER — OFFICE VISIT (OUTPATIENT)
Dept: PHYSICAL THERAPY | Age: 84
End: 2019-05-21
Payer: COMMERCIAL

## 2019-05-21 DIAGNOSIS — M25.551 RIGHT HIP PAIN: Primary | ICD-10-CM

## 2019-05-21 DIAGNOSIS — M21.70 LEG LENGTH DISCREPANCY: ICD-10-CM

## 2019-05-21 PROCEDURE — 97110 THERAPEUTIC EXERCISES: CPT | Performed by: SPECIALIST/TECHNOLOGIST

## 2019-05-21 PROCEDURE — 97112 NEUROMUSCULAR REEDUCATION: CPT | Performed by: SPECIALIST/TECHNOLOGIST

## 2019-05-23 ENCOUNTER — OFFICE VISIT (OUTPATIENT)
Dept: PHYSICAL THERAPY | Age: 84
End: 2019-05-23
Payer: COMMERCIAL

## 2019-05-23 DIAGNOSIS — M25.551 RIGHT HIP PAIN: Primary | ICD-10-CM

## 2019-05-23 DIAGNOSIS — M21.70 LEG LENGTH DISCREPANCY: ICD-10-CM

## 2019-05-23 PROCEDURE — 97112 NEUROMUSCULAR REEDUCATION: CPT | Performed by: SPECIALIST/TECHNOLOGIST

## 2019-05-23 PROCEDURE — 97110 THERAPEUTIC EXERCISES: CPT | Performed by: SPECIALIST/TECHNOLOGIST

## 2019-05-24 DIAGNOSIS — E87.1 HYPONATREMIA: ICD-10-CM

## 2019-05-24 RX ORDER — SODIUM CHLORIDE 1000 MG
TABLET, SOLUBLE MISCELLANEOUS
Qty: 30 TABLET | Refills: 6 | Status: SHIPPED | OUTPATIENT
Start: 2019-05-24 | End: 2019-12-02 | Stop reason: SDUPTHER

## 2019-05-28 ENCOUNTER — OFFICE VISIT (OUTPATIENT)
Dept: PHYSICAL THERAPY | Age: 84
End: 2019-05-28
Payer: COMMERCIAL

## 2019-05-28 DIAGNOSIS — M25.551 RIGHT HIP PAIN: Primary | ICD-10-CM

## 2019-05-28 DIAGNOSIS — M21.70 LEG LENGTH DISCREPANCY: ICD-10-CM

## 2019-05-28 PROCEDURE — 97110 THERAPEUTIC EXERCISES: CPT | Performed by: SPECIALIST/TECHNOLOGIST

## 2019-05-28 PROCEDURE — 97112 NEUROMUSCULAR REEDUCATION: CPT | Performed by: SPECIALIST/TECHNOLOGIST

## 2019-05-30 ENCOUNTER — APPOINTMENT (OUTPATIENT)
Dept: PHYSICAL THERAPY | Age: 84
End: 2019-05-30
Payer: COMMERCIAL

## 2019-06-03 ENCOUNTER — TELEPHONE (OUTPATIENT)
Dept: FAMILY MEDICINE CLINIC | Facility: CLINIC | Age: 84
End: 2019-06-03

## 2019-06-03 DIAGNOSIS — L40.9 PSORIASIS: Primary | ICD-10-CM

## 2019-06-12 ENCOUNTER — OFFICE VISIT (OUTPATIENT)
Dept: OBGYN CLINIC | Facility: HOSPITAL | Age: 84
End: 2019-06-12
Payer: COMMERCIAL

## 2019-06-12 VITALS
WEIGHT: 148.2 LBS | BODY MASS INDEX: 27.98 KG/M2 | HEART RATE: 79 BPM | HEIGHT: 61 IN | DIASTOLIC BLOOD PRESSURE: 63 MMHG | SYSTOLIC BLOOD PRESSURE: 157 MMHG

## 2019-06-12 DIAGNOSIS — M21.70 LEG LENGTH DISCREPANCY: ICD-10-CM

## 2019-06-12 DIAGNOSIS — M17.12 LOCALIZED OSTEOARTHRITIS OF LEFT KNEE: ICD-10-CM

## 2019-06-12 DIAGNOSIS — M25.551 RIGHT HIP PAIN: Primary | ICD-10-CM

## 2019-06-12 PROCEDURE — 99213 OFFICE O/P EST LOW 20 MIN: CPT | Performed by: ORTHOPAEDIC SURGERY

## 2019-07-12 ENCOUNTER — OFFICE VISIT (OUTPATIENT)
Dept: PODIATRY | Facility: CLINIC | Age: 84
End: 2019-07-12
Payer: COMMERCIAL

## 2019-07-12 VITALS
HEIGHT: 61 IN | SYSTOLIC BLOOD PRESSURE: 147 MMHG | BODY MASS INDEX: 27.38 KG/M2 | DIASTOLIC BLOOD PRESSURE: 68 MMHG | HEART RATE: 74 BPM | WEIGHT: 145 LBS

## 2019-07-12 DIAGNOSIS — G62.9 PERIPHERAL NERVE DISORDER: ICD-10-CM

## 2019-07-12 DIAGNOSIS — I73.9 PERIPHERAL VASCULAR DISEASE, UNSPECIFIED (HCC): ICD-10-CM

## 2019-07-12 DIAGNOSIS — E11.29 CONTROLLED TYPE 2 DIABETES MELLITUS WITH OTHER DIABETIC KIDNEY COMPLICATION, WITHOUT LONG-TERM CURRENT USE OF INSULIN (HCC): Primary | ICD-10-CM

## 2019-07-12 PROCEDURE — 99202 OFFICE O/P NEW SF 15 MIN: CPT | Performed by: PODIATRIST

## 2019-07-12 NOTE — PROGRESS NOTES
Assessment/Plan:    Discussed principles of diabetic foot care  Patient has a numb sensation in her feet but is able to discern the Lipscomb-Robert monofilament  She is unable to discern its tuning fork  This is likely due to her back surgery and not diabetes  Patient is unable to walk barefoot due to the limb length discrepancy  She does not have palpable pedal pulses  Treatment consisted of nail trimming  Periodic palliative care is recommended  No problem-specific Assessment & Plan notes found for this encounter  Diagnoses and all orders for this visit:    Controlled type 2 diabetes mellitus with other diabetic kidney complication, without long-term current use of insulin ([facility] [address] )    Peripheral nerve disorder    Peripheral vascular disease, unspecified ([facility] [address] )          Subjective:      Patient ID: Nan Purcell is a 80 y o  female  HPI patient, a 80year-old type 2 diabetic, presents with her daughter  Patient is here for pedal evaluation and toenail care would patient has long toenails that she has unable to trim  Patient relates a numb sensation in her feet  She has a history of back surgery and disc issues at L4 and L5  Blood sugar is under good control  Last A1c in January was 6 4  Patient ambulates with the aid of a walker  She has a significant limb length inequality due to prior right lower extremity surgery  She wears a heel lift on the right shoe that is significant  The following portions of the patient's history were reviewed and updated as appropriate: allergies, current medications, past family history, past medical history, past social history, past surgical history and problem list     Review of Systems   Musculoskeletal: Positive for arthralgias, back pain and gait problem  Skin: Negative  Neurological: Positive for numbness  Psychiatric/Behavioral: Negative                Objective:      /68   Pulse 74   Ht 5' 1" (1 549 m)   Wt 65 8 kg (145 lb) BMI 27 40 kg/m²          Physical Exam   Cardiovascular: Pulses are weak pulses  Pulses:       Dorsalis pedis pulses are 0 on the right side, and 0 on the left side  Posterior tibial pulses are 0 on the right side, and 0 on the left side  Feet:   Right Foot:   Skin Integrity: Negative for ulcer, skin breakdown, erythema, warmth, callus or dry skin  Left Foot:   Skin Integrity: Negative for ulcer, skin breakdown, erythema, warmth, callus or dry skin  Diabetic Foot Exam    Patient's shoes and socks removed  Right Foot/Ankle   Right Foot Inspection  Skin Exam: skin normal and skin intact no dry skin, no warmth, no callus, no erythema, no maceration, no abnormal color, no pre-ulcer, no ulcer and no callus                          Toe Exam: ROM and strength within normal limits  Sensory   Vibration: absent  Proprioception: intact   Monofilament testing: intact  Vascular  Capillary refills: elevated  The right DP pulse is 0  The right PT pulse is 0  Right Toe  - Comprehensive Exam  Ecchymosis: none  Arch: normal  Hammertoes: absent  Claw Toes: absent  Swelling: none   Tenderness: none         Left Foot/Ankle  Left Foot Inspection  Skin Exam: skin normal and skin intactno dry skin, no warmth, no erythema, no maceration, normal color, no pre-ulcer, no ulcer and no callus                         Toe Exam: ROM and strength within normal limits                   Sensory   Vibration: absent  Proprioception: intact  Monofilament: intact  Vascular  Capillary refills: elevated  The left DP pulse is 0  The left PT pulse is 0  Left Toe  - Comprehensive Exam  Ecchymosis: none  Arch: normal  Hammertoes: absent  Claw toes: absent  Swelling: none   Tenderness: none       Assign Risk Category:  No deformity present;  Loss of protective sensation; Weak pulses       Risk: 2

## 2019-07-19 LAB
LEFT EYE DIABETIC RETINOPATHY: NORMAL
RIGHT EYE DIABETIC RETINOPATHY: NORMAL
SEVERITY (EYE EXAM): NORMAL

## 2019-07-22 NOTE — INTERVAL H&P NOTE
H&P reviewed  After examining the patient I find no changes in the patients condition since the H&P had been written      Preop for left total knee arthroplasty Writer left a message for patient regarding program attendance for today.  In writers message writer asked that patient return writers call when receiving writers message.

## 2019-08-15 ENCOUNTER — APPOINTMENT (OUTPATIENT)
Dept: LAB | Age: 84
End: 2019-08-15
Payer: COMMERCIAL

## 2019-08-15 DIAGNOSIS — N18.30 CHRONIC KIDNEY DISEASE, STAGE 3 (HCC): ICD-10-CM

## 2019-08-15 DIAGNOSIS — I10 ESSENTIAL HYPERTENSION: ICD-10-CM

## 2019-08-15 DIAGNOSIS — N18.30 TYPE 2 DM WITH CKD STAGE 3 AND HYPERTENSION (HCC): ICD-10-CM

## 2019-08-15 DIAGNOSIS — I12.9 TYPE 2 DM WITH CKD STAGE 3 AND HYPERTENSION (HCC): ICD-10-CM

## 2019-08-15 DIAGNOSIS — E03.9 ACQUIRED HYPOTHYROIDISM: ICD-10-CM

## 2019-08-15 DIAGNOSIS — E78.2 MIXED HYPERLIPIDEMIA: ICD-10-CM

## 2019-08-15 DIAGNOSIS — E11.22 TYPE 2 DM WITH CKD STAGE 3 AND HYPERTENSION (HCC): ICD-10-CM

## 2019-08-15 LAB
ALBUMIN SERPL BCP-MCNC: 4.1 G/DL (ref 3.5–5)
ALP SERPL-CCNC: 76 U/L (ref 46–116)
ALT SERPL W P-5'-P-CCNC: 29 U/L (ref 12–78)
ANION GAP SERPL CALCULATED.3IONS-SCNC: 9 MMOL/L (ref 4–13)
AST SERPL W P-5'-P-CCNC: 18 U/L (ref 5–45)
BASOPHILS # BLD AUTO: 0.06 THOUSANDS/ΜL (ref 0–0.1)
BASOPHILS NFR BLD AUTO: 1 % (ref 0–1)
BILIRUB SERPL-MCNC: 0.8 MG/DL (ref 0.2–1)
BUN SERPL-MCNC: 30 MG/DL (ref 5–25)
CALCIUM SERPL-MCNC: 9.6 MG/DL (ref 8.3–10.1)
CHLORIDE SERPL-SCNC: 103 MMOL/L (ref 100–108)
CHOLEST SERPL-MCNC: 163 MG/DL (ref 50–200)
CO2 SERPL-SCNC: 22 MMOL/L (ref 21–32)
CREAT SERPL-MCNC: 1.35 MG/DL (ref 0.6–1.3)
CREAT UR-MCNC: 35.6 MG/DL
EOSINOPHIL # BLD AUTO: 0.18 THOUSAND/ΜL (ref 0–0.61)
EOSINOPHIL NFR BLD AUTO: 3 % (ref 0–6)
ERYTHROCYTE [DISTWIDTH] IN BLOOD BY AUTOMATED COUNT: 13.9 % (ref 11.6–15.1)
EST. AVERAGE GLUCOSE BLD GHB EST-MCNC: 126 MG/DL
GFR SERPL CREATININE-BSD FRML MDRD: 34 ML/MIN/1.73SQ M
GLUCOSE P FAST SERPL-MCNC: 121 MG/DL (ref 65–99)
HBA1C MFR BLD: 6 % (ref 4.2–6.3)
HCT VFR BLD AUTO: 38.9 % (ref 34.8–46.1)
HDLC SERPL-MCNC: 97 MG/DL (ref 40–60)
HGB BLD-MCNC: 12.6 G/DL (ref 11.5–15.4)
IMM GRANULOCYTES # BLD AUTO: 0.01 THOUSAND/UL (ref 0–0.2)
IMM GRANULOCYTES NFR BLD AUTO: 0 % (ref 0–2)
LDLC SERPL CALC-MCNC: 52 MG/DL (ref 0–100)
LYMPHOCYTES # BLD AUTO: 2.06 THOUSANDS/ΜL (ref 0.6–4.47)
LYMPHOCYTES NFR BLD AUTO: 29 % (ref 14–44)
MCH RBC QN AUTO: 31.3 PG (ref 26.8–34.3)
MCHC RBC AUTO-ENTMCNC: 32.4 G/DL (ref 31.4–37.4)
MCV RBC AUTO: 97 FL (ref 82–98)
MICROALBUMIN UR-MCNC: 25.9 MG/L (ref 0–20)
MICROALBUMIN/CREAT 24H UR: 73 MG/G CREATININE (ref 0–30)
MONOCYTES # BLD AUTO: 0.66 THOUSAND/ΜL (ref 0.17–1.22)
MONOCYTES NFR BLD AUTO: 9 % (ref 4–12)
NEUTROPHILS # BLD AUTO: 4.23 THOUSANDS/ΜL (ref 1.85–7.62)
NEUTS SEG NFR BLD AUTO: 58 % (ref 43–75)
NONHDLC SERPL-MCNC: 66 MG/DL
NRBC BLD AUTO-RTO: 0 /100 WBCS
PLATELET # BLD AUTO: 183 THOUSANDS/UL (ref 149–390)
PMV BLD AUTO: 10.6 FL (ref 8.9–12.7)
POTASSIUM SERPL-SCNC: 4 MMOL/L (ref 3.5–5.3)
PROT SERPL-MCNC: 7.7 G/DL (ref 6.4–8.2)
RBC # BLD AUTO: 4.03 MILLION/UL (ref 3.81–5.12)
SODIUM SERPL-SCNC: 134 MMOL/L (ref 136–145)
T4 FREE SERPL-MCNC: 1.73 NG/DL (ref 0.76–1.46)
TRIGL SERPL-MCNC: 69 MG/DL
TSH SERPL DL<=0.05 MIU/L-ACNC: 0.32 UIU/ML (ref 0.36–3.74)
WBC # BLD AUTO: 7.2 THOUSAND/UL (ref 4.31–10.16)

## 2019-08-15 PROCEDURE — 36415 COLL VENOUS BLD VENIPUNCTURE: CPT

## 2019-08-15 PROCEDURE — 80061 LIPID PANEL: CPT

## 2019-08-15 PROCEDURE — 85025 COMPLETE CBC W/AUTO DIFF WBC: CPT

## 2019-08-15 PROCEDURE — 82570 ASSAY OF URINE CREATININE: CPT

## 2019-08-15 PROCEDURE — 84439 ASSAY OF FREE THYROXINE: CPT

## 2019-08-15 PROCEDURE — 80053 COMPREHEN METABOLIC PANEL: CPT

## 2019-08-15 PROCEDURE — 84443 ASSAY THYROID STIM HORMONE: CPT

## 2019-08-15 PROCEDURE — 83036 HEMOGLOBIN GLYCOSYLATED A1C: CPT

## 2019-08-15 PROCEDURE — 82043 UR ALBUMIN QUANTITATIVE: CPT

## 2019-08-25 PROBLEM — N39.0 URINARY TRACT INFECTION WITH HEMATURIA: Status: RESOLVED | Noted: 2018-10-22 | Resolved: 2019-08-25

## 2019-08-25 PROBLEM — R31.9 URINARY TRACT INFECTION WITH HEMATURIA: Status: RESOLVED | Noted: 2018-10-22 | Resolved: 2019-08-25

## 2019-08-25 PROBLEM — D64.9 POSTOPERATIVE ANEMIA: Status: RESOLVED | Noted: 2018-08-13 | Resolved: 2019-08-25

## 2019-08-27 ENCOUNTER — OFFICE VISIT (OUTPATIENT)
Dept: FAMILY MEDICINE CLINIC | Facility: CLINIC | Age: 84
End: 2019-08-27
Payer: COMMERCIAL

## 2019-08-27 VITALS
HEIGHT: 61 IN | TEMPERATURE: 97.5 F | HEART RATE: 74 BPM | RESPIRATION RATE: 16 BRPM | DIASTOLIC BLOOD PRESSURE: 70 MMHG | WEIGHT: 150.8 LBS | SYSTOLIC BLOOD PRESSURE: 157 MMHG | BODY MASS INDEX: 28.47 KG/M2

## 2019-08-27 DIAGNOSIS — I10 ESSENTIAL HYPERTENSION: Primary | ICD-10-CM

## 2019-08-27 DIAGNOSIS — R26.2 AMBULATORY DYSFUNCTION: ICD-10-CM

## 2019-08-27 DIAGNOSIS — M15.9 GENERALIZED OSTEOARTHRITIS: ICD-10-CM

## 2019-08-27 DIAGNOSIS — E03.9 ACQUIRED HYPOTHYROIDISM: ICD-10-CM

## 2019-08-27 DIAGNOSIS — E11.42 DIABETIC PERIPHERAL NEUROPATHY (HCC): ICD-10-CM

## 2019-08-27 DIAGNOSIS — I12.9 TYPE 2 DM WITH CKD STAGE 3 AND HYPERTENSION (HCC): ICD-10-CM

## 2019-08-27 DIAGNOSIS — N18.30 CHRONIC KIDNEY DISEASE, STAGE 3 (HCC): ICD-10-CM

## 2019-08-27 DIAGNOSIS — N18.30 TYPE 2 DM WITH CKD STAGE 3 AND HYPERTENSION (HCC): ICD-10-CM

## 2019-08-27 DIAGNOSIS — E11.22 TYPE 2 DM WITH CKD STAGE 3 AND HYPERTENSION (HCC): ICD-10-CM

## 2019-08-27 DIAGNOSIS — I10 ESSENTIAL HYPERTENSION: ICD-10-CM

## 2019-08-27 PROCEDURE — 1160F RVW MEDS BY RX/DR IN RCRD: CPT | Performed by: FAMILY MEDICINE

## 2019-08-27 PROCEDURE — 99214 OFFICE O/P EST MOD 30 MIN: CPT | Performed by: FAMILY MEDICINE

## 2019-08-27 PROCEDURE — 1036F TOBACCO NON-USER: CPT | Performed by: FAMILY MEDICINE

## 2019-08-27 RX ORDER — AMLODIPINE BESYLATE AND ATORVASTATIN CALCIUM 10; 20 MG/1; MG/1
1 TABLET, FILM COATED ORAL
Qty: 90 TABLET | Refills: 3 | Status: SHIPPED | OUTPATIENT
Start: 2019-08-27 | End: 2020-06-25 | Stop reason: SDUPTHER

## 2019-08-27 RX ORDER — LOSARTAN POTASSIUM 100 MG/1
100 TABLET ORAL DAILY
Qty: 90 TABLET | Refills: 3 | Status: SHIPPED | OUTPATIENT
Start: 2019-08-27 | End: 2020-06-25 | Stop reason: SDUPTHER

## 2019-08-27 NOTE — TELEPHONE ENCOUNTER
Patient forgot to mention she needs refills on Generic Caduet 10-20 mg tablets and Losartan 100 mg tablets called in to the [facility] [address]  Eloise Shay can be reached at [phone] any questions

## 2019-08-27 NOTE — PROGRESS NOTES
Assessment/Plan:    Hypertension  Systolic blood pressure is elevated  Patient was advised to check blood pressure at home or at the pharmacy and call with BP log in 10-14 days  Continue Losartan 100 mg daily, Caduet 10/20 mg daily  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 hypertension ([facility] )  Lab Results   Component Value Date    HGBA1C 6 0 08/15/2019       No results for input(s): POCGLU in the last 72 hours  Blood Sugar Average: Last 72 hrs:    Type 2 DM - diet controlled  Check blood sugar at home 2-3 times per week  Check eye exam by ophthalmologist annually  Follow- up with podiatrist Dr Hillary Prater for routine foot care  Hypothyroidism  TSH 0 321  T4 free 1 73  Recommended to take Levothyroxine 125 mcg daily Monday through Friday and skip dose on Saturday and Sunday  Recheck TSH in 2 months  Chronic kidney disease, stage 3  Creatinine level is stable  Recommended to stay well hydrated, avoid NSAID's  Ambulatory dysfunction  Patient completed PT  Discussed fall precautions with patient and her daughter  Continue to use a walker for ambulation  Generalized osteoarthritis  Take Tylenol PRN for joint pains  I have spent 25 minutes with Patient and family today in which greater than 50% of this time was spent in counseling/coordination of care regarding Diagnostic results, Risks and benefits of tx options, Intructions for management, Patient and family education, Importance of tx compliance, Risk factor reductions and Impressions  Schedule follow-up office visit in 4 months  Diagnoses and all orders for this visit:    Essential hypertension    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 hypertension ([address] )    Acquired hypothyroidism  -     TSH, 3rd generation with Free T4 reflex;  Future    Diabetic peripheral neuropathy (HCC)    Chronic kidney disease, stage 3 (HCC)    Generalized osteoarthritis    Ambulatory dysfunction    Other orders  -     Pyrithione Zinc 2 % SHAM; Apply topically          Subjective:      Patient ID: Pedro Tucker is a 80 y o  female  HPI     Patient presents for 4 month follow-up of chronic medical conditions accompanied by daughter  PMHx: HTN, Hypothyroidism, Type 2 DM - diet controlled, Hyperli[idemia, CKD stage 3, generalized OA, ambulatory dysfunction, PVD, peripheral neuropathy  Reviewed current medications, blood test results from 8/15/19  Hb A1c 6 0, creatinine 1 35 -stable  GFR 34, fasting blood sugar 121, potassium 4 0  Cholesterol 163, HDL 97, LDL 52  TSH 0 321, T4 free 1 73  HTN - patient takes Losartan 100 mg daily, Caduet 10/20 mg daily  Denies chest pain, shortness of breath, dizziness  She does not check blood pressure at home  Type 2 DM - diet controlled  Patient checks blood sugar at home a few times per week  Blood sugar ranges 120-130's  Patient was seen by podiatrist Ephriam Dakin in 7/19  She had eye exam with Dr Sowmya Barboza this summer  Phone # [phone]  Hypothyroidism - patient takes Levothyroxine 125 mcg daily  Patient has PVD, S/P endovascular AAA  repair in March 2016 performed by vascular surgeon Dr Mario Bonilla  Ambulatory dysfunction - patient ambulates with a walker  She completed physical therapy, reports some improvement in balance, R  hip pain  She has leg length discrepancy, uses right heel lift  She has been followed by orthopedic surgeon Dr Alcantar Never, was seen in 6/19  Denies tobacco use  Prevnar 13 done in 9/15  The following portions of the patient's history were reviewed and updated as appropriate: allergies, current medications, past family history, past social history, past surgical history and problem list     Review of Systems   Constitutional: Positive for fatigue (mild)  Negative for activity change, appetite change, chills and fever  HENT: Positive for hearing loss (wears BL hearing aids)   Negative for congestion, ear pain, mouth sores, nosebleeds, sore throat, tinnitus and trouble swallowing  Eyes: Negative for pain, discharge, redness, itching and visual disturbance  Respiratory: Negative for cough, chest tightness, shortness of breath and wheezing  Cardiovascular: Negative for chest pain, palpitations and leg swelling  Gastrointestinal: Negative for abdominal pain, blood in stool, constipation, diarrhea, nausea and vomiting  Genitourinary: Negative for difficulty urinating, dysuria, flank pain, frequency and hematuria  Musculoskeletal: Positive for arthralgias (pain in hand joints  R hip pain improved  ) and gait problem (ambulates with a walker)  Negative for joint swelling and neck pain  Skin: Negative for rash and wound  Neurological: Positive for numbness (in feet)  Negative for dizziness, syncope and headaches  Hematological: Negative  Psychiatric/Behavioral: Negative  Objective:      /70 (BP Location: Left arm, Patient Position: Sitting, Cuff Size: Standard)   Pulse 74   Temp 97 5 °F (36 4 °C) (Tympanic)   Resp 16   Ht 5' 1" (1 549 m)   Wt 68 4 kg (150 lb 12 8 oz)   BMI 28 49 kg/m²          Physical Exam   Constitutional: She appears well-developed and well-nourished  HENT:   Head: Normocephalic and atraumatic  Right Ear: External ear normal    Left Ear: External ear normal    Mouth/Throat: Oropharynx is clear and moist    Eyes: Pupils are equal, round, and reactive to light  Conjunctivae are normal    Neck: Normal range of motion  Neck supple  No JVD present  Cardiovascular: Normal rate, regular rhythm and normal heart sounds  No murmur heard  No carotid bruits BL  No BL LE edema  Patient wears compression stockings   Pulmonary/Chest: Effort normal and breath sounds normal    Abdominal: Soft  Bowel sounds are normal  There is no tenderness  Musculoskeletal:   Patient ambulates with a walker  Arthritic changes in hand joints   Skin: Skin is warm and dry  No rash noted     Psychiatric: She has a normal mood and affect  Nursing note and vitals reviewed

## 2019-08-28 NOTE — ASSESSMENT & PLAN NOTE
TSH 0 321  T4 free 1 73  Recommended to take Levothyroxine 125 mcg daily Monday through Friday and skip dose on Saturday and Sunday  Recheck TSH in 2 months

## 2019-08-28 NOTE — ASSESSMENT & PLAN NOTE
Lab Results   Component Value Date    HGBA1C 6 0 08/15/2019       No results for input(s): POCGLU in the last 72 hours  Blood Sugar Average: Last 72 hrs:    Type 2 DM - diet controlled  Check blood sugar at home 2-3 times per week  Check eye exam by ophthalmologist annually  Follow- up with podiatrist Dr Daryl Prasad for routine foot care

## 2019-08-28 NOTE — ASSESSMENT & PLAN NOTE
Patient completed PT  Discussed fall precautions with patient and her daughter  Continue to use a walker for ambulation

## 2019-08-28 NOTE — ASSESSMENT & PLAN NOTE
Systolic blood pressure is elevated  Patient was advised to check blood pressure at home or at the pharmacy and call with BP log in 10-14 days  Continue Losartan 100 mg daily, Caduet 10/20 mg daily

## 2019-09-05 ENCOUNTER — APPOINTMENT (OUTPATIENT)
Dept: RADIOLOGY | Facility: CLINIC | Age: 84
End: 2019-09-05
Payer: COMMERCIAL

## 2019-09-05 ENCOUNTER — CONSULT (OUTPATIENT)
Dept: RHEUMATOLOGY | Facility: CLINIC | Age: 84
End: 2019-09-05
Payer: COMMERCIAL

## 2019-09-05 VITALS
SYSTOLIC BLOOD PRESSURE: 173 MMHG | BODY MASS INDEX: 28.78 KG/M2 | HEART RATE: 76 BPM | DIASTOLIC BLOOD PRESSURE: 53 MMHG | WEIGHT: 152.3 LBS

## 2019-09-05 DIAGNOSIS — G89.29 CHRONIC PAIN OF LEFT KNEE: ICD-10-CM

## 2019-09-05 DIAGNOSIS — M25.542 ARTHRALGIA OF HANDS, BILATERAL: ICD-10-CM

## 2019-09-05 DIAGNOSIS — M15.0 PRIMARY GENERALIZED (OSTEO)ARTHRITIS: ICD-10-CM

## 2019-09-05 DIAGNOSIS — M25.562 CHRONIC PAIN OF LEFT KNEE: ICD-10-CM

## 2019-09-05 DIAGNOSIS — M25.541 ARTHRALGIA OF HANDS, BILATERAL: ICD-10-CM

## 2019-09-05 DIAGNOSIS — L40.9 PSORIASIS: ICD-10-CM

## 2019-09-05 DIAGNOSIS — M15.0 PRIMARY GENERALIZED (OSTEO)ARTHRITIS: Primary | ICD-10-CM

## 2019-09-05 PROCEDURE — 99205 OFFICE O/P NEW HI 60 MIN: CPT | Performed by: INTERNAL MEDICINE

## 2019-09-05 PROCEDURE — 73130 X-RAY EXAM OF HAND: CPT

## 2019-09-05 NOTE — PROGRESS NOTES
Assessment and Plan:   Ms Malgorzata Carmen is a 89-FII[age]-FAR female with history significant for generalized osteoarthritis requiring multiple total joint replacements, recent diagnosis of scalp psoriasis, chronic kidney disease stage 3 and osteoporosis who presents for further evaluation of joint pains  She is referred by Dr Liliana Ramirez for a rheumatology consult  Irina Marques presents today for further evaluation of predominant bilateral hand arthralgias which appear to be intermittent in nature, as well as a history of generalized osteoarthritis requiring multiple joint replacement surgeries  She does have a recent diagnosis of scalp psoriasis, and in view of this there was a concern for an underlying inflammatory arthritis  - At today's office visit, she does not describe other significant arthralgias except for what is affecting her hands, as well as her spine  Subjectively she also does not describe symptoms that would be concerning for an underlying inflammatory arthritis  Based on her physical examination today there is concern for very mild fixed flexion deformities at multiple joints, as well as evidence of bony enlargement versus synovial hypertrophy  I do think she deserves a workup for an inflammatory arthritis and would like to obtain rheumatoid serologies as well as x-rays of her bilateral hands to further assess     - We also discussed that it would be difficult to evaluate at this time if the advanced osteoarthritis she had affecting multiple other joints which led to joint replacement surgeries may have been related to a primary or secondary osteoarthritis  - To manage her symptoms at this time I recommend she continue with the Tylenol as needed which does seem to be helping  I also offered her a refill on the Voltaren gel which she had previously used for the knee osteoarthritis  She can apply this on her hands up to twice daily as needed    We do need to be cautious with NSAID use as she has a history of chronic kidney disease stage 3     - I will see her back in the office in 6 weeks to review the results of the labs and x-rays  We will determine at that time if a change in her treatment is indicated  Of note it is also reasonable to consider treating her with just the Tylenol as needed for now, as this has been beneficial, and she is not complaining of debilitating or persistent arthralgias  Plan:  Diagnoses and all orders for this visit:    Primary generalized (osteo)arthritis  -     Cyclic citrul peptide antibody, IgG; Future  -     RF Screen w/ Reflex to Titer; Future  -     XR hand 3+ vw right; Future  -     XR hand 3+ vw left; Future    Psoriasis  -     Ambulatory referral to Rheumatology    Arthralgia of hands, bilateral  -     Cyclic citrul peptide antibody, IgG; Future  -     RF Screen w/ Reflex to Titer; Future  -     XR hand 3+ vw right; Future  -     XR hand 3+ vw left; Future    Chronic pain of left knee  -     diclofenac sodium (VOLTAREN) 1 %; Apply 2 g topically 2 (two) times a day as needed (Hand pain)      I have personally reviewed pertinent films in PACS which does not show any joint effusion of the left knee x-ray  Activities as tolerated    Diet: low carb/low fat, more greens/vegetables, adequate hydration  Exercise: try to maintain a low impact exercise regimen as much as possible  Walk for 30 minutes a day for at least 3 days a week    Encouraged to maintain good sleep hygiene  Continue other medications as prescribed by PCP and other specialists        RTC in 6 weeks  HPI  Ms Halima Pinto is a [birth year]-BPTY-YJN female with history significant for generalized osteoarthritis requiring multiple total joint replacements, recent diagnosis of scalp psoriasis, chronic kidney disease stage 3 and osteoporosis who presents for further evaluation of joint pains  She is referred by Dr Rosemarie Lam for a rheumatology consult      Patient states over a duration of many years she has gone on to develop advanced osteoarthritis affecting multiple joints which has resulted in joint replacement surgeries of her bilateral shoulders, hips and left knee  She does not necessarily experience continued pain in these joints, unless she does certain activities  She has been noticing a pain with stiffness sensation in her bilateral hands which is worse first thing in the morning, with the stiffness improving within a few minutes  She states that the pain really only affects her during the morning and does not bother her during the day  She does have difficulty with fine motor activities  She denies any significant pain affecting her wrists, elbows, ankles or feet  She does experience swelling around her ankles with activities, but this relieves with resting  She has not noticed any other swollen joints  As mentioned, she does experience diffuse morning stiffness including affecting her hands, which lasts only for a few minutes  In view of the chronic kidney disease she refrains from NSAID use  Currently she is managing her symptoms with Tylenol 500 mg twice daily as needed  This does help her  A few months ago she was diagnosed with scalp psoriasis by Dermatology  This was managed with topical corticosteroids and a shampoo  This has resolved  She denies a prior history of psoriasis, or skin rashes affecting other areas of her body  She denies any known family history of rheumatoid/psoriatic arthritis  No other complaints noted at this time  The following portions of the patient's history were reviewed and updated as appropriate: allergies, current medications, past family history, past medical history, past social history, past surgical history and problem list       Review of Systems  Constitutional: Negative for weight change, fevers, chills, night sweats, fatigue    ENT/Mouth: Negative for ear pain, nasal congestion, sinus pain, hoarseness, sore throat, rhinorrhea, swallowing difficulty  Positive for loss of hearing  Eyes: Negative for pain, redness, discharge, vision changes  Cardiovascular: Negative for chest pain, SOB, palpitations  Respiratory: Negative for cough, sputum, wheezing, dyspnea  Gastrointestinal: Negative for nausea, vomiting, diarrhea, constipation, pain, heartburn  Genitourinary: Negative for dysuria, urinary frequency, hematuria  Musculoskeletal: As per HPI  Skin: Negative for skin rash, color changes  Positive for scalp rash  Neuro: Negative for weakness, tingling, loss of consciousness  Positive for numbness  Psych: Negative for anxiety, depression  Heme/Lymph: Negative for easy bruising, bleeding, lymphadenopathy  Past Medical History:   Diagnosis Date    AAA [facility]abdominal aortic aneurysm) ([facility])     s/p EVAR 3/10/16    Allergic urticaria     LAST ASSESSED: [id number]    Appendicitis     Arthritis     CKD (chronic kidney disease), stage III ([facility])     Coronary artery disease     Diabetes mellitus ([facility])     Eczema     Gross hematuria     LAST ASSESSED: [id number]    Hematuria     Hypertension     Hyponatremia     Hypothyroid     Osteoporosis     LAST ASSESSED: [id number]    PONV (postoperative nausea and vomiting)     Primary osteoarthritis of left knee     LAST ASSESSED: [id number]    Renal disorder     Renal insufficiency     Rotator cuff tear arthropathy     LEFT LAST ASSESSED: [id number]    Secondary osteoarthritis of right shoulder     LAST ASSESSED: [id number]    Syncope        Past Surgical History:   Procedure Laterality Date    APPENDECTOMY      BACK SURGERY      CATARACT EXTRACTION W/  INTRAOCULAR LENS IMPLANT Bilateral     KNEE ARTHROSCOPY Left     ONSET: [id number] THERAPEUTIC    LUMBAR LAMINECTOMY      [place] AAA REPAIR,MODULR BIFURCATED PROSTH N/A 3/10/2016    Procedure: REPAIR ANEURYSM ENDOVASCULAR ABDOMINAL AORTIC  (EVAR) ;   Surgeon: John Spence MD;  [facility];  Service: Vascular    ND RECONSTR TOTAL SHOULDER IMPLANT Left 5/31/2016    Procedure: ARTHROPLASTY SHOULDER REVERSE;  Surgeon: Karely Huff MD;  [facility];  Service: Orthopedics    [place] TOTAL KNEE ARTHROPLASTY Left 7/23/2018    Procedure: KNEE : COMPLETE REPLACEMENT;  Surgeon: Henry Vázquez MD;  [facility];  Service: Orthopedics    REVERSE TOTAL SHOULDER ARTHROPLASTY Right     ROTATOR CUFF REPAIR Left     RC SURGERY RESOLVED: 1999    SHOULDER ARTHROPLASTY      5/15 - FOR RIGHT SHOULDER; 5/16 - FOR LEFT SHOULDER    SHOULDER ARTHROSCOPY      TOTAL HIP ARTHROPLASTY Left     ONSET: [id number]    TOTAL HIP ARTHROPLASTY  05/31/2006    REVISION       Social History     Socioeconomic History    Marital status:       Spouse name: Not on file    Number of children: Not on file    Years of education: Not on file    Highest education level: Not on file   Occupational History    Occupation: RETIRED   Social Needs    Financial resource strain: Not on file    Food insecurity:     Worry: Not on file     Inability: Not on file    Transportation needs:     Medical: Not on file     Non-medical: Not on file   Tobacco Use    Smoking status: Never Smoker    Smokeless tobacco: Never Used   Substance and Sexual Activity    Alcohol use: No    Drug use: No    Sexual activity: Not on file   Lifestyle    Physical activity:     Days per week: Not on file     Minutes per session: Not on file    Stress: Not on file   Relationships    Social connections:     Talks on phone: Not on file     Gets together: Not on file     Attends Evangelical service: Not on file     Active member of club or organization: Not on file     Attends meetings of clubs or organizations: Not on file     Relationship status: Not on file    Intimate partner violence:     Fear of current or ex partner: Not on file     Emotionally abused: Not on file     Physically abused: Not on file     Forced sexual activity: Not on file   Other Topics Concern    Not on file Social History Narrative    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 - [employer]       Family History   Problem Relation Age of Onset    Coronary artery disease Mother     Diabetes Mother     Coronary artery disease Father     Cancer Sister     Arthritis Sister     Unexplained death Family         RAPID    Cancer Daughter        Allergies   Allergen Reactions    Clobetasol     Fluocinolone     Ketoconazole     Pyrithione     Bextra [Valdecoxib] Rash     GI INTOL    Percocet [Oxycodone-Acetaminophen] Rash and GI Intolerance       Current Outpatient Medications:     acetaminophen (TYLENOL) 325 mg tablet, Take 2 tablets (650 mg total) by mouth every 6 (six) hours as needed for mild pain , Disp: 30 tablet, Rfl: 0    amLODIPine-atorvastatin (CADUET) 10-20 MG per tablet, Take 1 tablet by mouth daily at bedtime, Disp: 90 tablet, Rfl: 3    calcium citrate-vitamin D (CITRACAL+D) 315-200 MG-UNIT per tablet, Take 1 tablet by mouth daily  Calcium + D TABS  Refills: 0   , M D ; Active , Disp: , Rfl:     losartan (COZAAR) 100 MG tablet, Take 1 tablet (100 mg total) by mouth daily for 90 days Losartan Potassium 100 MG Oral Tablet take 1 tablet once daily  Quantity: 90;  Refills: 3    Aleksey MEDEL ; Active, Disp: 90 tablet, Rfl: 3    sodium chloride 1 g tablet, TAKE 1 TABLET BY MOUTH EVERY DAY, Disp: 30 tablet, Rfl: 6    diclofenac sodium (VOLTAREN) 1 %, Apply 2 g topically 2 (two) times a day as needed (Hand pain), Disp: 100 g, Rfl: 0    fexofenadine (ALLEGRA) 180 MG tablet, Take 1 tablet by mouth as needed  , Disp: , Rfl:     Fluocinolone Acetonide Scalp (DERMA-SMOOTHE/FS SCALP) 0 01 % OIL, Apply topically, Disp: , Rfl:     fluticasone (FLONASE) 50 mcg/act nasal spray, 2 sprays into each nostril daily Fluticasone Propionate 50 MCG/ACT Nasal Suspension use 2 spr   in each nostril daily  Quantity: 1;  Refills: 5    Aleksey MEDEL ;  Started 23-Sep-2014 Active, Disp: 16 g, Rfl: [facility] (SPENCO GEL HEEL CUP MED/LG) MISC, 3A since heel lift for right side, Disp: 1 each, Rfl: 0    ketoconazole (NIZORAL) 2 % shampoo, Apply 1 application topically once, Disp: , Rfl:     levothyroxine 125 mcg tablet, Take 1 tablet (125 mcg total) by mouth daily for 90 days, Disp: 90 tablet, Rfl: 3    Multiple Vitamins-Minerals (MULTIVITAMIN & MINERAL PO), 1 tablet daily  Multivitamin & Mineral Oral Liquid  Quantity: 0;  Refills: 0   , M D ; Active , Disp: , Rfl:     ONE TOUCH ULTRA TEST test strip, USE AS DIRECTED, Disp: 100 each, Rfl: 3    ONETOUCH DELICA LANCETS FINE MISC, TEST BLOOD SUGAR ONE TIME DAILY OR AS NEEDED, Disp: 100 each, Rfl: 2    Pyrithione Zinc 2 % SHAM, Apply topically, Disp: , Rfl:       Objective:    Vitals:    09/05/19 1309   BP: (!) 173/53   Pulse: 76   Weight: 69 1 kg (152 lb 4 8 oz)       Physical Exam  General: Well appearing, well nourished, in no distress  Oriented x 3, normal mood and affect  Ambulating with aid of a walker  Skin: Good turgor, no rash, unusual bruising or prominent lesions  No psoriasis noted  Hair: Normal texture and distribution  Nails: Normal color, no deformities  No nail pitting  HEENT:  Head: Normocephalic, atraumatic  Eyes: Conjunctiva clear, sclera non-icteric, EOM intact  Nose: No external lesions, mucosa non-inflamed  Mouth: Mucous membranes moist, no mucosal lesions  Extremities: No amputations or deformities, cyanosis  Mild lower extremity edema noted  Musculoskeletal:   Hands - she has diffuse osteoarthritic changes present at her bilateral PIP joints, as well as at her bilateral CMC joints  She also has a slight fixed flexion deformity at her right hand 4th and 5th digits, at the PIP joints  In addition there is synovial hypertrophy versus bony enlargement present at her 2nd and 3rd PIP joints on the right hand  Her left hand demonstrates a fixed flexion deformity at the 5th PIP joint which she relates to a prior injury    There is also bony enlargement versus synovial hypertrophy present at her left hand 3rd PIP  Her MCPs bilaterally are unremarkable  There is no tenderness to palpation of the small joints of her hands  Wrists - there appears to be chronic synovial hypertrophy which is mild of her left wrist   She has slight limitation in full flexion bilaterally  There is no tenderness noted  Elbows - unremarkable  Shoulders - status post bilateral total shoulder replacement  Hips - status post bilateral total hip replacement  Knees - left hip is status post total knee replacement  Ankles - mild soft tissue swelling present bilaterally without tenderness  Feet - negative MTP squeeze test bilaterally  Neurologic: Alert and oriented  No focal neurological deficits appreciated  Psychiatric: Normal mood and affect  WILMER Britt    Rheumatology

## 2019-09-10 ENCOUNTER — APPOINTMENT (OUTPATIENT)
Dept: LAB | Age: 84
End: 2019-09-10
Payer: COMMERCIAL

## 2019-09-10 DIAGNOSIS — M25.541 ARTHRALGIA OF HANDS, BILATERAL: ICD-10-CM

## 2019-09-10 DIAGNOSIS — M25.542 ARTHRALGIA OF HANDS, BILATERAL: ICD-10-CM

## 2019-09-10 DIAGNOSIS — M15.0 PRIMARY GENERALIZED (OSTEO)ARTHRITIS: ICD-10-CM

## 2019-09-10 LAB — RHEUMATOID FACT SER QL LA: NEGATIVE

## 2019-09-10 PROCEDURE — 86200 CCP ANTIBODY: CPT

## 2019-09-10 PROCEDURE — 36415 COLL VENOUS BLD VENIPUNCTURE: CPT

## 2019-09-10 PROCEDURE — 86430 RHEUMATOID FACTOR TEST QUAL: CPT

## 2019-09-12 LAB — CCP IGA+IGG SERPL IA-ACNC: 7 UNITS (ref 0–19)

## 2019-09-13 ENCOUNTER — OFFICE VISIT (OUTPATIENT)
Dept: PODIATRY | Facility: CLINIC | Age: 84
End: 2019-09-13
Payer: COMMERCIAL

## 2019-09-13 VITALS
BODY MASS INDEX: 29.15 KG/M2 | HEART RATE: 64 BPM | DIASTOLIC BLOOD PRESSURE: 56 MMHG | SYSTOLIC BLOOD PRESSURE: 110 MMHG | WEIGHT: 154.4 LBS | HEIGHT: 61 IN

## 2019-09-13 DIAGNOSIS — E11.29 CONTROLLED TYPE 2 DIABETES MELLITUS WITH OTHER DIABETIC KIDNEY COMPLICATION, WITHOUT LONG-TERM CURRENT USE OF INSULIN (HCC): Primary | ICD-10-CM

## 2019-09-13 PROCEDURE — 11719 TRIM NAIL(S) ANY NUMBER: CPT | Performed by: PODIATRIST

## 2019-09-13 NOTE — PROGRESS NOTES
Established patient with class findings presents for nail care  Vascular exam:  DP  0/4 bilateral; PT  0 4 bilateral   Dermatological exam:  Each toenail is thick and  dystrophic  Diagnosis:  Diabetes mellitus  Treatment: Trimmed multiple dystrophic toenails  Nail removal  Date/Time: 9/13/2019 2:29 PM  Performed by: Priya Gonsales DPM  Authorized by: Priya Gonsales DPM     Patient location:  Clinic  Anesthesia (see MAR for exact dosages): Anesthesia method:  None  Nails trimmed:     Number of nails trimmed:  10  Post-procedure details:     Patient tolerance of procedure:   Tolerated well, no immediate complications

## 2019-09-18 ENCOUNTER — OFFICE VISIT (OUTPATIENT)
Dept: OBGYN CLINIC | Facility: HOSPITAL | Age: 84
End: 2019-09-18
Payer: COMMERCIAL

## 2019-09-18 VITALS
HEART RATE: 75 BPM | SYSTOLIC BLOOD PRESSURE: 155 MMHG | HEIGHT: 61 IN | BODY MASS INDEX: 29.14 KG/M2 | WEIGHT: 154.32 LBS | DIASTOLIC BLOOD PRESSURE: 66 MMHG

## 2019-09-18 DIAGNOSIS — R29.898 WEAKNESS OF RIGHT HIP: Primary | ICD-10-CM

## 2019-09-18 DIAGNOSIS — G89.29 CHRONIC LOW BACK PAIN WITHOUT SCIATICA, UNSPECIFIED BACK PAIN LATERALITY: ICD-10-CM

## 2019-09-18 DIAGNOSIS — M54.50 CHRONIC LOW BACK PAIN WITHOUT SCIATICA, UNSPECIFIED BACK PAIN LATERALITY: ICD-10-CM

## 2019-09-18 DIAGNOSIS — M70.61 TROCHANTERIC BURSITIS OF RIGHT HIP: ICD-10-CM

## 2019-09-18 PROCEDURE — 20610 DRAIN/INJ JOINT/BURSA W/O US: CPT | Performed by: ORTHOPAEDIC SURGERY

## 2019-09-18 PROCEDURE — 99213 OFFICE O/P EST LOW 20 MIN: CPT | Performed by: ORTHOPAEDIC SURGERY

## 2019-09-18 RX ORDER — BUPIVACAINE HYDROCHLORIDE 2.5 MG/ML
2 INJECTION, SOLUTION INFILTRATION; PERINEURAL
Status: COMPLETED | OUTPATIENT
Start: 2019-09-18 | End: 2019-09-18

## 2019-09-18 RX ORDER — LIDOCAINE HYDROCHLORIDE 10 MG/ML
2 INJECTION, SOLUTION INFILTRATION; PERINEURAL
Status: COMPLETED | OUTPATIENT
Start: 2019-09-18 | End: 2019-09-18

## 2019-09-18 RX ORDER — BETAMETHASONE SODIUM PHOSPHATE AND BETAMETHASONE ACETATE 3; 3 MG/ML; MG/ML
12 INJECTION, SUSPENSION INTRA-ARTICULAR; INTRALESIONAL; INTRAMUSCULAR; SOFT TISSUE
Status: COMPLETED | OUTPATIENT
Start: 2019-09-18 | End: 2019-09-18

## 2019-09-18 RX ADMIN — BETAMETHASONE SODIUM PHOSPHATE AND BETAMETHASONE ACETATE 12 MG: 3; 3 INJECTION, SUSPENSION INTRA-ARTICULAR; INTRALESIONAL; INTRAMUSCULAR; SOFT TISSUE at 14:12

## 2019-09-18 RX ADMIN — LIDOCAINE HYDROCHLORIDE 2 ML: 10 INJECTION, SOLUTION INFILTRATION; PERINEURAL at 14:12

## 2019-09-18 RX ADMIN — BUPIVACAINE HYDROCHLORIDE 2 ML: 2.5 INJECTION, SOLUTION INFILTRATION; PERINEURAL at 14:12

## 2019-09-18 NOTE — PROGRESS NOTES
89 y o female who has been evaluated in the past for right sided leg length discrepancy  She was previously provided with addional shoe lifts that have helped with her symptoms of pain and instability significantly  Patients only complaint at this time is feelings of weakness to the right leg and requests additional formal physical therapy for additional strengthening  Review of Systems  Review of systems negative unless otherwise specified in HPI    Past Medical History  Past Medical History:   Diagnosis Date    AAA (abdominal aortic aneurysm) ([address] )     s/p EVAR 3/10/16    Allergic urticaria     LAST ASSESSED: [id number]    Appendicitis     Arthritis     CKD (chronic kidney disease), stage III ([facility])     Coronary artery disease     Diabetes mellitus ([facility])     Eczema     Gross hematuria     LAST ASSESSED: [id number]    Hematuria     Hypertension     Hyponatremia     Hypothyroid     Osteoporosis     LAST ASSESSED: [id number]    PONV (postoperative nausea and vomiting)     Primary osteoarthritis of left knee     LAST ASSESSED: [id number]    Renal disorder     Renal insufficiency     Rotator cuff tear arthropathy     LEFT LAST ASSESSED: [id number]    Secondary osteoarthritis of right shoulder     LAST ASSESSED: [id number]    Syncope        Past Surgical History  Past Surgical History:   Procedure Laterality Date    APPENDECTOMY      BACK SURGERY      CATARACT EXTRACTION W/  INTRAOCULAR LENS IMPLANT Bilateral     KNEE ARTHROSCOPY Left     ONSET: [id number] THERAPEUTIC    LUMBAR LAMINECTOMY      [place] AAA REPAIR,MODULR BIFURCATED PROSTH N/A 3/10/2016    Procedure: REPAIR ANEURYSM ENDOVASCULAR ABDOMINAL AORTIC  (EVAR) ;   Surgeon: Patria Lopez MD;  [facility];  Service: Vascular    [place] RECONSTR TOTAL SHOULDER IMPLANT Left 5/31/2016    Procedure: ARTHROPLASTY SHOULDER REVERSE;  Surgeon: Ashley Miranda MD;  [facility];  Service: Orthopedics    [place] TOTAL KNEE ARTHROPLASTY Left 7/23/2018    Procedure: KNEE : COMPLETE REPLACEMENT;  Surgeon: Serafin Tenorio MD;  [facility];  Service: Orthopedics    REVERSE TOTAL SHOULDER ARTHROPLASTY Right     ROTATOR CUFF REPAIR Left     RC SURGERY RESOLVED: 1999    SHOULDER ARTHROPLASTY      5/15 - FOR RIGHT SHOULDER; 5/16 - FOR LEFT SHOULDER    SHOULDER ARTHROSCOPY      TOTAL HIP ARTHROPLASTY Left     ONSET: [id number]    TOTAL HIP ARTHROPLASTY  05/31/2006    REVISION       Current Medications  Current Outpatient Medications on File Prior to Visit   Medication Sig Dispense Refill    acetaminophen (TYLENOL) 325 mg tablet Take 2 tablets (650 mg total) by mouth every 6 (six) hours as needed for mild pain  30 tablet 0    amLODIPine-atorvastatin (CADUET) 10-20 MG per tablet Take 1 tablet by mouth daily at bedtime 90 tablet 3    calcium citrate-vitamin D (CITRACAL+D) 315-200 MG-UNIT per tablet Take 1 tablet by mouth daily  Calcium + D TABS  Refills: 0   , M D ; Active       diclofenac sodium (VOLTAREN) 1 % Apply 2 g topically 2 (two) times a day as needed (Hand pain) 100 g 0    fexofenadine (ALLEGRA) 180 MG tablet Take 1 tablet by mouth as needed        Fluocinolone Acetonide Scalp (DERMA-SMOOTHE/FS SCALP) 0 01 % OIL Apply topically      fluticasone (FLONASE) 50 mcg/act nasal spray 2 sprays into each nostril daily Fluticasone Propionate 50 MCG/ACT Nasal Suspension use 2 spr  in each nostril daily  Quantity: 1;  Refills: 5    Anita Rides M D ;  Started 23-Sep-2014 Active 16 g 5    Foot Care Products [facility] GEL HEEL CUP MED/LG) MISC 3A since heel lift for right side 1 each 0    ketoconazole (NIZORAL) 2 % shampoo Apply 1 application topically once      levothyroxine 125 mcg tablet Take 1 tablet (125 mcg total) by mouth daily for 90 days 90 tablet 3    losartan (COZAAR) 100 MG tablet Take 1 tablet (100 mg total) by mouth daily for 90 days Losartan Potassium 100 MG Oral Tablet take 1 tablet once daily    Quantity: 90;  Refills: 3 Emile Eth M D ; Active 90 tablet 3    Multiple Vitamins-Minerals (MULTIVITAMIN & MINERAL PO) 1 tablet daily  Multivitamin & Mineral Oral Liquid  Quantity: 0;  Refills: 0   , M D ; Active       ONE TOUCH ULTRA TEST test strip USE AS DIRECTED 100 each 3    ONETOUCH DELICA LANCETS FINE MISC TEST BLOOD SUGAR ONE TIME DAILY OR AS NEEDED 100 each 2    Pyrithione Zinc 2 % SHAM Apply topically      sodium chloride 1 g tablet TAKE 1 TABLET BY MOUTH EVERY DAY 30 tablet 6     No current facility-administered medications on file prior to visit          Recent Labs [facility])  0   Lab Value Date/Time    HCT 38 9 08/15/2019 0904    HCT 38 0 10/01/2015 1333    HGB 12 6 08/15/2019 0904    HGB 12 8 10/01/2015 1333    WBC 7 20 08/15/2019 0904    WBC 6 16 10/01/2015 1333    INR 1 00 06/26/2018 1007    INR 1 00 04/21/2015 0947    GLUCOSE 153 (H) 01/07/2016 1401    HGBA1C 6 0 08/15/2019 0904    HGBA1C 5 8 (H) 10/03/2017 0938         Physical exam  · General: Awake, Alert, Oriented  · Eyes: Pupils equal, round and reactive to light  · Heart: regular rate and rhythm  · Lungs: No audible wheezing  · Abdomen: soft  RLE  Skin intact  NTTP over the right hip  No groin pain with active or passive ROM of the right hip  Motor and sensory innervation intact   Brisk capillary refill to the foot and toes     Large joint arthrocentesis: R greater trochanteric bursa  Date/Time: 9/18/2019 2:12 PM  Consent given by: patient  Site marked: site marked  Timeout: Immediately prior to procedure a time out was called to verify the correct patient, procedure, equipment, support staff and site/side marked as required   Supporting Documentation  Indications: pain   Procedure Details  Location: hip - R greater trochanteric bursa  Needle size: 22 G  Approach: lateral  Medications administered: 2 mL bupivacaine 0 25 %; 2 mL lidocaine 1 %; 12 mg betamethasone acetate-betamethasone sodium phosphate 6 (3-3) mg/mL    Patient tolerance: patient tolerated the procedure well with no immediate complications  Dressing:  Sterile dressing applied          Assessment/Plan:   80 y  o female with improving right hip pain following additional shoe inserts for leg length discrepancy  Will provide additional PT referral for additional strengthening of the right leg and core  Patient to follow up in 3 months

## 2019-09-26 ENCOUNTER — EVALUATION (OUTPATIENT)
Dept: PHYSICAL THERAPY | Age: 84
End: 2019-09-26
Payer: COMMERCIAL

## 2019-09-26 DIAGNOSIS — R29.898 WEAKNESS OF RIGHT HIP: Primary | ICD-10-CM

## 2019-09-26 PROCEDURE — 97162 PT EVAL MOD COMPLEX 30 MIN: CPT | Performed by: PHYSICAL THERAPIST

## 2019-09-26 NOTE — PROGRESS NOTES
PT Evaluation     Today's date: 2019  Patient name: Bridgett Wilcox  :   MRN: [id number]  Referring provider: Helen Munroe MD  Dx:   Encounter Diagnosis     ICD-10-CM    1  Weakness of right hip R29 898 Ambulatory referral to Physical Therapy                  Assessment  Assessment details: Pt is a 80 y o  Female referred for R hip weakness  Pt presents with decreased strength, balance, endurance, gait impairments, and decreased abilities to perform ADL's and IADL's  Pt is currently walking daily and performing previous hip HEP  Pt uses rollator at all times  Pt presents with R trendelenburg, decreased step length,  Pt would benefit from skilled PT to address the above impairments, increase stability, and mobility to increase safety and independence  Pt educated to continue walking and performing HEP  Pt reports difficulty and unable to put theraband around legs to perform seated hip ABD  New HEP will be given next visit  Pt only able to receive transportation to therapy on  and   Impairments: abnormal gait, activity intolerance, impaired balance, impaired physical strength and poor body mechanics  Functional limitations: Pt presents with decreased strength, balance, endurance, abnormal gait, and difficult with ADL's, IADL's, bending, lifting, which is further limiting overall function and independence  Understanding of Dx/Px/POC: good   Prognosis: good    Goals  ST  Pt will increase overall R hip strength to 4/5 within 4 weeks of therapy  2  Pt will decrease TUG time <20 seconds within 4 weeks of therapy  3  Pt will perform b/l SLS time to 10 seconds within 4 weeks of therapy  4  Pt will increase walking tolerance by 5 minutes within 4 weeks of therapy  LTG  1  Pt will have 0/10 pain during activities within 8 weeks of therapy  2  Pt will perform walking program for 30 continuous minutes within 8 weeks of therapy    3  Pt will improve overall R hip strength to 5/5 within 8 weeks of therapy  Plan  Patient would benefit from: skilled physical therapy  Planned modality interventions: cryotherapy and thermotherapy: hydrocollator packs  Planned therapy interventions: patient education, therapeutic activities, therapeutic exercise, gait training, home exercise program, strengthening, stretching, balance, postural training, abdominal trunk stabilization, manual therapy and neuromuscular re-education  Frequency: 2x week  Duration in weeks: 4  Treatment plan discussed with: patient        Subjective Evaluation    History of Present Illness  Mechanism of injury: Pt reports chronic hip problems  H/o 2 DAVID's in R hip, 1 DAVID in L hip, 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  Pt has not had any falls or trauma, however feels weak in her R hip and has decreased balance  Pt uses a rollator at all times to increase stability  Pt enjoys walking daily ~30 minutes  Pt saw ortho on  and was given an injection, f/u in 3 months  Leg length discrepency, wears build up sneaker on R  Pt lives in independent living facility with cleaning and 2 meals/day provided  Quality of life: good    Pain  Current pain ratin  At best pain ratin  At worst pain rating: 3  Quality: dull ache and sharp  Relieving factors: rest, medications and change in position  Aggravating factors: stair climbing and walking  Progression: no change    Social Support  Steps to enter house: no  Stairs in house: no   Lives in: apartment  Lives with: alone    Employment status: not working  Exercise history: walking, previous HEP program    Treatments  No previous or current treatments  Patient Goals  Patient goals for therapy: decreased pain, improved balance, increased motion, return to sport/leisure activities and increased strength          Objective     Static Posture   General Observations  Right leg length discrepancy  Tenderness     Right Hip   Tenderness in the greater trochanter       Lumbar Screen  Lumbar range of motion within normal limits with the following exceptions:Lumbar flexion 73  Lumbar ext 22  Lumbar SB R 14, L 15  Lumbar Rotation R 15, L 16    Neurological Testing     Sensation     Hip   Left Hip   Intact: light touch    Right Hip   Intact: light touch    Reflexes   Left   Clonus sign: negative    Right   Clonus sign: negative    Active Range of Motion     Right Hip   Flexion: 122 degrees     Strength/Myotome Testing     Left Hip   Normal muscle strength    Right Hip   Planes of Motion   Flexion: 4-  Abduction: 3+  Adduction: 4-    Left Knee   Normal strength    Right Knee   Flexion: 4  Extension: 4-    Ambulation     Observational Gait   Increased left stance time, right stance time, left swing time and right swing time  Decreased walking speed, stride length, left step length and right step length  Additional Observational Gait Details  Use of Rollator at all times during ambulation     Quality of Movement During Gait   Trunk    Trunk (Left): Positive left lateral lean over stance limb  Pelvis    Pelvis (Left): Positive Trendelenburg  Functional Assessment        Single Leg Stance   Left: 3 seconds  Right: 0 (pt unable to perform) seconds    Comments  Rhomberg feet together EO minimal ankle sway for ~15 seconds  EC with max ankle and hip strategies for ~10 sec      TU seconds with use of Rollator      Flowsheet Rows      Most Recent Value   PT/OT G-Codes   Current Score  34   Projected Score  42             Precautions: FALL RISK, DM, h/o AAA    Specialty Daily Treatment Diary       Manual                                Exercise Diary                 Bike                 Step ups FW, lateral        sidestepping                Stand hip abd         Tandem stance        SLS CGA                 bridges        Supine SLR        S/l clam shells                                                                Modalities

## 2019-10-01 ENCOUNTER — OFFICE VISIT (OUTPATIENT)
Dept: PHYSICAL THERAPY | Age: 84
End: 2019-10-01
Payer: COMMERCIAL

## 2019-10-01 DIAGNOSIS — R29.898 WEAKNESS OF RIGHT HIP: Primary | ICD-10-CM

## 2019-10-01 PROCEDURE — 97112 NEUROMUSCULAR REEDUCATION: CPT | Performed by: PHYSICAL THERAPIST

## 2019-10-01 PROCEDURE — 97110 THERAPEUTIC EXERCISES: CPT | Performed by: PHYSICAL THERAPIST

## 2019-10-01 NOTE — PROGRESS NOTES
Daily Note     Today's date: 10/1/2019  Patient name: May Conde  :   MRN: [id number]  Referring provider: Juan Painting MD  Dx:   Encounter Diagnosis     ICD-10-CM    1  Weakness of right hip R29 898                   Subjective: Pt reports she has continued walking every day and feels like she is walking a little better but still must use her rollator  Objective: See treatment diary below      Assessment: Patient tolerated treatment well  Initiation of exercise program today  Pt demonstrated fatigue during exercises, and took rest breaks as needed  Patient needing CGA during balance exercises for safety  Pt needing verbal cues to lift right foot up higher during standing functional exercises d/t wearing build up sneaker on R  Pt reports she feels better after therapy  Pt given s/l clam shells for HEP  Patient would benefit from continued PT      Plan: Continue per plan of care        Precautions: FALL RISK, DM, h/o AAA    Specialty Daily Treatment Diary       Manual 10/1                               Exercise Diary                 Nustep 8'               Step ups FW, lateral 4" 15x each       sidestepping 2 laps               Stand hip abd  2 x10       Tandem stance 10sec x3 ea       SLS CGA  5sec x3 ea               bridges 2 x10       Supine SLR 2 x10 Add wt      S/l clam shells 2 x10                                                               Modalities

## 2019-10-03 ENCOUNTER — OFFICE VISIT (OUTPATIENT)
Dept: PHYSICAL THERAPY | Age: 84
End: 2019-10-03
Payer: COMMERCIAL

## 2019-10-03 DIAGNOSIS — R29.898 WEAKNESS OF RIGHT HIP: Primary | ICD-10-CM

## 2019-10-03 PROCEDURE — 97112 NEUROMUSCULAR REEDUCATION: CPT | Performed by: PHYSICAL THERAPIST

## 2019-10-03 PROCEDURE — 97110 THERAPEUTIC EXERCISES: CPT | Performed by: PHYSICAL THERAPIST

## 2019-10-03 NOTE — PROGRESS NOTES
Daily Note     Today's date: 10/3/2019  Patient name: Myriam Alvarado  :   MRN: [id number]  Referring provider: Iris Quarles MD  Dx:   Encounter Diagnosis     ICD-10-CM    1  Weakness of right hip R29 898                   Subjective: Pt reports she is feeling stronger and she feels like she is walking more normal since starting therapy  Objective: See treatment diary below      Assessment: Patient tolerated treatment well  Pt performed exercises with less fatigue today, but took rest breaks as needed  Progressed to adding weights, increase in number of repetitions and distance during exercises  Added tandem walking today in // bars  Pt needing CGA during balance activities for safety  Pt reporting she is performing HEP daily  Pt needing VC at times during functional exercises for proper technique  Initiation of exercise program today  Pt demonstrated fatigue during exercises, and took rest breaks as needed  Patient needing CGA during balance exercises for safety  Pt reports she feels better after therapy today  Patient would benefit from continued PT      Plan: Continue per plan of care        Precautions: FALL RISK, DM, h/o AAA    Specialty Daily Treatment Diary       Manual 10/1 10/3                              Exercise Diary                 Nustep 8' 10'              Step ups FW, lateral 4" 15x each 4" 20x ea      sidestepping 2 laps 3  laps              Stand hip abd  2 x10 2 x10      Tandem stance in // bars 10sec x3 ea 10sec x 3 ea      SLS CGA  In // bars 5sec x3 ea 5 sec x3 ea      Tandem walking in // bars  2 laps              bridges 2 x10 2 x 10      Supine SLR 2 x10 1# 2 x 10      S/l clam shells 2 x10 2 x 10                                                              Modalities

## 2019-10-08 ENCOUNTER — OFFICE VISIT (OUTPATIENT)
Dept: PHYSICAL THERAPY | Age: 84
End: 2019-10-08
Payer: COMMERCIAL

## 2019-10-08 DIAGNOSIS — R29.898 WEAKNESS OF RIGHT HIP: Primary | ICD-10-CM

## 2019-10-08 PROCEDURE — 97110 THERAPEUTIC EXERCISES: CPT | Performed by: PHYSICAL THERAPIST

## 2019-10-08 PROCEDURE — 97112 NEUROMUSCULAR REEDUCATION: CPT | Performed by: PHYSICAL THERAPIST

## 2019-10-08 NOTE — PROGRESS NOTES
Daily Note     Today's date: 10/8/2019  Patient name: Rufino Murillo  :   MRN: [id number]  Referring provider: Linnette Bejarano MD  Dx:   Encounter Diagnosis     ICD-10-CM    1  Weakness of right hip R29 898                   Subjective: Pt reports she has been walking and performing her HEP daily  Objective: See treatment diary below      Assessment: Patient tolerated treatment well  Increased step up height, added weights and TB's to advance exercise program  Pt could feel the exercises were more challenging today  Pt demonstrated fatigue and took rest breaks as needed  Pt continues to be compliant with HEP and remains active  During step ups and balance exercises pt is CGA for safety  Pt needing VC to  her feet higher to prevent them from catching on the ground and to maintain good posture  Patient would benefit from continued PT      Plan: Continue per plan of care        Precautions: FALL RISK, DM, h/o AAA    Specialty Daily Treatment Diary       Manual 10/1 10/3 10/8                             Exercise Diary                 Nustep 8' 10' 8'             Step ups FW, lateral 4" 15x each 4" 20x ea 6" 20x fwd ea, 10x lat     sidestepping 2 laps 3  laps 2 laps YTB             Stand hip abd  2 x10 2 x10 1# 2 x10     Tandem stance in // bars 10sec x3 ea 10sec x 3 ea 10sec x3 ea     SLS CGA  In // bars 5sec x3 ea 5 sec x3 ea 10sec x3 ea     Tandem walking in // bars  2 laps 3 laps     Standing marches   1# 20x             bridges 2 x10 2 x 10 2 x10     Supine SLR 2 x10 1# 2 x 10 1# 2 x10     S/l clam shells 2 x10 2 x 10 2 x10                                                             Modalities

## 2019-10-10 ENCOUNTER — OFFICE VISIT (OUTPATIENT)
Dept: PHYSICAL THERAPY | Age: 84
End: 2019-10-10
Payer: COMMERCIAL

## 2019-10-10 DIAGNOSIS — R29.898 WEAKNESS OF RIGHT HIP: Primary | ICD-10-CM

## 2019-10-10 PROCEDURE — 97112 NEUROMUSCULAR REEDUCATION: CPT | Performed by: PHYSICAL THERAPIST

## 2019-10-10 PROCEDURE — 97110 THERAPEUTIC EXERCISES: CPT | Performed by: PHYSICAL THERAPIST

## 2019-10-10 NOTE — PROGRESS NOTES
Daily Note     Today's date: 10/10/2019  Patient name: Rufino Murillo  : [birth year]  MRN: [id number]  Referring provider: Linnette Bejarano MD  Dx:   Encounter Diagnosis     ICD-10-CM    1  Weakness of right hip R29 898                   Subjective: Pt reports she is feeling better since last session  Objective: See treatment diary below      Assessment: Patient tolerated treatment well  Pt performed progression of exercises easier than last treatment  Pt still fatiguing on RLE>LLE  Pt taking rest breaks as needed throughout exercises  Pt has increased balance today during exercises  Added s/l hip abd  Pt unable to perform full ROM against gravity but has good muscle activation and able to lift leg a few inches against gravity  Pt would benefit from continued therapy to increase strength and balance  Plan: Continue per plan of care        Precautions: FALL RISK, DM, h/o AAA    Specialty Daily Treatment Diary       Manual 10/1 10/3 10/8 10/10                            Exercise Diary                 Nustep 8' 10' 8' 10'            Step ups FW, lateral 4" 15x each 4" 20x ea 6" 20x fwd ea, 10x lat 6" 20x fwd ea, 15x lat    sidestepping 2 laps 3  laps 2 laps YTB 3 laps YTB            Stand hip abd  2 x10 2 x10 1# 2 x10 1 5# 2x10    Tandem stance in // bars 10sec x3 ea 10sec x 3 ea 10sec x3 ea 10sec x3ea    SLS CGA  In // bars 5sec x3 ea 5 sec x3 ea 10sec x3 ea 10sec x3 ea    Tandem walking in // bars  2 laps 3 laps 3 laps    Standing marches   1# 20x 1 5# 20x            bridges 2 x10 2 x 10 2 x10 2x10    Supine SLR 2 x10 1# 2 x 10 1# 2 x10 1# 2x10    S/l clam shells 2 x10 2 x 10 2 x10 2x10    S/l hip abd    2x10                                                    Modalities

## 2019-10-15 ENCOUNTER — OFFICE VISIT (OUTPATIENT)
Dept: PHYSICAL THERAPY | Age: 84
End: 2019-10-15
Payer: COMMERCIAL

## 2019-10-15 DIAGNOSIS — R29.898 WEAKNESS OF RIGHT HIP: Primary | ICD-10-CM

## 2019-10-15 PROCEDURE — 97110 THERAPEUTIC EXERCISES: CPT

## 2019-10-15 PROCEDURE — 97112 NEUROMUSCULAR REEDUCATION: CPT

## 2019-10-15 NOTE — PROGRESS NOTES
Daily Note     Today's date: 10/15/2019  Patient name: Marrie Boas  :   MRN: [id number]  Referring provider: Edward Pastrana MD  Dx:   Encounter Diagnosis     ICD-10-CM    1  Weakness of right hip R29 898                   Subjective: Pt reports she is feeling stronger in legs but does not feel her balance is improved since therapy started      Objective: See treatment diary below      Assessment: Patient tolerated treatment well    Pt still fatiguing on RLE>LLE  Pt taking rest breaks as needed throughout exercises  Worked harder on balance emphasis with today's session as pt wants to improve balance as a priority  Challenged with strength and balance with most narrow based activities Pt would benefit from continued therapy to increase strength and balance  Plan: Continue per plan of care        Precautions: FALL RISK, DM, h/o AAA    Specialty Daily Treatment Diary       Manual 10/1 10/3 10/8 10/10 10/15                           Exercise Diary                 Nustep 8' 10' 8' 10' 10m           Step ups FW, lateral 4" 15x each 4" 20x ea 6" 20x fwd ea, 10x lat 6" 20x fwd ea, 15x lat 6in x15 fwd/lat   sidestepping 2 laps 3  laps 2 laps YTB 3 laps YTB 3 laps YTB           Stand hip abd  2 x10 2 x10 1# 2 x10 1 5# 2x10 1 5# 2x10   Tandem stance in // bars 10sec x3 ea 10sec x 3 ea 10sec x3 ea 10sec x3ea [id number] ea   SLS CGA  In // bars 5sec x3 ea 5 sec x3 ea 10sec x3 ea 10sec x3 ea [id number]   Tandem walking in // bars  2 laps 3 laps 3 laps 3laps fwd & bwd at [employer] bar   Standing marches   1# 20x 1 5# 20x 1 5# x20           bridges 2 x10 2 x 10 2 x10 2x10 2x10   Supine SLR 2 x10 1# 2 x 10 1# 2 x10 1# 2x10    S/l clam shells 2 x10 2 x 10 2 x10 2x10    S/l hip abd    2x10                                                    Modalities

## 2019-10-16 ENCOUNTER — OFFICE VISIT (OUTPATIENT)
Dept: RHEUMATOLOGY | Facility: CLINIC | Age: 84
End: 2019-10-16
Payer: COMMERCIAL

## 2019-10-16 VITALS
DIASTOLIC BLOOD PRESSURE: 74 MMHG | BODY MASS INDEX: 28.81 KG/M2 | SYSTOLIC BLOOD PRESSURE: 152 MMHG | HEART RATE: 66 BPM | WEIGHT: 152.4 LBS

## 2019-10-16 DIAGNOSIS — M15.0 PRIMARY GENERALIZED (OSTEO)ARTHRITIS: ICD-10-CM

## 2019-10-16 DIAGNOSIS — L40.9 PSORIASIS: ICD-10-CM

## 2019-10-16 DIAGNOSIS — L40.50 PSORIATIC ARTHROPATHY (HCC): Primary | ICD-10-CM

## 2019-10-16 PROCEDURE — 99214 OFFICE O/P EST MOD 30 MIN: CPT | Performed by: INTERNAL MEDICINE

## 2019-10-16 NOTE — PROGRESS NOTES
Assessment and Plan:   Ms Tomasz Deleon is a 59-SMVE-OPM female with history significant for generalized osteoarthritis requiring multiple total joint replacements, recent diagnosis of scalp psoriasis, chronic kidney disease stage 3 and osteoporosis who presents for follow up  # Psoriatic arthritis and advanced osteoarthritis  - Cathryn Diamond presents today for follow-up of predominantly bilateral hand arthralgias which appear to be intermittent in nature  Her serologies were unrevealing, but x-rays did show significant osteoarthritic changes as well as areas of mild soft tissue swelling  In view of this I think she likely has an underlying diagnosis of psoriatic arthritis, superimposed on the advanced osteoarthritis  I had an extensive discussion with her that we will focus on improving the arthralgias, as well as ensuring her quality of life and ability to perform her activities of daily living  As her symptoms are well controlled with the use of Tylenol twice daily and the hard arthralgias arise intermittently, I would recommend that she continue with Tylenol up to 2-3 times daily as needed, and also start the diclofenac gel to assess if this provides her with additional benefit  - We will monitor her on this regimen over the next 6 months, and if she continues to notice worsening hand pain or limitation in activities that she is able to perform, we can potentially discuss stepping up to DMARD therapy  Oral NSAIDs are contraindicated as she does have chronic kidney disease  # Psoriasis  - I advised her to continue follow up with Dermatology  This can be managed with topical applications as needed  Plan:  Diagnoses and all orders for this visit:    Psoriatic arthropathy ([facility] [address] )    Psoriasis    Primary generalized (osteo)arthritis      Activities as tolerated    Diet: low carb/low fat, more greens/vegetables, adequate hydration  Exercise: try to maintain a low impact exercise regimen as much as possible   Walk for 30 minutes a day for at least 3 days a week    Encouraged to maintain good sleep hygiene  Continue other medications as prescribed by PCP and other specialists        RTC in 6 months  HPI    INITIAL VISIT NOTE:  Ms Anuj Tyson is a 23-UPFS-UZA female with history significant for generalized osteoarthritis requiring multiple total joint replacements, recent diagnosis of scalp psoriasis, chronic kidney disease stage 3 and osteoporosis who presents for further evaluation of joint pains  She is referred by Dr Luke Rico for a rheumatology consult      Patient states over a duration of many years she has gone on to develop advanced osteoarthritis affecting multiple joints which has resulted in joint replacement surgeries of her bilateral shoulders, hips and left knee  She does not necessarily experience continued pain in these joints, unless she does certain activities  She has been noticing a pain with stiffness sensation in her bilateral hands which is worse first thing in the morning, with the stiffness improving within a few minutes  She states that the pain really only affects her during the morning and does not bother her during the day  She does have difficulty with fine motor activities  She denies any significant pain affecting her wrists, elbows, ankles or feet  She does experience swelling around her ankles with activities, but this relieves with resting  She has not noticed any other swollen joints  As mentioned, she does experience diffuse morning stiffness including affecting her hands, which lasts only for a few minutes  In view of the chronic kidney disease she refrains from NSAID use  Currently she is managing her symptoms with Tylenol 500 mg twice daily as needed  This does help her      A few months ago she was diagnosed with scalp psoriasis by Dermatology  This was managed with topical corticosteroids and a shampoo  This has resolved    She denies a prior history of psoriasis, or skin rashes affecting other areas of her body  She denies any known family history of rheumatoid/psoriatic arthritis  No other complaints noted at this time        10/16/2019:  Patient presents for a follow-up today  We reviewed her labs which showed a negative rheumatoid factor and anti CCP antibody  X-ray of her left hand showed degenerative changes at the [facility], 1st IP, as well as 2nd through 5th DIPs and PIPs  There were no erosive changes identified  X-ray of her right hand showed degenerative changes at the [facility], 1st IP, as well as the 2nd through 5th PIPs and DIPs  There was soft tissue swelling seen at the PIP joint of the 3rd digit  Patient denies any new complaints from the prior office visit, but states depending on the activities she will continue to notice pain in her bilateral hands, as well as a numbness sensation which is intermittent  She has been taking Tylenol up to 2 times daily which does help with the hand pain  She forgot to  the prescription for the diclofenac gel  She mentions the right hip pain has been improving with physical therapy  She denies any other significant areas of joint pains  She does experience swelling around her ankles with activities, but this relieves with resting  She has not noticed any other swollen joints  She does experience diffuse morning stiffness including affecting her hands, which lasts only for a few minutes  She states the scalp psoriasis has been under good control  She has noticed small red spots arise on her face  She does not have a scheduled appointment with Dermatology  The following portions of the patient's history were reviewed and updated as appropriate: allergies, current medications, past family history, past medical history, past social history, past surgical history and problem list       Review of Systems  Constitutional: Negative for weight change, fevers, chills, night sweats, fatigue    ENT/Mouth: Negative for hearing changes, ear pain, sinus pain, hoarseness, sore throat, rhinorrhea, swallowing difficulty  Positive for nasal congestion  Eyes: Negative for pain, redness, discharge, vision changes  Cardiovascular: Negative for chest pain, SOB, palpitations  Respiratory: Negative for cough, sputum, wheezing, dyspnea  Gastrointestinal: Negative for nausea, vomiting, diarrhea, constipation, pain, heartburn  Genitourinary: Negative for dysuria, urinary frequency, hematuria  Musculoskeletal: As per HPI  Skin: Negative for color changes  Positive for hair loss and skin rash  Neuro: Negative for weakness, tingling, loss of consciousness  Positive for numbness  Psych: Negative for anxiety, depression  Heme/Lymph: Negative for easy bruising, bleeding, lymphadenopathy          Past Medical History:   Diagnosis Date    AAA (abdominal aortic aneurysm) ([facility])     s/p EVAR 3/10/16    Allergic urticaria     LAST ASSESSED: [id number]    Appendicitis     Arthritis     CKD (chronic kidney disease), stage III ([facility])     Coronary artery disease     Diabetes mellitus ([facility])     Eczema     Gross hematuria     LAST ASSESSED: [id number]    Hematuria     Hypertension     Hyponatremia     Hypothyroid     Osteoporosis     LAST ASSESSED: [id number]    PONV (postoperative nausea and vomiting)     Primary osteoarthritis of left knee     LAST ASSESSED: [id number]    Renal disorder     Renal insufficiency     Rotator cuff tear arthropathy     LEFT LAST ASSESSED: [id number]    Secondary osteoarthritis of right shoulder     LAST ASSESSED: [id number]    Syncope        Past Surgical History:   Procedure Laterality Date    APPENDECTOMY      BACK SURGERY      CATARACT EXTRACTION W/  INTRAOCULAR LENS IMPLANT Bilateral     KNEE ARTHROSCOPY Left     ONSET: [id number] THERAPEUTIC    LUMBAR LAMINECTOMY      SD AAA REPAIR,MODULR BIFURCATED PROSTH N/A 3/10/2016    Procedure: REPAIR ANEURYSM ENDOVASCULAR ABDOMINAL AORTIC  (EVAR) ; Surgeon: Ericka Carlisle MD;  [facility];  Service: Vascular    DE RECONSTR TOTAL SHOULDER IMPLANT Left 5/31/2016    Procedure: ARTHROPLASTY SHOULDER REVERSE;  Surgeon: Deepti Massey MD;  [facility];  Service: Orthopedics    DE TOTAL KNEE ARTHROPLASTY Left 7/23/2018    Procedure: KNEE : COMPLETE REPLACEMENT;  Surgeon: Elizabeth Jose MD;  [facility];  Service: Orthopedics    REVERSE TOTAL SHOULDER ARTHROPLASTY Right     ROTATOR CUFF REPAIR Left     RC SURGERY RESOLVED: 1999    SHOULDER ARTHROPLASTY      5/15 - FOR RIGHT SHOULDER; 5/16 - FOR LEFT SHOULDER    SHOULDER ARTHROSCOPY      TOTAL HIP ARTHROPLASTY Left     ONSET: [id number]    TOTAL HIP ARTHROPLASTY  05/31/2006    REVISION       Social History     Socioeconomic History    Marital status:       Spouse name: Not on file    Number of children: Not on file    Years of education: Not on file    Highest education level: Not on file   Occupational History    Occupation: RETIRED   Social Needs    Financial resource strain: Not on file    Food insecurity:     Worry: Not on file     Inability: Not on file    Transportation needs:     Medical: Not on file     Non-medical: Not on file   Tobacco Use    Smoking status: Never Smoker    Smokeless tobacco: Never Used   Substance and Sexual Activity    Alcohol use: No    Drug use: No    Sexual activity: Not on file   Lifestyle    Physical activity:     Days per week: Not on file     Minutes per session: Not on file    Stress: Not on file   Relationships    Social connections:     Talks on phone: Not on file     Gets together: Not on file     Attends [employer] service: Not on file     Active member of club or organization: Not on file     Attends meetings of clubs or organizations: Not on file     Relationship status: Not on file    Intimate partner violence:     Fear of current or ex partner: Not on file     Emotionally abused: Not on file     Physically abused: Not on file     Forced sexual activity: Not on file   Other Topics Concern    Not on file   Social History Narrative    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 - [employer]       Family History   Problem Relation Age of Onset    Coronary artery disease Mother     Diabetes Mother     Coronary artery disease Father     Cancer Sister     Arthritis Sister     Unexplained death Family         RAPID    Cancer Daughter        Allergies   Allergen Reactions    Clobetasol     Fluocinolone     Ketoconazole     Pyrithione     Bextra [Valdecoxib] Rash     GI INTOL    Percocet [Oxycodone-Acetaminophen] Rash and GI Intolerance       Current Outpatient Medications:     acetaminophen (TYLENOL) 325 mg tablet, Take 2 tablets (650 mg total) by mouth every 6 (six) hours as needed for mild pain , Disp: 30 tablet, Rfl: 0    amLODIPine-atorvastatin (CADUET) 10-20 MG per tablet, Take 1 tablet by mouth daily at bedtime, Disp: 90 tablet, Rfl: 3    calcium citrate-vitamin D (CITRACAL+D) 315-200 MG-UNIT per tablet, Take 1 tablet by mouth daily  Calcium + D TABS  Refills: 0   , M D ; Active , Disp: , Rfl:     diclofenac sodium (VOLTAREN) 1 %, Apply 2 g topically 2 (two) times a day as needed (Hand pain), Disp: 100 g, Rfl: 0    fexofenadine (ALLEGRA) 180 MG tablet, Take 1 tablet by mouth as needed  , Disp: , Rfl:     Fluocinolone Acetonide Scalp (DERMA-SMOOTHE/FS SCALP) 0 01 % OIL, Apply topically, Disp: , Rfl:     fluticasone (FLONASE) 50 mcg/act nasal spray, 2 sprays into each nostril daily Fluticasone Propionate 50 MCG/ACT Nasal Suspension use 2 spr   in each nostril daily  Quantity: 1;  Refills: 5    James Villarreal M D ;  Started 23-Sep-2014 Active, Disp: 16 g, Rfl: 5    Foot Care Products [facility] GEL HEEL CUP MED/LG) MISC, 3A since heel lift for right side, Disp: 1 each, Rfl: 0    ketoconazole (NIZORAL) 2 % shampoo, Apply 1 application topically once, Disp: , Rfl:     losartan (COZAAR) 100 MG tablet, Take 1 tablet (100 mg total) by mouth daily for 90 days Losartan Potassium 100 MG Oral Tablet take 1 tablet once daily  Quantity: 90;  Refills: 3    James Quantico M D ; Active, Disp: 90 tablet, Rfl: 3    Multiple Vitamins-Minerals (MULTIVITAMIN & MINERAL PO), 1 tablet daily  Multivitamin & Mineral Oral Liquid  Quantity: 0;  Refills: 0   , M D ; Active , Disp: , Rfl:     ONE TOUCH ULTRA TEST test strip, USE AS DIRECTED, Disp: 100 each, Rfl: 3    ONETOUCH DELICA LANCETS FINE MISC, TEST BLOOD SUGAR ONE TIME DAILY OR AS NEEDED, Disp: 100 each, Rfl: 2    Pyrithione Zinc 2 % SHAM, Apply topically, Disp: , Rfl:     sodium chloride 1 g tablet, TAKE 1 TABLET BY MOUTH EVERY DAY, Disp: 30 tablet, Rfl: 6    levothyroxine 125 mcg tablet, Take 1 tablet (125 mcg total) by mouth daily for 90 days, Disp: 90 tablet, Rfl: 3      Objective:    Vitals:    10/16/19 1207   BP: 152/74   Pulse: 66   Weight: 69 1 kg (152 lb 6 4 oz)       Physical Exam  General: Well appearing, well nourished, in no distress  Oriented x 3, normal mood and affect  Ambulating with aid of a walker  Skin: Good turgor, no rash, unusual bruising or prominent lesions  No psoriasis noted  Hair: Normal texture and distribution  Nails: Normal color, no deformities  No nail pitting  HEENT:  Head: Normocephalic, atraumatic  Eyes: Conjunctiva clear, sclera non-icteric, EOM intact  Nose: No external lesions, mucosa non-inflamed  Mouth: Mucous membranes moist, no mucosal lesions  Extremities: No amputations or deformities, cyanosis  Musculoskeletal:   Hands - she has diffuse osteoarthritic changes present at her bilateral PIP joints, as well as at her bilateral CMC joints  She has a slight fixed flexion deformity at her right hand 4th and 5th digits, at the PIP joints  In addition there is likely synovial hypertrophy present at her 2nd and 3rd PIP joints on the right hand    Her left hand demonstrates a fixed flexion deformity at the 5th PIP joint which she relates to a prior injury  There is also likely synovial hypertrophy present at her left hand 3rd PIP  Her MCPs bilaterally are unremarkable  There is no significant tenderness noted to palpation of the small joints of her hands  Wrists - there appears to be chronic synovial hypertrophy which is mild of her left wrist   She has slight limitation in full flexion bilaterally  There is no tenderness noted  Elbows - unremarkable  Shoulders - status post bilateral total shoulder replacement  Hips and knees - status post bilateral total hip replacement and left knee total knee replacement  Ankles - mild soft tissue swelling present bilaterally without tenderness  Feet - negative MTP squeeze test bilaterally  Neurologic: Alert and oriented  No focal neurological deficits appreciated  Psychiatric: Normal mood and affect  WILMER Martinez    Rheumatology

## 2019-10-17 ENCOUNTER — OFFICE VISIT (OUTPATIENT)
Dept: PHYSICAL THERAPY | Age: 84
End: 2019-10-17
Payer: COMMERCIAL

## 2019-10-17 DIAGNOSIS — R29.898 WEAKNESS OF RIGHT HIP: Primary | ICD-10-CM

## 2019-10-17 PROCEDURE — 97110 THERAPEUTIC EXERCISES: CPT | Performed by: PHYSICAL THERAPIST

## 2019-10-17 PROCEDURE — 97112 NEUROMUSCULAR REEDUCATION: CPT | Performed by: PHYSICAL THERAPIST

## 2019-10-17 NOTE — PROGRESS NOTES
Daily Note     Today's date: 10/17/2019  Patient name: Jose Peguero  :   MRN: [id number]  Referring provider: Geovanny Raines MD  Dx:   Encounter Diagnosis     ICD-10-CM    1  Weakness of right hip R29 898                   Subjective: Pt reports she is very sore today after unloading a heavy chest of drawers yesterday at her apartment and twisting a lot  Objective: See treatment diary below      Assessment: Patient tolerated treatment well  Pt demonstrating increased fatigue with increased rest breaks today, and feeling a little more unbalanced today compared to usual d/t being tired and overdoing it yesterday with housework  Pt has been compliant with HEP  Pt needing HHA for 2nd hand during fwd steps d/t being fatigued  Pt showing increased balance abilities during SLS and tandem stance  Pt's confidence with mobility is slowly increasing  Pt would benefit from continued therapy to increase strength and balance  Plan: Continue per plan of care        Precautions: FALL RISK, DM, h/o AAA    Specialty Daily Treatment Diary       Manual 10/17 10/3 10/8 10/10 10/15                           Exercise Diary                 Nustep 10' 10' 8' 10' 10m           Step ups FW, lateral 6" 20x each 4" 20x ea 6" 20x fwd ea, 10x lat 6" 20x fwd ea, 15x lat 6in x15 fwd/lat   sidestepping 3 lapsTYB 3  laps 2 laps YTB 3 laps YTB 3 laps YTB           Stand hip abd  1 5# 2 x10 2 x10 1# 2 x10 1 5# 2x10 1 5# 2x10   Tandem stance in // bars 10sec x3 ea 10sec x 3 ea 10sec x3 ea 10sec x3ea [id number] ea   SLS CGA  In // bars 5sec x3 ea 5 sec x3 ea 10sec x3 ea 10sec x3 ea [id number]   Tandem walking in // bars 3 laps 2 laps 3 laps 3 laps 3laps fwd & bwd at ballet bar   Standing marches 1 5# 20x  1# 20x 1 5# 20x 1 5# x20           bridges 2 x10 2 x 10 2 x10 2x10 2x10   Supine SLR 2 x10 1# 2 x 10 1# 2 x10 1# 2x10    S/l clam shells 2 x10 2 x 10 2 x10 2x10    S/l hip abd 2x10   2x10 Modalities

## 2019-10-21 ENCOUNTER — TELEPHONE (OUTPATIENT)
Dept: OBGYN CLINIC | Facility: HOSPITAL | Age: 84
End: 2019-10-21

## 2019-10-21 NOTE — TELEPHONE ENCOUNTER
Florentino Thorpe, patient's daughter is calling   [phone] is stating that the pharmacy needs a prior auth on the diclofenac sodium 1%  Florentino Thorpe is also stating that the pharmacy has called us about this & she would like to know when this will be taken care of  Patient is stating that they are using [facility] in [place]

## 2019-10-21 NOTE — TELEPHONE ENCOUNTER
Please let patient's daughter know we will work on this, but we have not received anything from the pharmacy  Please start auth for diclofenac gel 1% two times daily as needed for osteoarthritis and psoriatic arthritis of hands  Avoiding oral NSAID's due to chronic kidney disease  Thanks

## 2019-10-22 ENCOUNTER — OFFICE VISIT (OUTPATIENT)
Dept: PHYSICAL THERAPY | Age: 84
End: 2019-10-22
Payer: COMMERCIAL

## 2019-10-22 DIAGNOSIS — R29.898 WEAKNESS OF RIGHT HIP: Primary | ICD-10-CM

## 2019-10-22 PROCEDURE — 97110 THERAPEUTIC EXERCISES: CPT

## 2019-10-22 NOTE — PROGRESS NOTES
Daily Note     Today's date: 10/22/2019  Patient name: Myriam Alvarado  :   MRN: [id number]  Referring provider: Iris Quarles MD  Dx:   Encounter Diagnosis     ICD-10-CM    1  Weakness of right hip R29 898                   Subjective: Pt reports she still has goal of climbing steps at daughter's using handrail      Objective: See treatment diary below      Assessment: Patient tolerated treatment well  Progressed with fatigue as result but pt denies feeling she is straining to do current ex  Pt showing increased balance abilities during SLS and tandem stance  Also needs assist to count at times with ex  Pt's confidence with mobility is slowly increasing  Pt would benefit from continued therapy to increase strength and balance  Plan: Continue per plan of care        Precautions: FALL RISK, DM, h/o AAA    Specialty Daily Treatment Diary       Manual 10/17 10/22 10/8 10/10 10/15                           Exercise Diary                 Nustep 10' 10' 8' 10' 10m           Step ups FW, lateral 6" 20x each 6" 20x ea 6" 20x fwd ea, 10x lat 6" 20x fwd ea, 15x lat 6in x15 fwd/lat   sidestepping 3 lapsTYB 3  Laps RTB 2 laps YTB 3 laps YTB 3 laps YTB           Stand hip abd  1 5# 2 x10 1 5# 3 x10 1# 2 x10 1 5# 2x10 1 5# 2x10   Tandem stance in // bars 10sec x3 ea 10sec x 3 ea 10sec x3 ea 10sec x3ea [id number] ea   SLS CGA  In // bars 5sec x3 ea 5 sec x3 ea 10sec x3 ea 10sec x3 ea [id number]   Tandem walking in // bars 3 laps 3 laps 3 laps 3 laps 3laps fwd & bwd at ballet bar   Standing marches 1 5# 20x 1 5# x30 1# 20x 1 5# 20x 1 5# x20   Standing ham curls 1 5# x20       bridges 2 x10 2 x 10 2 x10 2x10 2x10   Supine SLR 1 5 #3 x10 1# 2 x 10 1# 2 x10 1# 2x10    S/l clam shells 2 x10 2 x 10 2 x10 2x10    S/l hip abd 2x10   2x10                                                    Modalities

## 2019-10-24 ENCOUNTER — OFFICE VISIT (OUTPATIENT)
Dept: PHYSICAL THERAPY | Age: 84
End: 2019-10-24
Payer: COMMERCIAL

## 2019-10-24 ENCOUNTER — APPOINTMENT (OUTPATIENT)
Dept: LAB | Age: 84
End: 2019-10-24
Payer: COMMERCIAL

## 2019-10-24 DIAGNOSIS — E03.9 ACQUIRED HYPOTHYROIDISM: Primary | ICD-10-CM

## 2019-10-24 DIAGNOSIS — E03.9 ACQUIRED HYPOTHYROIDISM: ICD-10-CM

## 2019-10-24 DIAGNOSIS — R29.898 WEAKNESS OF RIGHT HIP: Primary | ICD-10-CM

## 2019-10-24 LAB
T4 FREE SERPL-MCNC: 1.14 NG/DL (ref 0.76–1.46)
TSH SERPL DL<=0.05 MIU/L-ACNC: 9.19 UIU/ML (ref 0.36–3.74)

## 2019-10-24 PROCEDURE — 97112 NEUROMUSCULAR REEDUCATION: CPT

## 2019-10-24 PROCEDURE — 36415 COLL VENOUS BLD VENIPUNCTURE: CPT

## 2019-10-24 PROCEDURE — 84443 ASSAY THYROID STIM HORMONE: CPT

## 2019-10-24 PROCEDURE — 84439 ASSAY OF FREE THYROXINE: CPT

## 2019-10-24 PROCEDURE — 97110 THERAPEUTIC EXERCISES: CPT

## 2019-10-24 NOTE — PROGRESS NOTES
Daily Note     Today's date: 10/24/2019  Patient name: Lucho Fox  :   MRN: [id number]  Referring provider: Juanjo Zavala MD  Dx:   Encounter Diagnosis     ICD-10-CM    1  Weakness of right hip R29 898                   Subjective: Pt reports she is using cuff weights at home but is not sure if she is using them right on her UE  Pt noted some LBP when she woke up that wnet away earlier in day      Objective: See treatment diary below      Assessment: Patient tolerated treatment well  Progressed with fatigue as result but pt denies feeling she is straining to do current ex  Pt showing increased balance abilities during SLS and tandem stance  Also needs assist to count at times with ex  Noting some glut med weakness during step ex rachel on L  Pt would benefit from continued therapy to increase strength and balance  Plan: Continue per plan of care        Precautions: FALL RISK, DM, h/o AAA    Specialty Daily Treatment Diary       Manual 10/17 10/22 10/24 10/10 10/15                           Exercise Diary                 Nustep 10' 10' 8' 10' 10m           Step ups FW, lateral 6" 20x each 6" 20x ea 6" 20x fwd ea, 10x lat 6" 20x fwd ea, 15x lat 6in x15 fwd/lat   sidestepping 3 lapsTYB 3  Laps RTB 3 laps RTB 3 laps YTB 3 laps YTB           Stand hip abd  1 5# 2 x10 1 5# 3 x10 2# 2 x10 1 5# 2x10 1 5# 2x10   Tandem stance in // bars 10sec x3 ea 10sec x 3 ea 10sec x3 ea at ballet bar 10sec x3ea [id number] ea   SLS CGA  In // bars 5sec x3 ea 5 sec x3 ea 10sec x3 ea 10sec x3 ea [id number]   Tandem walking in // bars 3 laps 3 laps 3 laps 3 laps 3laps fwd & bwd at ballet bar   Standing marches 1 5# 20x 1 5# x30 2# 20x at ballet bar 1 5# 20x 1 5# x20   Standing ham curls 1 5# x20  2# x20 at ballet bar     bridges 2 x10 2 x 10 2 x10 2x10 2x10   Supine SLR 1 5 #3 x10 1# 2 x 10 1 5# 2 x10 1# 2x10    S/l clam shells 2 x10 2 x 10 2 x10 YTB 2x10    S/l hip abd 2x10  2x10 2x10 Modalities

## 2019-10-29 ENCOUNTER — OFFICE VISIT (OUTPATIENT)
Dept: PHYSICAL THERAPY | Age: 84
End: 2019-10-29
Payer: COMMERCIAL

## 2019-10-29 DIAGNOSIS — R29.898 WEAKNESS OF RIGHT HIP: Primary | ICD-10-CM

## 2019-10-29 PROCEDURE — 97110 THERAPEUTIC EXERCISES: CPT

## 2019-10-31 ENCOUNTER — OFFICE VISIT (OUTPATIENT)
Dept: PHYSICAL THERAPY | Age: 84
End: 2019-10-31
Payer: COMMERCIAL

## 2019-10-31 DIAGNOSIS — R29.898 WEAKNESS OF RIGHT HIP: Primary | ICD-10-CM

## 2019-10-31 PROCEDURE — 97110 THERAPEUTIC EXERCISES: CPT | Performed by: PHYSICAL THERAPIST

## 2019-10-31 NOTE — PROGRESS NOTES
Daily Note/ Re-assessment    Today's date: 10/31/2019  Patient name: Franca Morrow  :   MRN: [id number]  Referring provider: Cortney Salazar MD  Dx:   Encounter Diagnosis     ICD-10-CM    1  Weakness of right hip R29 898                   Subjective: Patient feels that she's walking better overall, nervous that she may not be able to maintain her gains yet in PT if she discharges too soon  Objective: See treatment diary below    Pain 3/10 with activity, 0/10 with activity    Lumbar ROM:      Flexion  75     Extension 25     Lateral flexion 15 B      Rotation  25 B    MMT    Hip flexion 4-/5 B    Hip abduction 4/5 L 3+/5 R    Hip adduction 4/5 B      Knee flexion 5/5 L 4/5 R    Knee extension 5/5 L 4-/5 R    Ambulation     Continues to ambulate with trendelenberg gait pattern; however, improved hip control and less hip drop during R SLS  Patient more aware of hip drop and trendelenberg gait pattern and is able to somewhat control  Patient always uses RW as patient is unbalanced and unsafe to not use RW, patient understands and always uses her RW  TUG time 18 seconds with rollator  [place]homberg - not repeated since IE    Goals  ST  Pt will increase overall R hip strength to 4/5 within 4 weeks of therapy  Partially met- not bilaterally  2  Pt will decrease TUG time <20 seconds within 4 weeks of therapy  met  3  Pt will perform b/l SLS time to 10 seconds within 4 weeks of therapy  Partially met, able to achieve 3 seconds before loss of balance  4  Pt will increase walking tolerance by 5 minutes within 4 weeks of therapy  met    LTG  1  Pt will have 0/10 pain during activities within 8 weeks of therapy  Partially met, 0/10 at rest  2  Pt will perform walking program for 30 continuous minutes within 8 weeks of therapy  Not met  3  Pt will improve overall R hip strength to 5/5 within 8 weeks of therapy   Not met      Assessment:  Patient is making good progress towards goals and progressing with strength in her hips  Improving balance as able and improve gait pattern since IE  Plan: Recommending patient to continue with PT for 2x/week for 4 weeks        Precautions: FALL RISK, DM, h/o AAA    Specialty Daily Treatment Diary       Manual 10/17 10/22 10/24 10/29 10/31                           Exercise Diary                 Nustep 10' 10' 8' 10' 10'           Step ups FW, lateral 6" 20x each 6" 20x ea 6" 20x fwd ea, 10x lat 6" 20x fwd ea, 15x lat 6in x20 fwd  15x lat   sidestepping 3 lapsTYB 3  Laps RTB 3 laps RTB 3 laps RTB 3 laps RTB           Stand hip abd  1 5# 2 x10 1 5# 3 x10 2# 2 x10 2# 2x10 2 5# 2x10   Tandem stance in // bars 10sec x3 ea 10sec x 3 ea 10sec x3 ea at ballet bar 10sec x3ea [id number] ea   SLS CGA  In // bars 5sec x3 ea 5 sec x3 ea 10sec x3 ea 10sec x3 ea [id number]   Tandem walking in // bars 3 laps 3 laps 3 laps 4 laps fwd & bwd at ballet bar 4laps fwd & bwd at ballet bar   Standing marches 1 5# 20x 1 5# x30 2# 20x at ballet bar 2# 20x at ballet bar 2 5# x20 ballet bar   Standing ham curls 1 5# x20  2# x20 at ballet bar 2# at ballet bar 2 5# 20x ballet bar   bridges 2 x10 2 x 10 2 x10 2x10 2x10   Supine SLR 1 5 #3 x10 1# 2 x 10 1 5# 2 x10 1 5# 2x10 2# 2x10   S/l clam shells 2 x10 2 x 10 2 x10 YTB 2x10  2x10   S/l hip abd 2x10  2x10 2x10 2x10                                                   Modalities

## 2019-11-05 ENCOUNTER — OFFICE VISIT (OUTPATIENT)
Dept: PHYSICAL THERAPY | Age: 84
End: 2019-11-05
Payer: COMMERCIAL

## 2019-11-05 DIAGNOSIS — R29.898 WEAKNESS OF RIGHT HIP: Primary | ICD-10-CM

## 2019-11-05 PROCEDURE — 97110 THERAPEUTIC EXERCISES: CPT | Performed by: PHYSICAL THERAPIST

## 2019-11-05 PROCEDURE — 97112 NEUROMUSCULAR REEDUCATION: CPT | Performed by: PHYSICAL THERAPIST

## 2019-11-05 NOTE — PROGRESS NOTES
Daily Note     Today's date: 2019  Patient name: Jaye Stephen  :   MRN: [id number]  Referring provider: Sachi De La Paz MD  Dx:   Encounter Diagnosis     ICD-10-CM    1  Weakness of right hip R29 898                   Subjective: Patient feels that she's gaining strength  Found herself walking short distances at home without her RW (ie 2-3')- something she hasn't done in years  Doesn't plan to not use her RW; however feels less gait and improved balance since  Objective: See treatment diary below      Assessment: Patient making visible improvement in strength since IE  Tolerating progressive strengthening well  Moderately fatigued towards end of session, needing AA for s/l hip abduction to be able to perform as patient fatigued and unable to adequately lift LE  Insurance approved 12 more visits, scheduled for 2 more weeks  Planning to discuss progress in 2 weeks- patient will decide if she wants/needs to continue more  Plan: Continue per plan of care        Precautions: FALL RISK, DM, h/o AAA    Specialty Daily Treatment Diary       Manual 11/5 10/22 10/24 10/29 10/31                           Exercise Diary                 Nustep 10' 10' 8' 10' 10'           Step ups FW, lateral 6" 20x each 6" 20x ea 6" 20x fwd ea, 10x lat 6" 20x fwd ea, 15x lat 6in x20 fwd  15x lat   sidestepping 3 laps RTB 3  Laps RTB 3 laps RTB 3 laps RTB 3 laps RTB           Stand hip abd  2 5# 2 x10 1 5# 3 x10 2# 2 x10 2# 2x10 2 5# 2x10   Tandem stance in // bars 10sec x3 ea 10sec x 3 ea 10sec x3 ea at ballet bar 10sec x3ea [id number] ea   SLS CGA  In // bars 5x:10 5 sec x3 ea 10sec x3 ea 10sec x3 ea [id number]   Tandem walking in // bars 4 laps FW and BW 3 laps 3 laps 4 laps fwd & bwd at ballet bar 4laps fwd & bwd at ballet bar   Standing marches 2 5# 20x 1 5# x30 2# 20x at ballet bar 2# 20x at ballet bar 2 5# x20 ballet bar   Standing ham curls 2 5# x20  2# x20 at ballet bar 2# at ballet bar 2 5# 20x ballet bar bridges 2 x10 2 x 10 2 x10 2x10 2x10   Supine SLR 2# 2 x10 1# 2 x 10 1 5# 2 x10 1 5# 2x10 2# 2x10   S/l clam shells 2 x10 2 x 10 2 x10 YTB 2x10  2x10   S/l hip abd 2x10  2x10 2x10 2x10                                                   Modalities

## 2019-11-07 ENCOUNTER — OFFICE VISIT (OUTPATIENT)
Dept: PHYSICAL THERAPY | Age: 84
End: 2019-11-07
Payer: COMMERCIAL

## 2019-11-07 DIAGNOSIS — R29.898 WEAKNESS OF RIGHT HIP: Primary | ICD-10-CM

## 2019-11-07 PROCEDURE — 97110 THERAPEUTIC EXERCISES: CPT | Performed by: PHYSICAL THERAPIST

## 2019-11-07 PROCEDURE — 97112 NEUROMUSCULAR REEDUCATION: CPT | Performed by: PHYSICAL THERAPIST

## 2019-11-07 NOTE — PROGRESS NOTES
Daily Note     Today's date: 2019  Patient name: May Conde  :   MRN: [id number]  Referring provider: Juan Painting MD  Dx:   Encounter Diagnosis     ICD-10-CM    1  Weakness of right hip R29 898        Start Time: 1445  Stop Time: 1545  Total time in clinic (min): 60 minutes    Subjective: Patient feels she wants to try to get back to not using RW and using cane for short distances as her main goal       Objective: See treatment diary below    Assessment: Pt continues to progress with strength, balance and endurance  Pt states her main goal is to walk with a SPC for short distances in her apartment building  Plan to trial gait training with [facility] next visit  Pt able to lift RLE in s/l hip ABD ~2inches with AROM in todays treatment  Pt demonstrates fatigue with standing exercises and takes rest breaks as needed  PT is very motivated and performs her exercises daily  Pt recommended to continue using RW at all times for now until we trial [facility] for short distances  Pt would benefit from continued skilled PT to increase function and QOL  Plan: Continue per plan of care    Plan to trial gait training with [facility] next visit     Precautions: FALL RISK, DM, h/o AAA    Specialty Daily Treatment Diary       Manual 11/5 11/7 10/24 10/29 10/31                           Exercise Diary                 Nustep 10' 10' 8' 10' 10'           Step ups FW, lateral 6" 20x each 6" 20x ea 6" 20x fwd ea, 10x lat 6" 20x fwd ea, 15x lat 6in x20 fwd  15x lat   sidestepping 3 laps RTB 3  Laps RTB 3 laps RTB 3 laps RTB 3 laps RTB           Stand hip abd  2 5# 2 x10 2 5# 3 x10 2# 2 x10 2# 2x10 2 5# 2x10   Tandem stance in // bars 10sec x3 ea 10sec x 3 ea 10sec x3 ea at ballet bar 10sec x3ea [id number] ea   SLS CGA  In // bars 5x:10 5 sec x3 ea 10sec x3 ea 10sec x3 ea [id number]   Tandem walking in // bars 4 laps FW and BW 3 laps 3 laps 4 laps fwd & bwd at ballet bar 4laps fwd & bwd at ballet bar   Standing marches 2 5# 20x 2 5# x20 2# 20x at ballet bar 2# 20x at ballet bar 2 5# x20 ballet bar   Standing ham curls 2 5# x20 2 5# x20 2# x20 at ballet bar 2# at ballet bar 2 5# 20x ballet bar   bridges 2 x10 2 x 10 2 x10 2x10 2x10   Supine SLR 2# 2 x10 2# 2 x 10 1 5# 2 x10 1 5# 2x10 2# 2x10   S/l clam shells 2 x10 2 x 10 2 x10 YTB 2x10  2x10   S/l hip abd 2x10 2x10 2x10 2x10 2x10                                                   Modalities

## 2019-11-12 ENCOUNTER — OFFICE VISIT (OUTPATIENT)
Dept: PHYSICAL THERAPY | Age: 84
End: 2019-11-12
Payer: COMMERCIAL

## 2019-11-12 DIAGNOSIS — R29.898 WEAKNESS OF RIGHT HIP: Primary | ICD-10-CM

## 2019-11-12 PROCEDURE — 97110 THERAPEUTIC EXERCISES: CPT

## 2019-11-12 NOTE — PROGRESS NOTES
Daily Note     Today's date: 2019  Patient name: Trevor Zavala  :   MRN: [id number]  Referring provider: Albania Hernandez MD  Dx:   Encounter Diagnosis     ICD-10-CM    1  Weakness of right hip R29 898                   Subjective: Patient feels she wants to try to get back to not using RW and using cane for short distances as her main goal   Reports she has having LBP but states that she has LBP most of time      Objective: See treatment diary below    Assessment: Pt continues to progress with strength, balance and endurance  Pt states her main goal is to walk with a SPC for short distances in her apartment building  Plan to trial gait training with [address] next visit  PT is very motivated and performs her exercises daily  Pt recommended to continue using RW at all times for now until we trial [address] for short distances  Pt would benefit from continued skilled PT to increase function and QOL  Plan: Continue per plan of care    Plan to trial gait training with [address] next visit     Precautions: FALL RISK, DM, h/o AAA    Specialty Daily Treatment Diary       Manual 11/5 11/7 10/24 10/29 10/31                           Exercise Diary                 Nustep 10' 10' 8' 10' 10'           Step ups FW, lateral 6" 20x each 6" 20x ea 6" 20x fwd ea, 10x lat 6" 20x fwd ea, 15x lat 6in x20 fwd  15x lat   sidestepping 3 laps RTB 3  Laps RTB 3 laps RTB 3 laps RTB 3 laps RTB           Stand hip abd  2 5# 2 x10 2 5# 3 x10 2# 2 x10 2# 2x10 2 5# 2x10   Tandem stance in // bars 10sec x3 ea 10sec x 3 ea 10sec x3 ea at ballet bar 10sec x3ea [id number] ea   SLS CGA  In // bars 5x:10 5 sec x3 ea 10sec x3 ea 10sec x3 ea [id number]   Tandem walking in // bars 4 laps FW and BW 3 laps 3 laps 4 laps fwd & bwd at ballet bar 4laps fwd & bwd at ballet bar   Standing marches 2 5# 20x 2 5# x20 2# 20x at ballet bar 2# 20x at ballet bar 2 5# x20 ballet bar   Standing ham curls 2 5# x20 2 5# x20 2# x20 at ballet bar 2# at ballet bar 2 5# 20x ballet bar   bridges 2 x10 2 x 10 2 x10 2x10 2x10   Supine SLR 2# 2 x10 2# 2 x 10 1 5# 2 x10 1 5# 2x10 2# 2x10   S/l clam shells 2 x10 2 x 10 2 x10 YTB 2x10  2x10   S/l hip abd 2x10 2x10 2x10 2x10 2x10                                                   Modalities

## 2019-11-14 ENCOUNTER — OFFICE VISIT (OUTPATIENT)
Dept: PHYSICAL THERAPY | Age: 84
End: 2019-11-14
Payer: COMMERCIAL

## 2019-11-14 DIAGNOSIS — R29.898 WEAKNESS OF RIGHT HIP: Primary | ICD-10-CM

## 2019-11-14 PROCEDURE — 97110 THERAPEUTIC EXERCISES: CPT | Performed by: PHYSICAL THERAPIST

## 2019-11-14 NOTE — PROGRESS NOTES
Daily Note     Today's date: 2019  Patient name: Elliot Fulton  :   MRN: [id number]  Referring provider: Penny Cheung MD  Dx:   Encounter Diagnosis     ICD-10-CM    1  Weakness of right hip R29 898                   Subjective: Patient feels she is doing pretty good and feels like she is walking better  Objective: See treatment diary below    Assessment: Pt tolerated treatment well  Trial of SPC today for the first time in therapy  Pt was supervision with SPC and demonstrated no to minimal R Trendelenburg during SPC ambulation, RW ambulation and while performing steps ups today for the first time indicating increase in R hip strength  Pt presents with increased balance and confidence  Continue to perform and progress gait training with SPC  Recommended to patient to continue using RW and to not use SPC during household ambulation yet  Pt would benefit from continued skilled PT to address impairments, improve gait patterns, and increase QOL  Plan: Continue per plan of care    Progress gait training with SPC     Precautions: FALL RISK, DM, h/o AAA    Specialty Daily Treatment Diary       Manual 11/5 11/7 11/14 10/29 10/31                           Exercise Diary                 Nustep 10' 10' 10' 10' 10'           Step ups FW, lateral 6" 20x each 6" 20x ea 6" 20x ea 6" 20x fwd ea, 15x lat 6in x20 fwd  15x lat   sidestepping 3 laps RTB 3  Laps RTB 3 laps RTB 3 laps RTB 3 laps RTB           Stand hip abd  2 5# 2 x10 2 5# 3 x10 2 5# 2 x10 2# 2x10 2 5# 2x10   Tandem stance in // bars 10sec x3 ea 10sec x 3 ea 10sec x3 ea  10sec x3ea [id number] ea   SLS CGA  In // bars 5x:10 5 sec x3 ea 5sec x3 ea 10sec x3 ea [id number]   Tandem walking in // bars 4 laps FW and BW 3 laps 3 laps 4 laps fwd & bwd at ballet bar 4laps fwd & bwd at ballet bar   Standing marches 2 5# 20x 2 5# x20 2 5# 20x at ballet bar 2# 20x at ballet bar 2 5# x20 ballet bar   Standing ham curls 2 5# x20 2 5# x20 2 5# x20 at ballet bar 2# at ballet bar 2 5# 20x ballet bar   bridges 2 x10 2 x 10 2 x10 2x10 2x10   Supine SLR 2# 2 x10 2# 2 x 10 2# 2 x10 1 5# 2x10 2# 2x10   S/l clam shells 2 x10 2 x 10 2 x10  2x10  2x10   S/l hip abd 2x10 2x10 2x10 2x10 2x10           Gait training with blue SPC   5x at ballet bar, progress NV                                     Modalities

## 2019-11-19 ENCOUNTER — OFFICE VISIT (OUTPATIENT)
Dept: PHYSICAL THERAPY | Age: 84
End: 2019-11-19
Payer: COMMERCIAL

## 2019-11-19 DIAGNOSIS — R29.898 WEAKNESS OF RIGHT HIP: Primary | ICD-10-CM

## 2019-11-19 PROCEDURE — 97110 THERAPEUTIC EXERCISES: CPT | Performed by: PHYSICAL THERAPIST

## 2019-11-19 NOTE — PROGRESS NOTES
Daily Note     Today's date: 2019  Patient name: Kory العلي  : [birth year]  MRN: [id number]  Referring provider: Fay Sherwood MD  Dx:   Encounter Diagnosis     ICD-10-CM    1  Weakness of right hip R29 898                   Subjective: Patient was really sore all weekend because she accidentally did all of her exercises with ankle weights instead of certain ones  Objective: See treatment diary below    Assessment: Pt tolerated treatment well  Pt reported she was concerned with how she would do today because she is stiff today  Pt was able to walk 1 lap continuously around the gym using a SPC with supervision for the first time today  Pt continues to show improvements in strength, balance, and endurance  Pt educated to only use ankle weights during specific exercises and to continue using RW outside of therapy  Pt ambulated without Trendelenburg gait pattern today and performed step ups without Trendelenburg  Progress pt as able with SPC ambulation and exercises  Pt would benefit from continued therapy to gain further strength and balance to improve QOL  Plan: Continue per plan of care    Progress gait training with SPC     Precautions: FALL RISK, DM, h/o AAA    Specialty Daily Treatment Diary       Manual 11/5 11/7 11/14 11/19 10/31                           Exercise Diary                 Nustep 10' 10' 10' 10' 10'           Step ups FW, lateral 6" 20x each 6" 20x ea 6" 20x ea 6" 20x ea 6in x20 fwd  15x lat   sidestepping 3 laps RTB 3  Laps RTB 3 laps RTB 3 laps RTB 3 laps RTB           Stand hip abd  2 5# 2 x10 2 5# 3 x10 2 5# 2 x10 2 5# 2x10 2 5# 2x10   Tandem stance in // bars 10sec x3 ea 10sec x 3 ea 10sec x3 ea  10sec x3ea [id number] ea   SLS CGA  In // bars 5x:10 5 sec x3 ea 5sec x3 ea 10sec x3 ea [id number]   Tandem walking in // bars 4 laps FW and BW 3 laps 3 laps 3 laps 4laps fwd & bwd at ballet bar   Standing marches 2 5# 20x 2 5# x20 2 5# 20x at ballet bar 2 5# 20x  2 5# x20 ballet bar Standing ham curls 2 5# x20 2 5# x20 2 5# x20 at ballet bar 2 5# 20x 2 5# 20x ballet bar   bridges 2 x10 2 x 10 2 x10 2x10 2x10   Supine SLR 2# 2 x10 2# 2 x 10 2# 2 x10 2# 2x10 2# 2x10   S/l clam shells 2 x10 2 x 10 2 x10  2x10  2x10   S/l hip abd 2x10 2x10 2x10 2x10 2x10           Gait training with blue SPC   5x at ballet bar, progress NV 1 [facility] Supervision                                    Modalities

## 2019-11-21 ENCOUNTER — OFFICE VISIT (OUTPATIENT)
Dept: PODIATRY | Facility: CLINIC | Age: 84
End: 2019-11-21
Payer: COMMERCIAL

## 2019-11-21 VITALS
HEIGHT: 60 IN | WEIGHT: 152.7 LBS | HEART RATE: 72 BPM | BODY MASS INDEX: 29.98 KG/M2 | DIASTOLIC BLOOD PRESSURE: 61 MMHG | SYSTOLIC BLOOD PRESSURE: 129 MMHG

## 2019-11-21 DIAGNOSIS — E11.29 CONTROLLED TYPE 2 DIABETES MELLITUS WITH OTHER DIABETIC KIDNEY COMPLICATION, WITHOUT LONG-TERM CURRENT USE OF INSULIN (HCC): Primary | ICD-10-CM

## 2019-11-21 PROCEDURE — 11719 TRIM NAIL(S) ANY NUMBER: CPT | Performed by: PODIATRIST

## 2019-11-21 NOTE — PROGRESS NOTES
Established patient with class findings presents for nail care  Vascular exam:  DP  0/4 bilateral; PT  0 4 bilateral   Dermatological exam:  Each toenail is thick and  dystrophic  Diagnosis:  Diabetes mellitus  Treatment: Trimmed multiple dystrophic toenails  Nail removal  Date/Time: 11/21/2019 1:50 PM  Performed by: Heriberto Cornelius DPM  Authorized by: Heriberto Cornelius DPM     Patient location:  Clinic  Anesthesia (see MAR for exact dosages): Anesthesia method:  None  Nails trimmed:     Number of nails trimmed:  10  Post-procedure details:     Patient tolerance of procedure:   Tolerated well, no immediate complications

## 2019-11-22 ENCOUNTER — OFFICE VISIT (OUTPATIENT)
Dept: PHYSICAL THERAPY | Age: 84
End: 2019-11-22
Payer: COMMERCIAL

## 2019-11-22 DIAGNOSIS — R29.898 WEAKNESS OF RIGHT HIP: Primary | ICD-10-CM

## 2019-11-22 PROCEDURE — 97112 NEUROMUSCULAR REEDUCATION: CPT | Performed by: PHYSICAL THERAPIST

## 2019-11-22 PROCEDURE — 97110 THERAPEUTIC EXERCISES: CPT | Performed by: PHYSICAL THERAPIST

## 2019-11-22 NOTE — PROGRESS NOTES
Daily Note     Today's date: 2019  Patient name: Tess Case  :   MRN: [id number]  Referring provider: Reno Schaefer MD  Dx:   Encounter Diagnosis     ICD-10-CM    1  Weakness of right hip R29 898                   Subjective: Patient was feels better today than last treatment session  Objective: See treatment diary below    Assessment: Pt tolerated treatment well  Pt was able to walk one additional lap with SPC compared to last treatment session  Pt continues to demonstrate challenges with balance exercises and has minimal LOB and uses UE support to correct herself as needed  Pt continues to ambulate and perform steps ups without a Trendelenburg pattern  Pt will continue using RW at home  Pt states her goal is to be able to use her SPC to walk from her room to the [place], which is about 50ft  Pt would benefit from progression of ambulation with SPC and strengthening to increase functional abilities and QOL  Plan: Continue per plan of care    Progress gait training with SPC     Precautions: FALL RISK, DM, h/o AAA    Specialty Daily Treatment Diary       Manual                            Exercise Diary                 Nustep 10' 10' 10' 10' 10'           Step ups FW, lateral 6" 20x each 6" 20x ea 6" 20x ea 6" 20x ea 6in x20 ea   sidestepping 3 laps RTB 3  Laps RTB 3 laps RTB 3 laps RTB 3 laps RTB           Stand hip abd  2 5# 2 x10 2 5# 3 x10 2 5# 2 x10 2 5# 2x10 2 5# 2x10   Tandem stance in // bars 10sec x3 ea 10sec x 3 ea 10sec x3 ea  10sec x3ea [id number] ea   SLS CGA  In // bars 5x:10 5 sec x3 ea 5sec x3 ea 10sec x3 ea [id number]   Tandem walking in // bars 4 laps FW and BW 3 laps 3 laps 3 laps 3 laps   Standing marches 2 5# 20x 2 5# x20 2 5# 20x at ballet bar 2 5# 20x  2 5# x20 ea   Standing ham curls 2 5# x20 2 5# x20 2 5# x20 at ballet bar 2 5# 20x 2 5# 20x ea   bridges 2 x10 2 x 10 2 x10 2x10 2x10   Supine SLR 2# 2 x10 2# 2 x 10 2# 2 x10 2# 2x10 2# 2x10   S/l clam shells 2 x10 2 x 10 2 x10  2x10  2x10   S/l hip abd 2x10 2x10 2x10 2x10 2x10           Gait training with blue SPC   5x at ballet bar, progress NV 1 lap SPC Supervision 1 lap x2 with SPC, rest break b/w                                   Modalities

## 2019-11-25 ENCOUNTER — OFFICE VISIT (OUTPATIENT)
Dept: PHYSICAL THERAPY | Age: 84
End: 2019-11-25
Payer: COMMERCIAL

## 2019-11-25 DIAGNOSIS — R29.898 WEAKNESS OF RIGHT HIP: Primary | ICD-10-CM

## 2019-11-25 PROCEDURE — 97112 NEUROMUSCULAR REEDUCATION: CPT

## 2019-11-25 PROCEDURE — 97110 THERAPEUTIC EXERCISES: CPT

## 2019-11-25 NOTE — PROGRESS NOTES
Daily Note     Today's date: 2019  Patient name: Henrietta Mccoy  :   MRN: [id number]  Referring provider: Kenny Holloway MD  Dx:   Encounter Diagnosis     ICD-10-CM    1  Weakness of right hip R29 898                   Subjective: Patient reports her goal is to use a can on steps with handrail  " I know I am going to need the walker to walk but the cane would be easier on the steps"      Objective: See treatment diary below    Assessment: Pt tolerated treatment well  Pt was able to walk one additional lap with SPC compared to last treatment session  Pt continues to demonstrate challenges with balance exercises and has minimal LOB and uses UE support to correct herself as needed  Pt continues to ambulate and perform steps ups without a Trendelenburg pattern  Pt will continue using RW at home  Pt also states her goal is to be able to use her SPC to walk from her room to [place], which is about 50ft  Pt would benefit from progression of ambulation with SPC and strengthening to increase functional abilities and QOL  Plan: Continue per plan of care    Progress gait training with SPC to try to steps but when pt is at full endurance     Precautions: FALL RISK, DM, h/o AAA    Specialty Daily Treatment Diary       Manual                            Exercise Diary                 Nustep 10' 10' 10' 10' 10'           Step ups FW, lateral 6" 20x each 6" 20x ea 6" 20x ea 6" 20x ea 6in x20 ea   sidestepping 3 laps RTB 3  Laps RTB 3 laps RTB 3 laps RTB 3 laps RTB           Stand hip abd  2 5# 3 x10 2 5# 3 x10 2 5# 2 x10 2 5# 2x10 2 5# 2x10   Tandem stance in // bars 10sec x3 ea 10sec x 3 ea 10sec x3 ea  10sec x3ea [id number] ea   SLS CGA  In // bars 5x:10 5 sec x3 ea 5sec x3 ea 10sec x3 ea [id number]   Tandem walking in // bars 4 laps FW and BW 3 laps 3 laps 3 laps 3 laps   Standing marches 2 5# 30x 2 5# x20 2 5# 20x at ballet bar 2 5# 20x  2 5# x20 ea   Standing ham curls 2 5# x30 2 5# x20 2 5# x20 at [place] bar 2 5# 20x 2 5# 20x ea   bridges 2 x10 2 x 10 2 x10 2x10 2x10   Supine SLR 2# 2 x10 2# 2 x 10 2# 2 x10 2# 2x10 2# 2x10   S/l clam shells 2 x10 2 x 10 2 x10  2x10  2x10   S/l hip abd 2x10 2x10 2x10 2x10 2x10           Gait training with blue SPC   5x at ballet bar, progress NV 1 lap SPC Supervision 1 lap x2 with SPC, rest break b/w                                   Modalities

## 2019-11-26 ENCOUNTER — OFFICE VISIT (OUTPATIENT)
Dept: PHYSICAL THERAPY | Age: 84
End: 2019-11-26
Payer: COMMERCIAL

## 2019-11-26 DIAGNOSIS — R29.898 WEAKNESS OF RIGHT HIP: Primary | ICD-10-CM

## 2019-11-26 PROCEDURE — 97116 GAIT TRAINING THERAPY: CPT | Performed by: PHYSICAL THERAPIST

## 2019-11-26 PROCEDURE — 97112 NEUROMUSCULAR REEDUCATION: CPT | Performed by: PHYSICAL THERAPIST

## 2019-11-26 PROCEDURE — 97110 THERAPEUTIC EXERCISES: CPT | Performed by: PHYSICAL THERAPIST

## 2019-11-26 NOTE — PROGRESS NOTES
Daily Note     Today's date: 2019  Patient name: Ash Padron  :   MRN: [id number]  Referring provider: Kristi Plummer MD  Dx:   Encounter Diagnosis     ICD-10-CM    1  Weakness of right hip R29 898                   Subjective: Patient reports she feels she is getting better, but is unsure if she is going to get an stronger or better  Patient asks if there is further progression and if she will be able to use her cane again  Objective: See treatment diary below    Assessment: Pt tolerated treatment well  Pt continues to feel and express concern about her improvement  Pt educated about how far she has progressed with her functional abilities and strengthening and being able to use her [facility] again but it takes time and needs to continue with her walking and exercises at home on top of a few more weeks of therapy  Pt will come to therapy for 2 more weeks and determine if she should continue with therapy or transition to just HEP  Pt continues to ambulate and perform exercises without a R Trendelenburg  Pt still limited with SLS  Pt has difficulty ambulating if she looses her concentration and in open environment  Pt would benefit from addition gait training and addition of obstacle course during gait  Pt continues to show improvements with strength, balance, and overall function  Pt recommended to start practicing with [facility] in hallway at night with the handrail on her right side  Pt performed table exercise at home d/t running out of time when her transportation arrived  Pt would benefit from continued PT to address impairments and reach patients goal to be able to walk with a SPC to the [place]  Plan: Continue per plan of care    Progress gait training with SPC to try to steps but when pt is at full endurance     Precautions: FALL RISK, DM, h/o AAA    Specialty Daily Treatment Diary       Manual                            Exercise Diary Nustep 10' 10' 10' 10' 10'           Step ups FW, lateral 6" 20x each 6" 20x ea 6" 20x ea 6" 20x ea 6in x20 ea   sidestepping 3 laps RTB 3  Laps RTB 3 laps RTB 3 laps RTB 3 laps RTB           Stand hip abd  2 5# 3 x10 2 5# 3 x10 2 5# 2 x10 2 5# 2x10 2 5# 2x10   Tandem stance in // bars 10sec x3 ea 10sec x 3 ea 10sec x3 ea  10sec x3ea [id number] ea   SLS CGA  In // bars 5x:10 5 sec x3 ea 5sec x3 ea 10sec x3 ea [id number]   Tandem walking in // bars 4 laps FW and BW 3 laps 3 laps 3 laps 3 laps   Standing marches 2 5# 30x 2 5# x20 2 5# 20x at [place] bar 2 5# 20x  2 5# x20 ea   Standing ham curls 2 5# x30 2 5# x20 2 5# x20 at [place] bar 2 5# 20x 2 5# 20x ea   bridges 2 x10 At home 2 x10 2x10 2x10   Supine SLR 2# 2 x10 At home 2# 2 x10 2# 2x10 2# 2x10   S/l clam shells 2 x10 At home 2 x10  2x10  2x10   S/l hip abd 2x10 At home 2x10 2x10 2x10           Gait training with blue SPC  1 lap x2 with SPC, rest break b/w 5x at [facility], progress NV 1 lap SPC Supervision 1 lap x2 with SPC, rest break b/w                                   Modalities

## 2019-12-02 DIAGNOSIS — E87.1 HYPONATREMIA: ICD-10-CM

## 2019-12-02 RX ORDER — SODIUM CHLORIDE 1000 MG
TABLET, SOLUBLE MISCELLANEOUS
Qty: 30 TABLET | Refills: 6 | Status: SHIPPED | OUTPATIENT
Start: 2019-12-02 | End: 2020-06-25 | Stop reason: ALTCHOICE

## 2019-12-03 ENCOUNTER — OFFICE VISIT (OUTPATIENT)
Dept: PHYSICAL THERAPY | Age: 84
End: 2019-12-03
Payer: COMMERCIAL

## 2019-12-03 DIAGNOSIS — R29.898 WEAKNESS OF RIGHT HIP: Primary | ICD-10-CM

## 2019-12-03 PROCEDURE — 97112 NEUROMUSCULAR REEDUCATION: CPT

## 2019-12-03 PROCEDURE — 97110 THERAPEUTIC EXERCISES: CPT

## 2019-12-03 NOTE — PROGRESS NOTES
Daily Note     Today's date: 12/3/2019  Patient name: Franca Morrow  :   MRN: [id number]  Referring provider: Cortney Salazar MD  Dx:   Encounter Diagnosis     ICD-10-CM    1  Weakness of right hip R29 898                   Subjective: Patient reports she feels she is getting better, but is unsure if she is going to get an stronger or better  Pt notes she is fearful of falling on her carpet at home as she feels she loses her balance with cane when on "spongy" surface per her report    Objective: See treatment diary below    Assessment: Pt tolerated treatment well  Pt continues to feel and express concern about her improvement  Pt educated about how far she has progressed with her functional abilities and strengthening and being able to use her [facility] again but it takes time and needs to continue with her walking and exercises at home on top of a few more weeks of therapy  Pt will come to therapy for 2 more weeks and determine if she should continue with therapy or transition to just HEP  Added in amb with SPC on uneven soft surfaces to ease fear with SPC  Pt still needs cg of 1 for occ loss of bal on SPC  Pt would benefit from continued PT to address impairments and reach patients goal to be able to walk with a SPC to the [place]  Plan: Continue per plan of care    Progress gait training with SPC to try to steps but when pt is at full endurance     Precautions: FALL RISK, DM, h/o AAA    Specialty Daily Treatment Diary       Manual 11/25 11/26 12/3 11/19 11/22                           Exercise Diary                 Nustep 10' 10' 10' 10' 10'           Step ups FW, lateral 6" 20x each 6" 20x ea 6" 20x ea 6" 20x ea 6in x20 ea   sidestepping 3 laps RTB 3  Laps RTB 3 laps RTB 3 laps RTB 3 laps RTB           Stand hip abd  2 5# 3 x10 2 5# 3 x10 2 5# 2 x10 2 5# 2x10 2 5# 2x10   Tandem stance in // bars 10sec x3 ea 10sec x 3 ea 10sec x3 ea  10sec x3ea [id number] ea   SLS CGA  In // bars 5x:10 5 sec x3 ea 5sec x3 ea 10sec x3 ea [id number]   Tandem walking in // bars 4 laps FW and BW 3 laps 3 laps 3 laps 3 laps   Standing marches 2 5# 30x 2 5# x20 2 5# 20x at ballet bar 2 5# 20x  2 5# x20 ea   Standing ham curls 2 5# x30 2 5# x20 2 5# x20 at ballet bar 2 5# 20x 2 5# 20x ea   bridges 2 x10 At home 2 x10 2x10 2x10   Supine SLR 2# 2 x10 At home 2# 2 x10 2# 2x10 2# 2x10   S/l clam shells 2 x10 At home 2 x10  2x10  2x10   S/l hip abd 2x10 At home 2x10 2x10 2x10           Gait training with blue SPC  1 lap x2 with SPC, rest break b/w 1llap x2 with SPC, levels and uneven surface  1 lap SPC Supervision 1 lap x2 with SPC, rest break b/w                                   Modalities

## 2019-12-05 ENCOUNTER — OFFICE VISIT (OUTPATIENT)
Dept: PHYSICAL THERAPY | Age: 84
End: 2019-12-05
Payer: COMMERCIAL

## 2019-12-05 DIAGNOSIS — R29.898 WEAKNESS OF RIGHT HIP: Primary | ICD-10-CM

## 2019-12-05 PROCEDURE — 97116 GAIT TRAINING THERAPY: CPT | Performed by: PHYSICAL THERAPIST

## 2019-12-05 PROCEDURE — 97110 THERAPEUTIC EXERCISES: CPT | Performed by: PHYSICAL THERAPIST

## 2019-12-05 NOTE — PROGRESS NOTES
Daily Note     Today's date: 2019  Patient name: Tess Case  :   MRN: [id number]  Referring provider: Reno Schaefer MD  Dx:   Encounter Diagnosis     ICD-10-CM    1  Weakness of right hip R29 898                   Subjective: Patient reports she was walking around her apartment better today  Objective: See treatment diary below    Assessment: Pt tolerated treatment well  Pt reports she walked in her apartment 4x today with her SPC  Pt preformed obstacle course with stepping on uneven surfaces with increased abilities and confidence today  Pt able to ambulate 2 laps without rest break today  Increased ankle weights  Plan to trial stair negotiation with cane next visit  Pt is more comfortable using her SPC  Pt demonstrating fatigue during exercise program, took rest breaks as needed  Pt continues to no longer present with Trendelenburg gait pattern  Pt would benefit from continued skilled therapy to increase strength, balance, functional abilities and gait training  Plan: Continue per plan of care  Trial of stairs with SPC next visit        Precautions: FALL RISK, DM, h/o AAA    Specialty Daily Treatment Diary       Manual 11/25 11/26 12/3 12/5 11/22                           Exercise Diary                 Nustep 10' 10' 10' 10' 10'           Step ups FW, lateral 6" 20x each 6" 20x ea 6" 20x ea 6" 20x ea 6in x20 ea   sidestepping 3 laps RTB 3  Laps RTB 3 laps RTB 3 laps RTB 3 laps RTB           Stand hip abd  2 5# 3 x10 2 5# 3 x10 2 5# 2 x10 3# 2x10 2 5# 2x10   Tandem stance in // bars 10sec x3 ea 10sec x 3 ea 10sec x3 ea  10sec x3ea [id number] ea   SLS CGA  In // bars 5x:10 5 sec x3 ea 5sec x3 ea 10sec x3 ea [id number]   Tandem walking in // bars 4 laps FW and BW 3 laps 3 laps 3 laps 3 laps   Standing marches 2 5# 30x 2 5# x20 2 5# 20x at ballet bar 3# 20x  2 5# x20 ea   Standing ham curls 2 5# x30 2 5# x20 2 5# x20 at ballet bar 3# 20x 2 5# 20x ea   bridges 2 x10 At home 2 x10 2x10 2x10 Supine SLR 2# 2 x10 At home 2# 2 x10 2# 2x10 2# 2x10   S/l clam shells 2 x10 At home 2 x10  2x10  2x10   S/l hip abd 2x10 At home 2x10 2x10 2x10           Gait training with blue SPC  1 lap x2 with SPC, rest break b/w 1llap x2 with SPC, levels and uneven surface  2 lap SPC Supervision 1 lap x2 with SPC, rest break b/w   Obstacle course    4 airex + 1 pillow 4x with SPC                            Modalities

## 2019-12-09 DIAGNOSIS — E11.65 TYPE 2 DIABETES MELLITUS WITH HYPERGLYCEMIA, WITHOUT LONG-TERM CURRENT USE OF INSULIN (HCC): ICD-10-CM

## 2019-12-10 ENCOUNTER — OFFICE VISIT (OUTPATIENT)
Dept: PHYSICAL THERAPY | Age: 84
End: 2019-12-10
Payer: COMMERCIAL

## 2019-12-10 ENCOUNTER — LAB (OUTPATIENT)
Dept: LAB | Age: 84
End: 2019-12-10
Payer: COMMERCIAL

## 2019-12-10 DIAGNOSIS — R29.898 WEAKNESS OF RIGHT HIP: Primary | ICD-10-CM

## 2019-12-10 DIAGNOSIS — E03.9 ACQUIRED HYPOTHYROIDISM: ICD-10-CM

## 2019-12-10 LAB — TSH SERPL DL<=0.05 MIU/L-ACNC: 0.7 UIU/ML (ref 0.36–3.74)

## 2019-12-10 PROCEDURE — 36415 COLL VENOUS BLD VENIPUNCTURE: CPT

## 2019-12-10 PROCEDURE — 97112 NEUROMUSCULAR REEDUCATION: CPT

## 2019-12-10 PROCEDURE — 84443 ASSAY THYROID STIM HORMONE: CPT

## 2019-12-10 PROCEDURE — 97110 THERAPEUTIC EXERCISES: CPT

## 2019-12-10 NOTE — PROGRESS NOTES
Daily Note     Today's date: 12/10/2019  Patient name: Dilip Darnell  :   MRN: [id number]  Referring provider: Hillary Lester MD  Dx:   No diagnosis found  Subjective: Patient reports she did not exercise as much this weekend due seeing [employer] and other activities    Objective: See treatment diary below    Assessment: Pt tolerated treatment well  Challenged with current ex intensity  Will add trial stair negotiation with cane next visit as pt would like to do steps at daughter  Pt is more comfortable using her SPC  Pt demonstrating fatigue during exercise program, took rest breaks as needed  Pt continues to no longer present with Trendelenburg gait pattern  Pt would benefit from continued skilled therapy to increase strength, balance, functional abilities and gait training  Plan: Continue per plan of care  Trial of stairs with SPC next visit        Precautions: FALL RISK, DM, h/o AAA    Specialty Daily Treatment Diary       Manual 11/25 11/26 12/3 12/5 12/10                           Exercise Diary                 Nustep 10' 10' 10' 10' 10'           Step ups FW, lateral 6" 20x each 6" 20x ea 6" 20x ea 6" 20x ea 6in x20 ea   sidestepping 3 laps RTB 3  Laps RTB 3 laps RTB 3 laps RTB 3 laps RTB           Stand hip abd  2 5# 3 x10 2 5# 3 x10 2 5# 2 x10 3# 2x10 3# 2x10   Tandem stance in // bars 10sec x3 ea 10sec x 3 ea 10sec x3 ea  10sec x3ea [id number] ea   SLS CGA  In // bars 5x:10 5 sec x3 ea 5sec x3 ea 10sec x3 ea [id number]   Tandem walking in // bars 4 laps FW and BW 3 laps 3 laps 3 laps 3 laps   Standing marches 2 5# 30x 2 5# x20 2 5# 20x at ballet bar 3# 20x  3# x20 ea   Standing ham curls 2 5# x30 2 5# x20 2 5# x20 at ballet bar 3# 20x 3# 20x ea   bridges 2 x10 At home 2 x10 2x10 2x10   Supine SLR 2# 2 x10 At home 2# 2 x10 2# 2x10 2# 2x10   S/l clam shells 2 x10 At home 2 x10  2x10  2x10   S/l hip abd 2x10 At home 2x10 2x10 2x10           Gait training with blue SPC  1 lap x2 with SPC, rest break b/w 1llap x2 with SPC, levels and uneven surface  2 lap SPC Supervision 2 lap x2 with SPC, rest break b/w with uneven surface   Obstacle course    4 airex + 1 pillow 4x with SPC 4 airex + 1 pillow 4x with SPC                              Modalities

## 2019-12-12 ENCOUNTER — OFFICE VISIT (OUTPATIENT)
Dept: PHYSICAL THERAPY | Age: 84
End: 2019-12-12
Payer: COMMERCIAL

## 2019-12-12 DIAGNOSIS — R29.898 WEAKNESS OF RIGHT HIP: Primary | ICD-10-CM

## 2019-12-12 PROCEDURE — 97112 NEUROMUSCULAR REEDUCATION: CPT | Performed by: PHYSICAL THERAPIST

## 2019-12-12 PROCEDURE — 97110 THERAPEUTIC EXERCISES: CPT | Performed by: PHYSICAL THERAPIST

## 2019-12-12 NOTE — PROGRESS NOTES
Daily Note/Discharge Summary     Today's date: 2019  Patient name: Ash Padron  :   MRN: [id number]  Referring provider: Kristi Plummer MD  Dx:   Encounter Diagnosis     ICD-10-CM    1  Weakness of right hip R29 898                   Subjective: Patient excited for today to be her last day  Feels glad and proud that she's able to use her cane      Objective: See treatment diary below    MMT:  Hip flexion 4/5 on L 4-/5 on R  Hip abd 4-/5 on L 4/5 on R  Hip add 4+/5    Knee flex 5/5 B  Knee ext 5/5 B    SLS 5 sec foam; 8 sec firm  Tandem stance 5 sec foam 10 sec firm    Gait:  SPC with close S-CGA for steadying assist  Patient often losing her balance when distracted  Able to stop and catch her balance; however, 2x listing towards L and needing assist for balance and steadying  Patient has started to use [facility] for household distances and often reaches out for counter for steadying assist  Educated and recommended patient to not use counters, but to use RW if fatigued or feeling off balance  Patient uses the RW for long distances and community ambulation  Stair climbing:  Patient able to use single handrail and SPC for balance with stair climbing, step to pattern ascending and descending  Patient is able to use cane safely with single handrail, requires close S-CGA for slight steadying assist        Assessment: Patient seen for brief re-assessment  Reviewed HEP with patient and has been compliant with exercising daily  Uses cane in the hallway at her home and in her home  Plans to be away for the holidays and will exercise at home through the holidays  Patient presents with improved strength, balance, reduction of trendelenberg gait pattern and has progressed to using the [facility] safely  Discussed discharge plan with patient   Recommending patient to continue with HEP daily, to keep walking either with the cane or RW      Plan: discharge to HEP     Precautions: FALL RISK, DM, h/o AAA    Specialty Daily Treatment Diary       Manual 12/12 11/26 12/3 12/5 12/10                           Exercise Diary                 Nustep 10' 10' 10' 10' 10'           Step ups FW, lateral 6" 20x each 6" 20x ea 6" 20x ea 6" 20x ea 6in x20 ea   sidestepping 3 laps RTB 3  Laps RTB 3 laps RTB 3 laps RTB 3 laps RTB           Stand hip abd  - 2 5# 3 x10 2 5# 2 x10 3# 2x10 3# 2x10   Tandem stance in // bars - 10sec x 3 ea 10sec x3 ea  10sec x3ea [id number] ea   SLS CGA  In // bars - 5 sec x3 ea 5sec x3 ea 10sec x3 ea [id number]   Tandem walking in // bars - 3 laps 3 laps 3 laps 3 laps   Standing marches - 2 5# x20 2 5# 20x at ballet bar 3# 20x  3# x20 ea   Standing ham curls - 2 5# x20 2 5# x20 at ballet bar 3# 20x 3# 20x ea   bridges - At home 2 x10 2x10 2x10   Supine SLR  At home 2# 2 x10 2# 2x10 2# 2x10   S/l clam shells 2x10 At home 2 x10  2x10  2x10   S/l hip abd - At home 2x10 2x10 2x10           Gait training with blue SPC 4 laps, rest between 1 lap x2 with SPC, rest break b/w 1llap x2 with SPC, levels and uneven surface  2 lap SPC Supervision 2 lap x2 with SPC, rest break b/w with uneven surface   Obstacle course Stairs with SPC and single handrail     4 airex + 1 pillow 4x with SPC 4 airex + 1 pillow 4x with SPC                              Modalities

## 2019-12-27 ENCOUNTER — OFFICE VISIT (OUTPATIENT)
Dept: FAMILY MEDICINE CLINIC | Facility: CLINIC | Age: 84
End: 2019-12-27
Payer: COMMERCIAL

## 2019-12-27 VITALS
DIASTOLIC BLOOD PRESSURE: 54 MMHG | BODY MASS INDEX: 29.84 KG/M2 | HEIGHT: 60 IN | SYSTOLIC BLOOD PRESSURE: 150 MMHG | RESPIRATION RATE: 16 BRPM | WEIGHT: 152 LBS | HEART RATE: 66 BPM | OXYGEN SATURATION: 98 % | TEMPERATURE: 97.8 F

## 2019-12-27 DIAGNOSIS — R26.2 AMBULATORY DYSFUNCTION: ICD-10-CM

## 2019-12-27 DIAGNOSIS — I10 ESSENTIAL HYPERTENSION: Primary | ICD-10-CM

## 2019-12-27 DIAGNOSIS — M15.9 GENERALIZED OSTEOARTHRITIS: ICD-10-CM

## 2019-12-27 DIAGNOSIS — E78.2 MIXED HYPERLIPIDEMIA: ICD-10-CM

## 2019-12-27 DIAGNOSIS — N18.30 TYPE 2 DM WITH CKD STAGE 3 AND HYPERTENSION (HCC): ICD-10-CM

## 2019-12-27 DIAGNOSIS — E11.42 DIABETIC PERIPHERAL NEUROPATHY (HCC): ICD-10-CM

## 2019-12-27 DIAGNOSIS — E11.22 TYPE 2 DM WITH CKD STAGE 3 AND HYPERTENSION (HCC): ICD-10-CM

## 2019-12-27 DIAGNOSIS — I12.9 TYPE 2 DM WITH CKD STAGE 3 AND HYPERTENSION (HCC): ICD-10-CM

## 2019-12-27 DIAGNOSIS — E03.9 ACQUIRED HYPOTHYROIDISM: ICD-10-CM

## 2019-12-27 DIAGNOSIS — N18.30 CHRONIC KIDNEY DISEASE, STAGE 3 (HCC): ICD-10-CM

## 2019-12-27 PROCEDURE — 1160F RVW MEDS BY RX/DR IN RCRD: CPT | Performed by: FAMILY MEDICINE

## 2019-12-27 PROCEDURE — 99214 OFFICE O/P EST MOD 30 MIN: CPT | Performed by: FAMILY MEDICINE

## 2019-12-27 PROCEDURE — 1036F TOBACCO NON-USER: CPT | Performed by: FAMILY MEDICINE

## 2019-12-27 NOTE — PROGRESS NOTES
Chief Complaint   Patient presents with    Follow-up     4 month follow up     Health Maintenance   Topic Date Due    DTaP,Tdap,and Td Vaccines (1 - Tdap) 12/27/1936    BMI: Followup Plan  12/27/1943    Hepatitis B Vaccine (1 of 3 - Risk 3-dose series) 12/27/1944    Pneumococcal Vaccine: 65+ Years (2 of 2 - PPSV23) 09/01/2016    PT PLAN OF CARE  11/30/2019    HEMOGLOBIN A1C  02/15/2020    Urinary Incontinence Screening  04/24/2020    Medicare Annual Wellness Visit (AWV)  04/24/2020    Diabetic Foot Exam  07/12/2020    URINE MICROALBUMIN  08/15/2020    Fall Risk  10/31/2020    Depression Screening PHQ  10/31/2020    BMI: Adult  12/27/2020    DM Eye Exam  07/19/2021    Influenza Vaccine  Completed    Pneumococcal Vaccine: Pediatrics (0 to 5 Years) and At-Risk Patients (6 to 59 Years)  Aged Out    HIB Vaccine  Aged Out    IPV Vaccine  Aged Out    Hepatitis A Vaccine  Aged Out    Meningococcal ACWY Vaccine  Aged Out    HPV Vaccine  Aged Out     BMI Counseling: Body mass index is 29 69 kg/m²  The BMI is above normal  Nutrition recommendations include reducing portion sizes, decreasing overall calorie intake, 3-5 servings of fruits/vegetables daily, reducing fast food intake, consuming healthier snacks, decreasing soda and/or juice intake, moderation in carbohydrate intake, increasing intake of lean protein, reducing intake of saturated fat and trans fat and reducing intake of cholesterol  Exercise recommendations include moderate aerobic physical activity for 150 minutes/week  Assessment/Plan:    Hypertension  Systolic BP ranges in 965'E -160 at home  Continue current medical regimen including Losartan 100 mg daily, Caduet 10/20 mg daily  Monitor blood pressure at home and call if systolic blood pressure remains above 150  Bring blood pressure monitor for next office visit  Hypothyroidism  Continue Levothyroxine 125 mcg daily      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 hypertension ([facility] [address] )    Lab Results   Component Value Date    HGBA1C 6 0 08/15/2019     Type 2 DM- diet controlled  Continue to monitor blood sugar at home 2-3 times per week  Follow- up with ophthalmologist annually  Diabetic peripheral neuropathy [facility])    Lab Results   Component Value Date    HGBA1C 6 0 08/15/2019     Follow- up with podiatrist Dr Flory Kaur for routine foot care  Chronic kidney disease, stage 3  Encouraged patient to stay well hydrated, avoid NSAID's  Hyperlipidemia  Continue statin therapy  Check CMP, lipid panel in 3-4 months  Ambulatory dysfunction  Patient completed physical therapy 2 weeks ago  Discussed fall precautions  Continue to use a walker for ambulation  Generalized osteoarthritis  Take Tylenol PRN for joint pains  Schedule follow-up office visit in 4 months  Check labs prior to next visit  Diagnoses and all orders for this visit:    Essential hypertension  -     Comprehensive metabolic panel; Future    Acquired hypothyroidism  -     TSH, 3rd generation with Free T4 reflex; Future    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)  -     Comprehensive metabolic panel; Future  -     Hemoglobin A1C; Future  -     Microalbumin / creatinine urine ratio    Diabetic peripheral neuropathy (HCC)    Chronic kidney disease, stage 3 (HCC)  -     Comprehensive metabolic panel; Future    Mixed hyperlipidemia  -     Comprehensive metabolic panel; Future  -     Lipid panel; Future    Ambulatory dysfunction    Generalized osteoarthritis          Subjective:      Patient ID: Hope Vaz is a 80 y o  female  HPI    Patient is very pleasant 66-year-old female with PMHx of  HTN, Hypothyroidism, Type 2 DM - diet controlled, Hyperli[idemia, CKD stage 3, generalized OA, ambulatory dysfunction, PVD, peripheral neuropathy      She presents for 4 month follow-up office visit accompanied by her daughter  No changes in medical regimen since last visit        Patient had blood test done on 12/10/19  TSH 0 703  HTN - patient brought blood pressure log from home  BP ranges 143/56 -163/59  She denies chest pain, shortness of breath, dizziness  Type 2 DM - diet controlled  Patient checks blood sugar at home 2-3 times per week  Blood sugar ranges in 120 -130's  C/o numbness in feet  She has been followed by podiatrist Dr Sabine Regalado  Eye exam done by Dr Hdez in the summer this year  Hypothyroidism -currently taking Levothyroxine 125 mcg daily  Ambulatory dysfunction-  patient ambulates with a walker  Reports no falls  She completed physical therapy for balance and R hip pain 2 weeks ago  Pain in R hip improved  Patient has been followed by orthopedic surgeon Dr Gloria Colby  Patient reports adverse reaction to Diclofenac 1% topical gel ( developed headache, tightness in throat)  She takes Tylenol PRN for joint pains now  Patient has PVD, status post endovascular AAA repair in March 2016  Surgery was performed by vascular surgeon Dr Tonny Padron  Denies tobacco use  Patient had Prevnar 13 in September 2015, Flu shot in 11/19  The following portions of the patient's history were reviewed and updated as appropriate: allergies, past family history, past medical history, past social history, past surgical history and problem list     Review of Systems   Constitutional: Positive for fatigue (mild)  Negative for activity change, appetite change, chills and fever  HENT: Positive for hearing loss (wears BL hearing aids)  Negative for congestion, ear pain, mouth sores, nosebleeds, sore throat, tinnitus and trouble swallowing  Eyes: Negative for pain, discharge, redness, itching and visual disturbance  Respiratory: Negative for cough, chest tightness, shortness of breath and wheezing  Cardiovascular: Negative for chest pain, palpitations and leg swelling     Gastrointestinal: Negative for abdominal pain, blood in stool, constipation, diarrhea, nausea and vomiting  Genitourinary: Negative for difficulty urinating, dysuria, flank pain, frequency and hematuria  Musculoskeletal: Positive for arthralgias (arthritic pain in hand joints, knees  Pain in R hip improved) and gait problem (ambulates with a walker)  Negative for back pain, joint swelling and neck pain  Skin: Negative for rash and wound  Neurological: Positive for numbness (in feet)  Negative for dizziness and headaches  Hematological: Negative  Psychiatric/Behavioral: Negative for sleep disturbance  The patient is not nervous/anxious  Objective:      /54 (BP Location: Right arm, Patient Position: Sitting, Cuff Size: Standard)   Pulse 66   Temp 97 8 °F (36 6 °C) (Oral)   Resp 16   Ht 5' (1 524 m)   Wt 68 9 kg (152 lb)   SpO2 98%   BMI 29 69 kg/m²          Physical Exam   Constitutional: She appears well-developed and well-nourished  HENT:   Head: Normocephalic and atraumatic  Right Ear: External ear normal    Left Ear: External ear normal    Mouth/Throat: Oropharynx is clear and moist    Eyes: Pupils are equal, round, and reactive to light  Conjunctivae are normal    Neck: Normal range of motion  Neck supple  No JVD present  Cardiovascular: Normal rate, regular rhythm and normal heart sounds  No murmur heard  No carotid bruits BL  No BL LE edema  Patient wears compression stokings   Pulmonary/Chest: Effort normal and breath sounds normal    Abdominal: Soft  Bowel sounds are normal  There is no tenderness  Musculoskeletal:   Patient ambulates with a walker  Arthritic changes in hand joints   Skin: Skin is warm and dry  No rash noted  Psychiatric: She has a normal mood and affect  Nursing note and vitals reviewed

## 2019-12-28 NOTE — ASSESSMENT & PLAN NOTE
Patient completed physical therapy 2 weeks ago  Discussed fall precautions  Continue to use a walker for ambulation

## 2019-12-28 NOTE — ASSESSMENT & PLAN NOTE
Lab Results   Component Value Date    HGBA1C 6 0 08/15/2019     Follow- up with podiatrist Dr Jj Morelos for routine foot care

## 2019-12-28 NOTE — ASSESSMENT & PLAN NOTE
Lab Results   Component Value Date    HGBA1C 6 0 08/15/2019     Type 2 DM- diet controlled  Continue to monitor blood sugar at home 2-3 times per week  Follow- up with ophthalmologist annually

## 2019-12-28 NOTE — ASSESSMENT & PLAN NOTE
Systolic BP ranges in 556'A -160 at home  Continue current medical regimen including Losartan 100 mg daily, Caduet 10/20 mg daily  Monitor blood pressure at home and call if systolic blood pressure remains above 150  Bring blood pressure monitor for next office visit

## 2020-01-16 ENCOUNTER — TELEPHONE (OUTPATIENT)
Dept: FAMILY MEDICINE CLINIC | Facility: CLINIC | Age: 85
End: 2020-01-16

## 2020-01-16 DIAGNOSIS — E87.1 HYPONATREMIA: ICD-10-CM

## 2020-01-16 DIAGNOSIS — I10 ESSENTIAL HYPERTENSION: Primary | ICD-10-CM

## 2020-01-16 NOTE — TELEPHONE ENCOUNTER
Please call patient's daughter  Recommend to follow a low-sodium diet, keep lower extremities elevated, use compression stockings  Check blood pressure at home if patient has BP cuff, call with blood pressure readings tomorrow  Schedule office visit to recheck blood pressure, adjust medications if necessary

## 2020-01-16 NOTE — TELEPHONE ENCOUNTER
Alondra HERNANDEZ from [facility] patients insurance was out to see patient today and the following was documented  Patients daughter wanted you to review and advise? 1/16/20 @1:45   /58 machine read  /58 manual reading    Left leg look swollen and both feet looked swollen  No redness  Not warm to touch  Patient was wearing compression stockings today  Patient's daughter wanted to know if there is anything additional she should do in regards to BP and swelling?

## 2020-01-17 ENCOUNTER — LAB (OUTPATIENT)
Dept: LAB | Age: 85
End: 2020-01-17
Payer: COMMERCIAL

## 2020-01-17 DIAGNOSIS — I10 ESSENTIAL HYPERTENSION: ICD-10-CM

## 2020-01-17 DIAGNOSIS — E87.1 HYPONATREMIA: ICD-10-CM

## 2020-01-17 LAB
ANION GAP SERPL CALCULATED.3IONS-SCNC: 8 MMOL/L (ref 4–13)
BUN SERPL-MCNC: 30 MG/DL (ref 5–25)
CALCIUM SERPL-MCNC: 9.7 MG/DL (ref 8.3–10.1)
CHLORIDE SERPL-SCNC: 107 MMOL/L (ref 100–108)
CO2 SERPL-SCNC: 24 MMOL/L (ref 21–32)
CREAT SERPL-MCNC: 1.36 MG/DL (ref 0.6–1.3)
GFR SERPL CREATININE-BSD FRML MDRD: 33 ML/MIN/1.73SQ M
GLUCOSE SERPL-MCNC: 140 MG/DL (ref 65–140)
POTASSIUM SERPL-SCNC: 4.3 MMOL/L (ref 3.5–5.3)
SODIUM SERPL-SCNC: 139 MMOL/L (ref 136–145)

## 2020-01-17 PROCEDURE — 36415 COLL VENOUS BLD VENIPUNCTURE: CPT

## 2020-01-17 PROCEDURE — 80048 BASIC METABOLIC PNL TOTAL CA: CPT

## 2020-01-17 NOTE — TELEPHONE ENCOUNTER
I called patient's daughter and let her know  They will check BP again and let us know tomorrow  She is actually taking a sodium tablet for Hyponatremia, and in 8/19 the sodium level was low  Should she stop the sodium supplement?

## 2020-02-03 ENCOUNTER — OFFICE VISIT (OUTPATIENT)
Dept: FAMILY MEDICINE CLINIC | Facility: CLINIC | Age: 85
End: 2020-02-03
Payer: COMMERCIAL

## 2020-02-03 VITALS
RESPIRATION RATE: 16 BRPM | HEIGHT: 60 IN | SYSTOLIC BLOOD PRESSURE: 142 MMHG | TEMPERATURE: 98.3 F | DIASTOLIC BLOOD PRESSURE: 62 MMHG | HEART RATE: 80 BPM | BODY MASS INDEX: 29.84 KG/M2 | WEIGHT: 152 LBS

## 2020-02-03 DIAGNOSIS — E11.22 TYPE 2 DM WITH CKD STAGE 3 AND HYPERTENSION (HCC): ICD-10-CM

## 2020-02-03 DIAGNOSIS — I12.9 TYPE 2 DM WITH CKD STAGE 3 AND HYPERTENSION (HCC): ICD-10-CM

## 2020-02-03 DIAGNOSIS — L40.9 PSORIASIS: Primary | ICD-10-CM

## 2020-02-03 DIAGNOSIS — N18.30 TYPE 2 DM WITH CKD STAGE 3 AND HYPERTENSION (HCC): ICD-10-CM

## 2020-02-03 DIAGNOSIS — I10 ESSENTIAL HYPERTENSION: ICD-10-CM

## 2020-02-03 PROCEDURE — 4040F PNEUMOC VAC/ADMIN/RCVD: CPT | Performed by: NURSE PRACTITIONER

## 2020-02-03 PROCEDURE — 1036F TOBACCO NON-USER: CPT | Performed by: NURSE PRACTITIONER

## 2020-02-03 PROCEDURE — 3077F SYST BP >= 140 MM HG: CPT | Performed by: NURSE PRACTITIONER

## 2020-02-03 PROCEDURE — 2022F DILAT RTA XM EVC RTNOPTHY: CPT | Performed by: NURSE PRACTITIONER

## 2020-02-03 PROCEDURE — 3008F BODY MASS INDEX DOCD: CPT | Performed by: NURSE PRACTITIONER

## 2020-02-03 PROCEDURE — 3066F NEPHROPATHY DOC TX: CPT | Performed by: NURSE PRACTITIONER

## 2020-02-03 PROCEDURE — 1160F RVW MEDS BY RX/DR IN RCRD: CPT | Performed by: NURSE PRACTITIONER

## 2020-02-03 PROCEDURE — 99213 OFFICE O/P EST LOW 20 MIN: CPT | Performed by: NURSE PRACTITIONER

## 2020-02-03 PROCEDURE — 3078F DIAST BP <80 MM HG: CPT | Performed by: NURSE PRACTITIONER

## 2020-02-03 RX ORDER — LEVOTHYROXINE SODIUM 125 UG/1
TABLET ORAL
COMMUNITY
Start: 2019-12-28 | End: 2020-02-03

## 2020-02-03 NOTE — PROGRESS NOTES
Chief Complaint   Patient presents with    Rash     pink itchy rash on back of neck     Health Maintenance   Topic Date Due    DTaP,Tdap,and Td Vaccines (1 - Tdap) 12/27/1936    Hepatitis B Vaccine (1 of 3 - Risk 3-dose series) 12[birth year]27/1944    Pneumococcal Vaccine: 65+ Years (2 of 2 - PPSV23) 09/01/2016    PT PLAN OF CARE  11/30/2019    HEMOGLOBIN A1C  02/15/2020    Medicare Annual Wellness Visit (AWV)  04/24/2020    Diabetic Foot Exam  07/12/2020    URINE MICROALBUMIN  08/15/2020    Fall Risk  10/31/2020    Depression Screening PHQ  10/31/2020    BMI: Followup Plan  12/27/2020    BMI: Adult  12/27/2020    DM Eye Exam  07/19/2021    Influenza Vaccine  Completed    Pneumococcal Vaccine: Pediatrics (0 to 5 Years) and At-Risk Patients (6 to 59 Years)  Aged Out    HIB Vaccine  Aged Out    IPV Vaccine  Aged Out    Hepatitis A Vaccine  Aged Out    Meningococcal ACWY Vaccine  Aged Out    HPV Vaccine  Aged Out     Assessment/Plan:    Psoriasis- her current rash looks to be an extension of her psoriasis  I asked her to follow up with her Dermatologist, Dinorah Wilhelm as she has quite a few allergies to topical creams  Recommended using OTC Eucerin or Cetaphil cream in the mean time  Avoid hot showers, may apply cool compresses to site  Her daughter will schedule her Dermatology follow up  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)  Well controlled via diet    Lab Results   Component Value Date    HGBA1C 6 0 08/15/2019       Hypertension  BP is acceptable today  Continue current medication regimen of Losartan 100 mg daily, Caduet 10-20 mg daily    Continue to monitor BP and call if BP is consistently >150/90s  Continue holding sodium chloride 1 g tablet  Continue wearing compression stockings   She does have repeat blood work prior to her next follow up with Dr Neli Rios        Diagnoses and all orders for this visit:    Psoriasis    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 hypertension [facility])    Essential hypertension    Other orders  -     Discontinue: SYNTHROID 125 MCG tablet  -     pyrithione zinc (HEAD AND SHOULDERS) 1 % shampoo; Apply topically daily as needed for dandruff          Subjective:      Patient ID: Lucho Fox is a 80 y o  female  HPI     Pt presents with her daughter today for an acute visit   C/o a red rash on the back of her neck for the past 2-3 weeks  She usually wears a life alert necklace and the strap recently broke so she started using a strand of beads for the life alert  Shortly after this, she noticed the red rash  She has since stopped using the beads but the rash persists  She does have seborrheic dermatitis of the scalp and questions if this is spreading  She typically follows with Rosa Lagos, Dermatology but has not reached out to her office recently  The patient and her daughter noted "severe allergies" to Clobetasol, Fluocinolone, Ketoconazole- all of which reportedly cause hallucinations, visual changes     Pt does follow with Dr Radha Leslie, Rheumatology for psoriatic arthropathy, psoriasis (last seen 10/2019)     Pt with HTN- taking Losartan 100 mg daily, Caduet 10-20 mg daily  A home nurse recently called the office 1/16/2020 for elevated BP readings (taken by by her nurse)  At that time, she had a repeat BMP which showed Na 139 (previoulsy low), Bun 30, Cr 1 36 (stable)  Dr Luz Beck asked her to stop the Sodium 1g tablet daily at that time  Since stopping this, she feels her ankle swelling has drastically decreased  She does wear compression stockings  She lives at [facility] and she does have her BP checked regularly but she cannot recall any recent readings    She denies chest pain, palpitations, SOB, dizziness     Pt is diabetic, type 2 with CKD stage 3, diet controlled currenlty   A1C at 6 0    The following portions of the patient's history were reviewed and updated as appropriate: allergies, current medications, past family history, past medical history, past social history and problem list     Review of Systems   Constitutional: Negative for chills, fatigue and fever  Respiratory: Negative for cough, shortness of breath and wheezing  Cardiovascular: Negative for chest pain, palpitations and leg swelling  Gastrointestinal: Negative for abdominal pain, blood in stool, constipation, diarrhea and nausea  Genitourinary: Negative for dysuria  Musculoskeletal: Positive for arthralgias and gait problem  Negative for myalgias  Skin: Positive for rash  Negative for wound  Neurological: Negative for dizziness, weakness, numbness and headaches  Psychiatric/Behavioral: Negative for sleep disturbance and suicidal ideas  Objective:      /62   Pulse 80   Temp 98 3 °F (36 8 °C) (Tympanic)   Resp 16   Ht 5' (1 524 m)   Wt 68 9 kg (152 lb)   BMI 29 69 kg/m²          Physical Exam   Constitutional: She is oriented to person, place, and time  She appears well-developed and well-nourished  No distress  HENT:   Head: Normocephalic and atraumatic  Eyes: Pupils are equal, round, and reactive to light  Conjunctivae are normal    Neck: Normal range of motion  Neck supple  Cardiovascular: Normal rate, regular rhythm and normal heart sounds  No murmur heard  No LE edema  Wearing compression stockings    Pulmonary/Chest: Effort normal and breath sounds normal  No respiratory distress  She has no wheezes  Musculoskeletal: Normal range of motion  Using a rolling walker for ambulation   Neurological: She is alert and oriented to person, place, and time  Skin: Skin is warm and dry  Rash noted  She is not diaphoretic  Psychiatric: She has a normal mood and affect

## 2020-02-03 NOTE — ASSESSMENT & PLAN NOTE
BP is acceptable today  Continue current medication regimen of Losartan 100 mg daily, Caduet 10-20 mg daily    Continue to monitor BP and call if BP is consistently >150/90s  Continue holding sodium chloride 1 g tablet  Continue wearing compression stockings   She does have repeat blood work prior to her next follow up with Dr Klaus Agarwal

## 2020-02-28 ENCOUNTER — TELEPHONE (OUTPATIENT)
Dept: OBGYN CLINIC | Facility: HOSPITAL | Age: 85
End: 2020-02-28

## 2020-02-28 NOTE — TELEPHONE ENCOUNTER
Patient Dr Carey Mckay  # [phone] : Cruz Manzanares   Regarding: Billing issue from 09 05 2019        Daughter called stating she has been in contact with [facility] and her insurance for many months  Thereis an issues with the "code" that was used on September 05, 2019 from the visit in [place]   The patients insurance is not covering the visit     Per daughter, [facility] told her to contact Dr Carey Mckay huma see if the doctor can change the code that was used

## 2020-02-28 NOTE — TELEPHONE ENCOUNTER
Please look at the codes from that date office note  They are all pertinent to rheum  Pls check if her insurance can give us more info so i know what code to change to

## 2020-04-02 ENCOUNTER — TELEPHONE (OUTPATIENT)
Dept: OBGYN CLINIC | Facility: CLINIC | Age: 85
End: 2020-04-02

## 2020-04-13 DIAGNOSIS — E03.9 ACQUIRED HYPOTHYROIDISM: ICD-10-CM

## 2020-04-13 RX ORDER — LEVOTHYROXINE SODIUM 0.12 MG/1
125 TABLET ORAL DAILY
Qty: 90 TABLET | Refills: 3 | Status: SHIPPED | OUTPATIENT
Start: 2020-04-13 | End: 2020-09-04

## 2020-06-11 LAB
CREAT ?TM UR-SCNC: 49.9 UMOL/L
EXT MICROALBUMIN URINE RANDOM: 2.8
HBA1C MFR BLD HPLC: 6.3 %
MICROALBUMIN/CREAT UR: 56.1 MG/G{CREAT}

## 2020-06-15 DIAGNOSIS — E11.9 TYPE 2 DIABETES MELLITUS WITHOUT COMPLICATION, WITHOUT LONG-TERM CURRENT USE OF INSULIN (HCC): ICD-10-CM

## 2020-06-15 RX ORDER — LANCETS 30 GAUGE
EACH MISCELLANEOUS
Qty: 100 EACH | Refills: 2 | Status: SHIPPED | OUTPATIENT
Start: 2020-06-15 | End: 2021-11-16 | Stop reason: SDUPTHER

## 2020-06-25 ENCOUNTER — TELEPHONE (OUTPATIENT)
Dept: FAMILY MEDICINE CLINIC | Facility: CLINIC | Age: 85
End: 2020-06-25

## 2020-06-25 ENCOUNTER — OFFICE VISIT (OUTPATIENT)
Dept: FAMILY MEDICINE CLINIC | Facility: CLINIC | Age: 85
End: 2020-06-25
Payer: COMMERCIAL

## 2020-06-25 VITALS
BODY MASS INDEX: 28.86 KG/M2 | HEIGHT: 60 IN | TEMPERATURE: 97.5 F | RESPIRATION RATE: 16 BRPM | SYSTOLIC BLOOD PRESSURE: 144 MMHG | HEART RATE: 70 BPM | OXYGEN SATURATION: 99 % | DIASTOLIC BLOOD PRESSURE: 80 MMHG | WEIGHT: 147 LBS

## 2020-06-25 DIAGNOSIS — R06.02 SOB (SHORTNESS OF BREATH): ICD-10-CM

## 2020-06-25 DIAGNOSIS — I12.9 TYPE 2 DM WITH CKD STAGE 3 AND HYPERTENSION (HCC): ICD-10-CM

## 2020-06-25 DIAGNOSIS — E03.9 ACQUIRED HYPOTHYROIDISM: ICD-10-CM

## 2020-06-25 DIAGNOSIS — R26.2 AMBULATORY DYSFUNCTION: ICD-10-CM

## 2020-06-25 DIAGNOSIS — I73.9 PERIPHERAL VASCULAR DISEASE (HCC): ICD-10-CM

## 2020-06-25 DIAGNOSIS — Z00.00 MEDICARE ANNUAL WELLNESS VISIT, SUBSEQUENT: ICD-10-CM

## 2020-06-25 DIAGNOSIS — N18.30 TYPE 2 DM WITH CKD STAGE 3 AND HYPERTENSION (HCC): ICD-10-CM

## 2020-06-25 DIAGNOSIS — E11.22 TYPE 2 DM WITH CKD STAGE 3 AND HYPERTENSION (HCC): ICD-10-CM

## 2020-06-25 DIAGNOSIS — N18.30 CHRONIC KIDNEY DISEASE, STAGE 3 (HCC): ICD-10-CM

## 2020-06-25 DIAGNOSIS — M15.9 GENERALIZED OSTEOARTHRITIS: ICD-10-CM

## 2020-06-25 DIAGNOSIS — I10 ESSENTIAL HYPERTENSION: Primary | ICD-10-CM

## 2020-06-25 DIAGNOSIS — E11.42 DIABETIC PERIPHERAL NEUROPATHY (HCC): ICD-10-CM

## 2020-06-25 DIAGNOSIS — E78.2 MIXED HYPERLIPIDEMIA: ICD-10-CM

## 2020-06-25 PROBLEM — G63 POLYNEUROPATHY ASSOCIATED WITH UNDERLYING DISEASE (HCC): Status: ACTIVE | Noted: 2020-06-25

## 2020-06-25 PROCEDURE — 3079F DIAST BP 80-89 MM HG: CPT | Performed by: FAMILY MEDICINE

## 2020-06-25 PROCEDURE — 4040F PNEUMOC VAC/ADMIN/RCVD: CPT | Performed by: FAMILY MEDICINE

## 2020-06-25 PROCEDURE — 1170F FXNL STATUS ASSESSED: CPT | Performed by: FAMILY MEDICINE

## 2020-06-25 PROCEDURE — 4010F ACE/ARB THERAPY RXD/TAKEN: CPT | Performed by: FAMILY MEDICINE

## 2020-06-25 PROCEDURE — 99214 OFFICE O/P EST MOD 30 MIN: CPT | Performed by: FAMILY MEDICINE

## 2020-06-25 PROCEDURE — 1160F RVW MEDS BY RX/DR IN RCRD: CPT | Performed by: FAMILY MEDICINE

## 2020-06-25 PROCEDURE — 2022F DILAT RTA XM EVC RTNOPTHY: CPT | Performed by: FAMILY MEDICINE

## 2020-06-25 PROCEDURE — 3066F NEPHROPATHY DOC TX: CPT | Performed by: FAMILY MEDICINE

## 2020-06-25 PROCEDURE — 1125F AMNT PAIN NOTED PAIN PRSNT: CPT | Performed by: FAMILY MEDICINE

## 2020-06-25 PROCEDURE — 3077F SYST BP >= 140 MM HG: CPT | Performed by: FAMILY MEDICINE

## 2020-06-25 PROCEDURE — 3044F HG A1C LEVEL LT 7.0%: CPT | Performed by: FAMILY MEDICINE

## 2020-06-25 PROCEDURE — 1036F TOBACCO NON-USER: CPT | Performed by: FAMILY MEDICINE

## 2020-06-25 PROCEDURE — G0439 PPPS, SUBSEQ VISIT: HCPCS | Performed by: FAMILY MEDICINE

## 2020-06-25 PROCEDURE — 3008F BODY MASS INDEX DOCD: CPT | Performed by: FAMILY MEDICINE

## 2020-06-25 RX ORDER — LORAZEPAM 0.5 MG/1
0.5 TABLET ORAL
COMMUNITY
End: 2020-06-29 | Stop reason: SDUPTHER

## 2020-06-25 RX ORDER — AMLODIPINE BESYLATE AND ATORVASTATIN CALCIUM 10; 20 MG/1; MG/1
1 TABLET, FILM COATED ORAL
Qty: 90 TABLET | Refills: 3 | Status: SHIPPED | OUTPATIENT
Start: 2020-06-25 | End: 2020-09-01 | Stop reason: SDUPTHER

## 2020-06-25 RX ORDER — FLUTICASONE PROPIONATE 50 MCG
2 SPRAY, SUSPENSION (ML) NASAL DAILY
Qty: 16 G | Refills: 5 | Status: CANCELLED | OUTPATIENT
Start: 2020-06-25

## 2020-06-25 RX ORDER — LORAZEPAM 0.5 MG/1
0.5 TABLET ORAL
Qty: 30 TABLET | Refills: 0 | Status: CANCELLED | OUTPATIENT
Start: 2020-06-25 | End: 2020-07-25

## 2020-06-25 RX ORDER — LOSARTAN POTASSIUM 100 MG/1
100 TABLET ORAL DAILY
Qty: 90 TABLET | Refills: 3 | Status: SHIPPED | OUTPATIENT
Start: 2020-06-25 | End: 2021-07-06

## 2020-06-29 DIAGNOSIS — F41.9 ANXIETY: Primary | ICD-10-CM

## 2020-06-29 DIAGNOSIS — J30.1 SEASONAL ALLERGIC RHINITIS DUE TO POLLEN: ICD-10-CM

## 2020-06-29 RX ORDER — FLUTICASONE PROPIONATE 50 MCG
2 SPRAY, SUSPENSION (ML) NASAL DAILY
Qty: 16 G | Refills: 5 | Status: SHIPPED | OUTPATIENT
Start: 2020-06-29 | End: 2021-07-29 | Stop reason: SDUPTHER

## 2020-06-29 RX ORDER — LORAZEPAM 0.5 MG/1
TABLET ORAL
Qty: 30 TABLET | Refills: 3 | Status: SHIPPED | OUTPATIENT
Start: 2020-06-29 | End: 2021-04-28 | Stop reason: SDUPTHER

## 2020-09-01 DIAGNOSIS — I10 ESSENTIAL HYPERTENSION: ICD-10-CM

## 2020-09-01 RX ORDER — AMLODIPINE BESYLATE AND ATORVASTATIN CALCIUM 10; 20 MG/1; MG/1
1 TABLET, FILM COATED ORAL
Qty: 90 TABLET | Refills: 3 | Status: SHIPPED | OUTPATIENT
Start: 2020-09-01 | End: 2021-06-17

## 2020-09-04 ENCOUNTER — OFFICE VISIT (OUTPATIENT)
Dept: OBGYN CLINIC | Facility: MEDICAL CENTER | Age: 85
End: 2020-09-04
Payer: COMMERCIAL

## 2020-09-04 VITALS
BODY MASS INDEX: 23.99 KG/M2 | WEIGHT: 144 LBS | DIASTOLIC BLOOD PRESSURE: 65 MMHG | SYSTOLIC BLOOD PRESSURE: 148 MMHG | HEIGHT: 65 IN | HEART RATE: 72 BPM

## 2020-09-04 DIAGNOSIS — R26.9 GAIT ABNORMALITY: ICD-10-CM

## 2020-09-04 DIAGNOSIS — M70.61 TROCHANTERIC BURSITIS OF RIGHT HIP: Primary | ICD-10-CM

## 2020-09-04 PROCEDURE — 1036F TOBACCO NON-USER: CPT | Performed by: ORTHOPAEDIC SURGERY

## 2020-09-04 PROCEDURE — 99213 OFFICE O/P EST LOW 20 MIN: CPT | Performed by: ORTHOPAEDIC SURGERY

## 2020-09-04 PROCEDURE — 20610 DRAIN/INJ JOINT/BURSA W/O US: CPT | Performed by: ORTHOPAEDIC SURGERY

## 2020-09-04 PROCEDURE — 1160F RVW MEDS BY RX/DR IN RCRD: CPT | Performed by: ORTHOPAEDIC SURGERY

## 2020-09-04 RX ORDER — TROLAMINE SALICYLATE 10 G/100G
CREAM TOPICAL AS NEEDED
COMMUNITY

## 2020-09-04 RX ORDER — LIDOCAINE HYDROCHLORIDE 10 MG/ML
2 INJECTION, SOLUTION INFILTRATION; PERINEURAL
Status: COMPLETED | OUTPATIENT
Start: 2020-09-04 | End: 2020-09-04

## 2020-09-04 RX ORDER — LEVOTHYROXINE SODIUM 137 UG/1
137 TABLET ORAL DAILY
COMMUNITY
End: 2020-11-13 | Stop reason: ALTCHOICE

## 2020-09-04 RX ORDER — BETAMETHASONE SODIUM PHOSPHATE AND BETAMETHASONE ACETATE 3; 3 MG/ML; MG/ML
12 INJECTION, SUSPENSION INTRA-ARTICULAR; INTRALESIONAL; INTRAMUSCULAR; SOFT TISSUE
Status: COMPLETED | OUTPATIENT
Start: 2020-09-04 | End: 2020-09-04

## 2020-09-04 RX ORDER — BUPIVACAINE HYDROCHLORIDE 2.5 MG/ML
2 INJECTION, SOLUTION INFILTRATION; PERINEURAL
Status: COMPLETED | OUTPATIENT
Start: 2020-09-04 | End: 2020-09-04

## 2020-09-04 RX ORDER — MULTIVITAMIN WITH IRON
TABLET ORAL DAILY
COMMUNITY
End: 2021-03-29 | Stop reason: ALTCHOICE

## 2020-09-04 RX ADMIN — LIDOCAINE HYDROCHLORIDE 2 ML: 10 INJECTION, SOLUTION INFILTRATION; PERINEURAL at 14:56

## 2020-09-04 RX ADMIN — BUPIVACAINE HYDROCHLORIDE 2 ML: 2.5 INJECTION, SOLUTION INFILTRATION; PERINEURAL at 14:56

## 2020-09-04 RX ADMIN — BETAMETHASONE SODIUM PHOSPHATE AND BETAMETHASONE ACETATE 12 MG: 3; 3 INJECTION, SUSPENSION INTRA-ARTICULAR; INTRALESIONAL; INTRAMUSCULAR; SOFT TISSUE at 14:56

## 2020-09-04 NOTE — PROGRESS NOTES
Assessment:   Diagnosis ICD-10-CM Associated Orders   1  Trochanteric bursitis of right hip  M70 61 Large joint arthrocentesis: R greater trochanteric bursa     Ambulatory referral to Physical Therapy   2  Gait abnormality  R26 9 Ambulatory referral to Physical Therapy       Plan:  Recurrent right hip trochanteric bursitis  Patient was offered, accepted, performed injection of cortisone for symptomatic relief  Patient tolerated procedure well  Ice and post injection protocol advised  Weightbearing activities as tolerated  A prescription was placed electronically for a short course of physical therapy for gait training as well as balance at her request     To do next visit:  Return for re-check on an as needed basis (PRN)  The above stated was discussed in layman's terms and the patient expressed understanding  All questions were answered to the patient's satisfaction  Scribe Attestation    I,:   Makenna Montalvo am acting as a scribe while in the presence of the attending physician :        I,:   Karen Garrido MD personally performed the services described in this documentation    as scribed in my presence :              Subjective: Tari Rice is a 80 y o  female who presents repeat evaluation due to return of pain laterally at her right hip  She has a history of bilateral total hip arthroplasty with recurrent right trochanteric bursitis  Her last injection cortisone to her right trochanteric bursa was September 2019  She resides at [facility] and has been in lock down for the past 5 months          Review of systems negative unless otherwise specified in HPI    Past Medical History:   Diagnosis Date    AAA (abdominal aortic aneurysm) ([facility])     s/p EVAR 3/10/16    Allergic urticaria     LAST ASSESSED: [id number]    Appendicitis     Arthritis     CKD (chronic kidney disease), stage III ([facility])     Coronary artery disease     Diabetes mellitus ([facility])     Eczema     Gross hematuria LAST ASSESSED: [id number]    Hematuria     Hypertension     Hyponatremia     Hypothyroid     Osteoporosis     LAST ASSESSED: [id number]    PONV (postoperative nausea and vomiting)     Primary osteoarthritis of left knee     LAST ASSESSED: [id number]    Renal disorder     Renal insufficiency     Rotator cuff tear arthropathy     LEFT LAST ASSESSED: [id number]    Secondary osteoarthritis of right shoulder     LAST ASSESSED: [id number]    Syncope        Past Surgical History:   Procedure Laterality Date    APPENDECTOMY      BACK SURGERY      CATARACT EXTRACTION W/  INTRAOCULAR LENS IMPLANT Bilateral     KNEE ARTHROSCOPY Left     ONSET: [id number] THERAPEUTIC    LUMBAR LAMINECTOMY      AL AAA REPAIR,MODULR BIFURCATED PROSTH N/A 3/10/2016    Procedure: REPAIR ANEURYSM ENDOVASCULAR ABDOMINAL AORTIC  (EVAR) ;   Surgeon: Estevan Sim MD;  [facility];  Service: Vascular    AL RECONSTR TOTAL SHOULDER IMPLANT Left 5/31/2016    Procedure: ARTHROPLASTY SHOULDER REVERSE;  Surgeon: Vera Tenorio MD;  [facility];  Service: Orthopedics    AL TOTAL KNEE ARTHROPLASTY Left 7/23/2018    Procedure: KNEE : COMPLETE REPLACEMENT;  Surgeon: Nestor Alfredo MD;  [facility];  Service: Orthopedics    REVERSE TOTAL SHOULDER ARTHROPLASTY Right     ROTATOR CUFF REPAIR Left     RC SURGERY RESOLVED: 1999    SHOULDER ARTHROPLASTY      5/15 - FOR RIGHT SHOULDER; 5/16 - FOR LEFT SHOULDER    SHOULDER ARTHROSCOPY      TOTAL HIP ARTHROPLASTY Left     ONSET: [id number]    TOTAL HIP ARTHROPLASTY  05/31/2006    REVISION       Family History   Problem Relation Age of Onset    Coronary artery disease Mother     Diabetes Mother     Coronary artery disease Father     Cancer Sister     Arthritis Sister     Unexplained death Family         RAPID    Cancer Daughter        Social History     Occupational History    Occupation: RETIRED   Tobacco Use    Smoking status: Never Smoker    Smokeless tobacco: Never Used   Substance and Sexual Activity    Alcohol use: No    Drug use: No    Sexual activity: Not on file         Current Outpatient Medications:     acetaminophen (TYLENOL) 325 mg tablet, Take 2 tablets (650 mg total) by mouth every 6 (six) hours as needed for mild pain , Disp: 30 tablet, Rfl: 0    amLODIPine-atorvastatin (CADUET) 10-20 MG per tablet, Take 1 tablet by mouth daily at bedtime, Disp: 90 tablet, Rfl: 3    calcium citrate-vitamin D (CITRACAL+D) 315-200 MG-UNIT per tablet, Take 1 tablet by mouth daily  Calcium + D TABS  Refills: 0   , M D ; Active , Disp: , Rfl:     Cyanocobalamin (B-12 PO), Take by mouth daily, Disp: , Rfl:     Docusate Sodium (COLACE PO), Take by mouth as needed, Disp: , Rfl:     fexofenadine (ALLEGRA) 180 MG tablet, Take 1 tablet by mouth as needed  , Disp: , Rfl:     fluticasone (FLONASE) 50 mcg/act nasal spray, 2 sprays into each nostril daily Fluticasone Propionate 50 MCG/ACT Nasal Suspension use 2 spr  in each nostril daily  Quantity: 1;  Refills: 5    John Golds M D ;  Started 23-Sep-2014 Active, Disp: 16 g, Rfl: 5    Lancets (ONETOUCH DELICA PLUS [id number]) MISC, TEST BLOOD SUGAR ONE TIME DAILY OR AS NEEDED, Disp: 100 each, Rfl: 2    levothyroxine 137 mcg tablet, Take 137 mcg by mouth daily, Disp: , Rfl:     LORazepam (ATIVAN) 0 5 mg tablet, Take 1 tab  daily PRN for anxiety, Disp: 30 tablet, Rfl: 3    losartan (COZAAR) 100 MG tablet, Take 1 tablet (100 mg total) by mouth daily Losartan Potassium 100 MG Oral Tablet take 1 tablet once daily  Quantity: 90;  Refills: 3    John Golds M D ; Active, Disp: 90 tablet, Rfl: 3    Magnesium 250 MG TABS, Take by mouth daily, Disp: , Rfl:     Multiple Vitamins-Minerals (MULTIVITAMIN & MINERAL PO), 1 tablet daily   Multivitamin & Mineral Oral Liquid  Quantity: 0;  Refills: 0   , M D ; Active , Disp: , Rfl:     ONE TOUCH ULTRA TEST test strip, CHECK BLOOD SUGAR DAILY, Disp: 100 each, Rfl: 5    pyrithione zinc (HEAD AND SHOULDERS) 1 % shampoo, Apply topically daily as needed for dandruff, Disp: , Rfl:     trolamine salicylate (ASPERCREME) 10 % cream, Apply topically as needed for muscle/joint pain, Disp: , Rfl:     Allergies   Allergen Reactions    Clobetasol     Fluocinolone     Ketoconazole     Pyrithione     Bextra [Valdecoxib] Rash     GI INTOL    Diclofenac Headache    Percocet [Oxycodone-Acetaminophen] Rash and GI Intolerance            Vitals:    09/04/20 1422   BP: 148/65   Pulse: 72       Objective:                    Right Hip Exam     Tenderness   The patient is experiencing tenderness in the greater trochanter  Range of Motion   The patient has normal right hip ROM  Other   Erythema: absent  Sensation: normal    Comments:    Healed incision  Mild diffuse muscle weakness            Diagnostics, reviewed and taken today if performed as documented:    None performed        Procedures, if performed today:    Large joint arthrocentesis: R greater trochanteric bursa  Date/Time: 9/4/2020 2:56 PM  Consent given by: patient  Site marked: site marked  Supporting Documentation  Indications: pain   Procedure Details  Location: hip - R greater trochanteric bursa  Preparation: Patient was prepped and draped in the usual sterile fashion  Needle size: 22 G  Ultrasound guidance: no  Approach: lateral  Medications administered: 2 mL lidocaine 1 %; 2 mL bupivacaine 0 25 %; 12 mg betamethasone acetate-betamethasone sodium phosphate 6 (3-3) mg/mL    Patient tolerance: patient tolerated the procedure well with no immediate complications  Dressing:  Sterile dressing applied             Portions of the record may have been created with voice recognition software  Occasional wrong word or "sound a like" substitutions may have occurred due to the inherent limitations of voice recognition software  Read the chart carefully and recognize, using context, where substitutions have occurred

## 2020-09-15 ENCOUNTER — HOSPITAL ENCOUNTER (OUTPATIENT)
Dept: NON INVASIVE DIAGNOSTICS | Facility: CLINIC | Age: 85
Discharge: HOME | End: 2020-09-15
Payer: COMMERCIAL

## 2020-09-15 DIAGNOSIS — R06.02 SOB (SHORTNESS OF BREATH): ICD-10-CM

## 2020-09-15 DIAGNOSIS — I10 ESSENTIAL HYPERTENSION: ICD-10-CM

## 2020-09-15 PROCEDURE — 93306 TTE W/DOPPLER COMPLETE: CPT | Performed by: INTERNAL MEDICINE

## 2020-09-15 PROCEDURE — 93306 TTE W/DOPPLER COMPLETE: CPT

## 2020-09-17 ENCOUNTER — EVALUATION (OUTPATIENT)
Dept: PHYSICAL THERAPY | Age: 85
End: 2020-09-17
Payer: COMMERCIAL

## 2020-09-17 DIAGNOSIS — M70.61 TROCHANTERIC BURSITIS OF RIGHT HIP: Primary | ICD-10-CM

## 2020-09-17 PROCEDURE — 97163 PT EVAL HIGH COMPLEX 45 MIN: CPT | Performed by: PHYSICAL THERAPIST

## 2020-09-17 NOTE — PROGRESS NOTES
PT Evaluation     Today's date: 2020  Patient name: Whitney Robledo  :   MRN: [id number]  Referring provider: Drake Fernandes MD  Dx:   Encounter Diagnosis     ICD-10-CM    1  Trochanteric bursitis of right hip  M70 61                   Assessment  Assessment details: Patient seen for PT evaluation for trochanteric bursitis, gait and balance dysfunction  PT reviewed patient's home program, previous walking routine, patient's concerns for progressing weakness in LEs, inability to tolerate/perform previous HEP  PT assessed balance, strength  Patient presents with decreased balance, gait dysfunction, decreased strength in hips, knees and R foot weakness  Patient is a good candidate for PT  Thank you for your referrals  Impairments: abnormal gait, abnormal or restricted ROM, activity intolerance, impaired balance, impaired physical strength, lacks appropriate home exercise program, pain with function, poor posture  and poor body mechanics  Functional limitations: Patient reports moderate limitations wtih standing, walking, with IADLs, with recreational activities, with making her bed at home, difficulty walking on plush carpet in her apartment and in the hallways  Understanding of Dx/Px/POC: good   Prognosis: good    Goals  Impairment Goals to be met within 4 weeks  - Decrease pain to 0/10  - Increase strength to 5/5 throughout B LEs  - Increase UE strength to 4/5 bilaterally  - Improve gait posture  - Patient to be able to sustain balance SLS x 5 sec ea     Functional Goals to be met within 4-6 weeks  - Return to Prior Level of Function  - Increase Functional Status Measure to: expected  - Patient will be independent with HEP  - Patient to be able to resume daily walking program at home            Plan  Patient would benefit from: skilled physical therapy  Planned modality interventions: cryotherapy and thermotherapy: hydrocollator packs  Planned therapy interventions: abdominal trunk stabilization, manual therapy, neuromuscular re-education, patient education, postural training, strengthening, stretching, therapeutic activities, therapeutic exercise, home exercise program, gait training, balance and body mechanics training  Frequency: 2x week  Duration in weeks: 6  Treatment plan discussed with: patient and family        Subjective Evaluation    History of Present Illness  Mechanism of injury: Patient reports onset of R leg weakness starting in the winter of  year  Patient reports that she was walking in the hallways of her [facility] home/facility  Patient reports that she feels walking on the carpet has limited her walking in the hallways, started walking less and less over time  Patient reports that it was nicer outside and she started walking outside again and noticed increase in shortness of breath and that she couldn't walk as far (only 1/2 the distance)  Patient followed up with her PCP and was recommended for an ECHO; no issues with her heart  Patient still trying to walk outside, notices that she is not walking correctly, feeling that her knee is starting to "cave" in again  Patient followed up with ortho again- regarding her gait pattern and wondering if therapy will help her again  PLOF- ambulation with 4 wheel walker at all times, no longer uses the cane nor the 2 wheel walker  Patient very fearful with use of SPC    Pain  Current pain ratin  At best pain ratin  At worst pain rating: 3  Location: R hip  Quality: dull ache  Relieving factors: relaxation and rest  Aggravating factors: sitting, standing, walking, stair climbing and lifting    Social Support  Steps to enter house: no  Stairs in house: no   Lives in: community-based residential facility  Lives with: alone    Employment status: not working    Diagnostic Tests  X-ray: normal  Treatments  Previous treatment: physical therapy  Patient Goals  Patient goals for therapy: decreased pain, improved balance, increased motion, increased strength and return to sport/leisure activities          Objective     Neurological Testing     Sensation     Ankle/Foot   Left Ankle/Foot   Diminished: light touch    Right Ankle/Foot   Diminished: light touch    Additional Neurological Details  Decreased sensation in B feet with R foot drop/weakness  Active Range of Motion   Left Shoulder   Flexion: WFL  Abduction: WFL  External rotation BTH: WFL    Right Shoulder   Flexion: WFL  Abduction: WFL  External rotation BTH: WFL  Left Hip   Flexion: 80 degrees     Right Hip   Flexion: 90 degrees   Left Knee   Normal active range of motion    Right Knee   Normal active range of motion    Strength/Myotome Testing     Left Shoulder     Planes of Motion   Flexion: 4-   Abduction: 4-   External rotation at 0°: 3+   Internal rotation at 0°: 3+     Right Shoulder     Planes of Motion   Flexion: 4-   Abduction: 4-   External rotation at 0°: 3+   Internal rotation at 0°: 3     Left Elbow   Flexion: 3+  Extension: 3+    Right Elbow   Flexion: 3+  Extension: 3+    Left Hip   Planes of Motion   Flexion: 3+  Extension: 3+  Abduction: 3+  Adduction: 3+    Right Hip   Planes of Motion   Flexion: 3+  Extension: 3+  Abduction: 4-  Adduction: 3+    Left Knee   Flexion: 4-  Extension: 4-    Right Knee   Flexion: 4-  Extension: 4-    Left Ankle/Foot   Dorsiflexion: 4  Plantar flexion: 4    Right Ankle/Foot   Dorsiflexion: 3+  Plantar flexion: 4    Ambulation     Observational Gait   Gait: asymmetric and circumduction   Decreased walking speed and stride length  Right foot contact pattern: foot flat    Quality of Movement During Gait   Trunk  Forward lean  Functional Assessment      Squat    Unable to perform        Single Leg Stance   Left: 0 seconds  Right: 0 seconds      Flowsheet Rows      Most Recent Value   PT/OT G-Codes   Current Score  29   Projected Score  46             Precautions: FALL RISK, h/o syncope      Manuals Neuro Re-Ed        Side stepping        SLS firm        Tandem walking // bars                                        Ther Ex        Nu step                bridges        SAQ        LAQ Add wt? SLR x 3, standing        Ham curls                Step ups?   FW, lateral                Leg press                                Ther Activity                        Gait Training                        Modalities

## 2020-09-22 ENCOUNTER — OFFICE VISIT (OUTPATIENT)
Dept: PHYSICAL THERAPY | Age: 85
End: 2020-09-22
Payer: COMMERCIAL

## 2020-09-22 DIAGNOSIS — M70.61 TROCHANTERIC BURSITIS OF RIGHT HIP: Primary | ICD-10-CM

## 2020-09-22 PROCEDURE — 97110 THERAPEUTIC EXERCISES: CPT | Performed by: PHYSICAL THERAPIST

## 2020-09-22 PROCEDURE — 97112 NEUROMUSCULAR REEDUCATION: CPT | Performed by: PHYSICAL THERAPIST

## 2020-09-22 NOTE — PROGRESS NOTES
Daily Note     Today's date: 2020  Patient name: Danielle Zaldivar  :   MRN: [id number]  Referring provider: David Celments MD  Dx:   Encounter Diagnosis     ICD-10-CM    1  Trochanteric bursitis of right hip  M70 61                   Subjective: Patient feels good today  Objective: See treatment diary below      Assessment: Progressed program today to tolerance  Added seated p ball stretch to address R sided hip and back pain  Patient c/o hamstring tightness with standing ham curls, added seated ham stretch to program  Good tolerance to program  Challenged with weights with program today, only able to tolerate 10 reps today  Patient notices her weakness  Plan: Continue per plan of care  Precautions: FALL RISK, h/o syncope      Manuals                                        Neuro Re-Ed        Side stepping 3 laps, ballet bar       SLS firm 3x:03 ea       Tandem walking // bars 3 laps                                       Ther Ex        Nu step x10'       Pulleys- for gentle flex stretch NV       SLR x 3, standing 2# 10x ea       Ham curls 2# 10x ea       Mini squats 10x at ballet bar       Sit<>stands Chair with blue cushion 10x                Seated p ball FW, diagonals 10x:10 ea       Seated ham stretch 5x:10                       Step ups?   FW, lateral FW today 4" 10x               TB rows and extension                        Ther Activity                        Gait Training                        Modalities

## 2020-09-24 ENCOUNTER — OFFICE VISIT (OUTPATIENT)
Dept: PHYSICAL THERAPY | Age: 85
End: 2020-09-24
Payer: COMMERCIAL

## 2020-09-24 ENCOUNTER — TELEPHONE (OUTPATIENT)
Dept: FAMILY MEDICINE CLINIC | Facility: CLINIC | Age: 85
End: 2020-09-24

## 2020-09-24 DIAGNOSIS — R26.2 AMBULATORY DYSFUNCTION: Primary | ICD-10-CM

## 2020-09-24 DIAGNOSIS — M70.61 TROCHANTERIC BURSITIS OF RIGHT HIP: Primary | ICD-10-CM

## 2020-09-24 PROCEDURE — 97112 NEUROMUSCULAR REEDUCATION: CPT | Performed by: PHYSICAL THERAPIST

## 2020-09-24 PROCEDURE — 97110 THERAPEUTIC EXERCISES: CPT | Performed by: PHYSICAL THERAPIST

## 2020-09-24 NOTE — TELEPHONE ENCOUNTER
Daughter called to request an order for a new Rolator for Trecia Goldmann due that hers is in bad condition and new insurance will cover for it  Yoel Rodas would like a call back to  order   Phone # [phone]

## 2020-09-24 NOTE — PROGRESS NOTES
Daily Note     Today's date: 2020  Patient name: Bradley Solitario  :   MRN: [id number]  Referring provider: Sharona Islas MD  Dx:   Encounter Diagnosis     ICD-10-CM    1  Trochanteric bursitis of right hip  M70 61                   Subjective: Patient reports that she felt good after last session, not too sore  Objective: See treatment diary below      Assessment: Patient surprised that she was able to tolerate the exercise program well without increase in pain and feels better after PT today, stronger  Still incorporating stretching today as patient is having discomfort at L sided with hip strengthening TE  Plan: Continue per plan of care  Precautions: FALL RISK, h/o syncope      Manuals                                       Neuro Re-Ed        Side stepping 3 laps, ballet bar 3 laps ballet bar      SLS firm 3x:03 ea 3x:03 ea      Tandem walking // bars 3 laps 3 laps // bars                                      Ther Ex        Nu step x10' x10'      Pulleys- for gentle flex stretch NV Doesn't feel that she needs today      SLR x 3, standing 2# 10x ea 2# 2x10      Ham curls 2# 10x ea 2# 2x10      Mini squats 10x at ballet bar 2x10 ballet bar      Sit<>stands Chair with blue cushion 10x  10x blue cushion              Seated p ball FW, diagonals 10x:10 ea 10x:10      Seated ham stretch 5x:10 5x:10                      Step ups?   FW, lateral FW today 4" 10x 4" 10x ea FW only today              TB rows and extension                        Ther Activity                        Gait Training                        Modalities

## 2020-09-24 NOTE — TELEPHONE ENCOUNTER
Order placed in chart for Rollator walker  Please call patient's daughter to  prescription tomorrow

## 2020-09-29 ENCOUNTER — OFFICE VISIT (OUTPATIENT)
Dept: PHYSICAL THERAPY | Age: 85
End: 2020-09-29
Payer: COMMERCIAL

## 2020-09-29 DIAGNOSIS — M70.61 TROCHANTERIC BURSITIS OF RIGHT HIP: Primary | ICD-10-CM

## 2020-09-29 PROCEDURE — 97110 THERAPEUTIC EXERCISES: CPT | Performed by: PHYSICAL THERAPIST

## 2020-09-29 PROCEDURE — 97112 NEUROMUSCULAR REEDUCATION: CPT | Performed by: PHYSICAL THERAPIST

## 2020-09-29 NOTE — PROGRESS NOTES
Daily Note     Today's date: 2020  Patient name: Sheela Munoz  :   MRN: [id number]  Referring provider: Fely Boudreaux MD  Dx:   Encounter Diagnosis     ICD-10-CM    1  Trochanteric bursitis of right hip  M70 61                   Subjective: Patient reports that her daughter got her a new 4 wheel walker with brakes and it transitions to a transport chair  Patient reports the chair/walker combo is very heavy, tried to close it herself and found to have a significant difficulty and almost fell over as the system is very heavy  Patient was able to move device aside in her apartment and her daughter will be helping her to move it later  Patient currently using a standard 2 wheel walker  Objective: See treatment diary below  Patient 1:1 with HG/KRAMER    Assessment: Progressed weights today  Patient feeling the SLR x 3 is getting easier and feels a good challenge with the 2 5# weights today  Patient's tolerance to reps of 20 is improving since IE  Still challenged with balance as patient needs to use UE support with SLS and tandem walking  Good technique with sit<>stands today, still needing higher chair to be able to perform without UE assist       Plan: Continue per plan of care        Precautions: FALL RISK, h/o syncope      Manuals                                      Neuro Re-Ed        Side stepping 3 laps, ballet bar 3 laps ballet bar 3 laps ballet bar     SLS firm 3x:03 ea 3x:03 ea 5x:05     Tandem walking // bars 3 laps 3 laps // bars 3 laps // bars                                     Ther Ex        Nu step x10' x10' x10'     Pulleys- for gentle flex stretch NV Doesn't feel that she needs today      SLR x 3, standing 2# 10x ea 2# 2x10 2 5# 2x10     Ham curls 2# 10x ea 2# 2x10 2 5# 2x10     Mini squats 10x at ballet bar 2x10 ballet bar 2x10 ballet bar     Sit<>stands Chair with blue cushion 10x  10x blue cushion 10x blue cushion             Seated p ball FW, diagonals 10x:10 ea 10x:10 10x:10     Seated ham stretch 5x:10 5x:10 5x:10                     Step ups?   FW, lateral FW today 4" 10x 4" 10x ea FW only today 4" 10x ea FW only             TB rows and extension                        Ther Activity                        Gait Training                        Modalities

## 2020-10-01 ENCOUNTER — OFFICE VISIT (OUTPATIENT)
Dept: PHYSICAL THERAPY | Age: 85
End: 2020-10-01
Payer: COMMERCIAL

## 2020-10-01 DIAGNOSIS — M70.61 TROCHANTERIC BURSITIS OF RIGHT HIP: Primary | ICD-10-CM

## 2020-10-01 PROCEDURE — 97110 THERAPEUTIC EXERCISES: CPT | Performed by: PHYSICAL THERAPIST

## 2020-10-06 ENCOUNTER — OFFICE VISIT (OUTPATIENT)
Dept: PHYSICAL THERAPY | Age: 85
End: 2020-10-06
Payer: COMMERCIAL

## 2020-10-06 DIAGNOSIS — M70.61 TROCHANTERIC BURSITIS OF RIGHT HIP: Primary | ICD-10-CM

## 2020-10-06 PROCEDURE — 97112 NEUROMUSCULAR REEDUCATION: CPT | Performed by: PHYSICAL THERAPIST

## 2020-10-06 PROCEDURE — 97110 THERAPEUTIC EXERCISES: CPT | Performed by: PHYSICAL THERAPIST

## 2020-10-08 ENCOUNTER — OFFICE VISIT (OUTPATIENT)
Dept: PHYSICAL THERAPY | Age: 85
End: 2020-10-08
Payer: COMMERCIAL

## 2020-10-08 DIAGNOSIS — M70.61 TROCHANTERIC BURSITIS OF RIGHT HIP: Primary | ICD-10-CM

## 2020-10-08 PROCEDURE — 97110 THERAPEUTIC EXERCISES: CPT

## 2020-10-13 ENCOUNTER — LAB (OUTPATIENT)
Dept: LAB | Age: 85
End: 2020-10-13
Payer: COMMERCIAL

## 2020-10-13 ENCOUNTER — TRANSCRIBE ORDERS (OUTPATIENT)
Dept: ADMINISTRATIVE | Age: 85
End: 2020-10-13

## 2020-10-13 ENCOUNTER — EVALUATION (OUTPATIENT)
Dept: PHYSICAL THERAPY | Age: 85
End: 2020-10-13
Payer: COMMERCIAL

## 2020-10-13 DIAGNOSIS — I10 ESSENTIAL HYPERTENSION: ICD-10-CM

## 2020-10-13 DIAGNOSIS — M70.61 TROCHANTERIC BURSITIS OF RIGHT HIP: Primary | ICD-10-CM

## 2020-10-13 DIAGNOSIS — E03.9 ACQUIRED HYPOTHYROIDISM: ICD-10-CM

## 2020-10-13 DIAGNOSIS — I12.9 TYPE 2 DM WITH CKD STAGE 3 AND HYPERTENSION (HCC): ICD-10-CM

## 2020-10-13 DIAGNOSIS — E78.2 MIXED HYPERLIPIDEMIA: ICD-10-CM

## 2020-10-13 DIAGNOSIS — E11.22 TYPE 2 DM WITH CKD STAGE 3 AND HYPERTENSION (HCC): ICD-10-CM

## 2020-10-13 DIAGNOSIS — N18.30 TYPE 2 DM WITH CKD STAGE 3 AND HYPERTENSION (HCC): ICD-10-CM

## 2020-10-13 LAB
ALBUMIN SERPL BCP-MCNC: 4.1 G/DL (ref 3.5–5)
ALP SERPL-CCNC: 70 U/L (ref 46–116)
ALT SERPL W P-5'-P-CCNC: 33 U/L (ref 12–78)
ANION GAP SERPL CALCULATED.3IONS-SCNC: 9 MMOL/L (ref 4–13)
AST SERPL W P-5'-P-CCNC: 24 U/L (ref 5–45)
BASOPHILS # BLD AUTO: 0.08 THOUSANDS/ΜL (ref 0–0.1)
BASOPHILS NFR BLD AUTO: 1 % (ref 0–1)
BILIRUB SERPL-MCNC: 0.7 MG/DL (ref 0.2–1)
BUN SERPL-MCNC: 27 MG/DL (ref 5–25)
CALCIUM SERPL-MCNC: 9.6 MG/DL (ref 8.3–10.1)
CHLORIDE SERPL-SCNC: 107 MMOL/L (ref 100–108)
CO2 SERPL-SCNC: 20 MMOL/L (ref 21–32)
CREAT SERPL-MCNC: 1.32 MG/DL (ref 0.6–1.3)
CREAT UR-MCNC: 94.8 MG/DL
EOSINOPHIL # BLD AUTO: 0.12 THOUSAND/ΜL (ref 0–0.61)
EOSINOPHIL NFR BLD AUTO: 1 % (ref 0–6)
ERYTHROCYTE [DISTWIDTH] IN BLOOD BY AUTOMATED COUNT: 14 % (ref 11.6–15.1)
GFR SERPL CREATININE-BSD FRML MDRD: 35 ML/MIN/1.73SQ M
GLUCOSE SERPL-MCNC: 109 MG/DL (ref 65–140)
HCT VFR BLD AUTO: 37.8 % (ref 34.8–46.1)
HGB BLD-MCNC: 12.3 G/DL (ref 11.5–15.4)
IMM GRANULOCYTES # BLD AUTO: 0.04 THOUSAND/UL (ref 0–0.2)
IMM GRANULOCYTES NFR BLD AUTO: 0 % (ref 0–2)
LYMPHOCYTES # BLD AUTO: 2.24 THOUSANDS/ΜL (ref 0.6–4.47)
LYMPHOCYTES NFR BLD AUTO: 22 % (ref 14–44)
MCH RBC QN AUTO: 31.9 PG (ref 26.8–34.3)
MCHC RBC AUTO-ENTMCNC: 32.5 G/DL (ref 31.4–37.4)
MCV RBC AUTO: 98 FL (ref 82–98)
MICROALBUMIN UR-MCNC: 61.5 MG/L (ref 0–20)
MICROALBUMIN/CREAT 24H UR: 65 MG/G CREATININE (ref 0–30)
MONOCYTES # BLD AUTO: 0.79 THOUSAND/ΜL (ref 0.17–1.22)
MONOCYTES NFR BLD AUTO: 8 % (ref 4–12)
NEUTROPHILS # BLD AUTO: 6.82 THOUSANDS/ΜL (ref 1.85–7.62)
NEUTS SEG NFR BLD AUTO: 68 % (ref 43–75)
NRBC BLD AUTO-RTO: 0 /100 WBCS
PLATELET # BLD AUTO: 221 THOUSANDS/UL (ref 149–390)
PMV BLD AUTO: 10.9 FL (ref 8.9–12.7)
POTASSIUM SERPL-SCNC: 4.9 MMOL/L (ref 3.5–5.3)
PROT SERPL-MCNC: 7.7 G/DL (ref 6.4–8.2)
RBC # BLD AUTO: 3.85 MILLION/UL (ref 3.81–5.12)
SODIUM SERPL-SCNC: 136 MMOL/L (ref 136–145)
TSH SERPL DL<=0.05 MIU/L-ACNC: 0.56 UIU/ML (ref 0.36–3.74)
WBC # BLD AUTO: 10.09 THOUSAND/UL (ref 4.31–10.16)

## 2020-10-13 PROCEDURE — 97110 THERAPEUTIC EXERCISES: CPT | Performed by: PHYSICAL THERAPIST

## 2020-10-13 PROCEDURE — 80053 COMPREHEN METABOLIC PANEL: CPT

## 2020-10-13 PROCEDURE — 85025 COMPLETE CBC W/AUTO DIFF WBC: CPT

## 2020-10-13 PROCEDURE — 36415 COLL VENOUS BLD VENIPUNCTURE: CPT

## 2020-10-13 PROCEDURE — 82043 UR ALBUMIN QUANTITATIVE: CPT | Performed by: FAMILY MEDICINE

## 2020-10-13 PROCEDURE — 97112 NEUROMUSCULAR REEDUCATION: CPT | Performed by: PHYSICAL THERAPIST

## 2020-10-13 PROCEDURE — 84443 ASSAY THYROID STIM HORMONE: CPT

## 2020-10-13 PROCEDURE — 82570 ASSAY OF URINE CREATININE: CPT | Performed by: FAMILY MEDICINE

## 2020-10-15 ENCOUNTER — OFFICE VISIT (OUTPATIENT)
Dept: PHYSICAL THERAPY | Age: 85
End: 2020-10-15
Payer: COMMERCIAL

## 2020-10-15 DIAGNOSIS — M70.61 TROCHANTERIC BURSITIS OF RIGHT HIP: Primary | ICD-10-CM

## 2020-10-15 PROCEDURE — 97110 THERAPEUTIC EXERCISES: CPT | Performed by: PHYSICAL THERAPIST

## 2020-10-20 ENCOUNTER — OFFICE VISIT (OUTPATIENT)
Dept: PHYSICAL THERAPY | Age: 85
End: 2020-10-20
Payer: COMMERCIAL

## 2020-10-20 DIAGNOSIS — M70.61 TROCHANTERIC BURSITIS OF RIGHT HIP: Primary | ICD-10-CM

## 2020-10-20 PROCEDURE — 97110 THERAPEUTIC EXERCISES: CPT | Performed by: PHYSICAL THERAPIST

## 2020-10-22 ENCOUNTER — OFFICE VISIT (OUTPATIENT)
Dept: PHYSICAL THERAPY | Age: 85
End: 2020-10-22
Payer: COMMERCIAL

## 2020-10-22 DIAGNOSIS — M70.61 TROCHANTERIC BURSITIS OF RIGHT HIP: Primary | ICD-10-CM

## 2020-10-22 PROCEDURE — 97110 THERAPEUTIC EXERCISES: CPT | Performed by: PHYSICAL THERAPIST

## 2020-10-27 ENCOUNTER — OFFICE VISIT (OUTPATIENT)
Dept: PHYSICAL THERAPY | Age: 85
End: 2020-10-27
Payer: COMMERCIAL

## 2020-10-27 DIAGNOSIS — M70.61 TROCHANTERIC BURSITIS OF RIGHT HIP: Primary | ICD-10-CM

## 2020-10-27 PROCEDURE — 97110 THERAPEUTIC EXERCISES: CPT | Performed by: PHYSICAL THERAPIST

## 2020-10-29 ENCOUNTER — OFFICE VISIT (OUTPATIENT)
Dept: PHYSICAL THERAPY | Age: 85
End: 2020-10-29
Payer: COMMERCIAL

## 2020-10-29 DIAGNOSIS — M70.61 TROCHANTERIC BURSITIS OF RIGHT HIP: Primary | ICD-10-CM

## 2020-10-29 PROCEDURE — 97110 THERAPEUTIC EXERCISES: CPT | Performed by: PHYSICAL THERAPIST

## 2020-11-03 ENCOUNTER — APPOINTMENT (OUTPATIENT)
Dept: PHYSICAL THERAPY | Age: 85
End: 2020-11-03
Payer: COMMERCIAL

## 2020-11-05 ENCOUNTER — APPOINTMENT (OUTPATIENT)
Dept: PHYSICAL THERAPY | Age: 85
End: 2020-11-05
Payer: COMMERCIAL

## 2020-11-10 ENCOUNTER — OFFICE VISIT (OUTPATIENT)
Dept: PHYSICAL THERAPY | Age: 85
End: 2020-11-10
Payer: COMMERCIAL

## 2020-11-10 ENCOUNTER — APPOINTMENT (OUTPATIENT)
Dept: PHYSICAL THERAPY | Age: 85
End: 2020-11-10
Payer: COMMERCIAL

## 2020-11-10 DIAGNOSIS — M70.61 TROCHANTERIC BURSITIS OF RIGHT HIP: Primary | ICD-10-CM

## 2020-11-10 PROCEDURE — 97110 THERAPEUTIC EXERCISES: CPT | Performed by: PHYSICAL THERAPIST

## 2020-11-11 RX ORDER — LEVOTHYROXINE SODIUM 125 UG/1
TABLET ORAL
COMMUNITY
Start: 2020-09-16 | End: 2021-02-17

## 2020-11-12 ENCOUNTER — APPOINTMENT (OUTPATIENT)
Dept: PHYSICAL THERAPY | Age: 85
End: 2020-11-12
Payer: COMMERCIAL

## 2020-11-12 ENCOUNTER — OFFICE VISIT (OUTPATIENT)
Dept: PHYSICAL THERAPY | Age: 85
End: 2020-11-12
Payer: COMMERCIAL

## 2020-11-12 DIAGNOSIS — M70.61 TROCHANTERIC BURSITIS OF RIGHT HIP: Primary | ICD-10-CM

## 2020-11-12 PROCEDURE — 97110 THERAPEUTIC EXERCISES: CPT

## 2020-11-13 ENCOUNTER — OFFICE VISIT (OUTPATIENT)
Dept: FAMILY MEDICINE CLINIC | Facility: CLINIC | Age: 85
End: 2020-11-13
Payer: COMMERCIAL

## 2020-11-13 VITALS
HEIGHT: 65 IN | HEART RATE: 80 BPM | DIASTOLIC BLOOD PRESSURE: 68 MMHG | BODY MASS INDEX: 23.99 KG/M2 | OXYGEN SATURATION: 98 % | RESPIRATION RATE: 16 BRPM | SYSTOLIC BLOOD PRESSURE: 122 MMHG | WEIGHT: 144 LBS | TEMPERATURE: 97.8 F

## 2020-11-13 DIAGNOSIS — I73.9 PERIPHERAL VASCULAR DISEASE (HCC): ICD-10-CM

## 2020-11-13 DIAGNOSIS — I12.9 TYPE 2 DM WITH CKD STAGE 3 AND HYPERTENSION (HCC): ICD-10-CM

## 2020-11-13 DIAGNOSIS — E11.22 TYPE 2 DM WITH CKD STAGE 3 AND HYPERTENSION (HCC): ICD-10-CM

## 2020-11-13 DIAGNOSIS — I10 ESSENTIAL HYPERTENSION: ICD-10-CM

## 2020-11-13 DIAGNOSIS — E03.9 ACQUIRED HYPOTHYROIDISM: Primary | ICD-10-CM

## 2020-11-13 DIAGNOSIS — E11.42 DIABETIC PERIPHERAL NEUROPATHY (HCC): ICD-10-CM

## 2020-11-13 DIAGNOSIS — R26.2 AMBULATORY DYSFUNCTION: ICD-10-CM

## 2020-11-13 DIAGNOSIS — N18.30 TYPE 2 DM WITH CKD STAGE 3 AND HYPERTENSION (HCC): ICD-10-CM

## 2020-11-13 DIAGNOSIS — E78.2 MIXED HYPERLIPIDEMIA: ICD-10-CM

## 2020-11-13 DIAGNOSIS — N18.32 STAGE 3B CHRONIC KIDNEY DISEASE (HCC): ICD-10-CM

## 2020-11-13 PROBLEM — E11.9 TYPE 2 DIABETES MELLITUS (HCC): Status: RESOLVED | Noted: 2020-11-13 | Resolved: 2020-11-13

## 2020-11-13 PROBLEM — E11.9 TYPE 2 DIABETES MELLITUS (HCC): Status: ACTIVE | Noted: 2020-11-13

## 2020-11-13 LAB — SL AMB POCT HEMOGLOBIN AIC: 6 (ref ?–6.5)

## 2020-11-13 PROCEDURE — 99214 OFFICE O/P EST MOD 30 MIN: CPT | Performed by: FAMILY MEDICINE

## 2020-11-13 PROCEDURE — 83036 HEMOGLOBIN GLYCOSYLATED A1C: CPT | Performed by: FAMILY MEDICINE

## 2020-11-13 PROCEDURE — 1036F TOBACCO NON-USER: CPT | Performed by: FAMILY MEDICINE

## 2020-11-13 PROCEDURE — 3725F SCREEN DEPRESSION PERFORMED: CPT | Performed by: FAMILY MEDICINE

## 2020-11-13 PROCEDURE — T1015 CLINIC SERVICE: HCPCS | Performed by: FAMILY MEDICINE

## 2020-11-17 ENCOUNTER — OFFICE VISIT (OUTPATIENT)
Dept: PHYSICAL THERAPY | Age: 85
End: 2020-11-17
Payer: COMMERCIAL

## 2020-11-17 DIAGNOSIS — M70.61 TROCHANTERIC BURSITIS OF RIGHT HIP: Primary | ICD-10-CM

## 2020-11-17 PROCEDURE — 97112 NEUROMUSCULAR REEDUCATION: CPT | Performed by: PHYSICAL THERAPIST

## 2020-11-17 PROCEDURE — 97110 THERAPEUTIC EXERCISES: CPT | Performed by: PHYSICAL THERAPIST

## 2020-11-19 ENCOUNTER — OFFICE VISIT (OUTPATIENT)
Dept: PHYSICAL THERAPY | Age: 85
End: 2020-11-19
Payer: COMMERCIAL

## 2020-11-19 DIAGNOSIS — M70.61 TROCHANTERIC BURSITIS OF RIGHT HIP: Primary | ICD-10-CM

## 2020-11-19 PROCEDURE — 97110 THERAPEUTIC EXERCISES: CPT | Performed by: PHYSICAL THERAPIST

## 2020-11-24 ENCOUNTER — EVALUATION (OUTPATIENT)
Dept: PHYSICAL THERAPY | Age: 85
End: 2020-11-24
Payer: COMMERCIAL

## 2020-11-24 DIAGNOSIS — M70.61 TROCHANTERIC BURSITIS OF RIGHT HIP: Primary | ICD-10-CM

## 2020-11-24 PROCEDURE — 97110 THERAPEUTIC EXERCISES: CPT | Performed by: PHYSICAL THERAPIST

## 2020-12-12 DIAGNOSIS — E11.65 TYPE 2 DIABETES MELLITUS WITH HYPERGLYCEMIA, WITHOUT LONG-TERM CURRENT USE OF INSULIN (HCC): ICD-10-CM

## 2020-12-12 RX ORDER — BLOOD SUGAR DIAGNOSTIC
STRIP MISCELLANEOUS
Qty: 100 EACH | Refills: 5 | Status: SHIPPED | OUTPATIENT
Start: 2020-12-12 | End: 2021-11-16 | Stop reason: SDUPTHER

## 2020-12-14 DIAGNOSIS — E11.65 TYPE 2 DIABETES MELLITUS WITH HYPERGLYCEMIA, WITHOUT LONG-TERM CURRENT USE OF INSULIN (HCC): ICD-10-CM

## 2020-12-14 RX ORDER — BLOOD SUGAR DIAGNOSTIC
STRIP MISCELLANEOUS
Refills: 5 | OUTPATIENT
Start: 2020-12-14

## 2020-12-22 ENCOUNTER — TELEPHONE (OUTPATIENT)
Dept: FAMILY MEDICINE CLINIC | Facility: CLINIC | Age: 85
End: 2020-12-22

## 2021-01-23 ENCOUNTER — IMMUNIZATIONS (OUTPATIENT)
Dept: FAMILY MEDICINE CLINIC | Facility: HOSPITAL | Age: 86
End: 2021-01-23

## 2021-01-23 DIAGNOSIS — Z23 ENCOUNTER FOR IMMUNIZATION: Primary | ICD-10-CM

## 2021-01-23 PROCEDURE — 0001A SARS-COV-2 / COVID-19 MRNA VACCINE (PFIZER-BIONTECH) 30 MCG: CPT

## 2021-01-23 PROCEDURE — 91300 SARS-COV-2 / COVID-19 MRNA VACCINE (PFIZER-BIONTECH) 30 MCG: CPT

## 2021-02-12 ENCOUNTER — IMMUNIZATIONS (OUTPATIENT)
Dept: FAMILY MEDICINE CLINIC | Facility: HOSPITAL | Age: 86
End: 2021-02-12

## 2021-02-12 DIAGNOSIS — Z23 ENCOUNTER FOR IMMUNIZATION: Primary | ICD-10-CM

## 2021-02-12 PROCEDURE — 91300 SARS-COV-2 / COVID-19 MRNA VACCINE (PFIZER-BIONTECH) 30 MCG: CPT

## 2021-02-12 PROCEDURE — 0002A SARS-COV-2 / COVID-19 MRNA VACCINE (PFIZER-BIONTECH) 30 MCG: CPT

## 2021-02-16 DIAGNOSIS — E03.9 HYPOTHYROIDISM, UNSPECIFIED TYPE: Primary | ICD-10-CM

## 2021-02-17 RX ORDER — LEVOTHYROXINE SODIUM 0.12 MG/1
TABLET ORAL
Qty: 90 TABLET | Refills: 3 | Status: SHIPPED | OUTPATIENT
Start: 2021-02-17 | End: 2021-02-19 | Stop reason: SDUPTHER

## 2021-02-19 DIAGNOSIS — E03.9 HYPOTHYROIDISM, UNSPECIFIED TYPE: ICD-10-CM

## 2021-02-19 RX ORDER — LEVOTHYROXINE SODIUM 0.12 MG/1
TABLET ORAL
Qty: 90 TABLET | Refills: 3 | Status: SHIPPED | OUTPATIENT
Start: 2021-02-19 | End: 2021-03-02 | Stop reason: ALTCHOICE

## 2021-03-02 ENCOUNTER — LAB (OUTPATIENT)
Dept: LAB | Age: 86
End: 2021-03-02
Payer: COMMERCIAL

## 2021-03-02 DIAGNOSIS — E03.9 ACQUIRED HYPOTHYROIDISM: Primary | ICD-10-CM

## 2021-03-02 DIAGNOSIS — E03.9 ACQUIRED HYPOTHYROIDISM: ICD-10-CM

## 2021-03-02 DIAGNOSIS — E11.22 TYPE 2 DM WITH CKD STAGE 3 AND HYPERTENSION (HCC): ICD-10-CM

## 2021-03-02 DIAGNOSIS — E78.2 MIXED HYPERLIPIDEMIA: ICD-10-CM

## 2021-03-02 DIAGNOSIS — I10 ESSENTIAL HYPERTENSION: ICD-10-CM

## 2021-03-02 DIAGNOSIS — I12.9 TYPE 2 DM WITH CKD STAGE 3 AND HYPERTENSION (HCC): ICD-10-CM

## 2021-03-02 DIAGNOSIS — N18.30 TYPE 2 DM WITH CKD STAGE 3 AND HYPERTENSION (HCC): ICD-10-CM

## 2021-03-02 DIAGNOSIS — N18.32 STAGE 3B CHRONIC KIDNEY DISEASE (HCC): ICD-10-CM

## 2021-03-02 LAB
ALBUMIN SERPL BCP-MCNC: 4.2 G/DL (ref 3.5–5)
ALP SERPL-CCNC: 74 U/L (ref 46–116)
ALT SERPL W P-5'-P-CCNC: 30 U/L (ref 12–78)
ANION GAP SERPL CALCULATED.3IONS-SCNC: 6 MMOL/L (ref 4–13)
AST SERPL W P-5'-P-CCNC: 21 U/L (ref 5–45)
BILIRUB SERPL-MCNC: 1.12 MG/DL (ref 0.2–1)
BUN SERPL-MCNC: 29 MG/DL (ref 5–25)
CALCIUM SERPL-MCNC: 9.8 MG/DL (ref 8.3–10.1)
CHLORIDE SERPL-SCNC: 107 MMOL/L (ref 100–108)
CHOLEST SERPL-MCNC: 156 MG/DL (ref 50–200)
CO2 SERPL-SCNC: 24 MMOL/L (ref 21–32)
CREAT SERPL-MCNC: 1.36 MG/DL (ref 0.6–1.3)
GFR SERPL CREATININE-BSD FRML MDRD: 33 ML/MIN/1.73SQ M
GLUCOSE P FAST SERPL-MCNC: 114 MG/DL (ref 65–99)
HDLC SERPL-MCNC: 101 MG/DL
LDLC SERPL CALC-MCNC: 44 MG/DL (ref 0–100)
NONHDLC SERPL-MCNC: 55 MG/DL
POTASSIUM SERPL-SCNC: 5 MMOL/L (ref 3.5–5.3)
PROT SERPL-MCNC: 7.5 G/DL (ref 6.4–8.2)
SODIUM SERPL-SCNC: 137 MMOL/L (ref 136–145)
T4 FREE SERPL-MCNC: 1.85 NG/DL (ref 0.76–1.46)
TRIGL SERPL-MCNC: 56 MG/DL
TSH SERPL DL<=0.05 MIU/L-ACNC: 0.28 UIU/ML (ref 0.36–3.74)

## 2021-03-02 PROCEDURE — 84439 ASSAY OF FREE THYROXINE: CPT

## 2021-03-02 PROCEDURE — 80053 COMPREHEN METABOLIC PANEL: CPT

## 2021-03-02 PROCEDURE — 36415 COLL VENOUS BLD VENIPUNCTURE: CPT

## 2021-03-02 PROCEDURE — 80061 LIPID PANEL: CPT

## 2021-03-02 PROCEDURE — 84443 ASSAY THYROID STIM HORMONE: CPT

## 2021-03-02 RX ORDER — LEVOTHYROXINE SODIUM 112 UG/1
TABLET ORAL
Qty: 30 TABLET | Refills: 5 | Status: SHIPPED | OUTPATIENT
Start: 2021-03-02 | End: 2021-03-04 | Stop reason: SDUPTHER

## 2021-03-03 DIAGNOSIS — E03.9 ACQUIRED HYPOTHYROIDISM: ICD-10-CM

## 2021-03-04 RX ORDER — LEVOTHYROXINE SODIUM 112 UG/1
TABLET ORAL
Qty: 30 TABLET | Refills: 5 | Status: SHIPPED | OUTPATIENT
Start: 2021-03-04 | End: 2021-04-26 | Stop reason: SDUPTHER

## 2021-03-26 NOTE — PROGRESS NOTES
Assessment & Plan:     E11 22  N18 30  I12 9  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 hypertension ([facility] )  I have evaluated the patient and found the condition to be: Stable  I have evaluated the patient and: Recommended continue with same treatment plan    E11 42  Diabetic peripheral neuropathy ([facility] )  I have evaluated the patient and found the condition to be: Stable  I have evaluated the patient and: Recommended continue with same treatment plan    E03 9  Hypothyroidism  I have evaluated the patient and found the condition to be: Stable  I have evaluated the patient and: Recommended continue with same treatment plan    I71 4  Aneurysm of abdominal aorta ([facility] )  I have evaluated the patient and found the condition to be: Stable  I have evaluated the patient and: Recommended continue with same treatment plan    I10  Hypertension  I have evaluated the patient and found the condition to be: Stable  I have evaluated the patient and: Recommended continue with same treatment plan    I73 9  Peripheral vascular disease ([facility] )  I have evaluated the patient and found the condition to be: Stable  I have evaluated the patient and: Recommended continue with same treatment plan    G63  Polyneuropathy associated with underlying disease ([facility] )  I have evaluated the patient and found the condition to be: Stable  I have evaluated the patient and: Recommended continue with same treatment plan    L40 9  Psoriasis  I have evaluated the patient and found the condition to be: Stable  I have evaluated the patient and: Recommended continue with same treatment plan    M15 9  Generalized osteoarthritis  I have evaluated the patient and found the condition to be: Stable  I have evaluated the patient and: Recommended continue with same treatment plan    N18 30  Chronic kidney disease, stage 3  I have evaluated the patient and found the condition to be:  Stable  I have evaluated the patient and: Recommended continue with same treatment plan    E78 5 Hyperlipidemia  I have evaluated the patient and found the condition to be: Stable  I have evaluated the patient and: Recommended continue with same treatment plan    R26 2  Ambulatory dysfunction  I have evaluated the patient and found the condition to be: Stable  I have evaluated the patient and: Recommended continue with same treatment plan    M54 5  G89 29  Chronic low back pain without sciatica  I have evaluated the patient and found the condition to be: Stable  I have evaluated the patient and: Recommended continue with same treatment plan         Subjective:     Patient ID: Henrietta Mccoy is a 80 y o  female     No chief complaint on file  HPI    Review of Systems    Objective: There were no vitals taken for this visit      Problem List Items Addressed This Visit        Endocrine    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 hypertension ([facility] )      Other Visit Diagnoses     Encounter for immunization    -  Primary          Physical Exam

## 2021-03-28 PROBLEM — R06.02 SOB (SHORTNESS OF BREATH): Status: RESOLVED | Noted: 2020-06-25 | Resolved: 2021-03-28

## 2021-03-29 ENCOUNTER — OFFICE VISIT (OUTPATIENT)
Dept: FAMILY MEDICINE CLINIC | Facility: CLINIC | Age: 86
End: 2021-03-29
Payer: COMMERCIAL

## 2021-03-29 VITALS
HEART RATE: 78 BPM | RESPIRATION RATE: 14 BRPM | SYSTOLIC BLOOD PRESSURE: 154 MMHG | HEIGHT: 65 IN | DIASTOLIC BLOOD PRESSURE: 70 MMHG | WEIGHT: 146 LBS | OXYGEN SATURATION: 97 % | BODY MASS INDEX: 24.32 KG/M2

## 2021-03-29 DIAGNOSIS — I12.9 TYPE 2 DM WITH CKD STAGE 3 AND HYPERTENSION (HCC): ICD-10-CM

## 2021-03-29 DIAGNOSIS — E03.9 ACQUIRED HYPOTHYROIDISM: ICD-10-CM

## 2021-03-29 DIAGNOSIS — I10 ESSENTIAL HYPERTENSION: Primary | ICD-10-CM

## 2021-03-29 DIAGNOSIS — L40.9 PSORIASIS: ICD-10-CM

## 2021-03-29 DIAGNOSIS — I73.9 PERIPHERAL VASCULAR DISEASE (HCC): ICD-10-CM

## 2021-03-29 DIAGNOSIS — N18.32 STAGE 3B CHRONIC KIDNEY DISEASE (HCC): ICD-10-CM

## 2021-03-29 DIAGNOSIS — E11.42 DIABETIC PERIPHERAL NEUROPATHY (HCC): ICD-10-CM

## 2021-03-29 DIAGNOSIS — N18.30 TYPE 2 DM WITH CKD STAGE 3 AND HYPERTENSION (HCC): ICD-10-CM

## 2021-03-29 DIAGNOSIS — E78.2 MIXED HYPERLIPIDEMIA: ICD-10-CM

## 2021-03-29 DIAGNOSIS — R26.2 AMBULATORY DYSFUNCTION: ICD-10-CM

## 2021-03-29 DIAGNOSIS — E11.22 TYPE 2 DM WITH CKD STAGE 3 AND HYPERTENSION (HCC): ICD-10-CM

## 2021-03-29 DIAGNOSIS — M15.9 GENERALIZED OSTEOARTHRITIS: ICD-10-CM

## 2021-03-29 PROBLEM — Z01.818 PREOP GENERAL PHYSICAL EXAM: Status: RESOLVED | Noted: 2018-07-10 | Resolved: 2021-03-29

## 2021-03-29 PROBLEM — R60.0 LEG EDEMA, LEFT: Status: RESOLVED | Noted: 2018-03-13 | Resolved: 2021-03-29

## 2021-03-29 PROCEDURE — 99214 OFFICE O/P EST MOD 30 MIN: CPT | Performed by: FAMILY MEDICINE

## 2021-03-29 PROCEDURE — 3725F SCREEN DEPRESSION PERFORMED: CPT | Performed by: FAMILY MEDICINE

## 2021-03-29 PROCEDURE — T1015 CLINIC SERVICE: HCPCS | Performed by: FAMILY MEDICINE

## 2021-03-29 PROCEDURE — 1036F TOBACCO NON-USER: CPT | Performed by: FAMILY MEDICINE

## 2021-03-29 PROCEDURE — 1160F RVW MEDS BY RX/DR IN RCRD: CPT | Performed by: FAMILY MEDICINE

## 2021-03-29 NOTE — PROGRESS NOTES
Chief Complaint   Patient presents with    Follow-up     4 month follow up    Enhanced Visit     Health Maintenance   Topic Date Due    DTaP,Tdap,and Td Vaccines (1 - Tdap) Never done    Pneumococcal Vaccine: 65+ Years (2 of 2 - PPSV23) 09/01/2016    HEMOGLOBIN A1C  05/13/2021    Medicare Annual Wellness Visit (AWV)  06/25/2021    DM Eye Exam  07/19/2021    Fall Risk  09/17/2021    Depression Screening PHQ  11/13/2021    Diabetic Foot Exam  11/13/2021    BMI: Adult  03/29/2022    Influenza Vaccine  Completed    COVID-19 Vaccine  Completed    HIB Vaccine  Aged Out    Hepatitis B Vaccine  Aged Out    IPV Vaccine  Aged Out    Hepatitis A Vaccine  Aged Out    Meningococcal ACWY Vaccine  Aged Out    HPV Vaccine  Aged Out         Falls Plan of Care: balance, strength, and gait training instructions were provided  Recommended assistive device to help with gait and balance  Vitamin D supplementation was recommended  Home safety education provided  Assessment/Plan:    Hypertension  Stable  Continue Losartan 100 mg daily, Caduet 10/20 mg 1 tablet daily  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 hypertension ([facility] )    Lab Results   Component Value Date    HGBA1C 6 11/13/2020     Type 2 DM - diet control  Continue to monitor blood sugar at home 2-3 times per week  Check eye exam by ophthalmology as annually  Follow up with podiatry Dr Jj Morelos for routine foot care  Diabetic peripheral neuropathy [facility])    Lab Results   Component Value Date    HGBA1C 6 11/13/2020     Follow-up with podiatry  Hypothyroidism  TSH 0 278  Dose of Levothyroxine was decreased to 112 mcg daily 3 weeks ago  Recheck TSH level next month  Hyperlipidemia  Continue Caduet 10/20 mg daily        Chronic kidney disease, stage 3  Lab Results   Component Value Date    EGFR 33 03/02/2021    EGFR 35 10/13/2020    EGFR 33 01/17/2020    CREATININE 1 36 (H) 03/02/2021    CREATININE 1 32 (H) 10/13/2020    CREATININE 1 36 (H) 01/17/2020       Creatinine level is stable  Recommended to stay well hydrated  Aavoid NSAID's, nephrotoxic drugs  Peripheral vascular disease ([facility] )  Continue statin therapy  Generalized osteoarthritis  Take Tylenol PRN for joint pains  Ambulatory dysfunction  Continue to use a walker for ambulation to prevent falls  Psoriasis  Patient has scalp psoriasis  Reports improvement in scalp dryness  Continue follow-up with dermatologist Dr Monserrat Diallo  Schedule follow-up visit, Medicare AWV in 4 months  Diagnoses and all orders for this visit:    Essential hypertension  -     Comprehensive metabolic panel; Future    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)  -     Comprehensive metabolic panel; Future  -     Hemoglobin A1C; Future    Diabetic peripheral neuropathy (HCC)    Acquired hypothyroidism  -     TSH, 3rd generation with Free T4 reflex; Future    Mixed hyperlipidemia  -     Comprehensive metabolic panel; Future    Stage 3b chronic kidney disease    Peripheral vascular disease (HCC)    Generalized osteoarthritis    Ambulatory dysfunction    Psoriasis    Other orders  -     Cancel: PNEUMOCOCCAL POLYSACCHARIDE VACCINE 23-VALENT =>1YO SQ IM  -     Cancel: Hemoglobin A1C ([facility]); Future          Subjective:      Patient ID: Jaye Stephen is a 80 y o  female  HPI       Patient presents for 4 month follow-up office visit accompanied by her daughter  PMHx: HTN, Hypothyroidism, Hyperlipidemia, Type 2 DM - diet controlled, microalbuminuria, CKD stage 3, generalized OA, ambulatory dysfunction, PVD, peripheral neuropathy      Reviewed current medications, blood test results from 3/2/21  Creatinine 1 36, GFR 33, glucose 114, potassium 5 0, cholesterol 156, , LDL 44  HTN -  patient takes Losartan 100 mg daily, Caduet 10/20 mg daily  Denies chest pain, dizziness  C/o mild exertional SOB      Echocardiogram done in September 2020 showed EF  55-65%, grade 1 diastolic dysfunction  Mild AR, TR  Patient checks blood pressure at home periodically  BP remains stable in 140-150's/ 70's  Type 2 DM - diet controlled  Hemoglobin A1c was 6  in November 2020  Patient checks blood sugar at home 3 times per week  Blood sugar ranges in 120's  C/o numbness in feet  She has been followed by podiatrist Dr Handy Prince  Ambulates with a walker  Reports no falls  Cognitive status is stable  Hypothyroidism - TSH 0 278  Dose of Levothyroxine was decreased to 112 mcg daily 3 weeks ago  Patient has peripheral vascular disease, S/P endovascular AAA repair in March 2017  She is no longer follows with vascular surgeon  Non -smoker  Patient completed COVID vaccination last month  The following portions of the patient's history were reviewed and updated as appropriate: allergies, current medications, past family history, past social history, past surgical history and problem list     Review of Systems   Constitutional: Positive for fatigue (mild)  Negative for activity change, appetite change, chills and fever  HENT: Positive for hearing loss  Negative for congestion, ear pain, nosebleeds, sore throat and trouble swallowing  Eyes: Negative for pain, discharge, redness, itching and visual disturbance  Respiratory: Positive for shortness of breath  Negative for cough (mild exertional ), chest tightness and wheezing  Cardiovascular: Positive for leg swelling (mild)  Negative for chest pain  Gastrointestinal: Positive for constipation (occasional)  Negative for abdominal pain, blood in stool, diarrhea, nausea and vomiting  Genitourinary: Negative for difficulty urinating, dysuria, flank pain, frequency and hematuria  Musculoskeletal: Positive for arthralgias, back pain (chronic) and gait problem (ambulates with a walker)  Negative for joint swelling and neck pain  Neurological: Positive for numbness (in feet)   Negative for dizziness, syncope and headaches  Hematological: Negative for adenopathy  Does not bruise/bleed easily  Psychiatric/Behavioral: Negative for dysphoric mood and sleep disturbance  The patient is not nervous/anxious  Objective:      /70 (BP Location: Left arm, Patient Position: Sitting, Cuff Size: Standard)   Pulse 78   Resp 14   Ht 5' 5" (1 651 m)   Wt 66 2 kg (146 lb)   SpO2 97%   BMI 24 30 kg/m²          Physical Exam  Vitals signs and nursing note reviewed  Constitutional:       Appearance: Normal appearance  HENT:      Head: Normocephalic and atraumatic  Eyes:      Conjunctiva/sclera: Conjunctivae normal       Pupils: Pupils are equal, round, and reactive to light  Neck:      Musculoskeletal: Normal range of motion and neck supple  Vascular: No carotid bruit  Cardiovascular:      Rate and Rhythm: Normal rate and regular rhythm  Heart sounds: No murmur  Comments: Trace BL LE edema  Pulmonary:      Effort: Pulmonary effort is normal       Breath sounds: Normal breath sounds  Abdominal:      General: There is no distension  Palpations: Abdomen is soft  Tenderness: There is no abdominal tenderness  Musculoskeletal:      Comments: Ambulates with a walker  Arthritic changes in hand joints   Skin:     General: Skin is warm and dry  Findings: No rash  Neurological:      General: No focal deficit present  Mental Status: She is alert and oriented to person, place, and time  Mental status is at baseline     Psychiatric:         Mood and Affect: Mood normal

## 2021-03-30 NOTE — ASSESSMENT & PLAN NOTE
Lab Results   Component Value Date    EGFR 33 03/02/2021    EGFR 35 10/13/2020    EGFR 33 01/17/2020    CREATININE 1 36 (H) 03/02/2021    CREATININE 1 32 (H) 10/13/2020    CREATININE 1 36 (H) 01/17/2020       Creatinine level is stable  Recommended to stay well hydrated  Aavoid NSAID's, nephrotoxic drugs

## 2021-03-30 NOTE — ASSESSMENT & PLAN NOTE
Patient has scalp psoriasis  Reports improvement in scalp dryness  Continue follow-up with dermatologist Dr Sugey Ruiz

## 2021-03-30 NOTE — ASSESSMENT & PLAN NOTE
Lab Results   Component Value Date    HGBA1C 6 11/13/2020     Type 2 DM - diet control  Continue to monitor blood sugar at home 2-3 times per week  Check eye exam by ophthalmology as annually  Follow up with podiatry Dr Suresh Moore for routine foot care

## 2021-04-13 ENCOUNTER — LAB (OUTPATIENT)
Dept: LAB | Age: 86
End: 2021-04-13
Payer: COMMERCIAL

## 2021-04-13 DIAGNOSIS — E03.9 ACQUIRED HYPOTHYROIDISM: ICD-10-CM

## 2021-04-13 LAB — TSH SERPL DL<=0.05 MIU/L-ACNC: 1.05 UIU/ML (ref 0.36–3.74)

## 2021-04-13 PROCEDURE — 36415 COLL VENOUS BLD VENIPUNCTURE: CPT

## 2021-04-13 PROCEDURE — 84443 ASSAY THYROID STIM HORMONE: CPT

## 2021-04-26 DIAGNOSIS — F41.9 ANXIETY: ICD-10-CM

## 2021-04-26 DIAGNOSIS — E03.9 ACQUIRED HYPOTHYROIDISM: ICD-10-CM

## 2021-04-26 DIAGNOSIS — E03.9 ACQUIRED HYPOTHYROIDISM: Primary | ICD-10-CM

## 2021-04-26 RX ORDER — LEVOTHYROXINE SODIUM 112 UG/1
TABLET ORAL
Qty: 30 TABLET | Refills: 5 | Status: SHIPPED | OUTPATIENT
Start: 2021-04-26 | End: 2021-07-29 | Stop reason: SDUPTHER

## 2021-04-26 RX ORDER — LEVOTHYROXINE SODIUM 112 UG/1
TABLET ORAL
Qty: 30 TABLET | Refills: 5 | OUTPATIENT
Start: 2021-04-26

## 2021-04-26 RX ORDER — LORAZEPAM 0.5 MG/1
TABLET ORAL
Qty: 30 TABLET | Refills: 3 | OUTPATIENT
Start: 2021-04-26

## 2021-04-26 NOTE — TELEPHONE ENCOUNTER
Requested medication(s) are due for refill today: Yes  Patient has already received a courtesy refill: No  Other reason request has been forwarded to provider:Can not be delegated

## 2021-04-27 DIAGNOSIS — F41.9 ANXIETY: ICD-10-CM

## 2021-04-27 DIAGNOSIS — E03.9 ACQUIRED HYPOTHYROIDISM: ICD-10-CM

## 2021-04-27 NOTE — TELEPHONE ENCOUNTER
----- Message from Sharee Lafleur MD sent at 4/26/2021  7:34 PM EDT -----  I refilled Rx for Levothyroxine 112 mcg daily  Please check if patient requested Rx refill for Ativan 0 5 mg  System does not allow to e-scribed Ativan, not sure why

## 2021-04-28 DIAGNOSIS — F41.9 ANXIETY: ICD-10-CM

## 2021-04-28 DIAGNOSIS — E03.9 ACQUIRED HYPOTHYROIDISM: Primary | ICD-10-CM

## 2021-04-28 RX ORDER — LEVOTHYROXINE SODIUM 112 UG/1
112 TABLET ORAL
Qty: 90 TABLET | Refills: 3 | Status: SHIPPED | OUTPATIENT
Start: 2021-04-28 | End: 2022-04-15 | Stop reason: SDUPTHER

## 2021-04-28 RX ORDER — LORAZEPAM 0.5 MG/1
TABLET ORAL
Qty: 30 TABLET | Refills: 0 | OUTPATIENT
Start: 2021-04-28

## 2021-04-28 RX ORDER — LEVOTHYROXINE SODIUM 112 UG/1
TABLET ORAL
Qty: 90 TABLET | Refills: 3 | OUTPATIENT
Start: 2021-04-28

## 2021-04-28 RX ORDER — LORAZEPAM 0.5 MG/1
TABLET ORAL
Qty: 30 TABLET | Refills: 3 | Status: SHIPPED | OUTPATIENT
Start: 2021-04-28 | End: 2021-12-29 | Stop reason: SDUPTHER

## 2021-04-28 NOTE — TELEPHONE ENCOUNTER
Patient's daughter, Lorin Comment, phoned to state she had a message in My Chart that the refill for levothyroxine and lorazepam were denied by [facility]  Please call daughter at [phone]

## 2021-04-28 NOTE — TELEPHONE ENCOUNTER
I called IT and was able to figure something out  In her demographic information, her address included "[employer] [place]" behind it, which must have flagged an error in the E-scribing system  With that removed, it allowed me to continue on with the rx  Please sign and send, it will send a 90 day supply to [facility] and the Lorazepam will go to [facility]

## 2021-06-16 DIAGNOSIS — I10 ESSENTIAL HYPERTENSION: ICD-10-CM

## 2021-06-17 RX ORDER — AMLODIPINE BESYLATE AND ATORVASTATIN CALCIUM 10; 20 MG/1; MG/1
TABLET, FILM COATED ORAL
Qty: 90 TABLET | Refills: 3 | Status: SHIPPED | OUTPATIENT
Start: 2021-06-17 | End: 2022-06-25

## 2021-07-02 DIAGNOSIS — I10 ESSENTIAL HYPERTENSION: ICD-10-CM

## 2021-07-06 RX ORDER — LOSARTAN POTASSIUM 100 MG/1
TABLET ORAL
Qty: 90 TABLET | Refills: 3 | Status: SHIPPED | OUTPATIENT
Start: 2021-07-06 | End: 2022-05-01

## 2021-07-20 ENCOUNTER — APPOINTMENT (OUTPATIENT)
Dept: LAB | Age: 86
End: 2021-07-20
Payer: COMMERCIAL

## 2021-07-20 DIAGNOSIS — E11.22 TYPE 2 DM WITH CKD STAGE 3 AND HYPERTENSION (HCC): ICD-10-CM

## 2021-07-20 DIAGNOSIS — I12.9 TYPE 2 DM WITH CKD STAGE 3 AND HYPERTENSION (HCC): ICD-10-CM

## 2021-07-20 DIAGNOSIS — N18.30 TYPE 2 DM WITH CKD STAGE 3 AND HYPERTENSION (HCC): ICD-10-CM

## 2021-07-20 DIAGNOSIS — I10 ESSENTIAL HYPERTENSION: ICD-10-CM

## 2021-07-20 DIAGNOSIS — E78.2 MIXED HYPERLIPIDEMIA: ICD-10-CM

## 2021-07-20 DIAGNOSIS — E03.9 ACQUIRED HYPOTHYROIDISM: ICD-10-CM

## 2021-07-20 LAB
ALBUMIN SERPL BCP-MCNC: 3.9 G/DL (ref 3.5–5)
ALP SERPL-CCNC: 75 U/L (ref 46–116)
ALT SERPL W P-5'-P-CCNC: 32 U/L (ref 12–78)
ANION GAP SERPL CALCULATED.3IONS-SCNC: 6 MMOL/L (ref 4–13)
AST SERPL W P-5'-P-CCNC: 22 U/L (ref 5–45)
BILIRUB SERPL-MCNC: 1.17 MG/DL (ref 0.2–1)
BUN SERPL-MCNC: 24 MG/DL (ref 5–25)
CALCIUM SERPL-MCNC: 9.7 MG/DL (ref 8.3–10.1)
CHLORIDE SERPL-SCNC: 105 MMOL/L (ref 100–108)
CO2 SERPL-SCNC: 24 MMOL/L (ref 21–32)
CREAT SERPL-MCNC: 1.3 MG/DL (ref 0.6–1.3)
EST. AVERAGE GLUCOSE BLD GHB EST-MCNC: 131 MG/DL
GFR SERPL CREATININE-BSD FRML MDRD: 35 ML/MIN/1.73SQ M
GLUCOSE P FAST SERPL-MCNC: 121 MG/DL (ref 65–99)
HBA1C MFR BLD: 6.2 %
POTASSIUM SERPL-SCNC: 5.3 MMOL/L (ref 3.5–5.3)
PROT SERPL-MCNC: 7.7 G/DL (ref 6.4–8.2)
SODIUM SERPL-SCNC: 135 MMOL/L (ref 136–145)
TSH SERPL DL<=0.05 MIU/L-ACNC: 1.19 UIU/ML (ref 0.36–3.74)

## 2021-07-20 PROCEDURE — 84443 ASSAY THYROID STIM HORMONE: CPT

## 2021-07-20 PROCEDURE — 36415 COLL VENOUS BLD VENIPUNCTURE: CPT

## 2021-07-20 PROCEDURE — 83036 HEMOGLOBIN GLYCOSYLATED A1C: CPT

## 2021-07-20 PROCEDURE — 80053 COMPREHEN METABOLIC PANEL: CPT

## 2021-07-23 ENCOUNTER — RA CDI HCC (OUTPATIENT)
Dept: OTHER | Facility: HOSPITAL | Age: 86
End: 2021-07-23

## 2021-07-23 NOTE — PROGRESS NOTES
[facility]  coding opportunities          Chart reviewed, no opportunity found: CHART REVIEWED, NO OPPORTUNITY FOUND                     Patients insurance company: [facility] [address] [address]  (Medicare Advantage and Commercial)

## 2021-07-29 ENCOUNTER — OFFICE VISIT (OUTPATIENT)
Dept: FAMILY MEDICINE CLINIC | Facility: CLINIC | Age: 86
End: 2021-07-29
Payer: COMMERCIAL

## 2021-07-29 VITALS
BODY MASS INDEX: 24.83 KG/M2 | TEMPERATURE: 97.7 F | WEIGHT: 149 LBS | SYSTOLIC BLOOD PRESSURE: 140 MMHG | HEIGHT: 65 IN | DIASTOLIC BLOOD PRESSURE: 68 MMHG | RESPIRATION RATE: 16 BRPM | HEART RATE: 72 BPM

## 2021-07-29 DIAGNOSIS — E03.9 ACQUIRED HYPOTHYROIDISM: ICD-10-CM

## 2021-07-29 DIAGNOSIS — E78.2 MIXED HYPERLIPIDEMIA: ICD-10-CM

## 2021-07-29 DIAGNOSIS — I12.9 TYPE 2 DM WITH CKD STAGE 3 AND HYPERTENSION (HCC): ICD-10-CM

## 2021-07-29 DIAGNOSIS — I10 ESSENTIAL HYPERTENSION: Primary | ICD-10-CM

## 2021-07-29 DIAGNOSIS — I73.9 PERIPHERAL VASCULAR DISEASE (HCC): ICD-10-CM

## 2021-07-29 DIAGNOSIS — M15.9 GENERALIZED OSTEOARTHRITIS: ICD-10-CM

## 2021-07-29 DIAGNOSIS — R26.2 AMBULATORY DYSFUNCTION: ICD-10-CM

## 2021-07-29 DIAGNOSIS — N18.30 TYPE 2 DM WITH CKD STAGE 3 AND HYPERTENSION (HCC): ICD-10-CM

## 2021-07-29 DIAGNOSIS — G63 POLYNEUROPATHY ASSOCIATED WITH UNDERLYING DISEASE (HCC): ICD-10-CM

## 2021-07-29 DIAGNOSIS — Z23 NEED FOR PNEUMOCOCCAL VACCINATION: ICD-10-CM

## 2021-07-29 DIAGNOSIS — Z00.00 MEDICARE ANNUAL WELLNESS VISIT, SUBSEQUENT: ICD-10-CM

## 2021-07-29 DIAGNOSIS — E11.22 TYPE 2 DM WITH CKD STAGE 3 AND HYPERTENSION (HCC): ICD-10-CM

## 2021-07-29 DIAGNOSIS — J30.1 SEASONAL ALLERGIC RHINITIS DUE TO POLLEN: ICD-10-CM

## 2021-07-29 DIAGNOSIS — N18.32 STAGE 3B CHRONIC KIDNEY DISEASE (HCC): ICD-10-CM

## 2021-07-29 PROCEDURE — 1125F AMNT PAIN NOTED PAIN PRSNT: CPT | Performed by: FAMILY MEDICINE

## 2021-07-29 PROCEDURE — 3288F FALL RISK ASSESSMENT DOCD: CPT | Performed by: FAMILY MEDICINE

## 2021-07-29 PROCEDURE — 3725F SCREEN DEPRESSION PERFORMED: CPT | Performed by: FAMILY MEDICINE

## 2021-07-29 PROCEDURE — 1160F RVW MEDS BY RX/DR IN RCRD: CPT | Performed by: FAMILY MEDICINE

## 2021-07-29 PROCEDURE — G0009 ADMIN PNEUMOCOCCAL VACCINE: HCPCS

## 2021-07-29 PROCEDURE — 1170F FXNL STATUS ASSESSED: CPT | Performed by: FAMILY MEDICINE

## 2021-07-29 PROCEDURE — 4040F PNEUMOC VAC/ADMIN/RCVD: CPT | Performed by: FAMILY MEDICINE

## 2021-07-29 PROCEDURE — 90732 PPSV23 VACC 2 YRS+ SUBQ/IM: CPT

## 2021-07-29 PROCEDURE — 1036F TOBACCO NON-USER: CPT | Performed by: FAMILY MEDICINE

## 2021-07-29 PROCEDURE — 99214 OFFICE O/P EST MOD 30 MIN: CPT | Performed by: FAMILY MEDICINE

## 2021-07-29 PROCEDURE — G0439 PPPS, SUBSEQ VISIT: HCPCS | Performed by: FAMILY MEDICINE

## 2021-07-29 RX ORDER — FLUTICASONE PROPIONATE 50 MCG
2 SPRAY, SUSPENSION (ML) NASAL DAILY
Qty: 16 G | Refills: 5 | Status: SHIPPED | OUTPATIENT
Start: 2021-07-29

## 2021-07-29 NOTE — ASSESSMENT & PLAN NOTE
Lab Results   Component Value Date    HGBA1C 6 2 (H) 07/20/2021     Type 2 DM - diet controlled  Continue to monitor blood sugar at home 2-3 times per week  Patient is due for eye exam with ophthalmologist Dr Debo Guerra  Follow- up with podiatry Dr Vadim Whittaker for routine foot care

## 2021-07-29 NOTE — PROGRESS NOTES
Assessment and Plan:     Problem List Items Addressed This Visit        Endocrine    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 hypertension ([facility] [address] )    Hypothyroidism       Cardiovascular and Mediastinum    Hypertension - Primary    Peripheral vascular disease (HCC)       Nervous and Auditory    Peripheral neuropathy       Musculoskeletal and Integument    Generalized osteoarthritis       Genitourinary    Chronic kidney disease, stage 3 ([facility] [place] [address] )       Other    Hyperlipidemia    Ambulatory dysfunction    Medicare annual wellness visit, subsequent           Preventive health issues were discussed with patient, and age appropriate screening tests were ordered as noted in patient's After Visit Summary  Personalized health advice and appropriate referrals for health education or preventive services given if needed, as noted in patient's After Visit Summary       History of Present Illness:     Patient presents for Medicare Annual Wellness visit    Patient Care Team:  Khoi Parks MD as PCP - LUCIANA Yu MD Briscoe Bouche, MD (Orthopedic Surgery)     Problem List:     Patient Active Problem List   Diagnosis    Aneurysm of abdominal aorta ([facility] [place] [address] )    S/p left reverse total shoulder arthroplasty    Hypertension    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 [facility]    Hypothyroidism    Chronic kidney disease, stage 3 ([facility] [place] [address] )    Hyperlipidemia    Hyponatremia    Peripheral vascular disease ([facility] [place] [address] )    Generalized osteoarthritis    Chronic pain of left knee    Ambulatory dysfunction    Bilateral leg edema    Allergic rhinitis    Anxiety    Aortoiliac stenosis (HCC)    Constipation    Diabetic peripheral neuropathy ([facility] [place] [address] )    Dry scalp    First degree AV block    Gait disturbance    Knee effusion, left    Lumbosacral spondylosis without myelopathy    Microalbuminuria    Osteopenia    Peripheral neuropathy    Seborrheic dermatitis of scalp    Spinal stenosis    Effusion of left knee    Pes anserinus bursitis of left knee    S/P total knee replacement, left    Medicare annual wellness visit, subsequent    Leg length discrepancy    Right hip pain    Trochanteric bursitis of right hip    Weakness of right hip    Chronic low back pain without sciatica    Psoriasis    Polyneuropathy associated with underlying disease [facility])      Past Medical and Surgical History:     Past Medical History:   Diagnosis Date    AAA (abdominal aortic aneurysm) (HCC)     s/p EVAR 3/10/16    Allergic urticaria     LAST ASSESSED: [id number]    Appendicitis     Arthritis     CKD (chronic kidney disease), stage III     Coronary artery disease     Diabetes mellitus (HCC)     Eczema     Gross hematuria     LAST ASSESSED: [id number]    Hematuria     Hypertension     Hyponatremia     Hypothyroid     Osteoporosis     LAST ASSESSED: [id number]    PONV (postoperative nausea and vomiting)     Primary osteoarthritis of left knee     LAST ASSESSED: [id number]    Renal disorder     Renal insufficiency     Rotator cuff tear arthropathy     LEFT LAST ASSESSED: [id number]    Secondary osteoarthritis of right shoulder     LAST ASSESSED: [id number]    Syncope      Past Surgical History:   Procedure Laterality Date    APPENDECTOMY      BACK SURGERY      CATARACT EXTRACTION W/  INTRAOCULAR LENS IMPLANT Bilateral     KNEE ARTHROSCOPY Left     ONSET: [id number] THERAPEUTIC    LUMBAR LAMINECTOMY      DC AAA REPAIR,MODULR BIFURCATED PROSTH N/A 3/10/2016    Procedure: REPAIR ANEURYSM ENDOVASCULAR ABDOMINAL AORTIC  (EVAR) ;   Surgeon: Chilo Denny MD;  [facility];  Service: Vascular    DC RECONSTR TOTAL SHOULDER IMPLANT Left 5/31/2016    Procedure: ARTHROPLASTY SHOULDER REVERSE;  Surgeon: Juan Diego Babcock MD;  [facility];  Service: Orthopedics    DC TOTAL KNEE ARTHROPLASTY Left 7/23/2018    Procedure: KNEE : COMPLETE REPLACEMENT;  Surgeon: Serafin Tenorio MD;  [facility];  Service: Orthopedics    REVERSE TOTAL SHOULDER ARTHROPLASTY Right     ROTATOR CUFF REPAIR Left     RC SURGERY RESOLVED: 1999    SHOULDER ARTHROPLASTY      5/15 - FOR RIGHT SHOULDER; 5/16 - FOR LEFT SHOULDER    SHOULDER ARTHROSCOPY      TOTAL HIP ARTHROPLASTY Left     ONSET: [id number]    TOTAL HIP ARTHROPLASTY  05/31/2006    REVISION      Family History:     Family History   Problem Relation Age of Onset    Coronary artery disease Mother     Diabetes Mother     Coronary artery disease Father     Cancer Sister     Arthritis Sister     Unexplained death Family         RAPID    Cancer Daughter       Social History:     Social History     Socioeconomic History    Marital status:      Spouse name: Not on file    Number of children: Not on file    Years of education: Not on file    Highest education level: Not on file   Occupational History    Occupation: RETIRED   Tobacco Use    Smoking status: Never Smoker    Smokeless tobacco: Never Used   Substance and Sexual Activity    Alcohol use: No    Drug use: No    Sexual activity: Not on file   Other Topics Concern    Not on file   Social History Narrative    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 - [employer]     Social Determinants of Health     Financial Resource Strain:     Difficulty of Paying Living Expenses:    Food Insecurity:     Worried About Running Out of Food in the Last Year:     [address] in the Last Year:    Transportation Needs:     Lack of Transportation (Medical):      Lack of Transportation (Non-Medical):    Physical Activity:     Days of Exercise per Week:     Minutes of Exercise per Session:    Stress:     Feeling of Stress :    Social Connections:     Frequency of Communication with Friends and Family:     Frequency of Social Gatherings with Friends and Family:     Attends [employer] Services:     Active Member of Clubs or Organizations:     Attends Club or Organization Meetings:     Marital Status:    Intimate Partner Violence:     Fear of Current or Ex-Partner:     Emotionally Abused:     Physically Abused:     Sexually Abused:       Medications and Allergies:     Current Outpatient Medications   Medication Sig Dispense Refill    acetaminophen (TYLENOL) 325 mg tablet Take 2 tablets (650 mg total) by mouth every 6 (six) hours as needed for mild pain  30 tablet 0    amLODIPine-atorvastatin (CADUET) 10-20 MG per tablet TAKE 1 TABLET AT BEDTIME 90 tablet 3    calcium citrate-vitamin D (CITRACAL+D) 315-200 MG-UNIT per tablet Take 1 tablet by mouth daily  Calcium + D TABS  Refills: 0   , M D ; Active       Docusate Sodium (COLACE PO) Take by mouth as needed      fexofenadine (ALLEGRA) 180 MG tablet Take 1 tablet by mouth as needed        fluticasone (FLONASE) 50 mcg/act nasal spray 2 sprays into each nostril daily Fluticasone Propionate 50 MCG/ACT Nasal Suspension use 2 spr  in each nostril daily  Quantity: 1;  Refills: 5    Levi MEDEL ;  Started 23-Sep-2014 Active 16 g 5    Lancets (ONETOUCH DELICA PLUS [id number]) MISC TEST BLOOD SUGAR ONE TIME DAILY OR AS NEEDED 100 each 2    levothyroxine 112 mcg tablet Take 1 tablet daily before breakfast 30 tablet 5    LORazepam (ATIVAN) 0 5 mg tablet Take 1 tab  daily PRN for anxiety 30 tablet 3    losartan (COZAAR) 100 MG tablet TAKE 1 TABLET ONCE DAILY 90 tablet 3    Multiple Vitamins-Minerals (MULTIVITAMIN & MINERAL PO) 1 tablet daily  Multivitamin & Mineral Oral Liquid  Quantity: 0;  Refills: 0   , M D ; Active       OneTouch Ultra test strip CHECK BLOOD SUGAR DAILY 100 each 5    pyrithione zinc (HEAD AND SHOULDERS) 1 % shampoo Apply topically daily as needed for dandruff      trolamine salicylate (ASPERCREME) 10 % cream Apply topically as needed for muscle/joint pain      levothyroxine 112 mcg tablet Take 1 tablet (112 mcg total) by mouth daily in the early morning 90 tablet 3     No current facility-administered medications for this visit       Allergies   Allergen Reactions    Clobetasol     Fluocinolone     Ketoconazole     Pyrithione     Bextra [Valdecoxib] Rash     GI INTOL    Diclofenac Headache    Percocet [Oxycodone-Acetaminophen] Rash and GI Intolerance      Immunizations:     Immunization History   Administered Date(s) Administered    INFLUENZA 10/22/2009, 10/18/2018    Influenza Split High Dose Preservative Free IM 09/23/2014, 10/05/2017    Influenza, high dose seasonal 0 7 mL 11/01/2019    Influenza, seasonal, injectable 09/18/2012, 09/20/2013, 10/01/2015, 10/01/2016    Pneumococcal Conjugate 13-Valent 09/01/2015    SARS-CoV-2 / COVID-19 mRNA IM (Pfizer-BioNTech) 01/23/2021, 02/12/2021    Zoster Vaccine Recombinant 08/23/2019, 11/01/2019    influenza, trivalent, adjuvanted 09/15/2020      Health Maintenance: There are no preventive care reminders to display for this patient  Topic Date Due    DTaP,Tdap,and Td Vaccines (1 - Tdap) Never done    Pneumococcal Vaccine: 65+ Years (1 of 2 - PPSV23) 10/27/2015    Influenza Vaccine (1) 09/01/2021      Medicare Health Risk Assessment:     /62   Pulse 72   Temp 97 7 °F (36 5 °C) (Tympanic)   Resp 16   Ht 5' 5" (1 651 m)   Wt 67 6 kg (149 lb)   BMI 24 79 kg/m²      Snow Bradshaw is here for her Subsequent Wellness visit  Health Risk Assessment:   Patient rates overall health as good  Patient feels that their physical health rating is same  Patient is satisfied with their life  Eyesight was rated as slightly worse  Hearing was rated as slightly worse  Patient feels that their emotional and mental health rating is much better  Patients states they are never, rarely angry  Patient states they are often unusually tired/fatigued  Pain experienced in the last 7 days has been some  Patient's pain rating has been 7/10  Patient states that she has experienced no weight loss or gain in last 6 months  Depression Screening:   PHQ-2 Score: 0      Fall Risk Screening:    In the past year, patient has experienced: no history of falling in past year      Home Safety:  Patient has trouble with stairs inside or outside of their home  Patient has working smoke alarms and has working carbon monoxide detector  Home safety hazards include: none  Medications:   Patient is currently taking over-the-counter supplements  OTC medications include: see medication list  Patient is able to manage medications  Activities of Daily Living (ADLs)/Instrumental Activities of Daily Living (IADLs):   Walk and transfer into and out of bed and chair?: Yes  Dress and groom yourself?: Yes    Bathe or shower yourself?: No    Feed yourself? Yes  Do your laundry/housekeeping?: No  Manage your money, pay your bills and track your expenses?: No  Make your own meals?: No    Do your own shopping?: No    Previous Hospitalizations:   Any hospitalizations or ED visits within the last 12 months?: No      Advance Care Planning:   Living will: Yes    Durable POA for healthcare:  Yes    Advanced directive: Yes    Advanced directive counseling given: Yes      Cognitive Screening:   Provider or family/friend/caregiver concerned regarding cognition?: No    PREVENTIVE SCREENINGS      Cardiovascular Screening:    General: Screening Not Indicated and History Lipid Disorder      Diabetes Screening:     General: Screening Not Indicated and History Diabetes      Colorectal Cancer Screening:     General: Screening Not Indicated      Breast Cancer Screening:     General: Screening Not Indicated      Cervical Cancer Screening:    General: Screening Not Indicated      Osteoporosis Screening:    General: Screening Not Indicated      Abdominal Aortic Aneurysm (AAA) Screening:        General: Screening Not Indicated and History AAA      Lung Cancer Screening:     General: Screening Not Indicated      Hepatitis C Screening:    General: Screening Not Indicated    Screening, Brief Intervention, and Referral to Treatment (SBIRT)    Screening  Typical number of drinks in a day: 1  Typical number of drinks in a week: 2  Interpretation: Low risk drinking behavior  AUDIT-C Screenin) How often did you have a drink containing alcohol in the past year? 2 to 4 times a month  2) How many drinks did you have on a typical day when you were drinking in the past year? 1 to 2  3) How often did you have 6 or more drinks on one occasion in the past year? never    AUDIT-C Score: 2  Interpretation: Score 0-2 (female): Negative screen for alcohol misuse    Single Item Drug Screening:  How often have you used an illegal drug (including marijuana) or a prescription medication for non-medical reasons in the past year? never    Single Item Drug Screen Score: 0  Interpretation: Negative screen for possible drug use disorder    Other Counseling Topics:   Skin self-exam and calcium and vitamin D intake and regular weightbearing exercise         Felecia Santos MD

## 2021-07-29 NOTE — PROGRESS NOTES
Assessment/Plan:    Hypertension  BP is stable  Continue Losartan 100 mg daily, Caduet 10/20 mg daily  Hypothyroidism  Continue Levothyroxine 112 mcg daily  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)    Lab Results   Component Value Date    HGBA1C 6 2 (H) 07/20/2021     Type 2 DM - diet controlled  Continue to monitor blood sugar at home 2-3 times per week  Patient is due for eye exam with ophthalmologist Dr Shabbir Wiggins  Follow- up with podiatry Dr Dat Sarmiento for routine foot care  Peripheral vascular disease ([place] [address] )  Continue statin therapy  Peripheral neuropathy  Follow-up with podiatrist Dr Dat Sarmiento  Chronic kidney disease, stage 3  Lab Results   Component Value Date    EGFR 35 07/20/2021    EGFR 33 03/02/2021    EGFR 35 10/13/2020    CREATININE 1 30 07/20/2021    CREATININE 1 36 (H) 03/02/2021    CREATININE 1 32 (H) 10/13/2020     Creatinine level remains stable, at baseline  Encouraged to stay well hydrated, avoid NSAID's  Hyperlipidemia  Continue Caduet 10/20 mg daily  Ambulatory dysfunction  Discussed fall precautions with patient  Continue to use a walker for ambulation to prevent falls  Generalized osteoarthritis  Take Tylenol PRN for joint pains  Allergic rhinitis  Symptoms are stable  Refilled prescription for Flonase to use 2 sprays in each nostril PRN  HM: Pneumovax administered today  Schedule follow-up office visit in 4 months  Check labs prior to next visit  Diagnoses and all orders for this visit:    Essential hypertension  -     CBC and differential; Future  -     Comprehensive metabolic panel; Future    Acquired hypothyroidism  -     TSH, 3rd generation with Free T4 reflex; Future    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)  -     Comprehensive metabolic panel;  Future    Peripheral vascular disease (HCC)    Polyneuropathy associated with underlying disease ([place] [address] )    Stage 3b chronic kidney disease ([place] [address] )  -     CBC and differential; Future  -     Comprehensive metabolic panel; Future    Mixed hyperlipidemia  -     Comprehensive metabolic panel; Future  -     Lipid panel; Future    Generalized osteoarthritis    Ambulatory dysfunction    Medicare annual wellness visit, subsequent    Seasonal allergic rhinitis due to pollen  -     fluticasone (FLONASE) 50 mcg/act nasal spray; 2 sprays into each nostril daily Fluticasone Propionate 50 MCG/ACT Nasal Suspension use 2 spr  in each nostril daily  Quantity: 1;  Refills: 5    Doris MEDEL ;  Started 23-Sep-2014 Active    Need for pneumococcal vaccination  -     PNEUMOCOCCAL POLYSACCHARIDE VACCINE 23-VALENT =>3YO SQ IM          Subjective:      Patient ID: Joe Woodall is a 80 y o  female  HPI    Patient presents for 4 month follow-up office visit accompanied by her daughter  PMHx: HTN, Hypothyroidism, Hyperlipidemia, Type 2 DM - diet controlled, microalbuminuria, CKD stage 3, PVD, generalized OA, ambulatory dysfunction, peripheral neuropathy  No new complaints  Patient states that she feels well overall  Reviewed current medications, blood test results from July 20, 2021  TSH 1 190  Sodium 135, potassium 5 3, creatinine 1 30 - stable,GFR 35  Hb A1C 6 2  HTN - blood pressure remains stable  Patient takes Losartan 100 mg daily, Caduet 10/20 mg daily  Denies chest pain, dizziness, shortness of breath  BP at home ranges 142-/150's/ 60's  Type 2 DM -diet controlled  Patient checks blood sugar at home 3 times per week  Fasting blood sugar ranges 116-120's  Patient  is due for annual eye exam with ophthalmologist Dr Baljit Saavedra  C/o numbness in feet  She has been followed by podiatry Dr Aaron Colby  Patient is independent with all ADL's  She uses a walker for ambulation  Reports no recent falls  Cognitive status is stable  Hypothyroidism - currently on Levothyroxine 112 mcg daily      PVD / S/P endovascular AAA repair in March 2017- patient is no longer follows with vascular surgeon  Denies tobacco use  Completed COVID vaccination in 2/2021  The following portions of the patient's history were reviewed and updated as appropriate: allergies, current medications, past medical history, past social history, past surgical history and problem list     Review of Systems   Constitutional: Positive for fatigue  Negative for activity change, appetite change, chills and fever  Diaphoresis: mild  HENT: Positive for hearing loss  Negative for congestion, ear pain (wears BL hearing aids), nosebleeds, sore throat, tinnitus and trouble swallowing  Eyes: Negative for pain, discharge, redness, itching and visual disturbance  Respiratory: Negative for cough, chest tightness, shortness of breath and wheezing  Cardiovascular: Positive for leg swelling (mild)  Negative for chest pain and palpitations  Gastrointestinal: Positive for constipation (occasional)  Negative for abdominal pain, blood in stool, diarrhea, nausea and vomiting  Genitourinary: Negative for difficulty urinating, dysuria, flank pain, frequency and hematuria  Musculoskeletal: Positive for arthralgias, back pain (chronic) and gait problem (ambulates with a walker)  Negative for joint swelling and neck pain  Skin: Negative for rash  Neurological: Positive for numbness (in feet)  Negative for dizziness, syncope and headaches  Hematological: Negative  Psychiatric/Behavioral: Negative for dysphoric mood and sleep disturbance  The patient is not nervous/anxious  Objective:      /68 (BP Location: Left arm)   Pulse 72   Temp 97 7 °F (36 5 °C) (Tympanic)   Resp 16   Ht 5' 5" (1 651 m)   Wt 67 6 kg (149 lb)   BMI 24 79 kg/m²          Physical Exam  Vitals and nursing note reviewed  Constitutional:       Appearance: Normal appearance  HENT:      Head: Normocephalic and atraumatic     Eyes:      Conjunctiva/sclera: Conjunctivae normal  Pupils: Pupils are equal, round, and reactive to light  Neck:      Vascular: No carotid bruit  Cardiovascular:      Rate and Rhythm: Normal rate and regular rhythm  Heart sounds: No murmur heard  Comments: Mild BL LE edema  Patient wears compression stokings  Pulmonary:      Effort: Pulmonary effort is normal       Breath sounds: Normal breath sounds  Abdominal:      General: There is no distension  Palpations: Abdomen is soft  Tenderness: There is no abdominal tenderness  Comments: No abdominal bruits   Musculoskeletal:      Cervical back: Normal range of motion and neck supple  Comments: Ambulates with a walker  Arthritic changes in hand joints     Skin:     General: Skin is warm and dry  Findings: No rash  Neurological:      General: No focal deficit present  Mental Status: She is alert and oriented to person, place, and time  Mental status is at baseline  Motor: No weakness        Gait: Gait normal    Psychiatric:         Mood and Affect: Mood normal

## 2021-07-29 NOTE — ASSESSMENT & PLAN NOTE
Lab Results   Component Value Date    EGFR 35 07/20/2021    EGFR 33 03/02/2021    EGFR 35 10/13/2020    CREATININE 1 30 07/20/2021    CREATININE 1 36 (H) 03/02/2021    CREATININE 1 32 (H) 10/13/2020     Creatinine level remains stable, at baseline  Encouraged to stay well hydrated, avoid NSAID's

## 2021-07-29 NOTE — PATIENT INSTRUCTIONS
Medicare Preventive Visit Patient Instructions  Thank you for completing your Welcome to Medicare Visit or Medicare Annual Wellness Visit today  Your next wellness visit will be due in one year (7/30/2022)  The screening/preventive services that you may require over the next 5-10 years are detailed below  Some tests may not apply to you based off risk factors and/or age  Screening tests ordered at today's visit but not completed yet may show as past due  Also, please note that scanned in results may not display below  Preventive Screenings:  Service Recommendations Previous Testing/Comments   Colorectal Cancer Screening  * Colonoscopy    * Fecal Occult Blood Test (FOBT)/Fecal Immunochemical Test (FIT)  * Fecal DNA/Cologuard Test  * Flexible Sigmoidoscopy Age: 54-65 years old   Colonoscopy: every 10 years (may be performed more frequently if at higher risk)  OR  FOBT/FIT: every 1 year  OR  Cologuard: every 3 years  OR  Sigmoidoscopy: every 5 years  Screening may be recommended earlier than age 48 if at higher risk for colorectal cancer  Also, an individualized decision between you and your healthcare provider will decide whether screening between the ages of 74-80 would be appropriate  Colonoscopy: Not on file  FOBT/FIT: Not on file  Cologuard: Not on file  Sigmoidoscopy: Not on file          Breast Cancer Screening Age: 36 years old  Frequency: every 1-2 years  Not required if history of left and right mastectomy Mammogram: Not on file        Cervical Cancer Screening Between the ages of 21-29, pap smear recommended once every 3 years  Between the ages of 33-67, can perform pap smear with HPV co-testing every 5 years     Recommendations may differ for women with a history of total hysterectomy, cervical cancer, or abnormal pap smears in past  Pap Smear: Not on file        Hepatitis C Screening Once for adults born [facility]  More frequently in patients at high risk for Hepatitis C Hep C Antibody: Not on file        Diabetes Screening 1-2 times per year if you're at risk for diabetes or have pre-diabetes Fasting glucose: 121 mg/dL   A1C: 6 2 %        Cholesterol Screening Once every 5 years if you don't have a lipid disorder  May order more often based on risk factors  Lipid panel: 03/02/2021          Other Preventive Screenings Covered by Medicare:  1  Abdominal Aortic Aneurysm (AAA) Screening: covered once if your at risk  You're considered to be at risk if you have a family history of AAA  2  Lung Cancer Screening: covers low dose CT scan once per year if you meet all of the following conditions: (1) Age 50-69; (2) No signs or symptoms of lung cancer; (3) Current smoker or have quit smoking within the last 15 years; (4) You have a tobacco smoking history of at least 30 pack years (packs per day multiplied by number of years you smoked); (5) You get a written order from a healthcare provider  3  Glaucoma Screening: covered annually if you're considered high risk: (1) You have diabetes OR (2) Family history of glaucoma OR (3)  aged 48 and older OR (3)  American aged 72 and older  3  Osteoporosis Screening: covered every 2 years if you meet one of the following conditions: (1) You're estrogen deficient and at risk for osteoporosis based off medical history and other findings; (2) Have a vertebral abnormality; (3) On glucocorticoid therapy for more than 3 months; (4) Have primary hyperparathyroidism; (5) On osteoporosis medications and need to assess response to drug therapy  · Last bone density test (DXA Scan): Not on file  5  HIV Screening: covered annually if you're between the age of 12-76  Also covered annually if you are younger than 13 and older than 72 with risk factors for HIV infection  For pregnant patients, it is covered up to 3 times per pregnancy      Immunizations:  Immunization Recommendations   Influenza Vaccine Annual influenza vaccination during flu season is recommended for all persons aged >= 6 months who do not have contraindications   Pneumococcal Vaccine (Prevnar and Pneumovax)  * Prevnar = PCV13  * Pneumovax = PPSV23   Adults 25-60 years old: 1-3 doses may be recommended based on certain risk factors  Adults 72 years old: Prevnar (PCV13) vaccine recommended followed by Pneumovax (PPSV23) vaccine  If already received PPSV23 since turning 65, then PCV13 recommended at least one year after PPSV23 dose  Hepatitis B Vaccine 3 dose series if at intermediate or high risk (ex: diabetes, end stage renal disease, liver disease)   Tetanus (Td) Vaccine - COST NOT COVERED BY MEDICARE PART B Following completion of primary series, a booster dose should be given every 10 years to maintain immunity against tetanus  Td may also be given as tetanus wound prophylaxis  Tdap Vaccine - COST NOT COVERED BY MEDICARE PART B Recommended at least once for all adults  For pregnant patients, recommended with each pregnancy  Shingles Vaccine (Shingrix) - COST NOT COVERED BY MEDICARE PART B  2 shot series recommended in those aged 48 and above     Health Maintenance Due:  There are no preventive care reminders to display for this patient  Immunizations Due:      Topic Date Due    DTaP,Tdap,and Td Vaccines (1 - Tdap) Never done    Pneumococcal Vaccine: 65+ Years (1 of 2 - PPSV23) 10/27/2015    Influenza Vaccine (1) 09/01/2021     Advance Directives   What are advance directives? Advance directives are legal documents that state your wishes and plans for medical care  These plans are made ahead of time in case you lose your ability to make decisions for yourself  Advance directives can apply to any medical decision, such as the treatments you want, and if you want to donate organs  What are the types of advance directives? There are many types of advance directives, and each state has rules about how to use them   You may choose a combination of any of the following:  · Living will: This is a written record of the treatment you want  You can also choose which treatments you do not want, which to limit, and which to stop at a certain time  This includes surgery, medicine, IV fluid, and tube feedings  · Durable power of  for healthcare [facility]): This is a written record that states who you want to make healthcare choices for you when you are unable to make them for yourself  This person, called a proxy, is usually a family member or a friend  You may choose more than 1 proxy  · Do not resuscitate (DNR) order:  A DNR order is used in case your heart stops beating or you stop breathing  It is a request not to have certain forms of treatment, such as CPR  A DNR order may be included in other types of advance directives  · Medical directive: This covers the care that you want if you are in a coma, near death, or unable to make decisions for yourself  You can list the treatments you want for each condition  Treatment may include pain medicine, surgery, blood transfusions, dialysis, IV or tube feedings, and a ventilator (breathing machine)  · Values history: This document has questions about your views, beliefs, and how you feel and think about life  This information can help others choose the care that you would choose  Why are advance directives important? An advance directive helps you control your care  Although spoken wishes may be used, it is better to have your wishes written down  Spoken wishes can be misunderstood, or not followed  Treatments may be given even if you do not want them  An advance directive may make it easier for your family to make difficult choices about your care  © Copyright [employer] 2018 Information is for End User's use only and may not be sold, redistributed or otherwise used for commercial purposes   All illustrations and images included in CareNotes® are the copyrighted property of [employer]  or [employer] [facility] Preventive Visit Patient Instructions  Thank you for completing your Welcome to Medicare Visit or Medicare Annual Wellness Visit today  Your next wellness visit will be due in one year (7/30/2022)  The screening/preventive services that you may require over the next 5-10 years are detailed below  Some tests may not apply to you based off risk factors and/or age  Screening tests ordered at today's visit but not completed yet may show as past due  Also, please note that scanned in results may not display below  Preventive Screenings:  Service Recommendations Previous Testing/Comments   Colorectal Cancer Screening  * Colonoscopy    * Fecal Occult Blood Test (FOBT)/Fecal Immunochemical Test (FIT)  * Fecal DNA/Cologuard Test  * Flexible Sigmoidoscopy Age: 54-65 years old   Colonoscopy: every 10 years (may be performed more frequently if at higher risk)  OR  FOBT/FIT: every 1 year  OR  Cologuard: every 3 years  OR  Sigmoidoscopy: every 5 years  Screening may be recommended earlier than age 48 if at higher risk for colorectal cancer  Also, an individualized decision between you and your healthcare provider will decide whether screening between the ages of 74-80 would be appropriate  Colonoscopy: Not on file  FOBT/FIT: Not on file  Cologuard: Not on file  Sigmoidoscopy: Not on file          Breast Cancer Screening Age: 36 years old  Frequency: every 1-2 years  Not required if history of left and right mastectomy Mammogram: Not on file        Cervical Cancer Screening Between the ages of 21-29, pap smear recommended once every 3 years  Between the ages of 33-67, can perform pap smear with HPV co-testing every 5 years     Recommendations may differ for women with a history of total hysterectomy, cervical cancer, or abnormal pap smears in past  Pap Smear: Not on file        Hepatitis C Screening Once for adults born between [facility]  More frequently in patients at high risk for Hepatitis C Hep C Antibody: Not on file Diabetes Screening 1-2 times per year if you're at risk for diabetes or have pre-diabetes Fasting glucose: 121 mg/dL   A1C: 6 2 %        Cholesterol Screening Once every 5 years if you don't have a lipid disorder  May order more often based on risk factors  Lipid panel: 03/02/2021          Other Preventive Screenings Covered by Medicare:  6  Abdominal Aortic Aneurysm (AAA) Screening: covered once if your at risk  You're considered to be at risk if you have a family history of AAA  7  Lung Cancer Screening: covers low dose CT scan once per year if you meet all of the following conditions: (1) Age 50-69; (2) No signs or symptoms of lung cancer; (3) Current smoker or have quit smoking within the last 15 years; (4) You have a tobacco smoking history of at least 30 pack years (packs per day multiplied by number of years you smoked); (5) You get a written order from a healthcare provider  8  Glaucoma Screening: covered annually if you're considered high risk: (1) You have diabetes OR (2) Family history of glaucoma OR (3)  aged 48 and older OR (3)  American aged 72 and older  5  Osteoporosis Screening: covered every 2 years if you meet one of the following conditions: (1) You're estrogen deficient and at risk for osteoporosis based off medical history and other findings; (2) Have a vertebral abnormality; (3) On glucocorticoid therapy for more than 3 months; (4) Have primary hyperparathyroidism; (5) On osteoporosis medications and need to assess response to drug therapy  · Last bone density test (DXA Scan): Not on file  10  HIV Screening: covered annually if you're between the age of 12-76  Also covered annually if you are younger than 13 and older than 72 with risk factors for HIV infection  For pregnant patients, it is covered up to 3 times per pregnancy      Immunizations:  Immunization Recommendations   Influenza Vaccine Annual influenza vaccination during flu season is recommended for all persons aged >= 6 months who do not have contraindications   Pneumococcal Vaccine (Prevnar and Pneumovax)  * Prevnar = PCV13  * Pneumovax = PPSV23   Adults 25-60 years old: 1-3 doses may be recommended based on certain risk factors  Adults 72 years old: Prevnar (PCV13) vaccine recommended followed by Pneumovax (PPSV23) vaccine  If already received PPSV23 since turning 65, then PCV13 recommended at least one year after PPSV23 dose  Hepatitis B Vaccine 3 dose series if at intermediate or high risk (ex: diabetes, end stage renal disease, liver disease)   Tetanus (Td) Vaccine - COST NOT COVERED BY MEDICARE PART B Following completion of primary series, a booster dose should be given every 10 years to maintain immunity against tetanus  Td may also be given as tetanus wound prophylaxis  Tdap Vaccine - COST NOT COVERED BY MEDICARE PART B Recommended at least once for all adults  For pregnant patients, recommended with each pregnancy  Shingles Vaccine (Shingrix) - COST NOT COVERED BY MEDICARE PART B  2 shot series recommended in those aged 48 and above     Health Maintenance Due:  There are no preventive care reminders to display for this patient  Immunizations Due:      Topic Date Due    DTaP,Tdap,and Td Vaccines (1 - Tdap) Never done    Pneumococcal Vaccine: 65+ Years (1 of 2 - PPSV23) 10/27/2015    Influenza Vaccine (1) 09/01/2021     Advance Directives   What are advance directives? Advance directives are legal documents that state your wishes and plans for medical care  These plans are made ahead of time in case you lose your ability to make decisions for yourself  Advance directives can apply to any medical decision, such as the treatments you want, and if you want to donate organs  What are the types of advance directives? There are many types of advance directives, and each state has rules about how to use them  You may choose a combination of any of the following:  · Living will:   This is a written record of the treatment you want  You can also choose which treatments you do not want, which to limit, and which to stop at a certain time  This includes surgery, medicine, IV fluid, and tube feedings  · Durable power of  for healthcare [place] [facility]): This is a written record that states who you want to make healthcare choices for you when you are unable to make them for yourself  This person, called a proxy, is usually a family member or a friend  You may choose more than 1 proxy  · Do not resuscitate (DNR) order:  A DNR order is used in case your heart stops beating or you stop breathing  It is a request not to have certain forms of treatment, such as CPR  A DNR order may be included in other types of advance directives  · Medical directive: This covers the care that you want if you are in a coma, near death, or unable to make decisions for yourself  You can list the treatments you want for each condition  Treatment may include pain medicine, surgery, blood transfusions, dialysis, IV or tube feedings, and a ventilator (breathing machine)  · Values history: This document has questions about your views, beliefs, and how you feel and think about life  This information can help others choose the care that you would choose  Why are advance directives important? An advance directive helps you control your care  Although spoken wishes may be used, it is better to have your wishes written down  Spoken wishes can be misunderstood, or not followed  Treatments may be given even if you do not want them  An advance directive may make it easier for your family to make difficult choices about your care  © Copyright [address] 2018 Information is for End User's use only and may not be sold, redistributed or otherwise used for commercial purposes   All illustrations and images included in CareNotes® are the copyrighted property of [employer]  or [address] [place] [address] [employer]

## 2021-08-09 ENCOUNTER — TELEPHONE (OUTPATIENT)
Dept: FAMILY MEDICINE CLINIC | Facility: CLINIC | Age: 86
End: 2021-08-09

## 2021-08-09 DIAGNOSIS — Z20.822 EXPOSURE TO COVID-19 VIRUS: Primary | ICD-10-CM

## 2021-08-09 NOTE — TELEPHONE ENCOUNTER
Patient's aide was at her home this past Friday and aid is positive for Covid  Would like order for patient to be tested  Patient has have her covid vaccines

## 2021-08-09 NOTE — TELEPHONE ENCOUNTER
I called and let Amber Smith know the order was placed, she will present to [facility] for the COVID-19 test

## 2021-08-11 PROCEDURE — U0005 INFEC AGEN DETEC AMPLI PROBE: HCPCS | Performed by: FAMILY MEDICINE

## 2021-08-11 PROCEDURE — U0003 INFECTIOUS AGENT DETECTION BY NUCLEIC ACID (DNA OR RNA); SEVERE ACUTE RESPIRATORY SYNDROME CORONAVIRUS 2 (SARS-COV-2) (CORONAVIRUS DISEASE [COVID-19]), AMPLIFIED PROBE TECHNIQUE, MAKING USE OF HIGH THROUGHPUT TECHNOLOGIES AS DESCRIBED BY CMS-2020-01-R: HCPCS | Performed by: FAMILY MEDICINE

## 2021-09-03 ENCOUNTER — OFFICE VISIT (OUTPATIENT)
Dept: FAMILY MEDICINE CLINIC | Facility: CLINIC | Age: 86
End: 2021-09-03
Payer: COMMERCIAL

## 2021-09-03 DIAGNOSIS — R06.02 SOB (SHORTNESS OF BREATH): ICD-10-CM

## 2021-09-03 DIAGNOSIS — I10 ESSENTIAL HYPERTENSION: ICD-10-CM

## 2021-09-03 DIAGNOSIS — M15.9 GENERALIZED OSTEOARTHRITIS: Primary | ICD-10-CM

## 2021-09-03 DIAGNOSIS — R26.2 AMBULATORY DYSFUNCTION: ICD-10-CM

## 2021-09-03 PROCEDURE — 99213 OFFICE O/P EST LOW 20 MIN: CPT | Performed by: FAMILY MEDICINE

## 2021-09-03 RX ORDER — NALOXONE HYDROCHLORIDE 4 MG/.1ML
SPRAY NASAL
Qty: 1 EACH | Refills: 1 | Status: SHIPPED | OUTPATIENT
Start: 2021-09-03 | End: 2021-12-29 | Stop reason: ALTCHOICE

## 2021-09-03 RX ORDER — TRAMADOL HYDROCHLORIDE 50 MG/1
TABLET ORAL
Qty: 30 TABLET | Refills: 0 | Status: SHIPPED | OUTPATIENT
Start: 2021-09-03 | End: 2021-09-07

## 2021-09-03 NOTE — ASSESSMENT & PLAN NOTE
Patient c/o worsening generalized joint pains  Rx sent to pharmacy for Tramadol 50 mg to take 1/2 tablet twice daily PRN for pain  Reviewed side effects with patient and her daughter  Continue taking Tylenol PRN

## 2021-09-03 NOTE — PROGRESS NOTES
Virtual Regular Visit    Verification of patient location:    Patient is located in the following state in which I hold an active license [place]      Assessment/Plan:    Problem List Items Addressed This Visit        Cardiovascular and Mediastinum    Hypertension     Continue Losartan 100 mg daily, Caduet 10/20 mg daily  Continue to monitor BP at home  Musculoskeletal and Integument    Generalized osteoarthritis - Primary     Patient c/o worsening generalized joint pains  Rx sent to pharmacy for Tramadol 50 mg to take 1/2 tablet twice daily PRN for pain  Reviewed side effects with patient and her daughter  Continue taking Tylenol PRN  Relevant Medications    traMADol (ULTRAM) 50 mg tablet    naloxone (NARCAN) 4 mg/0 1 mL nasal spray       Other    Ambulatory dysfunction     Continue to use a walker for ambulation to prevent falls  SOB (shortness of breath)     Recommended patient's daughter to take her mother to the emergency room over the weekend is shortness of breath worsening, develops chest pain, worsening leg edema  Reason for visit is   Chief Complaint   Patient presents with    Virtual Regular Visit        Encounter provider Fanny Trinh MD    Provider located at [address] [place] [id number]      Recent Visits  No visits were found meeting these conditions  Showing recent visits within past 7 days and meeting all other requirements  Today's Visits  Date Type Provider Dept   09/03/21 Office Visit Fanny Trinh MD [address] today's visits and meeting all other requirements  Future Appointments  No visits were found meeting these conditions  Showing future appointments within next 150 days and meeting all other requirements       The patient was identified by name and date of birth   Breonna Thomas was informed that this is a telemedicine visit and that the visit is being conducted through [address] [place] [address] [facility] [employer] and patient was informed that this is a secure, HIPAA-compliant platform  She agrees to proceed     My office door was closed  No one else was in the room  She acknowledged consent and understanding of privacy and security of the video platform  The patient has agreed to participate and understands they can discontinue the visit at any time  Patient is aware this is a billable service  Subjective  Luz Pineda is a 80 y o  female with PMHx of HTN, Hypothyroidism, Hyperlipidemia, Type 2 DM - diet controlled, CKD stage 3, PVD, generalized OA, ambulatory dysfunction, peripheral neuropathy  HPI     Patient presents for virtual video visit  Her daughter is present in the room to provide medical history  Patient c/o worsening generalized joint pains, feeling more weakness with walking, mild shortness of breath  No fever, chills  Reports no worsening leg edema  No chest pain, dizziness  No known exposure to Covid -19  Patient has been taking Tylenol PRN for arthritic pain which does not provide significant relief  Due to HTN, CKD stage 3 she avoids using NSAID's  Ambulates with a walker  Reports no falls  According to patient's daughter her mother looks well, her color is normal   No acute SOB  BP on 9/1/21 was 154/56  BP today 162/60  Blood test done in July 2021  TSH 1 190, potassium 5 3, creatinine 0 30 -stable  GFR 35, HbA1C 6 2      Past Medical History:   Diagnosis Date    AAA [facility]abdominal aortic aneurysm) ([facility])     s/p EVAR 3/10/16    Allergic urticaria     LAST ASSESSED: [id number]    Appendicitis     Arthritis     CKD (chronic kidney disease), stage III ([facility])     Coronary artery disease     Diabetes mellitus ([facility] [address] )     Eczema     Gross hematuria     LAST ASSESSED: [id number]    Hematuria     Hypertension     Hyponatremia     Hypothyroid     Osteoporosis     LAST ASSESSED: [id number]    PONV (postoperative nausea and vomiting)     Primary osteoarthritis of left knee     LAST ASSESSED: [id number]    Renal disorder     Renal insufficiency     Rotator cuff tear arthropathy     LEFT LAST ASSESSED: [id number]    Secondary osteoarthritis of right shoulder     LAST ASSESSED: [id number]    Syncope        Past Surgical History:   Procedure Laterality Date    APPENDECTOMY      BACK SURGERY      CATARACT EXTRACTION W/  INTRAOCULAR LENS IMPLANT Bilateral     KNEE ARTHROSCOPY Left     ONSET: [id number] THERAPEUTIC    LUMBAR LAMINECTOMY      KS AAA REPAIR,MODULR BIFURCATED PROSTH N/A 3/10/2016    Procedure: REPAIR ANEURYSM ENDOVASCULAR ABDOMINAL AORTIC  (EVAR) ; Surgeon: Shekhar Shen MD;  [facility]: [facility];  Service: Vascular    KS RECONSTR TOTAL SHOULDER IMPLANT Left 5/31/2016    Procedure: ARTHROPLASTY SHOULDER REVERSE;  Surgeon: Abbey Noguera MD;  [facility];  Service: Orthopedics    KS TOTAL KNEE ARTHROPLASTY Left 7/23/2018    Procedure: KNEE : COMPLETE REPLACEMENT;  Surgeon: Jamie Estes MD;  [facility];  Service: Orthopedics    REVERSE TOTAL SHOULDER ARTHROPLASTY Right     ROTATOR CUFF REPAIR Left     RC SURGERY RESOLVED: 1999    SHOULDER ARTHROPLASTY      5/15 - FOR RIGHT SHOULDER; 5/16 - FOR LEFT SHOULDER    SHOULDER ARTHROSCOPY      TOTAL HIP ARTHROPLASTY Left     ONSET: [id number]    TOTAL HIP ARTHROPLASTY  05/31/2006    REVISION       Current Outpatient Medications   Medication Sig Dispense Refill    acetaminophen (TYLENOL) 325 mg tablet Take 2 tablets (650 mg total) by mouth every 6 (six) hours as needed for mild pain  30 tablet 0    amLODIPine-atorvastatin (CADUET) 10-20 MG per tablet TAKE 1 TABLET AT BEDTIME 90 tablet 3    calcium citrate-vitamin D (CITRACAL+D) 315-200 MG-UNIT per tablet Take 1 tablet by mouth daily   Calcium + D TABS  Refills: 0   , M D ; Active       Docusate Sodium (COLACE PO) Take by mouth as needed      fexofenadine (ALLEGRA) 180 MG tablet Take 1 tablet by mouth as needed        fluticasone (FLONASE) 50 mcg/act nasal spray 2 sprays into each nostril daily Fluticasone Propionate 50 MCG/ACT Nasal Suspension use 2 spr  in each nostril daily  Quantity: 1;  Refills: 5    Deontedongmary Henry M D ;  Started 23-Sep-2014 Active 16 g 5    Lancets (ONETOUCH DELICA PLUS [id number]) MISC TEST BLOOD SUGAR ONE TIME DAILY OR AS NEEDED 100 each 2    levothyroxine 112 mcg tablet Take 1 tablet (112 mcg total) by mouth daily in the early morning 90 tablet 3    LORazepam (ATIVAN) 0 5 mg tablet Take 1 tab  daily PRN for anxiety 30 tablet 3    losartan (COZAAR) 100 MG tablet TAKE 1 TABLET ONCE DAILY 90 tablet 3    Multiple Vitamins-Minerals (MULTIVITAMIN & MINERAL PO) 1 tablet daily  Multivitamin & Mineral Oral Liquid  Quantity: 0;  Refills: 0   , M D ; Active       naloxone [facility]) 4 mg/0 1 mL nasal spray Administer 1 spray into a nostril  If no response after 2-3 minutes, give another dose in the other nostril using a new spray  1 each 1    OneTouch Ultra test strip CHECK BLOOD SUGAR DAILY 100 each 5    pyrithione zinc (HEAD AND SHOULDERS) 1 % shampoo Apply topically daily as needed for dandruff      traMADol (ULTRAM) 50 mg tablet Take 1/2 tab twice daily PRN for joint pains 30 tablet 0    trolamine salicylate (ASPERCREME) 10 % cream Apply topically as needed for muscle/joint pain       No current facility-administered medications for this visit  Allergies   Allergen Reactions    Clobetasol     Fluocinolone     Ketoconazole     Pyrithione     Bextra [Valdecoxib] Rash     GI INTOL    Diclofenac Headache    Percocet [Oxycodone-Acetaminophen] Rash and GI Intolerance       Review of Systems   Constitutional: Positive for fatigue (mild)  Negative for activity change, appetite change and fever  HENT: Positive for hearing loss  Negative for congestion, sore throat and trouble swallowing  Eyes: Negative      Respiratory: Positive for shortness of breath (mild)  Negative for cough, chest tightness and wheezing  Cardiovascular: Positive for leg swelling (stable )  Negative for chest pain and palpitations  Gastrointestinal: Negative for abdominal pain, diarrhea, nausea and vomiting  Musculoskeletal: Positive for arthralgias, back pain (chronic) and gait problem (ambulates with a walker)  Negative for joint swelling and neck pain  Skin: Negative for rash  Neurological: Positive for numbness (in feet)  Negative for dizziness and headaches  Hematological: Negative  Psychiatric/Behavioral: Negative for dysphoric mood and sleep disturbance  The patient is not nervous/anxious  Video Exam    There were no vitals filed for this visit  Physical Exam  Constitutional:       General: She is not in acute distress  Appearance: Normal appearance  She is not ill-appearing  HENT:      Head: Normocephalic and atraumatic  Nose: No congestion  Eyes:      Conjunctiva/sclera: Conjunctivae normal       Pupils: Pupils are equal, round, and reactive to light  Cardiovascular:      Comments: Denies chest pain   Wears compression stockings  Pulmonary:      Effort: Pulmonary effort is normal       Breath sounds: No wheezing  Abdominal:      General: There is no distension  Tenderness: There is no abdominal tenderness  Musculoskeletal:      Cervical back: Normal range of motion and neck supple  Comments: Arthritic changes in hand joints  Ambulates with a walker   Skin:     Findings: No rash  Neurological:      Mental Status: She is alert and oriented to person, place, and time  Psychiatric:         Mood and Affect: Mood normal           I spent 20 minutes directly with the patient during this visit    VIRTUAL VISIT [address] [place] [address] verbally agrees to participate in [employer]   Pt is aware that [employer] could be limited without vital signs or the ability to perform a full hands-on madhavi Kahn understands she or the provider may request at any time to terminate the video visit and request the patient to seek care or treatment in person

## 2021-09-03 NOTE — ASSESSMENT & PLAN NOTE
Recommended patient's daughter to take her mother to the emergency room over the weekend is shortness of breath worsening, develops chest pain, worsening leg edema

## 2021-09-07 DIAGNOSIS — M15.9 GENERALIZED OSTEOARTHRITIS: ICD-10-CM

## 2021-09-07 RX ORDER — TRAMADOL HYDROCHLORIDE 50 MG/1
TABLET ORAL
Qty: 30 TABLET | Refills: 0 | Status: SHIPPED | OUTPATIENT
Start: 2021-09-07 | End: 2021-12-29 | Stop reason: ALTCHOICE

## 2021-09-07 NOTE — TELEPHONE ENCOUNTER
Medication: Tramadol  PDMP please review  Active agreement on file -no    Patient wants if sent to [facility]

## 2021-09-07 NOTE — TELEPHONE ENCOUNTER
Mya, patient's daughter called because pharmacy did not have medication  Prescription was sent to mail service instead of patients preferred pharmacy which is [facility] [place]  Patient would like medication to be re-sent to the [facility]  Contact Mya at: [phone]

## 2021-09-24 ENCOUNTER — TELEPHONE (OUTPATIENT)
Dept: FAMILY MEDICINE CLINIC | Facility: CLINIC | Age: 86
End: 2021-09-24

## 2021-09-24 NOTE — TELEPHONE ENCOUNTER
Oksana Bowser ([phone]) called to note that patient has experienced brain fog and constipation since she was prescribed Tramadol 50 mg tab on 9/7/21  Patient is currently taking 1/2 twice a day  Oksana Bowser asked if the doctor would recommend taking 1/2 tab once a day and just taking  Tylenol at night  Contact Nikki to advise

## 2021-09-24 NOTE — TELEPHONE ENCOUNTER
Please call patient's daughter  Recommend to stop taking Tramadol to avoid side effects and increased risk for falls  Take Tylenol Arthritis 650 mg every 8 hours PRN for pain

## 2021-11-16 ENCOUNTER — PATIENT MESSAGE (OUTPATIENT)
Dept: FAMILY MEDICINE CLINIC | Facility: CLINIC | Age: 86
End: 2021-11-16

## 2021-11-16 DIAGNOSIS — E11.65 TYPE 2 DIABETES MELLITUS WITH HYPERGLYCEMIA, WITHOUT LONG-TERM CURRENT USE OF INSULIN (HCC): ICD-10-CM

## 2021-11-16 DIAGNOSIS — E11.9 TYPE 2 DIABETES MELLITUS WITHOUT COMPLICATION, WITHOUT LONG-TERM CURRENT USE OF INSULIN (HCC): ICD-10-CM

## 2021-11-16 RX ORDER — BLOOD-GLUCOSE METER
KIT MISCELLANEOUS
Qty: 1 KIT | Refills: 0 | Status: SHIPPED | OUTPATIENT
Start: 2021-11-16

## 2021-11-16 RX ORDER — LANCETS 30 GAUGE
EACH MISCELLANEOUS
Qty: 100 EACH | Refills: 3 | Status: SHIPPED | OUTPATIENT
Start: 2021-11-16

## 2021-11-16 RX ORDER — BLOOD SUGAR DIAGNOSTIC
STRIP MISCELLANEOUS
Qty: 100 EACH | Refills: 3 | Status: SHIPPED | OUTPATIENT
Start: 2021-11-16

## 2021-11-18 ENCOUNTER — APPOINTMENT (OUTPATIENT)
Dept: LAB | Age: 86
End: 2021-11-18
Payer: COMMERCIAL

## 2021-11-18 DIAGNOSIS — I10 ESSENTIAL HYPERTENSION: ICD-10-CM

## 2021-11-18 DIAGNOSIS — E03.9 ACQUIRED HYPOTHYROIDISM: ICD-10-CM

## 2021-11-18 DIAGNOSIS — I12.9 TYPE 2 DM WITH CKD STAGE 3 AND HYPERTENSION (HCC): ICD-10-CM

## 2021-11-18 DIAGNOSIS — N18.32 STAGE 3B CHRONIC KIDNEY DISEASE (HCC): ICD-10-CM

## 2021-11-18 DIAGNOSIS — N18.30 TYPE 2 DM WITH CKD STAGE 3 AND HYPERTENSION (HCC): ICD-10-CM

## 2021-11-18 DIAGNOSIS — E78.2 MIXED HYPERLIPIDEMIA: ICD-10-CM

## 2021-11-18 DIAGNOSIS — E11.22 TYPE 2 DM WITH CKD STAGE 3 AND HYPERTENSION (HCC): ICD-10-CM

## 2021-11-18 LAB
ALBUMIN SERPL BCP-MCNC: 4 G/DL (ref 3.5–5)
ALP SERPL-CCNC: 73 U/L (ref 46–116)
ALT SERPL W P-5'-P-CCNC: 29 U/L (ref 12–78)
ANION GAP SERPL CALCULATED.3IONS-SCNC: 7 MMOL/L (ref 4–13)
AST SERPL W P-5'-P-CCNC: 24 U/L (ref 5–45)
BASOPHILS # BLD AUTO: 0.06 THOUSANDS/ΜL (ref 0–0.1)
BASOPHILS NFR BLD AUTO: 1 % (ref 0–1)
BILIRUB SERPL-MCNC: 1.03 MG/DL (ref 0.2–1)
BUN SERPL-MCNC: 28 MG/DL (ref 5–25)
CALCIUM SERPL-MCNC: 9.6 MG/DL (ref 8.3–10.1)
CHLORIDE SERPL-SCNC: 106 MMOL/L (ref 100–108)
CHOLEST SERPL-MCNC: 173 MG/DL (ref 50–200)
CO2 SERPL-SCNC: 23 MMOL/L (ref 21–32)
CREAT SERPL-MCNC: 1.43 MG/DL (ref 0.6–1.3)
EOSINOPHIL # BLD AUTO: 0.17 THOUSAND/ΜL (ref 0–0.61)
EOSINOPHIL NFR BLD AUTO: 2 % (ref 0–6)
ERYTHROCYTE [DISTWIDTH] IN BLOOD BY AUTOMATED COUNT: 14.5 % (ref 11.6–15.1)
GFR SERPL CREATININE-BSD FRML MDRD: 31 ML/MIN/1.73SQ M
GLUCOSE P FAST SERPL-MCNC: 135 MG/DL (ref 65–99)
HCT VFR BLD AUTO: 37.7 % (ref 34.8–46.1)
HDLC SERPL-MCNC: 100 MG/DL
HGB BLD-MCNC: 12.3 G/DL (ref 11.5–15.4)
IMM GRANULOCYTES # BLD AUTO: 0.03 THOUSAND/UL (ref 0–0.2)
IMM GRANULOCYTES NFR BLD AUTO: 0 % (ref 0–2)
LDLC SERPL CALC-MCNC: 58 MG/DL (ref 0–100)
LYMPHOCYTES # BLD AUTO: 2.16 THOUSANDS/ΜL (ref 0.6–4.47)
LYMPHOCYTES NFR BLD AUTO: 23 % (ref 14–44)
MCH RBC QN AUTO: 31.9 PG (ref 26.8–34.3)
MCHC RBC AUTO-ENTMCNC: 32.6 G/DL (ref 31.4–37.4)
MCV RBC AUTO: 98 FL (ref 82–98)
MONOCYTES # BLD AUTO: 0.83 THOUSAND/ΜL (ref 0.17–1.22)
MONOCYTES NFR BLD AUTO: 9 % (ref 4–12)
NEUTROPHILS # BLD AUTO: 6.1 THOUSANDS/ΜL (ref 1.85–7.62)
NEUTS SEG NFR BLD AUTO: 65 % (ref 43–75)
NONHDLC SERPL-MCNC: 73 MG/DL
NRBC BLD AUTO-RTO: 0 /100 WBCS
PLATELET # BLD AUTO: 197 THOUSANDS/UL (ref 149–390)
PMV BLD AUTO: 10.8 FL (ref 8.9–12.7)
POTASSIUM SERPL-SCNC: 4.4 MMOL/L (ref 3.5–5.3)
PROT SERPL-MCNC: 7.3 G/DL (ref 6.4–8.2)
RBC # BLD AUTO: 3.85 MILLION/UL (ref 3.81–5.12)
SODIUM SERPL-SCNC: 136 MMOL/L (ref 136–145)
TRIGL SERPL-MCNC: 75 MG/DL
TSH SERPL DL<=0.05 MIU/L-ACNC: 1.97 UIU/ML (ref 0.36–3.74)
WBC # BLD AUTO: 9.35 THOUSAND/UL (ref 4.31–10.16)

## 2021-11-18 PROCEDURE — 85025 COMPLETE CBC W/AUTO DIFF WBC: CPT

## 2021-11-18 PROCEDURE — 84443 ASSAY THYROID STIM HORMONE: CPT

## 2021-11-18 PROCEDURE — 80053 COMPREHEN METABOLIC PANEL: CPT

## 2021-11-18 PROCEDURE — 80061 LIPID PANEL: CPT

## 2021-11-18 PROCEDURE — 36415 COLL VENOUS BLD VENIPUNCTURE: CPT

## 2021-12-18 NOTE — TELEPHONE ENCOUNTER
Spoke with daughter Rohith Aldridge notified and instructions given  Just as an FYI patient was having episodes of confusion, seems to be clearing up remembering things  [facility] will bring patient to appointment  Patient/Caregiver provided printed discharge information.

## 2021-12-26 PROBLEM — G63 POLYNEUROPATHY ASSOCIATED WITH UNDERLYING DISEASE (HCC): Status: RESOLVED | Noted: 2020-06-25 | Resolved: 2021-12-26

## 2021-12-29 ENCOUNTER — APPOINTMENT (OUTPATIENT)
Dept: RADIOLOGY | Age: 86
End: 2021-12-29
Payer: COMMERCIAL

## 2021-12-29 ENCOUNTER — OFFICE VISIT (OUTPATIENT)
Dept: FAMILY MEDICINE CLINIC | Facility: CLINIC | Age: 86
End: 2021-12-29
Payer: COMMERCIAL

## 2021-12-29 VITALS
HEIGHT: 65 IN | BODY MASS INDEX: 24.32 KG/M2 | RESPIRATION RATE: 16 BRPM | DIASTOLIC BLOOD PRESSURE: 80 MMHG | WEIGHT: 146 LBS | HEART RATE: 76 BPM | OXYGEN SATURATION: 99 % | TEMPERATURE: 97 F | SYSTOLIC BLOOD PRESSURE: 146 MMHG

## 2021-12-29 DIAGNOSIS — N18.32 STAGE 3B CHRONIC KIDNEY DISEASE (HCC): ICD-10-CM

## 2021-12-29 DIAGNOSIS — J30.9 ALLERGIC RHINITIS, UNSPECIFIED SEASONALITY, UNSPECIFIED TRIGGER: ICD-10-CM

## 2021-12-29 DIAGNOSIS — R26.2 AMBULATORY DYSFUNCTION: ICD-10-CM

## 2021-12-29 DIAGNOSIS — N18.30 TYPE 2 DM WITH CKD STAGE 3 AND HYPERTENSION (HCC): ICD-10-CM

## 2021-12-29 DIAGNOSIS — I73.9 PERIPHERAL VASCULAR DISEASE (HCC): ICD-10-CM

## 2021-12-29 DIAGNOSIS — M53.3 SACRAL PAIN: ICD-10-CM

## 2021-12-29 DIAGNOSIS — I12.9 TYPE 2 DM WITH CKD STAGE 3 AND HYPERTENSION (HCC): ICD-10-CM

## 2021-12-29 DIAGNOSIS — M15.9 GENERALIZED OSTEOARTHRITIS: ICD-10-CM

## 2021-12-29 DIAGNOSIS — E03.9 ACQUIRED HYPOTHYROIDISM: Primary | ICD-10-CM

## 2021-12-29 DIAGNOSIS — F41.9 ANXIETY: ICD-10-CM

## 2021-12-29 DIAGNOSIS — E11.42 DIABETIC PERIPHERAL NEUROPATHY (HCC): ICD-10-CM

## 2021-12-29 DIAGNOSIS — I10 PRIMARY HYPERTENSION: ICD-10-CM

## 2021-12-29 DIAGNOSIS — E11.22 TYPE 2 DM WITH CKD STAGE 3 AND HYPERTENSION (HCC): ICD-10-CM

## 2021-12-29 DIAGNOSIS — E78.2 MIXED HYPERLIPIDEMIA: ICD-10-CM

## 2021-12-29 PROBLEM — F11.20 CONTINUOUS OPIOID DEPENDENCE (HCC): Status: RESOLVED | Noted: 2021-12-29 | Resolved: 2021-12-29

## 2021-12-29 PROBLEM — R06.02 SOB (SHORTNESS OF BREATH): Status: RESOLVED | Noted: 2020-06-25 | Resolved: 2021-12-29

## 2021-12-29 PROBLEM — F11.20 CONTINUOUS OPIOID DEPENDENCE (HCC): Status: ACTIVE | Noted: 2021-12-29

## 2021-12-29 LAB — SL AMB POCT HEMOGLOBIN AIC: 6 (ref ?–6.5)

## 2021-12-29 PROCEDURE — 99215 OFFICE O/P EST HI 40 MIN: CPT | Performed by: FAMILY MEDICINE

## 2021-12-29 PROCEDURE — 83036 HEMOGLOBIN GLYCOSYLATED A1C: CPT | Performed by: FAMILY MEDICINE

## 2021-12-29 PROCEDURE — 72220 X-RAY EXAM SACRUM TAILBONE: CPT

## 2021-12-29 RX ORDER — LORAZEPAM 0.5 MG/1
TABLET ORAL
Qty: 30 TABLET | Refills: 3 | Status: SHIPPED | OUTPATIENT
Start: 2021-12-29

## 2021-12-30 ENCOUNTER — TELEPHONE (OUTPATIENT)
Dept: ADMINISTRATIVE | Facility: OTHER | Age: 86
End: 2021-12-30

## 2021-12-30 NOTE — LETTER
Diabetic Foot Exam Form    Date Requested: 21  Patient: Lizzeth Latham  Patient : 1925   Referring Provider: Davida Burch MD    Diabetic Foot Exam Performed with shoes and socks removed        Yes         No     Date of Diabetic Foot Exam ______________________________  Risk Score ____________________________________________    Left Foot       Visual Inspection         Monofilament Testing Sensory Exam        Pedal Pulses         Additional Comments         Right Foot      Visual Inspection         Monofilament Testing Sensory Exam       Pedal Pulses         Additional Comments         Comments __________________________________________________________    Practice Providing Exam ______________________________________________    Exam Performed By (print name) _______________________________________      Provider Signature ___________________________________________________      These reports are needed for  compliance  Please fax this completed form and a copy of the Diabetic Foot Exam report to our office located at [place] [address] as soon as possible via [phone] jermaine Justice Brass: Phone [phone]    We thank you for your assistance in treating our mutual patient

## 2021-12-30 NOTE — TELEPHONE ENCOUNTER
----- Message from Ca Myles sent at 12/29/2021 11:27 AM EST -----  Regarding: care gap request  12/29/21 11:27 AM    Hello, our patient attached above has had Diabetic Foot Exam completed/performed  Please assist in updating the patient chart by making an External outreach to Dr Cherylin Councilman with [facility] facility    The date of service is unknown phone # is [phone]    Thank you,  Jaron Hernandez MA  [facility]

## 2021-12-30 NOTE — LETTER
Diabetic Foot Exam Form    Date Requested: 22  Patient: Amada New  Patient : 1925   Referring Provider: Aline Encinas MD   2nd Request    Diabetic Foot Exam Performed with shoes and socks removed        Yes         No     Date of Diabetic Foot Exam ______________________________  Risk Score ____________________________________________    Left Foot       Visual Inspection         Monofilament Testing Sensory Exam        Pedal Pulses         Additional Comments         Right Foot      Visual Inspection         Monofilament Testing Sensory Exam       Pedal Pulses         Additional Comments         Comments __________________________________________________________    Practice Providing Exam ______________________________________________    Exam Performed By (print name) _______________________________________      Provider Signature ___________________________________________________      These reports are needed for  compliance  Please fax this completed form and a copy of the Diabetic Foot Exam report to our office located at [place] [address] as soon as possible via [phone] jermaine Romero: Phone [phone]    We thank you for your assistance in treating our mutual patient

## 2021-12-30 NOTE — TELEPHONE ENCOUNTER
Upon review of the In Basket request and the patient's chart, initial outreach has been made via fax, please see Contacts section for details       Thank you  Mono Mckeon, [address] DPM [phone] Fax [phone]

## 2022-01-03 NOTE — TELEPHONE ENCOUNTER
As a follow-up, a second attempt has been made for outreach via fax, please see Contacts section for details      Thank you  Nuris Chan, [address] DPM [phone] Fax [phone]

## 2022-01-04 ENCOUNTER — EVALUATION (OUTPATIENT)
Dept: PHYSICAL THERAPY | Age: 87
End: 2022-01-04
Payer: COMMERCIAL

## 2022-01-04 DIAGNOSIS — R26.2 AMBULATORY DYSFUNCTION: ICD-10-CM

## 2022-01-04 DIAGNOSIS — M15.9 GENERALIZED OSTEOARTHRITIS: Primary | ICD-10-CM

## 2022-01-04 PROCEDURE — 97162 PT EVAL MOD COMPLEX 30 MIN: CPT | Performed by: PHYSICAL THERAPIST

## 2022-01-04 NOTE — TELEPHONE ENCOUNTER
Upon review of the In Basket request we were able to locate, review, and update the patient chart as requested for Diabetic Foot Exam     Any additional questions or concerns should be emailed to the Practice Liaisons via [email or web address] email, please do not reply via In Basket      Thank you  Pearson Dubin, [place]

## 2022-01-04 NOTE — PROGRESS NOTES
PT Evaluation     Today's date: 2022  Patient name: Gonzalo Purcell  :   MRN: [id number]  Referring provider: Mina Sage MD  Dx:   Encounter Diagnosis     ICD-10-CM    1  Generalized osteoarthritis  M15 9 Ambulatory referral to Physical Therapy   2  Ambulatory dysfunction  R26 2 Ambulatory referral to Physical Therapy                  Assessment  Assessment details: Patient seen for PT evaluation for generalized OA, gait dysfunction and balance  Patient presents with decreased standing balance, decreased UE and LE strength including increase in R ankle weakness specifically with DF on R ( just appears to be muscle weakness, and lacks muscle endurance to continue with DF during swing phase with heavier shoe 2* leg length discrepancy)  Patient with decreased standing balance, limited with balance when not holding onto a surface (ie zipping her jacket, etc)  Patient is still independent with IADLs at home and does have assistance each day to help her with her compression stockings, etc  Patient is a good candidate for PT  Impairments: abnormal gait, abnormal or restricted ROM, activity intolerance, impaired balance, impaired physical strength, lacks appropriate home exercise program, pain with function, poor posture  and poor body mechanics  Functional limitations: Patient reports decreased balance, strength and slight B glute/back pain with standing, IADLs, with walking around her apartment, limitations with recreational activites Understanding of Dx/Px/POC: good   Prognosis: good    Goals  Impairment Goals to be met within 4 weeks  - Decrease pain to 0/10  - Improve ROM 5 degrees extension  - Increase strength to 4+/5 throughout  - Increase UE strength to 4-/5 grossly throughout        Functional Goals to be met within 4-6 weeks     - Return to Prior Level of Function  - Increase Functional Status Measure to: expected  - Patient will be independent with HEP  - Patient to be able to perform SLS x 2 sec on ea  - Patient to be able to perform 5 reps sit<> 15 sec  Plan  Patient would benefit from: skilled physical therapy  Planned modality interventions: cryotherapy and thermotherapy: hydrocollator packs  Planned therapy interventions: joint mobilization, manual therapy, neuromuscular re-education, patient education, postural training, strengthening, stretching, therapeutic activities, therapeutic exercise, home exercise program, body mechanics training and balance  Frequency: 2x week  Duration in weeks: 12  Treatment plan discussed with: patient        Subjective Evaluation    History of Present Illness  Mechanism of injury: Patient reports that she has been feeling weaker, hasn't been getting out for walks like she was previously  Patient lives in assisted living facility, meals are prepared for her  Since COVID, she has been more isolated in her room, and is able to leave for events, visiting with her family  Patient previously exercised in her apartment and used to walk the halls, and outside around the building  Patient reports that she's been having a hard time with her exercises because of her back pain  Patient reports that her back pain is worse with bending (ie with getting her bed made, walking around her room, etc)  Patient did note a fall a few days ago, reports that she forgot, was rushing around, the lights were off and she was trying to turn the lights back on when she lost her balance, the walker got away from her and she fell/landed on the arm rests of the walkers  Had x rays of her shoulder and all negative  Patient reports that her arm/shoulder is getting better  Patient has an alarm that she wears and pressed the button, assistance came and she was assisted into standing   Patient reports this is her only fall since she was last in PT     PMH:   B reverse TSA, TKA, peripheral neuroapathy  Pain  Current pain ratin  At best pain ratin  At worst pain rating: 7  Location: B hips and low back  Quality: sharp, dull ache and discomfort  Relieving factors: relaxation  Aggravating factors: sitting, standing, walking, stair climbing and lifting  Progression: no change    Social Support  Steps to enter house: no  Stairs in house: no   Lives in: apartment and community-based residential facility  Lives with: alone    Employment status: not working    Diagnostic Tests  No diagnostic tests performed  Treatments  Previous treatment: physical therapy  Patient Goals  Patient goals for therapy: decreased pain, improved balance, increased motion, increased strength and independence with ADLs/IADLs          Objective     Active Range of Motion   Left Shoulder   Flexion: 110 degrees   External rotation BTH: WFL    Right Shoulder   Flexion: 120 degrees   External rotation BTH: WFL    Lumbar   Flexion: 40 degrees   Extension: 0 degrees   Left rotation: 10 degrees   Right rotation: 10 degrees   Left Hip   Flexion: 90 degrees     Right Hip   Flexion: 90 degrees   Left Knee   Flexion: WFL  Extension: 0 degrees     Right Knee   Flexion: WFL  Extension: 0 degrees     Passive Range of Motion     Right Ankle/Foot    Dorsiflexion (ke): 0 degrees     Strength/Myotome Testing     Left Shoulder     Planes of Motion   Flexion: 3+   Abduction: 3     Right Shoulder     Planes of Motion   Flexion: 3+   Abduction: 3+     Left Elbow   Flexion: 3+    Right Elbow   Flexion: 3+    Left Hip   Planes of Motion   Flexion: 4  Extension: 4-  Abduction: 4-  Adduction: 4-    Right Hip   Planes of Motion   Flexion: 4  Extension: 4-  Abduction: 4-  Adduction: 4-    Left Knee   Flexion: 4  Extension: 4    Right Knee   Flexion: 4  Extension: 4    Left Ankle/Foot   Dorsiflexion: 4  Plantar flexion: 4    Right Ankle/Foot   Dorsiflexion: 3  Plantar flexion: 4    Functional Assessment        Single Leg Stance   Left: 0 seconds  Right: 0 seconds  Neuro Exam:     Functional outcomes   5x sit to stand: unable to perform (seconds)  2 minute walk test: 175' with 4 wheel walker  TU sec with 4 wheel walker (seconds)  Functional outcome gait comment: Patient ambulates with decreased and unequal stride length, lacking R toe clearance during swing phase, shorter step length on R compared to L, decreased gait speed  Patient is very fearful and can't tolerate walking without her walker  Patient would like to be able to take a step or 2 without the walker, but is becoming more dependent on the walker for her balance  Patient does also have trendelenburg at R hip during SLS, also tends to adduct her R knee during stance and L swing phase which can also cause her to lose her balance  Precautions: FALL risk, h/o B reverse TSA, 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  Manuals                                        Neuro Re-Ed                sidestepping        Tandem walking                                        Ther Ex                Nu step                bridges 20x       SLR flex        Hip abd with TB        S/l clam shells? SAQ 1 5# 2x10 :03       LAQ 1 5# 2x10 :03               SLR x 3 standing        Ham curls        HR        Ball squeezes 20x :03       squats                Seated TR Give for home NV, 10x               Standing back bends?  At // bars                                Ther Activity                        Gait Training                        Modalities

## 2022-01-06 ENCOUNTER — OFFICE VISIT (OUTPATIENT)
Dept: PHYSICAL THERAPY | Age: 87
End: 2022-01-06
Payer: COMMERCIAL

## 2022-01-06 DIAGNOSIS — M15.9 GENERALIZED OSTEOARTHRITIS: Primary | ICD-10-CM

## 2022-01-06 DIAGNOSIS — R26.2 AMBULATORY DYSFUNCTION: ICD-10-CM

## 2022-01-06 PROCEDURE — 97110 THERAPEUTIC EXERCISES: CPT | Performed by: PHYSICAL THERAPIST

## 2022-01-06 NOTE — PROGRESS NOTES
Daily Note     Today's date: 2022  Patient name: Agnieszka Guido  :   MRN: [id number]  Referring provider: Meg Recio MD  Dx:   Encounter Diagnosis     ICD-10-CM    1  Generalized osteoarthritis  M15 9    2  Ambulatory dysfunction  R26 2                   Subjective: Patient reports no increase in pain from last time  Patient still having generalized weakness and pain  Objective: See treatment diary below      Assessment: Tolerated treatment well  Patient fatigued with exercising today  Moderately weak at R ankle  Patient with burning pain at L thigh after SLR exercise, alleviate with rest, needing to alternate between 10 reps with SAQ to avoid burning/fatigue in L thigh  Patient unable to hold her foot into neutral DF to be able to perform SLR extension exercise  Plan: Continue per plan of care  Continue to monitor and progress patient as able to tolerate  Precautions: FALL risk, h/o B reverse TSA, 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  Manuals                                       Neuro Re-Ed                sidestepping        Tandem walking                                        Ther Ex                Nu step                bridges 20x 2x10      SLR flex  2x10      Hip abd with TB        S/l clam shells? SAQ 1 5# 2x10 :03 Too fatigued after SLR on R to tolerate weight today 2x10      LAQ 1 5# 2x10 :03               SLR x 3 standing  Flex, abd 2x10 // bars  Ext- too hard 2* foot drop on R  Ham curls  2x10      HR  2x10      Ball squeezes 20x :03 2x10 :03      squats          scap retraction 2x10      Seated TR Give for home NV, 10x 10x:05        Chest press with cane 20x        Biceps curls 2# 20x        YTB rows and extension 2x10 ea, CGA for balance                                                      Standing back bends?  At // bars                                Ther Activity                        Gait Training Modalities

## 2022-01-10 ENCOUNTER — OFFICE VISIT (OUTPATIENT)
Dept: PHYSICAL THERAPY | Age: 87
End: 2022-01-10
Payer: COMMERCIAL

## 2022-01-10 DIAGNOSIS — R26.2 AMBULATORY DYSFUNCTION: ICD-10-CM

## 2022-01-10 DIAGNOSIS — M15.9 GENERALIZED OSTEOARTHRITIS: Primary | ICD-10-CM

## 2022-01-10 PROCEDURE — 97110 THERAPEUTIC EXERCISES: CPT | Performed by: SPECIALIST/TECHNOLOGIST

## 2022-01-10 NOTE — PROGRESS NOTES
Daily Note     Today's date: 1/10/2022  Patient name: Jarett Cardoso  :   MRN: [id number]  Referring provider: Sari Leyden, MD  Dx:   Encounter Diagnosis     ICD-10-CM    1  Generalized osteoarthritis  M15 9    2  Ambulatory dysfunction  R26 2                   Subjective: Pt reports she felt good following previous PT treatment session- no pain/soreness from therex assigned  Objective: See treatment diary below    Assessment: Pt able to perform sit to stands from chair with [address] this visit which is a functional exercise progressions to help with pt's ADL's and transfers  Tolerated treatment well  Patient demonstrated fatigue post treatment, exhibited good technique with therapeutic exercises and would benefit from continued PT    Plan: Continue per plan of care  Progress treatment as tolerated  Precautions: FALL risk, h/o B reverse TSA, 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 SAQ 1 5# 2x10 :03 Too fatigued after SLR on R to tolerate weight today 2x10 30x     LAQ 1 5# 2x10 :03               SLR x 3 standing  Flex, abd 2x10 // bars  Ext- too hard 2* foot drop on R  flx 30x, abd 30x     Ham curls  2x10 2x10     HR  2x10 3x10     Ball squeezes 20x :03 2x10 :03 20x 3s     squats          scap retraction 2x10 2x10       Chest press with cane 20x        Biceps curls 2# 20x 2# 30x       YTB rows and extension 2x10 ea, CGA for balance TYB Rows, Ext Seated 30x                                                      Standing back bends?  At // bars                                Ther Activity                        Gait Training                        Modalities

## 2022-01-11 ENCOUNTER — APPOINTMENT (OUTPATIENT)
Dept: PHYSICAL THERAPY | Age: 87
End: 2022-01-11
Payer: COMMERCIAL

## 2022-01-13 ENCOUNTER — OFFICE VISIT (OUTPATIENT)
Dept: PHYSICAL THERAPY | Age: 87
End: 2022-01-13
Payer: COMMERCIAL

## 2022-01-13 DIAGNOSIS — R26.2 AMBULATORY DYSFUNCTION: ICD-10-CM

## 2022-01-13 DIAGNOSIS — M15.9 GENERALIZED OSTEOARTHRITIS: Primary | ICD-10-CM

## 2022-01-13 PROCEDURE — 97110 THERAPEUTIC EXERCISES: CPT | Performed by: PHYSICAL THERAPIST

## 2022-01-13 NOTE — PROGRESS NOTES
Daily Note     Today's date: 2022  Patient name: Pollo Morrison  :   MRN: [id number]  Referring provider: Linnette Webb MD  Dx:   Encounter Diagnosis     ICD-10-CM    1  Generalized osteoarthritis  M15 9    2  Ambulatory dysfunction  R26 2                   Subjective: Patient reports that she's sore from walking yesterday, notes that her L calf is more sore from walking  Objective: See treatment diary below      Assessment: Tolerated treatment well  Encouraged rest between exercises  Patient challenged and moderately fatigued today, specifically quads, hip flexors  Patient also presenting with more R foot drop, likely from heavier shoe, and with h/o CVA  Did recommend patient to trial an OTC ankle brace that will assist with clearing her toes during swing phase  Gave patient a paper and recommended to discuss with her daughter, to help order, etc  Patient would need to monitor her skin- can wear over her compression socks  Plan: Continue per plan of care  Precautions: FALL risk, h/o B reverse TSA, 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  Manuals 1/4 1/6 1/10 1/13                                    Neuro Re-Ed                sidestepping        Tandem walking                                        Ther Ex                Nu step   15' L1 15' L1            bridges 20x 2x10 2x10 2x10    SLR flex  2x10 2x10 2x10    Hip abd with TB   Red 30x Red 30x    S/l clam shells?                 SAQ 1 5# 2x10 :03 Too fatigued after SLR on R to tolerate weight today 2x10 30x 30x    LAQ 1 5# 2x10 :03           Seated pro stretch 3x:20 R foot- added only 2* gastroc pain today    SLR x 3 standing  Flex, abd 2x10 // bars  Ext- too hard 2* foot drop on R  flx 30x, abd 30x 2x10 flex, abd    Ham curls  2x10 2x10 2x10    HR  2x10 3x10 2x10 - sore     Ball squeezes 20x :03 2x10 :03 20x 3s 20x:03    squats          scap retraction 2x10 2x10 2x10      Chest press with cane 20x  2x10      Biceps curls 2# 20x 2# 30x 2# 30x      YTB rows and extension 2x10 ea, CGA for balance TYB Rows, Ext Seated 30x  YTB 30x standing, CGA                                                    Standing back bends?  At // bars                                Ther Activity                        Gait Training                        Modalities

## 2022-01-18 ENCOUNTER — OFFICE VISIT (OUTPATIENT)
Dept: PHYSICAL THERAPY | Age: 87
End: 2022-01-18
Payer: COMMERCIAL

## 2022-01-18 DIAGNOSIS — M15.9 GENERALIZED OSTEOARTHRITIS: Primary | ICD-10-CM

## 2022-01-18 DIAGNOSIS — R26.2 AMBULATORY DYSFUNCTION: ICD-10-CM

## 2022-01-18 PROCEDURE — 97110 THERAPEUTIC EXERCISES: CPT | Performed by: PHYSICAL THERAPIST

## 2022-01-18 NOTE — PROGRESS NOTES
Daily Note     Today's date: 2022  Patient name: Jarett Cardoso  :   MRN: [id number]  Referring provider: Sari Leyden, MD  Dx:   Encounter Diagnosis     ICD-10-CM    1  Generalized osteoarthritis  M15 9    2  Ambulatory dysfunction  R26 2                   Subjective: Patient reports that she was getting up this morning when her entire L LE went numb and the knee buckled causing patient to lose her balance into her RW  Patient is thankful her dresser was there to catch the walker and to stop her from falling all the way to the ground  Patient reports that once she sat down, regained her composure, her leg was fine, she was able to walk around her apartment and make herself breakfast  Patient has not had any other episodes of the L LE going numb  Patient has had this in the past, usually in the AM  Also notes h/o muscle cramps  Patient has spoken to her daughter about this  Patient also notes that she's still working on not stubbing her toe when she walks (on the R 2* ankle weakness)       Objective: See treatment diary below      Assessment: Tolerated treatment well  Modified program for sitting and standing TE  Patient has been exercising at home with HEP and has progressed back to using her ankle weights again to build her strength- started last week  Patient's LE numbness could have been positioning when sitting edge of the bed could have been a weakness in the leg  Patient is rotated in her gait, R hip higher during stance phase and is rotated with L LE positioned forward, possible fear of putting full weight through the leg during stance phase  Patient is fatigued in L LE with seated TE today, however, added weights and progressed program, no pain nor numbness with exercise program today  Plan: Continue per plan of care  Precautions: FALL risk, h/o B reverse TSA, 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        Manuals 1/4 1/6 1/10 1/13 1/18 Neuro Re-Ed                sidestepping        Tandem walking                                        Ther Ex                Nu step   15' L1 15' L1 x15' L1           bridges 20x 2x10 2x10 2x10 -   SLR flex  2x10 2x10 2x10 -   Hip abd with TB   Red 30x Red 30x -   S/l clam shells? SAQ 1 5# 2x10 :03 Too fatigued after SLR on R to tolerate weight today 2x10 30x 30x -   LAQ 1 5# 2x10 :03           Seated pro stretch 3x:20 R foot- added only 2* gastroc pain today -   SLR x 3 standing  Flex, abd 2x10 // bars  Ext- too hard 2* foot drop on R  flx 30x, abd 30x 2x10 flex, abd 2x10 flex, abd 1 5#    Ham curls  2x10 2x10 2x10 2x10 1 5#    HR  2x10 3x10 2x10 - sore  2x10   Ball squeezes 20x :03 2x10 :03 20x 3s 20x:03 20x :05   squats          scap retraction 2x10 2x10 2x10 2x10     Chest press with cane 20x  2x10 1# 2x10        Cane flexion, seated 1# 2x10     Biceps curls 2# 20x 2# 30x 2# 30x 1# 20x     YTB rows and extension 2x10 ea, CGA for balance TYB Rows, Ext Seated 30x  YTB 30x standing, CGA -        Sit<>stands from chair with foam pads as needed - NV                                           Standing back bends?  At // bars                                Ther Activity                        Gait Training                        Modalities

## 2022-01-20 ENCOUNTER — OFFICE VISIT (OUTPATIENT)
Dept: PHYSICAL THERAPY | Age: 87
End: 2022-01-20
Payer: COMMERCIAL

## 2022-01-20 DIAGNOSIS — M15.9 GENERALIZED OSTEOARTHRITIS: Primary | ICD-10-CM

## 2022-01-20 DIAGNOSIS — R26.2 AMBULATORY DYSFUNCTION: ICD-10-CM

## 2022-01-20 PROCEDURE — 97110 THERAPEUTIC EXERCISES: CPT | Performed by: PHYSICAL THERAPIST

## 2022-01-20 NOTE — PROGRESS NOTES
Daily Note     Today's date: 2022  Patient name: Charli Salvador  :   MRN: [id number]  Referring provider: Ascencion Chowdhury MD  Dx:   Encounter Diagnosis     ICD-10-CM    1  Generalized osteoarthritis  M15 9    2  Ambulatory dysfunction  R26 2                   Subjective: Patient reports that her leg numbness hasn't returned and had no increase in pain after last session  Patient notices that her R ankle strength is improving as she can lift her toes higher than before  Patient is trying to go for walks again, and is exercising at home  Objective: See treatment diary below      Assessment: Tolerated treatment well  Modified program to sitting and standing program to focus on more functional improvement/strengthening  Patient's R UE fatigued with cane TE and patient challenged with TB exercises today, only able to perform 20 reps (as bands were made tighter to challenge her strength)  Patient visibly fatigued in shoulders, and hips, no pain after session today  Plan: Continue per plan of care  Precautions: FALL risk, h/o B reverse TSA, 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        Manuals 1/20 1/6 1/10 1/13 1/18                                   Neuro Re-Ed                sidestepping 3x // bars       Tandem walking                                        Ther Ex                Nu step x15' L1  15' L1 15' L1 x15' L1                   LAQ 1 5# 2x10        Seated marching 1 5# 2x10   Seated pro stretch 3x:20 R foot- added only 2* gastroc pain today -   SLR x 3 standing 1 5# 2x10 Flex, abd 2x10 // bars  Ext- too hard 2* foot drop on R  flx 30x, abd 30x 2x10 flex, abd 2x10 flex, abd 1 5#    Ham curls 1 5# 2x10 2x10 2x10 2x10 2x10 1 5#    HR 2x10 2x10 3x10 2x10 - sore  2x10   Ball squeezes 20x :03 2x10 :03 20x 3s 20x:03 20x :05   squats        scap retraction  2x10 scap retraction 2x10 2x10 2x10 2x10   Chest press with cane 1# 2x10 Chest press with cane 20x  2x10 1# 2x10   Cane flex, seated  1# 2x10    Cane flexion, seated 1# 2x10   Biceps  1# 2x10 Biceps curls 2# 20x 2# 30x 2# 30x 1# 20x   TB rows and extension YTB CGA  YTB rows and extension 2x10 ea, CGA for balance TYB Rows, Ext Seated 30x  YTB 30x standing, CGA -   Sit<>stand from chair  1 foam pad 10x     Sit<>stands from chair with foam pads as needed - NV                                           Standing back bends?  At // bars                                Ther Activity                        Gait Training                        Modalities

## 2022-01-25 ENCOUNTER — OFFICE VISIT (OUTPATIENT)
Dept: PHYSICAL THERAPY | Age: 87
End: 2022-01-25
Payer: COMMERCIAL

## 2022-01-25 DIAGNOSIS — R26.2 AMBULATORY DYSFUNCTION: ICD-10-CM

## 2022-01-25 DIAGNOSIS — M15.9 GENERALIZED OSTEOARTHRITIS: Primary | ICD-10-CM

## 2022-01-25 PROCEDURE — 97112 NEUROMUSCULAR REEDUCATION: CPT | Performed by: PHYSICAL THERAPIST

## 2022-01-25 PROCEDURE — 97110 THERAPEUTIC EXERCISES: CPT | Performed by: PHYSICAL THERAPIST

## 2022-01-25 NOTE — PROGRESS NOTES
Daily Note     Today's date: 2022  Patient name: Michelle Carpenter  :   MRN: [id number]  Referring provider: Daisy Jang MD  Dx:   Encounter Diagnosis     ICD-10-CM    1  Generalized osteoarthritis  M15 9    2  Ambulatory dysfunction  R26 2                   Subjective: Patient reports that she's sore today  Was doing more picking up of objects as her apartment will be deep cleaned today, and plans to put/move her items back today/tomorrow  Objective: See treatment diary below      Assessment: Tolerated treatment well  Patient with 1x L knee buckling after standing TE, patient fatigued  No fall, patient was able to control  Progressed program today to include higher level balance and strengthening exercises  Patient moderately fatigued in upper body, challenged with addition of weight with cane flex and chest press TE today, heavier breathing as a result  Plan: Continue per plan of care  Precautions: FALL risk, h/o B reverse TSA, 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        Manuals 1/20 1/25 1/10 1/13 1/18                                   Neuro Re-Ed                sidestepping 3x // bars 3x // bars      Tandem walking  3x // bars- holding on                                      Ther Ex                Nu step x15' L1 x15' L1 15' L1 15' L1 x15' L1     UBE x3' FW- at end, 120 rpm              LAQ 1 5# 2x10  -      Seated marching 1 5# 2x10 -  Seated pro stretch 3x:20 R foot- added only 2* gastroc pain today -   SLR x 3 standing 1 5# 2x10 1 5# 2x10 flx 30x, abd 30x 2x10 flex, abd 2x10 flex, abd 1 5#    Ham curls 1 5# 2x10 1 5# 2x10 2x10 2x10 2x10 1 5#    HR 2x10 2x10 3x10 2x10 - sore  2x10   Ball squeezes 20x :03 2x10 :03 NT 20x 3s 20x:03 20x :05   squats  20x // bars      scap retraction  2x10 scap retraction 2x10 2x10 2x10 2x10   Chest press with cane 1# 2x10 Chest press with cane 20x 1#   2x10 1# 2x10   Cane flex, seated  1# 2x10 1# 2x10   Cane flexion, seated 1# 2x10 Biceps  1# 2x10 Biceps curls 2# 20x 2# 30x 2# 30x 1# 20x   TB rows and extension YTB CGA  YTB rows and extension 2x10 ea, CGA for balance- NT TYB Rows, Ext Seated 30x  YTB 30x standing, CGA -   Sit<>stand from chair  1 foam pad 10x  -   Sit<>stands from chair with foam pads as needed - NV                                           Standing back bends?  At // bars                                Ther Activity                        Gait Training                        Modalities

## 2022-01-27 ENCOUNTER — OFFICE VISIT (OUTPATIENT)
Dept: PHYSICAL THERAPY | Age: 87
End: 2022-01-27
Payer: COMMERCIAL

## 2022-01-27 DIAGNOSIS — M15.9 GENERALIZED OSTEOARTHRITIS: Primary | ICD-10-CM

## 2022-01-27 DIAGNOSIS — R26.2 AMBULATORY DYSFUNCTION: ICD-10-CM

## 2022-01-27 PROCEDURE — 97110 THERAPEUTIC EXERCISES: CPT

## 2022-01-27 PROCEDURE — 97112 NEUROMUSCULAR REEDUCATION: CPT

## 2022-01-27 NOTE — PROGRESS NOTES
Daily Note     Today's date: 2022  Patient name: Smitha Bettencourt  :   MRN: [id number]  Referring provider: Ady Duran MD  Dx:   Encounter Diagnosis     ICD-10-CM    1  Generalized osteoarthritis  M15 9    2  Ambulatory dysfunction  R26 2                   Subjective: Patient reports that she's still sore today  Was doing more picking up of objects as her apartment will be deep cleaned today, and plans to put/move her items back today/tomorrow  Pt feels her R arm is most sore today      Objective: See treatment diary below      Assessment: Tolerated treatment well  Progressing program today to include higher level balance and strengthening exercises  Added in bwd tandem in bars to further challenge balance  Patient moderately fatigued in upper body, challenged with of weight with cane flex and chest press TE today, heavier breathing as a result  R arm fatigues greater than L  Plan: Continue per plan of care  Precautions: FALL risk, h/o B reverse TSA, 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        Manuals                                    Neuro Re-Ed                sidestepping 3x // bars 3x // bars 3x in bars     Tandem walking  3x // bars- holding on Fwd/bwd x3 in bars                                     Ther Ex                Nu step x15' L1 x15' L1 15' L1 15' L1 x15' L1     UBE x3' FW- at end, 120 rpm UBE 4 min 2fw, 2 BW- 120 RPM             LAQ 1 5# 2x10  -      Seated marching 1 5# 2x10 -  Seated pro stretch 3x:20 R foot- added only 2* gastroc pain today -   SLR x 3 standing 1 5# 2x10 1 5# 2x10 1 5# 2x10 2x10 flex, abd 2x10 flex, abd 1 5#    Ham curls 1 5# 2x10 1 5# 2x10 1 5# 2x10 2x10 2x10 1 5#    HR 2x10 2x10 2x10 2x10 - sore  2x10   Ball squeezes 20x :03 2x10 :03 NT 20x 3s 20x:03 20x :05   squats  20x // bars 20x // bars     scap retraction  2x10 scap retraction 2x10 2x10 2x10 2x10   Chest press with cane 1# 2x10 Chest press with cane 20x 1# Chest press with cane 20x 1 5#  2x10 1# 2x10   Cane flex, seated  1# 2x10 1# 2x10 1 5# 2x10  Cane flexion, seated 1# 2x10   Biceps  1# 2x10 Biceps curls 2# 20x Biceps curls & hammer curls2# 30x 2# 30x 1# 20x   TB rows and extension YTB CGA  YTB rows and extension 2x10 ea, CGA for balance- NT TYB Rows, Ext Seated 30x -NT YTB 30x standing, CGA -   Sit<>stand from chair  1 foam pad 10x  -   Sit<>stands from chair with foam pads as needed - NV                                           Standing back bends?  At // bars                                Ther Activity                        Gait Training                        Modalities

## 2022-02-01 ENCOUNTER — OFFICE VISIT (OUTPATIENT)
Dept: PHYSICAL THERAPY | Age: 87
End: 2022-02-01
Payer: COMMERCIAL

## 2022-02-01 DIAGNOSIS — M15.9 GENERALIZED OSTEOARTHRITIS: Primary | ICD-10-CM

## 2022-02-01 DIAGNOSIS — R26.2 AMBULATORY DYSFUNCTION: ICD-10-CM

## 2022-02-01 PROCEDURE — 97110 THERAPEUTIC EXERCISES: CPT

## 2022-02-01 PROCEDURE — 97112 NEUROMUSCULAR REEDUCATION: CPT

## 2022-02-01 NOTE — PROGRESS NOTES
Daily Note     Today's date: 2022  Patient name: Matthew Rogers  :   MRN: [id number]  Referring provider: Shubham Hooks MD  Dx:   Encounter Diagnosis     ICD-10-CM    1  Generalized osteoarthritis  M15 9    2  Ambulatory dysfunction  R26 2                   Subjective: Patient reports her arms were especially tired after last session  Pt notes that R arm is weaker and that she is noticing her R hip is hurting a "little more"      Objective: See treatment diary below      Assessment: Tolerated treatment well  Progressing program today to include higher level balance and strengthening exercises  Adding in bwd tandem in bars to further challenge balance  Patient moderately fatigued in upper body, challenged with of weight with cane flex and chest press TE today,  Reps were sufficient  Cues pt not to lift R hip too high , as she was noticing some hip soreness with R hip abduction      Plan: Continue per plan of care  Precautions: FALL risk, h/o B reverse TSA, 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        Manuals                                    Neuro Re-Ed                sidestepping 3x // bars 3x // bars 3x in bars 3x  In bars    Tandem walking  3x // bars- holding on Fwd/bwd x3 in bars Fwd/bwd in bars                                    Ther Ex                Nu step x15' L1 x15' L1 15' L1 15' L1 x15' L1     UBE x3' FW- at end, 120 rpm UBE 4 min 2fw, 2 BW- 120 RPM UBE 4 min, 2fw, 2 Bw            LAQ 1 5# 2x10  - 2# 3x10     Seated marching 1 5# 2x10 -   -   SLR x 3 standing 1 5# 2x10 1 5# 2x10 1 5# 2x10 1 5# 2x10 f 2x10 flex, abd 1 5#    Ham curls 1 5# 2x10 1 5# 2x10 1 5# 2x10 1 5# 2x10 2x10 1 5#    HR 2x10 2x10 2x10 2x10  2x10   Ball squeezes 20x :03 2x10 :03 NT 20x 3s 20x:03 20x :05   squats  20x // bars 20x // bars     scap retraction  2x10 scap retraction 2x10 2x10 2x10 2x10   Chest press with cane 1# 2x10 Chest press with cane 20x 1#  Chest press with cane 25x 1 5#  Chest press with cane 20x 1 5#  1# 2x10   Cane flex, seated  1# 2x10 1# 2x10 1 5# 2x10 1 5# 2x10 Cane flexion, seated 1# 2x10   Biceps  1# 2x10 Biceps curls 2# 20x Biceps curls & hammer curls2# 30x 2# 30x 1# 20x   TB rows and extension YTB CGA  YTB rows and extension 2x10 ea, CGA for balance- NT TYB Rows, Ext Seated 30x -NT YTB 30x standing, CGA -   Sit<>stand from chair  1 foam pad 10x  -   Sit<>stands from chair with foam pads as needed - NV                                           Standing back bends?  At // bars                                Ther Activity                        Gait Training                        Modalities

## 2022-02-03 ENCOUNTER — OFFICE VISIT (OUTPATIENT)
Dept: PHYSICAL THERAPY | Age: 87
End: 2022-02-03
Payer: COMMERCIAL

## 2022-02-03 DIAGNOSIS — M15.9 GENERALIZED OSTEOARTHRITIS: Primary | ICD-10-CM

## 2022-02-03 DIAGNOSIS — R26.2 AMBULATORY DYSFUNCTION: ICD-10-CM

## 2022-02-03 PROCEDURE — 97112 NEUROMUSCULAR REEDUCATION: CPT | Performed by: PHYSICAL THERAPIST

## 2022-02-03 PROCEDURE — 97110 THERAPEUTIC EXERCISES: CPT | Performed by: PHYSICAL THERAPIST

## 2022-02-03 NOTE — PROGRESS NOTES
Daily Note     Today's date: 2/3/2022  Patient name: Clint Marsh  :   MRN: [id number]  Referring provider: Liz Urbano MD  Dx:   Encounter Diagnosis     ICD-10-CM    1  Generalized osteoarthritis  M15 9    2  Ambulatory dysfunction  R26 2                   Subjective: Patient pleased therapy is helping her this much so quickly  Patient was able to progress her weights at home and forgot to take them off, spent the day doing her walking with the ankle weights on her legs as well  Patient surprised that at night she didn't have increased cramps in her legs that night or the next day  Objective: See treatment diary below      Assessment: Tolerated treatment well  No increase in pain with TE today  Requiring VC for exercise technique throughout the session  Still requires assistance for balance with theraband exercises  Plan: Continue per plan of care  Precautions: FALL risk, h/o B reverse TSA, 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        Manuals 1/20 1/25 1/27 2/1 2/3                                   Neuro Re-Ed                sidestepping 3x // bars 3x // bars 3x in bars 3x  In bars 3x in bars   Tandem walking  3x // bars- holding on Fwd/bwd x3 in bars Fwd/bwd in bars FW/BW 3x bars                                   Ther Ex                Nu step x15' L1 x15' L1 15' L1 15' L1 x15' L1     UBE x3' FW- at end, 120 rpm UBE 4 min 2fw, 2 BW- 120 RPM UBE 4 min, 2fw, 2 Bw UBE 2/2  120 rpm           LAQ 1 5# 2x10  - 2# 3x10     Seated marching 1 5# 2x10 -      SLR x 3 standing 1 5# 2x10 1 5# 2x10 1 5# 2x10 1 5# 2x10 f 1 5# 2x10   Ham curls 1 5# 2x10 1 5# 2x10 1 5# 2x10 1 5# 2x10 2x10 1 5#    HR 2x10 2x10 2x10 2x10  2x10   Ball squeezes 20x :03 2x10 :03 NT 20x 3s 20x:03 20x :05   squats  20x // bars 20x // bars  20x // bars   scap retraction  2x10 scap retraction 2x10 2x10 2x10 2x10   Chest press with cane 1# 2x10 Chest press with cane 20x 1#  Chest press with cane 25x 1 5#  Chest press with cane 20x 1 5#  1 5# 2x10   Cane flex, seated  1# 2x10 1# 2x10 1 5# 2x10 1 5# 2x10 Cane flexion, seated 1 5# 2x10   Biceps  1# 2x10 Biceps curls 2# 20x Biceps curls & hammer curls2# 30x 2# 30x 2# 20x   TB rows and extension YTB CGA  YTB rows and extension 2x10 ea, CGA for balance- NT TYB Rows, Ext Seated 30x -NT YTB 30x standing, CGA YTB 30x standing, CGA   Sit<>stand from chair  1 foam pad 10x  -   -                                           Standing back bends?  At // bars                                Ther Activity                        Gait Training                        Modalities

## 2022-02-08 ENCOUNTER — EVALUATION (OUTPATIENT)
Dept: PHYSICAL THERAPY | Age: 87
End: 2022-02-08
Payer: COMMERCIAL

## 2022-02-08 DIAGNOSIS — R26.2 AMBULATORY DYSFUNCTION: ICD-10-CM

## 2022-02-08 DIAGNOSIS — M15.9 GENERALIZED OSTEOARTHRITIS: Primary | ICD-10-CM

## 2022-02-08 PROCEDURE — 97112 NEUROMUSCULAR REEDUCATION: CPT | Performed by: PHYSICAL THERAPIST

## 2022-02-08 PROCEDURE — 97110 THERAPEUTIC EXERCISES: CPT | Performed by: PHYSICAL THERAPIST

## 2022-02-08 NOTE — PROGRESS NOTES
PT Re-Evaluation     Today's date: 2022  Patient name: Too Cuevas  : [id number]  MRN: [id number]  Referring provider: Javier Huitron MD  Dx:   Encounter Diagnosis     ICD-10-CM    1  Generalized osteoarthritis  M15 9    2  Ambulatory dysfunction  R26 2                   Assessment  Assessment details: Patient seen for PT re-assessment  Patient presents with increasing UE and LE strength, improving balance, improving DF strength  Patient has been working at home as well, but has been having pain at home with her HEP  Patient tolerates therapy well in PT and is making good progress  Recommending patient to continue with PT  Patient is still independent with IADLs at home and does have assistance each day to help her with her compression stockings, etc  Patient is a good candidate for PT  Impairments: abnormal gait, abnormal or restricted ROM, activity intolerance, impaired balance, impaired physical strength, lacks appropriate home exercise program, pain with function, poor posture  and poor body mechanics  Functional limitations: Patient reports decreased balance, strength and slight B glute/back pain with standing, IADLs, with walking around her apartment, limitations with recreational activites Understanding of Dx/Px/POC: good   Prognosis: good    Goals  Impairment Goals to be met within 4 weeks  - Decrease pain to 0/10 partially met  - Improve ROM 5 degrees extension not met  - Increase strength to 4+/5 throughout progressing  - Increase UE strength to 4-/5 grossly throughout   progressing      Functional Goals to be met within 4-6 weeks     - Return to Prior Level of Function progressing  - Increase Functional Status Measure to: expected progressing  - Patient will be independent with HEP progressing  - Patient to be able to perform SLS x 2 sec on ea progressing  - Patient to be able to perform 5 reps sit<> 15 sec    progressing      Plan  Patient would benefit from: skilled physical therapy  Planned modality interventions: cryotherapy and thermotherapy: hydrocollator packs  Planned therapy interventions: joint mobilization, manual therapy, neuromuscular re-education, patient education, postural training, strengthening, stretching, therapeutic activities, therapeutic exercise, home exercise program, body mechanics training and balance  Frequency: 2x week  Duration in weeks: 8  Treatment plan discussed with: patient        Subjective Evaluation    History of Present Illness  Mechanism of injury: Patient feels therapy is helping with her gaining strength, patient feels that she's not able to exercise at home as she does in therapy  Patient has been more active with her walking again, has been walking up/down the hallways at her residence  Patient also reports increase in B hip/low back pain today, patient notes that her pain eases when she's in therapy and has no pain when she leaves      PMH:   B reverse TSA, TKA, peripheral neuroapathy  Pain  Current pain ratin  At best pain ratin  At worst pain ratin  Location: B hips and low back  Quality: sharp, dull ache and discomfort  Relieving factors: relaxation  Aggravating factors: sitting, standing, walking, stair climbing and lifting  Progression: no change    Social Support  Steps to enter house: no  Stairs in house: no   Lives in: apartment and community-based residential facility  Lives with: alone    Employment status: not working    Diagnostic Tests  No diagnostic tests performed  Treatments  Previous treatment: physical therapy  Patient Goals  Patient goals for therapy: decreased pain, improved balance, increased motion, increased strength and independence with ADLs/IADLs          Objective     Active Range of Motion   Left Shoulder   Flexion: 110 degrees   External rotation BTH: WFL    Right Shoulder   Flexion: 120 degrees   External rotation BTH: WFL    Lumbar   Flexion: 60 degrees   Extension: 0 degrees   Left rotation: 10 degrees   Right rotation: 10 degrees   Left Hip   Flexion: 90 degrees     Right Hip   Flexion: 90 degrees   Left Knee   Flexion: WFL  Extension: 0 degrees     Right Knee   Flexion: WFL  Extension: 0 degrees     Passive Range of Motion     Right Ankle/Foot    Dorsiflexion (ke): 0 degrees     Strength/Myotome Testing     Left Shoulder     Planes of Motion   Flexion: 3+   Abduction: 3     Right Shoulder     Planes of Motion   Flexion: 3+   Abduction: 3+     Left Elbow   Flexion: 3+    Right Elbow   Flexion: 3+    Left Hip   Planes of Motion   Flexion: 4  Extension: 4-  Abduction: 4-  Adduction: 4-    Right Hip   Planes of Motion   Flexion: 4  Extension: 4-  Abduction: 4-  Adduction: 4-    Left Knee   Flexion: 4  Extension: 4    Right Knee   Flexion: 4  Extension: 4    Left Ankle/Foot   Dorsiflexion: 4  Plantar flexion: 4    Right Ankle/Foot   Dorsiflexion: 3   Plantar flexion: 4    Functional Assessment        Single Leg Stance   Left: 1 seconds  Right: 1 seconds  Neuro Exam:     Functional outcomes   5x sit to stand: 25 seconds  2 minute walk test: 170' with 4 wheel walker  TUG: 15 sec with 4 wheel walker (seconds)  Functional outcome gait comment: Patient ambulates with 4 wheel walker, decreased and delayed R DF during swing phase, unequal stride length  Patient's R LE is shorter than L LE and patient has a shoe modification that she wears, which contributes to the LE being heavier and her unequal stride length  Patient's trendelenburg is still present during R SLS, needs UE support for balance and safety  Precautions: FALL risk, h/o B reverse TSA, 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        Manuals 2/8- FOTO  1/25 1/27 2/1 2/3                                   Neuro Re-Ed                sidestepping 3x // bars 3x // bars 3x in bars 3x  In bars 3x in bars   Tandem walking FW/BW 3x bars 3x // bars- holding on Fwd/bwd x3 in bars Fwd/bwd in bars FW/BW 3x bars                                   Ther Ex Nu step x15' L1 x15' L1 15' L1 15' L1 x15' L1   UBE 3 FW/3 BW- fatigued at this time 120 rpm UBE x3' FW- at end, 120 rpm UBE 4 min 2fw, 2 BW- 120 RPM UBE 4 min, 2fw, 2 Bw UBE 2/2  120 rpm           LAQ 2# NT - 2# 3x10       -      SLR x 3 standing 2# 2x10 1 5# 2x10 1 5# 2x10 1 5# 2x10 f 1 5# 2x10   Ham curls 2# 2x10 1 5# 2x10 1 5# 2x10 1 5# 2x10 2x10 1 5#    HR 2x10 2x10 2x10 2x10  2x10   Ball squeezes 20x :03 2x10 :03 NT 20x 3s 20x:03 20x :05   squats  20x // bars 20x // bars  20x // bars   scap retraction  2x10 scap retraction 2x10 2x10 2x10 2x10   Chest press with cane 1 5# 2x10 Chest press with cane 20x 1#  Chest press with cane 25x 1 5#  Chest press with cane 20x 1 5#  1 5# 2x10   Cane flex, seated  1 5# 2x10 1# 2x10 1 5# 2x10 1 5# 2x10 Cane flexion, seated 1 5# 2x10   Biceps  1 5# 2x10 Biceps curls 2# 20x Biceps curls & hammer curls2# 30x 2# 30x 2# 20x   TB rows and extension YTB CGA- too fatigued today  YTB rows and extension 2x10 ea, CGA for balance- NT TYB Rows, Ext Seated 30x -NT YTB 30x standing, CGA YTB 30x standing, CGA   Sit<>stand from chair  1 foam pad 10x - NT time constraints -   -                                           Standing back bends?  At // bars                                Ther Activity                        Gait Training                        Modalities

## 2022-02-10 ENCOUNTER — OFFICE VISIT (OUTPATIENT)
Dept: PHYSICAL THERAPY | Age: 87
End: 2022-02-10
Payer: COMMERCIAL

## 2022-02-10 DIAGNOSIS — R26.2 AMBULATORY DYSFUNCTION: ICD-10-CM

## 2022-02-10 DIAGNOSIS — M15.9 GENERALIZED OSTEOARTHRITIS: Primary | ICD-10-CM

## 2022-02-10 PROCEDURE — 97112 NEUROMUSCULAR REEDUCATION: CPT | Performed by: PHYSICAL THERAPIST

## 2022-02-10 PROCEDURE — 97110 THERAPEUTIC EXERCISES: CPT | Performed by: PHYSICAL THERAPIST

## 2022-02-10 NOTE — PROGRESS NOTES
Daily Note     Today's date: 2/10/2022  Patient name: Benjie Dean  :   MRN: [id number]  Referring provider: Bobbi Fu MD  Dx:   Encounter Diagnosis     ICD-10-CM    1  Generalized osteoarthritis  M15 9    2  Ambulatory dysfunction  R26 2                   Subjective: Patient reports that she's been exercising at home, has no pain today  Objective: See treatment diary below      Assessment: Tolerated treatment well  Patient fatigued post treatment today, when patient's fatigued R LE slightly weaker, specifically toes as patient has a harder time picking her toes up  Patient is able to ambulate with 4 wheel walker, and has no loss of balance or catching her toes; needs extra time for gait for safety  Plan: Continue per plan of care  Precautions: FALL risk, h/o B reverse TSA, 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        Manuals - FOTO  2/10 1/27 2/1 2/3                                   Neuro Re-Ed                sidestepping 3x // bars 3x // bars 3x in bars 3x  In bars 3x in bars   Tandem walking FW/BW 3x bars 3x // bars- holding on Fwd/bwd x3 in bars Fwd/bwd in bars FW/BW 3x bars                                   Ther Ex                Nu step x15' L1 x15' L1 15' L1 15' L1 x15' L1   UBE 3 FW/3 BW- fatigued at this time 120 rpm UBE x3' FW- at end, 120 rpm UBE 4 min 2fw, 2 BW- 120 RPM UBE 4 min, 2fw, 2 Bw UBE 2/2  120 rpm           LAQ 2# NT - 2# 3x10       -      SLR x 3 standing 2# 2x10 2# 2x10 1 5# 2x10 1 5# 2x10 f 1 5# 2x10   Ham curls 2# 2x10 2# 2x10 1 5# 2x10 1 5# 2x10 2x10 1 5#    HR 2x10 2x10 2x10 2x10  2x10   Ball squeezes 20x :03 2x10 :03  20x 3s 20x:03 20x :05   squats  20x // bars 20x // bars  20x // bars   scap retraction  2x10 scap retraction 2x10 2x10 2x10 2x10   Chest press with cane 1 5# 2x10 Chest press with cane 20x 1 5#  Chest press with cane 25x 1 5#  Chest press with cane 20x 1 5#  1 5# 2x10   Cane flex, seated  1 5# 2x10 1 5# 2x10 1 5# 2x10 1 5# 2x10 Cane flexion, seated 1 5# 2x10   Biceps  1 5# 2x10 Biceps curls 2# 20x Biceps curls & hammer curls2# 30x 2# 30x 2# 20x   TB rows and extension YTB CGA- too fatigued today  YTB rows and extension 2x10 ea, CGA for balance- NT too fatigued TYB Rows, Ext Seated 30x -NT YTB 30x standing, CGA YTB 30x standing, CGA   Sit<>stand from chair  1 foam pad 10x - NT time constraints -   -                                           Standing back bends?  At // bars                                Ther Activity                        Gait Training                        Modalities

## 2022-02-14 NOTE — RESULT ENCOUNTER NOTE
I reviewed the results with patient's daughter  Infliximab Counseling:  I discussed with the patient the risks of infliximab including but not limited to myelosuppression, immunosuppression, autoimmune hepatitis, demyelinating diseases, lymphoma, and serious infections.  The patient understands that monitoring is required including a PPD at baseline and must alert us or the primary physician if symptoms of infection or other concerning signs are noted.

## 2022-02-15 ENCOUNTER — OFFICE VISIT (OUTPATIENT)
Dept: PHYSICAL THERAPY | Age: 87
End: 2022-02-15
Payer: COMMERCIAL

## 2022-02-15 DIAGNOSIS — M15.9 GENERALIZED OSTEOARTHRITIS: Primary | ICD-10-CM

## 2022-02-15 DIAGNOSIS — R26.2 AMBULATORY DYSFUNCTION: ICD-10-CM

## 2022-02-15 PROCEDURE — 97110 THERAPEUTIC EXERCISES: CPT

## 2022-02-15 NOTE — PROGRESS NOTES
Daily Note     Today's date: 2/15/2022  Patient name: Lizzeth Latham  :   MRN: [id number]  Referring provider: Eagle Howe MD  Dx:   Encounter Diagnosis     ICD-10-CM    1  Generalized osteoarthritis  M15 9    2  Ambulatory dysfunction  R26 2                   Subjective: Patient reports that she has L sided LBP today that worsens when she stands  Pt also notes R sided decreased ability to DF   " My foot catches"      Objective: See treatment diary below      Assessment: Tolerated treatment well  Pt felt that after end of hour session she had moderate fatigue  Pt felt her L side LBP had also gone away with exercise  Pt's daughter already with info for dorsi flexion assist, if they choose to purchase it  Patient is able to ambulate with 4 wheel walker, and has no loss of balance or catching her toes; needs extra time for gait for safety  Plan: Continue per plan of care  Precautions: FALL risk, h/o B reverse TSA, 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        Manuals - FOTO  2/10 2/15 2/1 2/3                                   Neuro Re-Ed                sidestepping 3x // bars 3x // bars 3x in bars 3x  In bars 3x in bars   Tandem walking FW/BW 3x bars 3x // bars- holding on Fwd/bwd x3 in bars Fwd/bwd in bars FW/BW 3x bars                                   Ther Ex                Nu step x15' L1 x15' L1 15' L1 15' L1 x15' L1   UBE 3 FW/3 BW- fatigued at this time 120 rpm UBE x3' FW- at end, 120 rpm UBE 4 min 2fw, 2 BW- 120 RPM UBE 4 min, 2fw, 2 Bw UBE 2/2  120 rpm           LAQ 2# NT - 2# 3x10       -      SLR x 3 standing 2# 2x10 2# 2x10 2# 3x10 1 5# 2x10 f 1 5# 2x10   Ham curls 2# 2x10 2# 2x10 2# 3x10 1 5# 2x10 2x10 1 5#    HR 2x10 2x10 2x10 2x10  2x10   Ball squeezes 20x :03 2x10 :03  20x 3s 20x:03 20x :05   squats  20x // bars 20x // bars  20x // bars   scap retraction  2x10 scap retraction 2x10 2x10 2x10 2x10   Chest press with cane 1 5# 2x10 Chest press with cane 20x 1 5#  Chest press with cane 25x 2#  Chest press with cane 20x 1 5#  1 5# 2x10   Cane flex, seated  1 5# 2x10 1 5# 2x10 2# 2x10 1 5# 2x10 Cane flexion, seated 1 5# 2x10   Biceps  1 5# 2x10 Biceps curls 2# 20x Biceps curls & hammer curls2# 30x 2# 30x 2# 20x   TB rows and extension YTB CGA- too fatigued today  YTB rows and extension 2x10 ea, CGA for balance- NT too fatigued TYB Rows, Ext Seated 30x -NT YTB 30x standing, CGA YTB 30x standing, CGA   Sit<>stand from chair  1 foam pad 10x - NT time constraints - x15  -                                           Standing back bends?  At // bars                                Ther Activity                        Gait Training                        Modalities

## 2022-02-17 ENCOUNTER — OFFICE VISIT (OUTPATIENT)
Dept: PHYSICAL THERAPY | Age: 87
End: 2022-02-17
Payer: COMMERCIAL

## 2022-02-17 DIAGNOSIS — R26.2 AMBULATORY DYSFUNCTION: ICD-10-CM

## 2022-02-17 DIAGNOSIS — M15.9 GENERALIZED OSTEOARTHRITIS: Primary | ICD-10-CM

## 2022-02-17 PROCEDURE — 97112 NEUROMUSCULAR REEDUCATION: CPT | Performed by: PHYSICAL THERAPIST

## 2022-02-17 PROCEDURE — 97110 THERAPEUTIC EXERCISES: CPT | Performed by: PHYSICAL THERAPIST

## 2022-02-17 NOTE — PROGRESS NOTES
Daily Note     Today's date: 2022  Patient name: Romina Found  :   MRN: [id number]  Referring provider: Chip Garcia MD  Dx:   Encounter Diagnosis     ICD-10-CM    1  Generalized osteoarthritis  M15 9    2  Ambulatory dysfunction  R26 2                   Subjective: Patient reports that she can lift her toes better on the L  Notes that she has been working on this at home  Objective: See treatment diary below      Assessment: Tolerated treatment well  Patient tolerating session well, moderately fatigued at L>R shoulder with UE strengthening today  Patient tolerating session well today, no significant soreness with PT today  Making progress with keeping foot DF during swing phase  Plan: Continue per plan of care  Precautions: FALL risk, h/o B reverse TSA, 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        Manuals - FOTO  2/10 2/15 2/17 23                                   Neuro Re-Ed                sidestepping 3x // bars 3x // bars 3x in bars 3x  In bars 2# 3x in bars   Tandem walking FW/BW 3x bars 3x // bars- holding on Fwd/bwd x3 in bars 3x Fwd/bwd in bars FW/BW 3x bars                                   Ther Ex                Nu step x15' L1 x15' L1 15' L1 15' L1 x15' L1   UBE 3 FW/3 BW- fatigued at this time 120 rpm UBE x3' FW- at end, 120 rpm UBE 4 min 2fw, 2 BW- 120 RPM UBE 4 min, 2fw, 2 Bw 120 RPM UBE 2/2  120 rpm           LAQ 2# NT - 2# 3x10 2# 3x10      -      SLR x 3 standing 2# 2x10 2# 2x10 2# 3x10 2# 3x10 1 5# 2x10   Ham curls 2# 2x10 2# 2x10 2# 3x10 2# 3x10 2x10 1 5#    HR 2x10 2x10 2x10 2x10  2x10   Ball squeezes 20x :03 2x10 :03  20x 3s 20x:03 20x :05   squats  20x // bars 20x // bars 20x //bars 20x // bars   scap retraction  2x10 scap retraction 2x10 2x10 2x10 2x10   Chest press with cane 1 5# 2x10 Chest press with cane 20x 1 5#  Chest press with cane 25x 2#  Chest press with cane 25x 2#  1 5# 2x10   Cane flex, seated  1 5# 2x10 1 5# 2x10 2# 2x10 2# 2x10 Cane flexion, seated 1 5# 2x10   Biceps  1 5# 2x10 Biceps curls 2# 20x Biceps curls & hammer curls2# 30x 2# 30x 2# 20x   TB rows and extension YTB CGA- too fatigued today  YTB rows and extension 2x10 ea, CGA for balance- NT too fatigued TYB Rows, Ext Seated 30x -NT YTB 30x standing, CGA- NT time constraints YTB 30x standing, CGA   Sit<>stand from chair  1 foam pad 10x - NT time constraints - x15 - -                                           Standing back bends?  At // bars                                Ther Activity                        Gait Training                        Modalities

## 2022-02-22 ENCOUNTER — OFFICE VISIT (OUTPATIENT)
Dept: PHYSICAL THERAPY | Age: 87
End: 2022-02-22
Payer: COMMERCIAL

## 2022-02-22 DIAGNOSIS — R26.2 AMBULATORY DYSFUNCTION: ICD-10-CM

## 2022-02-22 DIAGNOSIS — M15.9 GENERALIZED OSTEOARTHRITIS: Primary | ICD-10-CM

## 2022-02-22 PROCEDURE — 97110 THERAPEUTIC EXERCISES: CPT | Performed by: PHYSICAL THERAPIST

## 2022-02-22 PROCEDURE — 97112 NEUROMUSCULAR REEDUCATION: CPT | Performed by: PHYSICAL THERAPIST

## 2022-02-22 NOTE — PROGRESS NOTES
Daily Note     Today's date: 2022  Patient name: Areli Arana  :   MRN: [id number]  Referring provider: Minda Martinez MD  Dx:   Encounter Diagnosis     ICD-10-CM    1  Generalized osteoarthritis  M15 9    2  Ambulatory dysfunction  R26 2                   Subjective: Patient reports that she was out walking yesterday, and feels good today  Notices that she's having less back pain and that she's standing straighter today as well  Objective: See treatment diary below      Assessment: Tolerated treatment well  Patient able to tolerate exercising well today, no increase in pain with exercises today  Patient moderately fatigued in upper body with exercises today  Patient making good progress with PT  Patient does feel that she's able to exercise better with PT than she is at home with her home program        Plan: Continue per plan of care  Precautions: FALL risk, h/o B reverse TSA, 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        Manuals - FOTO  2/10 2/15 2/17 2/22                                   Neuro Re-Ed                sidestepping 3x // bars 3x // bars 3x in bars 3x  In bars 2# 3x in bars 2#    Tandem walking FW/BW 3x bars 3x // bars- holding on Fwd/bwd x3 in bars 3x Fwd/bwd in bars FW  3x bars 2#                                    Ther Ex                Nu step x15' L1 x15' L1 15' L1 15' L1 x15' L1   UBE 3 FW/3 BW- fatigued at this time 120 rpm UBE x3' FW- at end, 120 rpm UBE 4 min 2fw, 2 BW- 120 RPM UBE 4 min, 2fw, 2 Bw 120 RPM UBE 4/4  120 rpm           LAQ 2# NT - 2# 3x10 2# 3x10      -      SLR x 3 standing 2# 2x10 2# 2x10 2# 3x10 2# 3x10 2# 2x10   Ham curls 2# 2x10 2# 2x10 2# 3x10 2# 3x10 2x10 2#    HR 2x10 2x10 2x10 2x10  2x10   Ball squeezes 20x :03 2x10 :03  20x 3s 20x:03 20x :05   squats  20x // bars 20x // bars 20x //bars 20x // bars   scap retraction  2x10 scap retraction 2x10 2x10 2x10 2x10   Chest press with cane 1 5# 2x10 Chest press with cane 20x 1 5#  Chest press with cane 25x 2#  Chest press with cane 25x 2#  2# 2x10   Cane flex, seated  1 5# 2x10 1 5# 2x10 2# 2x10 2# 2x10 Cane flexion, seated 2# 2x10   Biceps  1 5# 2x10 Biceps curls 2# 20x Biceps curls & hammer curls2# 30x 2# 30x 2# 20x        Seated shoulder flexion 10x 2# ea   TB rows and extension YTB CGA- too fatigued today  YTB rows and extension 2x10 ea, CGA for balance- NT too fatigued TYB Rows, Ext Seated 30x -NT YTB 30x standing, CGA- NT time constraints YTB 30x standing, CGA NT time constraints   Sit<>stand from chair  1 foam pad 10x - NT time constraints - x15 - -                                                                           Ther Activity                        Gait Training                        Modalities

## 2022-02-24 ENCOUNTER — OFFICE VISIT (OUTPATIENT)
Dept: PHYSICAL THERAPY | Age: 87
End: 2022-02-24
Payer: COMMERCIAL

## 2022-02-24 DIAGNOSIS — R26.2 AMBULATORY DYSFUNCTION: ICD-10-CM

## 2022-02-24 DIAGNOSIS — M15.9 GENERALIZED OSTEOARTHRITIS: Primary | ICD-10-CM

## 2022-02-24 PROCEDURE — 97112 NEUROMUSCULAR REEDUCATION: CPT | Performed by: PHYSICAL THERAPIST

## 2022-02-24 PROCEDURE — 97110 THERAPEUTIC EXERCISES: CPT | Performed by: PHYSICAL THERAPIST

## 2022-02-24 NOTE — PROGRESS NOTES
Daily Note     Today's date: 2022  Patient name: Priyanka Merlos  :   MRN: [id number]  Referring provider: Russ Jang MD  Dx:   Encounter Diagnosis     ICD-10-CM    1  Generalized osteoarthritis  M15 9    2  Ambulatory dysfunction  R26 2                   Subjective: Patient reports that she hasn't been able to walk outside today because of the salt  Objective: See treatment diary below      Assessment: Tolerated treatment well  Patient able to tolerate progression today to shoulder raises, 10 reps with 2# weight  Patient is fatigued and challenged with UE exercises today  Progressed reps with sit<>stands from the chair with 1 foam pad  Plan: Continue per plan of care  Precautions: FALL risk, h/o B reverse TSA, 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        Manuals 2/24 2/10 2/15 2/17 2/22                                   Neuro Re-Ed                sidestepping 3x // bars 2#  3x // bars 3x in bars 3x  In bars 2# 3x in bars 2#    Tandem walking FW/BW 3x bars 2#  3x // bars- holding on Fwd/bwd x3 in bars 3x Fwd/bwd in bars FW  3x bars 2#                                    Ther Ex                Nu step x15' L1 x15' L1 15' L1 15' L1 x15' L1   UBE 4 FW/ 4BW 120 rpm UBE x3' FW- at end, 120 rpm UBE 4 min 2fw, 2 BW- 120 RPM UBE 4 min, 2fw, 2 Bw 120 RPM UBE 4/4  120 rpm           LAQ 2# 3x10 - 2# 3x10 2# 3x10      -      SLR x 3 standing 2# 2x10 2# 2x10 2# 3x10 2# 3x10 2# 2x10   Ham curls 2# 2x10 2# 2x10 2# 3x10 2# 3x10 2x10 2#    HR 2x10 2x10 2x10 2x10  2x10   Ball squeezes 20x :03 2x10 :03  20x 3s 20x:03 20x :05   squats 20x // bars 20x // bars 20x // bars 20x //bars 20x // bars   scap retraction  2x10 scap retraction 2x10 2x10 2x10 2x10   Chest press with cane 2# 2x10 Chest press with cane 20x 1 5#  Chest press with cane 25x 2#  Chest press with cane 25x 2#  2# 2x10   Cane flex, seated  2# 2x10 1 5# 2x10 2# 2x10 2# 2x10 Cane flexion, seated 2# 2x10   Biceps  2# 2x10 Biceps curls 2# 20x Biceps curls & hammer curls2# 30x 2# 30x 2# 20x   Seated  Shoulder flexion      Seated shoulder flexion 10x 2# ea   Sit<>stand from chair  1 foam pad 20x  CGA/S - 1x/ LOB BW - x15 - -                                                                           Ther Activity                        Gait Training                        Modalities

## 2022-03-01 ENCOUNTER — OFFICE VISIT (OUTPATIENT)
Dept: PHYSICAL THERAPY | Age: 87
End: 2022-03-01
Payer: COMMERCIAL

## 2022-03-01 DIAGNOSIS — R26.2 AMBULATORY DYSFUNCTION: ICD-10-CM

## 2022-03-01 DIAGNOSIS — M15.9 GENERALIZED OSTEOARTHRITIS: Primary | ICD-10-CM

## 2022-03-01 PROCEDURE — 97110 THERAPEUTIC EXERCISES: CPT | Performed by: PHYSICAL THERAPIST

## 2022-03-01 PROCEDURE — 97112 NEUROMUSCULAR REEDUCATION: CPT | Performed by: PHYSICAL THERAPIST

## 2022-03-01 NOTE — PROGRESS NOTES
Daily Note     Today's date: 3/1/2022  Patient name: Phoebe Ray  :   MRN: [id number]  Referring provider: Nadya Oshea MD  Dx:   Encounter Diagnosis     ICD-10-CM    1  Generalized osteoarthritis  M15 9    2  Ambulatory dysfunction  R26 2                   Subjective: Patient reports that her hips were sore 2 days following her last therapy session (ie over the weekend), patient also notes that she did not get out to exercise/walk because of the ice Friday  Objective: See treatment diary below      Assessment: Tolerated treatment well  Discussed patient's pain from last session, possible pain from strengthening as PT is focusing on progressing glute med strength to progress and reduce patient's trendelenburg gait pattern  Patient able to complete full program today, patient challenged with UE strengthening TE  Still requires S assist with sit<>stand transfers without UE support, for balance  Plan: Continue per plan of care  Precautions: FALL risk, h/o B reverse TSA, 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        Manuals 2/24 3/1- FOTO 2/15 2/17 2/22                                   Neuro Re-Ed                sidestepping 3x // bars 2#  3x // bars 2#  3x in bars 3x  In bars 2# 3x in bars 2#    Tandem walking FW/BW 3x bars 2#  3x // bars- holding on 2#  Fwd/bwd x3 in bars 3x Fwd/bwd in bars FW  3x bars 2#                                    Ther Ex                Nu step x15' L1 x15' L1 15' L1 15' L1 x15' L1   UBE 4 FW/ 4BW 120 rpm UBE 2' FW, 2'  RPM, set machine to stop between so patient is reminded to switch UBE 4 min 2fw, 2 BW- 120 RPM UBE 4 min, 2fw, 2 Bw 120 RPM UBE 4/4  120 rpm           LAQ 2# 3x10 2# 3x10 2# 3x10 2# 3x10      -      SLR x 3 standing 2# 2x10 2# 2x10 2# 3x10 2# 3x10 2# 2x10   Ham curls 2# 2x10 2# 2x10 2# 3x10 2# 3x10 2x10 2#    HR 2x10 2x10 2x10 2x10  2x10   Ball squeezes 20x :03 2x10 :03  20x 3s 20x:03 20x :05   squats 20x // bars 20x // bars 20x // bars 20x //bars 20x // bars   scap retraction  2x10 scap retraction 2x10 2x10 2x10 2x10   Chest press with cane 2# 2x10 Chest press with cane 20x 2#  Chest press with cane 25x 2#  Chest press with cane 25x 2#  2# 2x10   Cane flex, seated  2# 2x10 2# 2x10 2# 2x10 2# 2x10 Cane flexion, seated 2# 2x10   Biceps  2# 2x10 Biceps curls 2# 20x Biceps curls & hammer curls2# 30x 2# 30x 2# 20x   Seated  Shoulder flexion   2# 10x ea   Seated shoulder flexion 10x 2# ea   Sit<>stand from chair  1 foam pad 20x  CGA/S - 1x/ LOB BW 1 foam pad 10x - close S, no LOB today x15 - -                                                                           Ther Activity                        Gait Training                        Modalities

## 2022-03-03 ENCOUNTER — OFFICE VISIT (OUTPATIENT)
Dept: PHYSICAL THERAPY | Age: 87
End: 2022-03-03
Payer: COMMERCIAL

## 2022-03-03 DIAGNOSIS — M15.9 GENERALIZED OSTEOARTHRITIS: Primary | ICD-10-CM

## 2022-03-03 DIAGNOSIS — R26.2 AMBULATORY DYSFUNCTION: ICD-10-CM

## 2022-03-03 PROCEDURE — 97110 THERAPEUTIC EXERCISES: CPT

## 2022-03-03 NOTE — PROGRESS NOTES
Daily Note     Today's date: 3/3/2022  Patient name: Pollo Morrison  :   MRN: [id number]  Referring provider: Linnette Webb MD  Dx:   Encounter Diagnosis     ICD-10-CM    1  Generalized osteoarthritis  M15 9    2  Ambulatory dysfunction  R26 2                   Subjective: Patient reports that back and buttocks are always sore to some degree   Pt notes she was able to walk in her building with the walker for 1/2 hour yesterday and she felt good doing it      Objective: See treatment diary below      Assessment: Tolerated treatment well  Challenged with current ex from fatigue stand point  No ex was painful but notes that during standing hip hip ext she could feel it "pulling"  Pt does have history of LBP and that should be monitored so it does not cause any lingering LBP after therapy  Will continue therapy to continue to help improve stamina and pt function with ADL's      Plan: Continue per plan of care  Precautions: FALL risk, h/o B reverse TSA, 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        Manuals 2/24 3/1- FOTO 3/3 2/17 2/22                                   Neuro Re-Ed                sidestepping 3x // bars 2#  3x // bars 2#  3x in bars 3x  In bars 2# 3x in bars 2#    Tandem walking FW/BW 3x bars 2#  3x // bars- holding on 2#  Fwd/bwd x3 in bars 3x Fwd/bwd in bars FW  3x bars 2#                                    Ther Ex                Nu step x15' L1 x15' L1 15' L1 15' L1 x15' L1   UBE 4 FW/ 4BW 120 rpm UBE 2' FW, 2'  RPM, set machine to stop between so patient is reminded to switch UBE 8 min 4fw, 4 BW- 120 RPM UBE 4 min, 2fw, 2 Bw 120 RPM UBE 4/4  120 rpm           LAQ 2# 3x10 2# 3x10 2# 3x10 2# 3x10      -      SLR x 3 standing 2# 2x10 2# 2x10 2# 2x10 2# 3x10 2# 2x10   Ham curls 2# 2x10 2# 2x10 2# 2x10 2# 3x10 2x10 2#    HR 2x10 2x10 2x10 2x10  2x10   Ball squeezes 20x :03 2x10 :03  20x 3s 20x:03 20x :05   squats 20x // bars 20x // bars 20x // bars 20x //bars 20x // bars scap retraction  2x10 scap retraction 2x10 2x10 2x10 2x10   Chest press with cane 2# 2x10 Chest press with cane 20x 2#  Chest press with cane 20x 2#  Chest press with cane 25x 2#  2# 2x10   Cane flex, seated  2# 2x10 2# 2x10 2# 2x10 2# 2x10 Cane flexion, seated 2# 2x10   Biceps  2# 2x10 Biceps curls 2# 20x Biceps curls & hammer curls2# 30x 2# 30x 2# 20x   Seated  Shoulder flexion   2# 10x ea 2# x10  Seated shoulder flexion 10x 2# ea   Sit<>stand from chair  1 foam pad 20x  CGA/S - 1x/ LOB BW 1 foam pad 10x - close S, no LOB today x15- NT - -                                                                           Ther Activity                        Gait Training                        Modalities

## 2022-03-08 ENCOUNTER — EVALUATION (OUTPATIENT)
Dept: PHYSICAL THERAPY | Age: 87
End: 2022-03-08
Payer: COMMERCIAL

## 2022-03-08 DIAGNOSIS — R26.2 AMBULATORY DYSFUNCTION: ICD-10-CM

## 2022-03-08 DIAGNOSIS — M15.9 GENERALIZED OSTEOARTHRITIS: Primary | ICD-10-CM

## 2022-03-08 PROCEDURE — 97110 THERAPEUTIC EXERCISES: CPT | Performed by: PHYSICAL THERAPIST

## 2022-03-08 NOTE — PROGRESS NOTES
PT Re-Evaluation     Today's date: 3/8/2022  Patient name: Eyal Hall  :   MRN: [id number]  Referring provider: Emmie Giron MD  Dx:   Encounter Diagnosis     ICD-10-CM    1  Generalized osteoarthritis  M15 9    2  Ambulatory dysfunction  R26 2                   Assessment  Assessment details: Patient seen for PT re-assessment  Patient presents with increasing UE and LE strength, improving balance, improving DF strength  Patient has been working at home as well, but has been having pain at home with her HEP  Patient tolerates therapy well in PT and is making good progress  Recommending patient to continue with PT  Patient is still independent with IADLs at home and does have assistance each day to help her with her compression stockings, etc  Patient is a good candidate for PT  Impairments: abnormal gait, abnormal or restricted ROM, activity intolerance, impaired balance, impaired physical strength, lacks appropriate home exercise program, pain with function, poor posture  and poor body mechanics  Functional limitations: Patient reports decreased balance, strength and slight B glute/back pain with standing, IADLs, with walking around her apartment, limitations with recreational activites Understanding of Dx/Px/POC: good   Prognosis: good    Goals  Impairment Goals to be met within 4 weeks  - Decrease pain to 0/10 partially met  - Improve ROM 5 degrees extension not met  - Increase strength to 4+/5 throughout progressing  - Increase UE strength to 4-/5 grossly throughout   progressing      Functional Goals to be met within 4-6 weeks     - Return to Prior Level of Function progressing  - Increase Functional Status Measure to: expected progressing  - Patient will be independent with HEP progressing  - Patient to be able to perform SLS x 2 sec on ea progressing  - Patient to be able to perform 5 reps sit<> 15 sec    progressing      Plan  Patient would benefit from: skilled physical therapy  Planned modality interventions: cryotherapy and thermotherapy: hydrocollator packs  Planned therapy interventions: joint mobilization, manual therapy, neuromuscular re-education, patient education, postural training, strengthening, stretching, therapeutic activities, therapeutic exercise, home exercise program, body mechanics training and balance  Frequency: 2x week  Duration in weeks: 8  Treatment plan discussed with: patient        Subjective Evaluation    History of Present Illness  Mechanism of injury: Patient feels therapy is helping with her gain her strength, to work on her balance  Patient reports multiple episodes of near loss of balance in the home, is able to recover her balance with and without UE support  Patient does notice that she is using her RW more, would like to be able to take a few steps without the 4 wheel walker or needing to put her hands on a surface for balance  Patient has been exercising at home, is also going for a walk around her apartment complex/independent living with her 4 wheel walker  Patient reports that she always feels better after PT, but gets home, and tries to exercise on her own and walk/stay active at home, and feels that she is weak the next day; thinking that her strengthening in PT should last into the next day       PMH:   B reverse TSA, TKA, peripheral neuroapathy  Pain  Current pain ratin  At best pain ratin  At worst pain ratin  Location: B hips and low back  Quality: sharp, dull ache and discomfort  Relieving factors: relaxation  Aggravating factors: sitting, standing, walking, stair climbing and lifting  Progression: no change    Social Support  Steps to enter house: no  Stairs in house: no   Lives in: apartment and community-based residential facility  Lives with: alone    Employment status: not working    Diagnostic Tests  No diagnostic tests performed  Treatments  Previous treatment: physical therapy  Patient Goals  Patient goals for therapy: decreased pain, improved balance, increased motion, increased strength and independence with ADLs/IADLs          Objective     Active Range of Motion   Left Shoulder   Flexion: 115 degrees   External rotation BTH: WFL    Right Shoulder   Flexion: 140 degrees   External rotation BTH: WFL    Lumbar   Flexion: 65 degrees   Extension: 0 degrees   Left rotation: 10 degrees   Right rotation: 10 degrees   Left Hip   Flexion: 90 degrees     Right Hip   Flexion: 90 degrees   Left Knee   Flexion: WFL  Extension: 0 degrees     Right Knee   Flexion: WFL  Extension: 0 degrees     Passive Range of Motion     Right Ankle/Foot    Dorsiflexion (ke): 0 degrees     Strength/Myotome Testing     Left Shoulder     Planes of Motion   Flexion: 3+   Abduction: 3     Right Shoulder     Planes of Motion   Flexion: 4-   Abduction: 4-     Left Elbow   Flexion: 3+    Right Elbow   Flexion: 3+    Left Hip   Planes of Motion   Flexion: 4  Extension: 4-  Abduction: 4-  Adduction: 4-    Right Hip   Planes of Motion   Flexion: 4  Extension: 4-  Abduction: 4-  Adduction: 4-    Left Knee   Flexion: 4  Extension: 4    Right Knee   Flexion: 4  Extension: 4    Left Ankle/Foot   Dorsiflexion: 4  Plantar flexion: 4    Right Ankle/Foot   Dorsiflexion: 3   Plantar flexion: 4    Functional Assessment        Single Leg Stance   Left: 1 seconds  Right: 1 seconds  Neuro Exam:     Functional outcomes   5x sit to stand: 25 seconds  2 minute walk test: 170' with 4 wheel walker  TUG: 15 sec with 4 wheel walker (seconds)  Functional outcome gait comment: Patient ambulates with 4 wheel walker, decreased and delayed R DF during swing phase, unequal stride length  Patient's R LE is shorter than L LE and patient has a shoe modification that she wears, which contributes to the LE being heavier and her unequal stride length  Patient's trendelenburg is still present during R SLS, needs UE support for balance and safety             Precautions: FALL risk, h/o B reverse TSA, 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        Manuals 2/24 3/1- FOTO 3/3 3/8- FOTO 2/22                                   Neuro Re-Ed                sidestepping 3x // bars 2#  3x // bars 2#  3x in bars 3x  In bars 2# NT time constraints 3x in bars 2#    Tandem walking FW/BW 3x bars 2#  3x // bars- holding on 2#  Fwd/bwd x3 in bars 3x Fwd/bwd in bars- NT time constraints FW  3x bars 2#                                    Ther Ex                Nu step x15' L1 x15' L1 15' L1 15' L1 x15' L1   UBE 4 FW/ 4BW 120 rpm UBE 2' FW, 2'  RPM, set machine to stop between so patient is reminded to switch UBE 8 min 4fw, 4 BW- 120 RPM UBE 4 min, 2fw, 2 Bw 120 RPM- NT time constraints UBE 4/4  120 rpm           LAQ 2# 3x10 2# 3x10 2# 3x10 2# 3x10      -      SLR x 3 standing 2# 2x10 2# 2x10 2# 2x10 2# 3x10 2# 2x10   Ham curls 2# 2x10 2# 2x10 2# 2x10 2# 3x10 2x10 2#    HR 2x10 2x10 2x10 2x10  2x10   Ball squeezes 20x :03 2x10 :03  20x 3s 20x:03 20x :05   squats 20x // bars 20x // bars 20x // bars 20x //bars 20x // bars   scap retraction  2x10 scap retraction 2x10 2x10 2x10 2x10   Chest press with cane 2# 2x10 Chest press with cane 20x 2#  Chest press with cane 20x 2#  Chest press with cane 20x 2#  2# 2x10   Cane flex, seated  2# 2x10 2# 2x10 2# 2x10 2# 2x10 Cane flexion, seated 2# 2x10   Biceps  2# 2x10 Biceps curls 2# 20x Biceps curls & hammer curls2# 30x 3# 10x ea 2# 20x   Seated  Shoulder flexion   2# 10x ea 2# x10 2# 10x ea Seated shoulder flexion 10x 2# ea   Sit<>stand from chair  1 foam pad 20x  CGA/S - 1x/ LOB BW 1 foam pad 10x - close S, no LOB today x15- NT NT -       Leg press 50# 15x (back #6)                                                                    Ther Activity                        Gait Training                        Modalities

## 2022-03-10 ENCOUNTER — OFFICE VISIT (OUTPATIENT)
Dept: PHYSICAL THERAPY | Age: 87
End: 2022-03-10
Payer: COMMERCIAL

## 2022-03-10 DIAGNOSIS — R26.2 AMBULATORY DYSFUNCTION: ICD-10-CM

## 2022-03-10 DIAGNOSIS — M15.9 GENERALIZED OSTEOARTHRITIS: Primary | ICD-10-CM

## 2022-03-10 PROCEDURE — 97110 THERAPEUTIC EXERCISES: CPT | Performed by: PHYSICAL THERAPIST

## 2022-03-10 NOTE — PROGRESS NOTES
Daily Note     Today's date: 3/10/2022  Patient name: Benjie Dean  :   MRN: [id number]  Referring provider: Bobbi Fu MD  Dx:   Encounter Diagnosis     ICD-10-CM    1  Generalized osteoarthritis  M15 9    2  Ambulatory dysfunction  R26 2                   Subjective: Patient notices that her R hip/leg is feeling stronger, more stable since last session  Patient reports feeling looser after PT  Notes that she always enjoys coming to therapy, always feels better after therapy compared to when she exercises at home  Patient never has the same relief/benefit when she's exercising at home as she does when she exercises in PT  Objective: See treatment diary below      Assessment: Tolerated treatment well  Returned patient to 2# with bicep curl, 3# too heavy last session and patient unable to tolerate any further reps past 10 2* weakness in UEs  Patient able to tolerate progression to 20 reps with seated shoulder flexion exercise  Plan: Continue per plan of care  Precautions: FALL risk, h/o B reverse TSA, 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        Manuals 2/24 3/1- FOTO 3/3 3/8- FOTO 3/10                                   Neuro Re-Ed                sidestepping 3x // bars 2#  3x // bars 2#  3x in bars 3x  In bars 2# NT time constraints 3x in bars 2# NT   Tandem walking FW/BW 3x bars 2#  3x // bars- holding on 2#  Fwd/bwd x3 in bars 3x Fwd/bwd in bars- NT time constraints FW  3x bars 2#  NT                                    Ther Ex                Nu step x15' L1 x15' L1 15' L1 15' L1 x15' L1   UBE 4 FW/ 4BW 120 rpm UBE 2' FW, 2'  RPM, set machine to stop between so patient is reminded to switch UBE 8 min 4fw, 4 BW- 120 RPM UBE 4 min, 2fw, 2 Bw 120 RPM- NT time constraints UBE 4/4  120 rpm           LAQ 2# 3x10 2# 3x10 2# 3x10 2# 3x10 2# 3x10     -      SLR x 3 standing 2# 2x10 2# 2x10 2# 2x10 2# 3x10 2# 3x10   Ham curls 2# 2x10 2# 2x10 2# 2x10 2# 3x10 3x10 2#    HR 2x10 2x10 2x10 2x10  2x10   Ball squeezes 20x :03 2x10 :03  20x 3s 20x:03 20x :05   squats 20x // bars 20x // bars 20x // bars 20x //bars 20x // bars   scap retraction  2x10 scap retraction 2x10 2x10 2x10 2x10   Chest press with cane 2# 2x10 Chest press with cane 20x 2#  Chest press with cane 20x 2#  Chest press with cane 20x 2#  2# 2x10   Cane flex, seated  2# 2x10 2# 2x10 2# 2x10 2# 2x10 2# 2x10   Biceps  2# 2x10 Biceps curls 2# 20x Biceps curls & hammer curls2# 30x 3# 10x ea 2# 20x   Seated  Shoulder flexion   2# 10x ea 2# x10 2# 10x ea 2# 10x ea   Sit<>stand from chair  1 foam pad 20x  CGA/S - 1x/ LOB BW 1 foam pad 10x - close S, no LOB today x15- NT NT 15x       Leg press 50# 15x (back #6) NT time constraints                                                                   Ther Activity                        Gait Training                        Modalities

## 2022-03-15 ENCOUNTER — OFFICE VISIT (OUTPATIENT)
Dept: PHYSICAL THERAPY | Age: 87
End: 2022-03-15
Payer: COMMERCIAL

## 2022-03-15 DIAGNOSIS — M15.9 GENERALIZED OSTEOARTHRITIS: Primary | ICD-10-CM

## 2022-03-15 DIAGNOSIS — R26.2 AMBULATORY DYSFUNCTION: ICD-10-CM

## 2022-03-15 PROCEDURE — 97110 THERAPEUTIC EXERCISES: CPT

## 2022-03-15 NOTE — PROGRESS NOTES
Daily Note     Today's date: 3/15/2022  Patient name: Claudio Das  :   MRN: [id number]  Referring provider: Marian Farris MD  Dx:   Encounter Diagnosis     ICD-10-CM    1  Generalized osteoarthritis  M15 9    2  Ambulatory dysfunction  R26 2                   Subjective: Patient is disappointed her balance isn't improving  Pt still thinks she wants to try [facility]      Objective: See treatment diary below      Assessment: Tolerated treatment well  Stressed to patient, her balance may not improve with her age and that she will always need to use her walker and that [facility] is not safe for her  Pt is fatigued and challenged with current ex intensity and felt she was at her max tolerance after session  Pt wants to try leg press again as she wants to improve her leg strength      Plan: Continue per plan of care  Precautions: FALL risk, h/o B reverse TSA, 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        Manuals 3/14 3/1- FOTO 3/3 3/8- FOTO 3/10                                   Neuro Re-Ed                sidestepping 3x // bars 2#  3x // bars 2#  3x in bars 3x  In bars 2# NT time constraints 3x in bars 2# NT   Tandem walking FW/BW 3x bars 2#  3x // bars- holding on 2#  Fwd/bwd x3 in bars 3x Fwd/bwd in bars- NT time constraints FW  3x bars 2#  NT                                    Ther Ex                Nu step x15' L1 x15' L1 15' L1 15' L1 x15' L1   UBE 4 FW/ 4BW 90 rpm UBE 2' FW, 2'  RPM, set machine to stop between so patient is reminded to switch UBE 8 min 4fw, 4 BW- 120 RPM UBE 4 min, 2fw, 2 Bw 120 RPM- NT time constraints UBE 4/4  120 rpm           LAQ 2# 3x10 2# 3x10 2# 3x10 2# 3x10 2# 3x10     -      SLR x 3 standing 2# 3x10 2# 2x10 2# 2x10 2# 3x10 2# 3x10   Ham curls 2# 3x10 2# 2x10 2# 2x10 2# 3x10 3x10 2#    HR 2x10 2x10 2x10 2x10  2x10   Ball squeezes 20x :03 2x10 :03  20x 3s 20x:03 20x :05   squats 20x // bars 20x // bars 20x // bars 20x //bars 20x // bars   scap retraction  2x10 scap retraction 2x10 2x10 2x10 2x10   Chest press with cane 2# 2x10 Chest press with cane 20x 2#  Chest press with cane 20x 2#  Chest press with cane 20x 2#  2# 2x10   Cane flex, seated  2# 2x10 2# 2x10 2# 2x10 2# 2x10 2# 2x10   Biceps  2# 2x10 Biceps curls 2# 20x Biceps curls & hammer curls2# 30x 3# 10x ea 2# 20x   Seated  Shoulder flexion  2# x10 ea 2# 10x ea 2# x10 2# 10x ea 2# 10x ea   Sit<>stand from chair  1 foam pad 20x -NT CGA/S - 1x/ LOB BW 1 foam pad 10x - close S, no LOB today x15- NT NT 15x   Leg press NT time   Leg press 50# 15x (back #6) NT time constraints                                                                   Ther Activity                        Gait Training                        Modalities

## 2022-03-17 ENCOUNTER — OFFICE VISIT (OUTPATIENT)
Dept: PHYSICAL THERAPY | Age: 87
End: 2022-03-17
Payer: COMMERCIAL

## 2022-03-17 DIAGNOSIS — M15.9 GENERALIZED OSTEOARTHRITIS: Primary | ICD-10-CM

## 2022-03-17 DIAGNOSIS — R26.2 AMBULATORY DYSFUNCTION: ICD-10-CM

## 2022-03-17 PROCEDURE — 97110 THERAPEUTIC EXERCISES: CPT

## 2022-03-17 NOTE — PROGRESS NOTES
Daily Note     Today's date: 3/17/2022  Patient name: Duyen Ruggiero  :   MRN: [id number]  Referring provider: John Blanchard MD  Dx:   Encounter Diagnosis     ICD-10-CM    1  Generalized osteoarthritis  M15 9    2  Ambulatory dysfunction  R26 2                   Subjective: Patient notes B shld soreness today  " They shouldn't hurt when you have them replaced"      Objective: See treatment diary below      Assessment: Tolerated treatment well  Stressed to patient, her balance may not improve with her age and that she will always need to use her walker and that [facility] is not safe for her as pt still questions if she will get back to her cane  Pt is fatigued and challenged with current ex intensity and felt she was at her max tolerance after session  Plan: Continue per plan of care  Precautions: FALL risk, h/o B reverse TSA, 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        Manuals 3/14 3/17 3/3 3/8- FOTO 3/10                                   Neuro Re-Ed                sidestepping 3x // bars 2#  3x // bars 2#  3x in bars 3x  In bars 2# NT time constraints 3x in bars 2# NT   Tandem walking FW/BW 3x bars 2#  3x // bars- holding on 2#  Fwd/bwd x3 in bars 3x Fwd/bwd in bars- NT time constraints FW  3x bars 2#  NT                                    Ther Ex                Nu step x15' L1 x15' L1 15' L1 15' L1 x15' L1   UBE 4 FW/ 4BW 90 rpm 6min 3/3 90RPM UBE 8 min 4fw, 4 BW- 120 RPM UBE 4 min, 2fw, 2 Bw 120 RPM- NT time constraints UBE 4/4  120 rpm           LAQ 2# 3x10 2# 3x10 2# 3x10 2# 3x10 2# 3x10     -      SLR x 3 standing 2# 3x10 2# 3x10 2# 2x10 2# 3x10 2# 3x10   Ham curls 2# 3x10 2# 3x10 2# 2x10 2# 3x10 3x10 2#    HR 2x10 2x10 2x10 2x10  2x10   Ball squeezes 20x :03 2x10 :03  20x 3s 20x:03 20x :05   squats 20x // bars 20x // bars 20x // bars 20x //bars 20x // bars   scap retraction  2x10 scap retraction 2x10 2x10 2x10 2x10   Chest press with cane 2# 2x10 Chest press with cane 20x 2#  Chest press with cane 20x 2#  Chest press with cane 20x 2#  2# 2x10   Cane flex, seated  2# 2x10 2# 2x10 2# 2x10 2# 2x10 2# 2x10   Biceps  2# 2x10 Biceps curls 2# 20x Biceps curls & hammer curls2# 30x 3# 10x ea 2# 20x   Seated  Shoulder flexion  2# x10 ea 2# 10x ea 2# x10 2# 10x ea 2# 10x ea   Sit<>stand from chair  1 foam pad 20x -NT CGA/S - 1x/ LOB BW 1 foam pad 10x - close S, no LOB today x15- NT NT 15x   Leg press NT time 50# 2x15  Leg press 50# 15x (back #6) NT time constraints                                                                   Ther Activity                        Gait Training                        Modalities

## 2022-03-22 ENCOUNTER — OFFICE VISIT (OUTPATIENT)
Dept: PHYSICAL THERAPY | Age: 87
End: 2022-03-22
Payer: COMMERCIAL

## 2022-03-22 DIAGNOSIS — R26.2 AMBULATORY DYSFUNCTION: ICD-10-CM

## 2022-03-22 DIAGNOSIS — M15.9 GENERALIZED OSTEOARTHRITIS: Primary | ICD-10-CM

## 2022-03-22 PROCEDURE — 97112 NEUROMUSCULAR REEDUCATION: CPT | Performed by: PHYSICAL THERAPIST

## 2022-03-22 PROCEDURE — 97110 THERAPEUTIC EXERCISES: CPT | Performed by: PHYSICAL THERAPIST

## 2022-03-22 NOTE — PROGRESS NOTES
Daily Note     Today's date: 3/22/2022  Patient name: Amada New  :   MRN: [id number]  Referring provider: Olga López MD  Dx:   Encounter Diagnosis     ICD-10-CM    1  Generalized osteoarthritis  M15 9    2  Ambulatory dysfunction  R26 2                   Subjective: Patient feeling fine today, no significant pain nor concerns  Patient reports that she did bump her ribs area with her RW as she was at her daughter's house and lost her balance, her walker stopped short and she bumped into it, causing her to have some rib pain/tenderness  Objective: See treatment diary below      Assessment: Tolerated treatment well  Patient feeling rib pain with L UE exercises today, keeping reps to 5 with 2# weight today  Good tolerance to exercise program, tolerating exercises well without increase in pain  Plan: Continue per plan of care  Precautions: FALL risk, h/o B reverse TSA, 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        Manuals 3/14 3/17 3/22- FOTO 3/8- FOTO 3/10                                   Neuro Re-Ed                sidestepping 3x // bars 2#  3x // bars 2#  3x in bars 2#  3x  In bars 2# NT time constraints 3x in bars 2# NT   Tandem walking FW/BW 3x bars 2#  3x // bars- holding on 2#  3x // bars, holding on 2# 3x Fwd/bwd in bars- NT time constraints FW  3x bars 2#  NT                                    Ther Ex                Nu step x15' L1 x15' L1 15' L2 15' L1 x15' L1   UBE 4 FW/ 4BW 90 rpm 6min 3/3 90RPM 6 min 3/3 90 RPM UBE 4 min, 2fw, 2 Bw 120 RPM- NT time constraints UBE 4/4  120 rpm           LAQ 2# 3x10 2# 3x10 2# 3x10 2# 3x10 2# 3x10     -      SLR x 3 standing 2# 3x10 2# 3x10 2# 3x10 2# 3x10 2# 3x10   Ham curls 2# 3x10 2# 3x10 2# 3x10 2# 3x10 3x10 2#    HR 2x10 2x10 2x10 2x10  2x10   Ball squeezes 20x :03 2x10 :03  20x:03 20x:03 20x :05   squats 20x // bars 20x // bars 20x // bars 20x //bars 20x // bars           scap retraction  2x10 scap retraction 2x10 2x10 2x10 2x10   Chest press with cane 2# 2x10 Chest press with cane 20x 2#  Chest press with cane 20x 2#  Chest press with cane 20x 2#  2# 2x10   Cane flex, seated  2# 2x10 2# 2x10 2# 2x10 2# 2x10 2# 2x10   Biceps  2# 2x10 Biceps curls 2# 20x Biceps curls & hammer curls2# 30x  5x on L 2* rib pain 3# 10x ea 2# 20x   Seated  Shoulder flexion  2# x10 ea 2# 10x ea 2# x10  5x on L 2# 2* rib pain 2# 10x ea 2# 10x ea           Sit<>stand from chair  1 foam pad 20x -NT CGA/S - 1x/ LOB BW 1 foam pad 10x - close S, no LOB today x15- NT NT 15x   Leg press NT time 50# 2x15 50# 2x15  Time constraints Leg press 50# 15x (back #6) NT time constraints                                                                   Ther Activity                        Gait Training                        Modalities

## 2022-03-24 ENCOUNTER — OFFICE VISIT (OUTPATIENT)
Dept: PHYSICAL THERAPY | Age: 87
End: 2022-03-24
Payer: COMMERCIAL

## 2022-03-24 DIAGNOSIS — M15.9 GENERALIZED OSTEOARTHRITIS: Primary | ICD-10-CM

## 2022-03-24 DIAGNOSIS — R26.2 AMBULATORY DYSFUNCTION: ICD-10-CM

## 2022-03-24 PROCEDURE — 97110 THERAPEUTIC EXERCISES: CPT | Performed by: PHYSICAL THERAPIST

## 2022-03-24 PROCEDURE — 97112 NEUROMUSCULAR REEDUCATION: CPT | Performed by: PHYSICAL THERAPIST

## 2022-03-24 NOTE — PROGRESS NOTES
Daily Note     Today's date: 3/24/2022  Patient name: Jarett Cardoso  :   MRN: [id number]  Referring provider: Sari Leyden, MD  Dx:   Encounter Diagnosis     ICD-10-CM    1  Generalized osteoarthritis  M15 9    2  Ambulatory dysfunction  R26 2                   Subjective: Patient reports that her rib pain is feeling better  Objective: See treatment diary below      Assessment: Tolerated treatment fair  Patient more fatigued today and challenged with SLR x 3 exercises, patient having multiple episodes of catching her R toes during R LE leg raises (most difficulty with standing hip extension)  Patient fatigued today, patient not sure why  Attempted to progress with reps with balance TE, however, patient too fatigued to progress today  Patient with less difficulty last session compared to this session  Denies any new changes or issues  Talked with patient about device for shoes again to give some toe lift assist (wraps around ankle and connects to her shoes), patient just thinking that she's tired today and this is why her toes are catching- PT in agreement  Taking it easier today with exercises and allowing increased time for rest today  Plan: Continue per plan of care  Precautions: FALL risk, h/o B reverse TSA, 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        Manuals 3/14 3/17 3/22- FOTO 3/24 3/10                                   Neuro Re-Ed                sidestepping 3x // bars 2#  3x // bars 2#  3x in bars 2#  5x // bars 2# 3x in bars 2# NT   Tandem walking FW/BW 3x bars 2#  3x // bars- holding on 2#  3x // bars, holding on 2# 5x // bars 2#, holding on FW  3x bars 2#  NT                                    Ther Ex                Nu step x15' L1 x15' L1 15' L2 x15' L2 x15' L1   UBE 4 FW/ 4BW 90 rpm 6min 3/3 90RPM 6 min 3/3 90 RPM 6' 3/3, 90 RPM UBE   120 rpm           LAQ 2# 3x10 2# 3x10 2# 3x10 2# 3x10 2# 3x10     -      SLR x 3 standing 2# 3x10 2# 3x10 2# 3x10 2# 3x10 2# 3x10   Ham curls 2# 3x10 2# 3x10 2# 3x10 2# 3x10 3x10 2#    HR 2x10 2x10 2x10 2x10 2x10           Ball squeezes 20x :03 2x10 :03  20x:03 20x:03 20x :05   squats 20x // bars 20x // bars 20x // bars 20x // bars 20x // bars           scap retraction  2x10 scap retraction 2x10 2x10 2x10 2x10   Chest press with cane 2# 2x10 Chest press with cane 20x 2#  Chest press with cane 20x 2#  20x 2#  2# 2x10   Cane flex, seated  2# 2x10 2# 2x10 2# 2x10 2# 2x10 2# 2x10   Biceps  2# 2x10 Biceps curls 2# 20x Biceps curls & hammer curls2# 20x  5x on L 2* rib pain 2# 20x 2# 20x   Seated  Shoulder flexion  2# x10 ea 2# 10x ea 2# x10  5x on L 2# 2* rib pain 2# 20x on R, 12x on L- rib pain 2# 10x ea           Sit<>stand from chair  1 foam pad 20x -NT CGA/S - 1x/ LOB BW 1 foam pad 10x - close S, no LOB today x15- NT NT- fatigued 15x   Leg press NT time 50# 2x15 50# 2x15  Time constraints NT fatigued NT time constraints                                                                   Ther Activity                        Gait Training                        Modalities

## 2022-03-29 ENCOUNTER — OFFICE VISIT (OUTPATIENT)
Dept: PHYSICAL THERAPY | Age: 87
End: 2022-03-29
Payer: COMMERCIAL

## 2022-03-29 DIAGNOSIS — R26.2 AMBULATORY DYSFUNCTION: ICD-10-CM

## 2022-03-29 DIAGNOSIS — M15.9 GENERALIZED OSTEOARTHRITIS: Primary | ICD-10-CM

## 2022-03-29 PROCEDURE — 97110 THERAPEUTIC EXERCISES: CPT

## 2022-03-29 NOTE — PROGRESS NOTES
Daily Note     Today's date: 3/29/2022  Patient name: Sheela Munoz  :   MRN: [id number]  Referring provider: Jerrell Lynn MD  Dx:   Encounter Diagnosis     ICD-10-CM    1  Generalized osteoarthritis  M15 9    2  Ambulatory dysfunction  R26 2                   Subjective: Patient reports that her pain has been good this week  Pt reports she is working on dorsiflexion on R in chair because she lacks function there      Objective: See treatment diary below      Assessment: Tolerated treatment fair  Patient more fatigued today and challenged with SLR x 3 exercises, but pt id  Cybex ex first as well, in which abd was challenging  No pain noted with ex  Pt is going to have daughter get her a dorsi flexion assist strap to try on L foot  Plan: Continue per plan of care  Precautions: FALL risk, h/o B reverse TSA, 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        Manuals 3/14 3/17 3/22- FOTO 3/24 3/29                                   Neuro Re-Ed                sidestepping 3x // bars 2#  3x // bars 2#  3x in bars 2#  5x // bars 2# 3x in bars 2# NT   Tandem walking FW/BW 3x bars 2#  3x // bars- holding on 2#  3x // bars, holding on 2# 5x // bars 2#, holding on FW  3x bars 2#  NT                                    Ther Ex                Nu step x15' L1 x15' L1 15' L2 x15' L2 x15' L1   UBE 4 FW/ 4BW 90 rpm 6min 3/3 90RPM 6 min 3/3 90 RPM 6' 3/3, 90 RPM UBE /  90 rpm           LAQ 2# 3x10 2# 3x10 2# 3x10 2# 3x10 2# 3x10     -      SLR x 3 standing 2# 3x10 2# 3x10 2# 3x10 2# 3x10 2# 3x10   Ham curls 2# 3x10 2# 3x10 2# 3x10 2# 3x10 3x10 2#    HR 2x10 2x10 2x10 2x10 2x10           Ball squeezes 20x :03 2x10 :03  20x:03 20x:03 20x :05   squats 20x // bars 20x // bars 20x // bars 20x // bars 20x // bars           scap retraction  2x10 scap retraction 2x10 2x10 2x10 2x10   Chest press with cane 2# 2x10 Chest press with cane 20x 2#  Chest press with cane 20x 2#  20x 2#  2 5# 2x10   Cane flex, seated  2# 2x10 2# 2x10 2# 2x10 2# 2x10 2 5# 2x10   Biceps  2# 2x10 Biceps curls 2# 20x Biceps curls & hammer curls2# 20x  5x on L 2* rib pain 2# 20x 2# 20x   Seated  Shoulder flexion  2# x10 ea 2# 10x ea 2# x10  5x on L 2# 2* rib pain 2# 20x on R, 12x on L- rib pain 2 5# 10x2 ea           Sit<>stand from chair  1 foam pad 20x -NT CGA/S - 1x/ LOB BW 1 foam pad 10x - close S, no LOB today x15- NT NT- fatigued NT fatigued   Leg press NT time 50# 2x15 50# 2x15  Time constraints NT fatigued 40# 2x20   Cybex hip abd     # 15 2x10                                                           Ther Activity                        Gait Training                        Modalities

## 2022-03-31 ENCOUNTER — OFFICE VISIT (OUTPATIENT)
Dept: PHYSICAL THERAPY | Age: 87
End: 2022-03-31
Payer: COMMERCIAL

## 2022-03-31 DIAGNOSIS — M15.9 GENERALIZED OSTEOARTHRITIS: Primary | ICD-10-CM

## 2022-03-31 DIAGNOSIS — R26.2 AMBULATORY DYSFUNCTION: ICD-10-CM

## 2022-03-31 PROCEDURE — 97112 NEUROMUSCULAR REEDUCATION: CPT | Performed by: PHYSICAL THERAPIST

## 2022-03-31 PROCEDURE — 97110 THERAPEUTIC EXERCISES: CPT | Performed by: PHYSICAL THERAPIST

## 2022-03-31 NOTE — PROGRESS NOTES
Daily Note     Today's date: 3/31/2022  Patient name: Bradley Solitario  :   MRN: [id number]  Referring provider: Livan Villafuerte MD  Dx:   Encounter Diagnosis     ICD-10-CM    1  Generalized osteoarthritis  M15 9    2  Ambulatory dysfunction  R26 2                   Subjective: Patient reports that her rib pain is feeling better and she was able to get the DF assist during gait  Patient notices an immediate improvement, "I haven't stubbed my toes at all today "      Objective: See treatment diary below      Assessment: Tolerated treatment well  Patient tolerating session well  Improved activity tolerance today, less rest during SLR x 3 exercise as patient doesn't have to focus on her clearing her toes every time  Improved gait pattern, specifically improved R toe clearance during swing phase  Plan: Continue per plan of care  Precautions: FALL risk, h/o B reverse TSA, 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        Manuals 3/31 3/17 3/22- FOTO 3/24 3/29                                   Neuro Re-Ed                sidestepping 3x // bars 2#  3x // bars 2#  3x in bars 2#  5x // bars 2# 3x in bars 2# NT   Tandem walking FW/BW 3x bars 2#  3x // bars- holding on 2#  3x // bars, holding on 2# 5x // bars 2#, holding on FW  3x bars 2#  NT                                    Ther Ex                Nu step x15' L1 x15' L1 15' L2 x15' L2 x15' L1   UBE 4 FW/ 4BW 90 rpm 6min 3/3 90RPM 6 min 3/3 90 RPM 6' 3/3, 90 RPM UBE /  90 rpm           LAQ 2# 3x10 2# 3x10 2# 3x10 2# 3x10 2# 3x10     -      SLR x 3 standing 2# 3x10 2# 3x10 2# 3x10 2# 3x10 2# 3x10   Ham curls 2# 3x10 2# 3x10 2# 3x10 2# 3x10 3x10 2#    HR 2x10 2x10 2x10 2x10 2x10           Ball squeezes 20x :03 2x10 :03  20x:03 20x:03 20x :05   squats 20x // bars 20x // bars 20x // bars 20x // bars 20x // bars           scap retraction  2x10 scap retraction 2x10 2x10 2x10 2x10   Chest press with cane 2# 2x10 Chest press with cane 20x 2#  Chest press with cane 20x 2#  20x 2#  2 5# 2x10   Cane flex, seated  2# 2x10 2# 2x10 2# 2x10 2# 2x10 2 5# 2x10   Biceps  2# 2x10 Biceps curls 2# 20x Biceps curls & hammer curls2# 20x  5x on L 2* rib pain 2# 20x 2# 20x   Seated  Shoulder flexion  2# x10 ea 2# 10x ea 2# x10  5x on L 2# 2* rib pain 2# 20x on R, 12x on L- rib pain 2 5# 10x2 ea           Sit<>stand from chair  1 foam pad 20x - close S/CGA 1 foam pad 10x - close S, no LOB today x15- NT NT- fatigued NT fatigued   Leg press NT time 50# 2x15 50# 2x15  Time constraints NT fatigued 40# 2x20   Cybex hip abd     # 15 2x10                                                           Ther Activity                        Gait Training                        Modalities

## 2022-04-05 ENCOUNTER — OFFICE VISIT (OUTPATIENT)
Dept: PHYSICAL THERAPY | Age: 87
End: 2022-04-05
Payer: COMMERCIAL

## 2022-04-05 DIAGNOSIS — M15.9 GENERALIZED OSTEOARTHRITIS: Primary | ICD-10-CM

## 2022-04-05 DIAGNOSIS — R26.2 AMBULATORY DYSFUNCTION: ICD-10-CM

## 2022-04-05 PROCEDURE — 97110 THERAPEUTIC EXERCISES: CPT

## 2022-04-05 NOTE — PROGRESS NOTES
Daily Note     Today's date: 2022  Patient name: Lesli Lamar  :   MRN: [id number]  Referring provider: Khloe Dailey MD  Dx:   Encounter Diagnosis     ICD-10-CM    1  Generalized osteoarthritis  M15 9    2  Ambulatory dysfunction  R26 2                   Subjective: Patient reports no aches or pains today  Notes she still wants to improve her ability to pull up her toes on R side  Pt notes she is doing her exercises at home"      Objective: See treatment diary below      Assessment: Tolerated treatment well  Patient tolerating session well  More fatigue noted  With Cybex LE ex and UE ex today  Will adjust based ex based on pt endurance and pain with each visit       Plan: Continue per plan of care  Precautions: FALL risk, h/o B reverse TSA, 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        Manuals 3/31 4/5 3/22- FOTO 3/24 3/29                                   Neuro Re-Ed                sidestepping 3x // bars 2#  3x // bars 2#  3x in bars 2#  5x // bars 2# 3x in bars 2# NT   Tandem walking FW/BW 3x bars 2#  3x // bars- holding on 2#  3x // bars, holding on 2# 5x // bars 2#, holding on FW  3x bars 2#  NT                                    Ther Ex                Nu step x15' L1 x15' L1 15' L2 x15' L2 x15' L1   UBE 6 FW/ 4BW 90 rpm 6min 3/3 90RPM 6 min 3/3 90 RPM 6' 3/3, 90 RPM UBE /4  90 rpm           LAQ 2# 3x10 2# 3x10 2# 3x10 2# 3x10 2# 3x10     -      SLR x 3 standing 2# 3x10 2# 3x10 2# 3x10 2# 3x10 2# 3x10   Ham curls 2# 3x10 2# 3x10 2# 3x10 2# 3x10 3x10 2#    HR 2x10 2x10 2x10 2x10 2x10           Ball squeezes 20x :03 2x10 :03  20x:03 20x:03 20x :05   squats 20x // bars 20x // bars 20x // bars 20x // bars 20x // bars           scap retraction  2x10 scap retraction 2x10 2x10 2x10 2x10   Chest press with cane 2# 2x10 Chest press with cane 20x 2#  Chest press with cane 20x 2#  20x 2#  2 5# 2x10   Cane flex, seated  2# 2x10 2# 2x10 2# 2x10 2# 2x10 2 5# 2x10   Biceps  2# 2x10 Biceps curls 2# 20x Biceps curls & hammer curls2# 20x  5x on L 2* rib pain 2# 20x 2# 20x   Seated  Shoulder flexion  2# x10 ea 2# 10x ea 2# x10  5x on L 2# 2* rib pain 2# 20x on R, 12x on L- rib pain 2 5# 10x2 ea           Sit<>stand from chair  1 foam pad 20x - close S/CGA 1 foam pad 10x - close S, no LOB today x15- NT NT- fatigued NT fatigued   Leg press NT time 30# 2x20 50# 2x15  Time constraints NT fatigued 40# 2x20   Cybex hip abd  10# 2x10   # 15 2x10                                                           Ther Activity                        Gait Training                        Modalities

## 2022-04-07 ENCOUNTER — OFFICE VISIT (OUTPATIENT)
Dept: PHYSICAL THERAPY | Age: 87
End: 2022-04-07
Payer: COMMERCIAL

## 2022-04-07 DIAGNOSIS — M15.9 GENERALIZED OSTEOARTHRITIS: Primary | ICD-10-CM

## 2022-04-07 DIAGNOSIS — R26.2 AMBULATORY DYSFUNCTION: ICD-10-CM

## 2022-04-07 PROCEDURE — 97110 THERAPEUTIC EXERCISES: CPT | Performed by: PHYSICAL THERAPIST

## 2022-04-07 PROCEDURE — 97112 NEUROMUSCULAR REEDUCATION: CPT | Performed by: PHYSICAL THERAPIST

## 2022-04-07 NOTE — PROGRESS NOTES
Daily Note     Today's date: 2022  Patient name: Franca Morrow  :   MRN: [id number]  Referring provider: Dominique Santa MD  Dx:   Encounter Diagnosis     ICD-10-CM    1  Generalized osteoarthritis  M15 9    2  Ambulatory dysfunction  R26 2                   Subjective: Patient reports that she is doing well at home, has been using her DF assist and it helps to clear her toes when she walks  Patient reports no increase in pain with exercising last session, with addition of weight machines  Objective: See treatment diary below      Assessment: Tolerated treatment well  Patient tolerating increase in weight machines well  Progressing well with PT  Trialed tandem walking with 1 hand support, patient can feel the trendelenburg gait pattern, weakness in the hip during tandem walking with 1 hand support  Needs close S assist for safety with tandem walking 1 hand 2* weakness  VC for patient to contract glutes to progress her strength and hip stability  PT adjusted DF assist tighter to assist with pulling foot into DF  Plan: Continue per plan of care  Precautions: FALL risk, h/o B reverse TSA, 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        Manuals 3/31 4/5 4/7 3/24 3/29                                   Neuro Re-Ed                sidestepping 3x // bars 2#  3x // bars 2#  3x in bars ballet bar 5x // bars 2# 3x in bars 2# NT   Tandem walking FW/BW 3x bars 2#  3x // bars- holding on 2#  2x ballet bar- close S assist for safety 5x // bars 2#, holding on FW  3x bars 2#  NT                                    Ther Ex                Nu step x15' L1 x15' L1 12' L2 x15' L2 x15' L1   UBE 6 FW/ 4BW 90 rpm 6min 3/3 90RPM 8 min / 90 RPM 6' 3/3, 90 RPM UBE /4  90 rpm           LAQ 2# 3x10 2# 3x10 2# 3x10 2# 3x10 2# 3x10     -      SLR x 3 standing 2# 3x10 2# 3x10 2# 3x10 2# 3x10 2# 3x10   Ham curls 2# 3x10 2# 3x10 2# 3x10 2# 3x10 3x10 2#    HR 2x10 2x10 2x10 2x10 2x10           Ball squeezes 20x :03 2x10 :03  20x:03 20x:03 20x :05   squats 20x // bars 20x // bars 20x // bars - will do at home, time constraints 20x // bars 20x // bars           scap retraction  2x10 scap retraction 2x10 2x10 NT 2x10 2x10   Chest press with cane 2# 2x10 Chest press with cane 20x 2#  Chest press with cane 20x 2# NT time constraints 20x 2#  2 5# 2x10   Cane flex, seated  2# 2x10 2# 2x10 2# 2x10 NT time constraints 2# 2x10 2 5# 2x10   Biceps  2# 2x10 Biceps curls 2# 20x 2# 20x 2# 20x 2# 20x   Seated  Shoulder flexion  2# x10 ea 2# 10x ea 2# 20x ea 2# 20x on R, 12x on L- rib pain 2 5# 10x2 ea           Sit<>stand from chair  1 foam pad 20x - close S/CGA 1 foam pad 10x - close S, no LOB today x15- NT NT- fatigued NT fatigued   Leg press NT time 30# 2x20 40# 2x15  NT fatigued 40# 2x20   Cybex hip abd  10# 2x10 20# 2x10  # 15 2x10                                                           Ther Activity                        Gait Training                        Modalities

## 2022-04-12 ENCOUNTER — OFFICE VISIT (OUTPATIENT)
Dept: PHYSICAL THERAPY | Age: 87
End: 2022-04-12
Payer: COMMERCIAL

## 2022-04-12 DIAGNOSIS — R26.2 AMBULATORY DYSFUNCTION: ICD-10-CM

## 2022-04-12 DIAGNOSIS — M15.9 GENERALIZED OSTEOARTHRITIS: Primary | ICD-10-CM

## 2022-04-12 PROCEDURE — 97112 NEUROMUSCULAR REEDUCATION: CPT

## 2022-04-12 PROCEDURE — 97110 THERAPEUTIC EXERCISES: CPT

## 2022-04-12 NOTE — PROGRESS NOTES
Daily Note     Today's date: 2022  Patient name: Khadijah Steel  :   MRN: [id number]  Referring provider: Prabhjot Ramírez MD  Dx:   Encounter Diagnosis     ICD-10-CM    1  Generalized osteoarthritis  M15 9    2  Ambulatory dysfunction  R26 2                   Subjective: Patient reports that she is doing well at home, has been using her DF assist and it helps to clear her toes when she walks  Patient reports more general fatigue today but feels she can do her full session      Objective: See treatment diary below      Assessment: Tolerated treatment well  Patient tolerating increase in weight machines well  Progressing well with PT  Noted more SOB today and as a result needed  More frequesnt periods  Pt was challenged with R arm to reach 20 reps due to fatigue today  Plan: Continue per plan of care  Precautions: FALL risk, h/o B reverse TSA, 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        Manuals 3/31 4/5 4/7 4/12 3/29                                   Neuro Re-Ed                sidestepping 3x // bars 2#  3x // bars 2#  3x in bars ballet bar 3x  W/2# 3x in bars 2# NT   Tandem walking FW/BW 3x bars 2#  3x // bars- holding on 2#  2x ballet bar- close S assist for safety 3x w#2 FW  3x bars 2#  NT                                    Ther Ex                Nu step x15' L1 x15' L1 12' L2 x15' L2 x15' L1   UBE 6 FW/ 4BW 90 rpm 6min 3/3 90RPM 8 min 4/4 90 RPM 8 min' /, 90 RPM UBE /4  90 rpm           LAQ 2# 3x10 2# 3x10 2# 3x10 2# 3x10 2# 3x10     -      SLR x 3 standing 2# 3x10 2# 3x10 2# 3x10 2# 3x10 2# 3x10   Ham curls 2# 3x10 2# 3x10 2# 3x10 2# 3x10 3x10 2#    HR 2x10 2x10 2x10 2x10 2x10           Ball squeezes 20x :03 2x10 :03  20x:03  20x :05   squats 20x // bars 20x // bars 20x // bars - will do at home, time constraints 20x // bars 20x // bars           scap retraction  2x10 scap retraction 2x10 2x10 NT 2x10 2x10   Chest press with cane 2# 2x10 Chest press with cane 20x 2#  Chest press with cane 20x 2# NT time constraints 20x 2#  2 5# 2x10   Cane flex, seated  2# 2x10 2# 2x10 2# 2x10 NT time constraints 2# 2x10 2  2x10   Biceps  2# 2x10 Biceps curls 2# 20x 2# 20x 2# 20x 2# 20x   Seated  Shoulder flexion  2# x10 ea 2# 10x ea 2# 20x ea 2# 20 2  # 10x2 ea           Sit<>stand from chair  1 foam pad 20x - close S/CGA 1 foam pad 10x - close S, no LOB today x15- NT NT- fatigued NT fatigued   Leg press NT time 30# 2x20 40# 2x15  40# 2x15 40# 2x15   Cybex hip abd  10# 2x10 20# 2x10 NT- fatigued # 15 2x10-NT                                                           Ther Activity                        Gait Training                        Modalities

## 2022-04-14 ENCOUNTER — APPOINTMENT (OUTPATIENT)
Dept: LAB | Age: 87
End: 2022-04-14
Payer: COMMERCIAL

## 2022-04-14 ENCOUNTER — OFFICE VISIT (OUTPATIENT)
Dept: PHYSICAL THERAPY | Age: 87
End: 2022-04-14
Payer: COMMERCIAL

## 2022-04-14 DIAGNOSIS — I10 PRIMARY HYPERTENSION: ICD-10-CM

## 2022-04-14 DIAGNOSIS — E78.2 MIXED HYPERLIPIDEMIA: ICD-10-CM

## 2022-04-14 DIAGNOSIS — M15.9 GENERALIZED OSTEOARTHRITIS: Primary | ICD-10-CM

## 2022-04-14 DIAGNOSIS — E03.9 ACQUIRED HYPOTHYROIDISM: ICD-10-CM

## 2022-04-14 DIAGNOSIS — E11.22 TYPE 2 DM WITH CKD STAGE 3 AND HYPERTENSION (HCC): ICD-10-CM

## 2022-04-14 DIAGNOSIS — I12.9 TYPE 2 DM WITH CKD STAGE 3 AND HYPERTENSION (HCC): ICD-10-CM

## 2022-04-14 DIAGNOSIS — N18.30 TYPE 2 DM WITH CKD STAGE 3 AND HYPERTENSION (HCC): ICD-10-CM

## 2022-04-14 DIAGNOSIS — R26.2 AMBULATORY DYSFUNCTION: ICD-10-CM

## 2022-04-14 DIAGNOSIS — N18.32 STAGE 3B CHRONIC KIDNEY DISEASE (HCC): ICD-10-CM

## 2022-04-14 LAB
ALBUMIN SERPL BCP-MCNC: 4.1 G/DL (ref 3.5–5)
ALP SERPL-CCNC: 74 U/L (ref 46–116)
ALT SERPL W P-5'-P-CCNC: 32 U/L (ref 12–78)
ANION GAP SERPL CALCULATED.3IONS-SCNC: 5 MMOL/L (ref 4–13)
AST SERPL W P-5'-P-CCNC: 19 U/L (ref 5–45)
BILIRUB SERPL-MCNC: 1.21 MG/DL (ref 0.2–1)
BUN SERPL-MCNC: 28 MG/DL (ref 5–25)
CALCIUM SERPL-MCNC: 9.8 MG/DL (ref 8.3–10.1)
CHLORIDE SERPL-SCNC: 108 MMOL/L (ref 100–108)
CO2 SERPL-SCNC: 25 MMOL/L (ref 21–32)
CREAT SERPL-MCNC: 1.37 MG/DL (ref 0.6–1.3)
EST. AVERAGE GLUCOSE BLD GHB EST-MCNC: 128 MG/DL
GFR SERPL CREATININE-BSD FRML MDRD: 32 ML/MIN/1.73SQ M
GLUCOSE P FAST SERPL-MCNC: 121 MG/DL (ref 65–99)
HBA1C MFR BLD: 6.1 %
POTASSIUM SERPL-SCNC: 4 MMOL/L (ref 3.5–5.3)
PROT SERPL-MCNC: 7.1 G/DL (ref 6.4–8.2)
SODIUM SERPL-SCNC: 138 MMOL/L (ref 136–145)
TSH SERPL DL<=0.05 MIU/L-ACNC: 2.89 UIU/ML (ref 0.45–4.5)

## 2022-04-14 PROCEDURE — 80053 COMPREHEN METABOLIC PANEL: CPT

## 2022-04-14 PROCEDURE — 97110 THERAPEUTIC EXERCISES: CPT

## 2022-04-14 PROCEDURE — 84443 ASSAY THYROID STIM HORMONE: CPT

## 2022-04-14 PROCEDURE — 36415 COLL VENOUS BLD VENIPUNCTURE: CPT

## 2022-04-14 PROCEDURE — 83036 HEMOGLOBIN GLYCOSYLATED A1C: CPT

## 2022-04-14 NOTE — PROGRESS NOTES
Daily Note     Today's date: 2022  Patient name: Jaye Stephen  :   MRN: [id number]  Referring provider: Denny Sykes MD  Dx:   Encounter Diagnosis     ICD-10-CM    1  Generalized osteoarthritis  M15 9    2  Ambulatory dysfunction  R26 2                   Subjective: Patient reports feeling of general fatigue  Notes her back hurts a little bit today      Objective: See treatment diary below      Assessment: Tolerated treatment well  Progressing well with PT  Noted more SOB today and as a result needed  More frequesnt periods  Skipped Cybex with decreased endurance today and kept reps at 20 with exercises      Plan: Continue per plan of care  Precautions: FALL risk, h/o B reverse TSA, 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  Manuals 3/31 4/5 4/7 4/12 4/14                                   Neuro Re-Ed                sidestepping 3x // bars 2#  3x // bars 2#  3x in bars ballet bar 3x  W/2# 3x in bars 2#    Tandem walking FW/BW 3x bars 2#  3x // bars- holding on 2#  2x ballet bar- close S assist for safety 3x w#2 FW  3x bars 2#                                    Ther Ex                Nu step x15' L1 x15' L1 12' L2 x15' L2 x15' L1   UBE 6 FW/ 4BW 90 rpm 6min 3/3 90RPM 8 min /4 90 RPM 8 min' , 90 RPM UBE /4  90 rpm           LAQ 2# 3x10 2# 3x10 2# 3x10 2# 3x10 2# 3x10     -      SLR x 3 standing 2# 3x10 2# 3x10 2# 3x10 2# 3x10 2# 3x10   Ham curls 2# 3x10 2# 3x10 2# 3x10 2# 3x10 3x10 2#    HR 2x10 2x10 2x10 2x10 2x10           Ball squeezes 20x :03 2x10 :03  20x:03  20x :05   squats 20x // bars 20x // bars 20x // bars - will do at home, time constraints 20x // bars 20x // bars           scap retraction  2x10 scap retraction 2x10 2x10 NT 2x10 2x10   Chest press with cane 2# 2x10 Chest press with cane 20x 2#  Chest press with cane 20x 2# NT time constraints 20x 2#  2# 2x10   Cane flex, seated  2# 2x10 2# 2x10 2# 2x10 NT time constraints 2# 2x10 2   2x10   Biceps  2# 2x10 Biceps curls 2# 20x 2# 20x 2# 20x 2# 20x   Seated  Shoulder flexion  2# x10 ea 2# 10x ea 2# 20x ea 2# 20 2  # 10x2 ea           Sit<>stand from chair  1 foam pad 20x - close S/CGA 1 foam pad 10x - close S, no LOB today x15- NT NT- fatigued NT fatigued   Leg press NT time 30# 2x20 40# 2x15  40# 2x15 40# 2x15- NT    Cybex hip abd  10# 2x10 20# 2x10 NT- fatigued # 15 2x10-NT                                                           Ther Activity                        Gait Training                        Modalities

## 2022-04-15 DIAGNOSIS — E03.9 ACQUIRED HYPOTHYROIDISM: ICD-10-CM

## 2022-04-15 RX ORDER — LEVOTHYROXINE SODIUM 112 UG/1
112 TABLET ORAL
Qty: 90 TABLET | Refills: 3 | Status: SHIPPED | OUTPATIENT
Start: 2022-04-15 | End: 2022-07-14

## 2022-04-19 ENCOUNTER — OFFICE VISIT (OUTPATIENT)
Dept: PHYSICAL THERAPY | Age: 87
End: 2022-04-19
Payer: COMMERCIAL

## 2022-04-19 DIAGNOSIS — M15.9 GENERALIZED OSTEOARTHRITIS: Primary | ICD-10-CM

## 2022-04-19 DIAGNOSIS — R26.2 AMBULATORY DYSFUNCTION: ICD-10-CM

## 2022-04-19 PROCEDURE — 97110 THERAPEUTIC EXERCISES: CPT | Performed by: PHYSICAL THERAPIST

## 2022-04-19 PROCEDURE — 97112 NEUROMUSCULAR REEDUCATION: CPT | Performed by: PHYSICAL THERAPIST

## 2022-04-19 NOTE — PROGRESS NOTES
Daily Note     Today's date: 2022  Patient name: Khadijah Steel  :   MRN: [id number]  Referring provider: Prabhjot Ramírez MD  Dx:   Encounter Diagnosis     ICD-10-CM    1  Generalized osteoarthritis  M15 9    2  Ambulatory dysfunction  R26 2                   Subjective: Patient reports that she still has a hard time with lifting herself out of the recliner at home, notices that she loses her balance  Patient reports the chair is softer, has a lift ability, but prefers to not use as she enjoys strengthening  Patient notes that she has an easier time with getting up from a firmer chair  Objective: See treatment diary below      Assessment: Tolerated treatment well  Talked with patient and her daughter (over the phone) about PT appts  Patient will continue PT for the next 2 weeks, then patient plans to discharge to [facility] and walking/exercising at her independent living facility  Plan: Continue per plan of care  Precautions: FALL risk, h/o B reverse TSA, 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        Manuals                                    Neuro Re-Ed                sidestepping 3x foam // bars- S assist 3x // bars 2#  3x in bars ballet bar 3x  W/2# 3x in bars 2#    Tandem walking 3x foam // bars - S assist 3x // bars- holding on 2#  2x ballet bar- close S assist for safety 3x w#2 FW  3x bars 2#                                    Ther Ex                Nu step x15' L1 x15' L1 12' L2 x15' L2 x15' L1   UBE  90 RPM  6min 3/3 90RPM 8 min  90 RPM 8 min' , 90 RPM UBE   90 rpm           LAQ - 2# 3x10 2# 3x10 2# 3x10 2# 3x10     -      SLR x 3 standing - 2# 3x10 2# 3x10 2# 3x10 2# 3x10   Ham curls - 2# 3x10 2# 3x10 2# 3x10 3x10 2#    HR - 2x10 2x10 2x10 2x10           Ball squeezes - 2x10 :03  20x:03  20x :05   squats - 20x // bars 20x // bars - will do at home, time constraints 20x // bars 20x // bars           scap retraction  - scap retraction 2x10 2x10 NT 2x10 2x10   Chest press with cane - Chest press with cane 20x 2#  Chest press with cane 20x 2# NT time constraints 20x 2#  2# 2x10   Cane flex, seated  - 2# 2x10 2# 2x10 NT time constraints 2# 2x10 2  2x10   Biceps  - Biceps curls 2# 20x 2# 20x 2# 20x 2# 20x   Seated  Shoulder flexion  - 2# 10x ea 2# 20x ea 2# 20 2  # 10x2 ea           Sit<>stand from chair  2x10 (first) 1 foam pad 10x - close S, no LOB today x15- NT NT- fatigued NT fatigued   Leg press 40#  2x10 30# 2x20 40# 2x15  40# 2x15 40# 2x15- NT    Cybex hip abd 15#  2x10 (20# too hard) 10# 2x10 20# 2x10 NT- fatigued # 15 2x10-NT                                                           Ther Activity                        Gait Training                        Modalities

## 2022-04-21 ENCOUNTER — RA CDI HCC (OUTPATIENT)
Dept: OTHER | Facility: HOSPITAL | Age: 87
End: 2022-04-21

## 2022-04-21 ENCOUNTER — OFFICE VISIT (OUTPATIENT)
Dept: PHYSICAL THERAPY | Age: 87
End: 2022-04-21
Payer: COMMERCIAL

## 2022-04-21 DIAGNOSIS — R26.2 AMBULATORY DYSFUNCTION: ICD-10-CM

## 2022-04-21 DIAGNOSIS — M15.9 GENERALIZED OSTEOARTHRITIS: Primary | ICD-10-CM

## 2022-04-21 PROCEDURE — 97112 NEUROMUSCULAR REEDUCATION: CPT | Performed by: PHYSICAL THERAPIST

## 2022-04-21 PROCEDURE — 97110 THERAPEUTIC EXERCISES: CPT | Performed by: PHYSICAL THERAPIST

## 2022-04-21 NOTE — PROGRESS NOTES
Daily Note     Today's date: 2022  Patient name: Kory العلي  :   MRN: [id number]  Referring provider: Corinne Toussaint MD  Dx:   Encounter Diagnosis     ICD-10-CM    1  Generalized osteoarthritis  M15 9    2  Ambulatory dysfunction  R26 2                   Subjective: Patient reports that she was able to tolerate the program last session, felt tired in her arms, but no increase in pain and notes that she rested the remainder of the afternoon  Objective: See treatment diary below      Assessment: Tolerated treatment well  Patient feeling L knee weaker today, asking about exercises to perform, added increased weight with LAQ today, patient has 4# ankle weights at home, plans to progress to 4# ankle weights with LAQ at home  Patient tolerating progressions to machine work well today, able to tolerate 20# hip abdcution today with increased reps  Patient feeling that her hips are strengthening more with this machine  Patient very fatigued post session with sit>stands after exercises today, only able to perform 10 today  Patient needing walker in front for confidence and to improve her trunk flexion to assist with lifting herself into standing, unable to perform without  Plan: Continue per plan of care  Precautions: FALL risk, h/o B reverse TSA, 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        Manuals                                    Neuro Re-Ed                sidestepping 3x foam // bars- S assist 3x bars (foam not available) 3x in bars ballet bar 3x  W/2# 3x in bars 2#    Tandem walking 3x foam // bars - S assist 3x // bars (foam not available) 2x ballet bar- close S assist for safety 3x w#2 FW  3x bars 2#                                    Ther Ex                Nu step x15' L1 x10' L1 12' L2 x15' L2 x15' L1   UBE 5/5 90 RPM  5/5 90 RPM 8 min 4/4 90 RPM 8 min' 4/4, 90 RPM UBE 4/4  90 rpm           LAQ - 3# 3x10- 4# NV 2# 3x10 2# 3x10 2# 3x10           SLR x 3 standing -  2# 3x10 2# 3x10 2# 3x10   Ham curls -  2# 3x10 2# 3x10 3x10 2#    HR -  2x10 2x10 2x10           Ball squeezes -  20x:03  20x :05   squats -  20x // bars - will do at home, time constraints 20x // bars 20x // bars           scap retraction  -  2x10 NT 2x10 2x10   Chest press with cane -  Chest press with cane 20x 2# NT time constraints 20x 2#  2# 2x10   Cane flex, seated  -  2# 2x10 NT time constraints 2# 2x10 2  2x10   Biceps  -  2# 20x 2# 20x 2# 20x   Seated  Shoulder flexion  -  2# 20x ea 2# 20 2  # 10x2 ea           Sit<>stand from chair  2x10 (first) 10x - too fatigued towards end   x15- NT NT- fatigued NT fatigued   Leg press 40#  2x10 40# 3x10 40# 2x15  40# 2x15 40# 2x15- NT    Cybex hip abd 15#  2x10 (20# too hard) 20# 3x10 20# 2x10 NT- fatigued # 15 2x10-NT                                                           Ther Activity                        Gait Training                        Modalities

## 2022-04-21 NOTE — PROGRESS NOTES
[facility]  coding opportunities          Chart Reviewed number of suggestions sent to Provider: 1   [id number]    Patients Insurance     Medicare Insurance: [employer]

## 2022-04-26 ENCOUNTER — OFFICE VISIT (OUTPATIENT)
Dept: PHYSICAL THERAPY | Age: 87
End: 2022-04-26
Payer: COMMERCIAL

## 2022-04-26 DIAGNOSIS — R26.2 AMBULATORY DYSFUNCTION: ICD-10-CM

## 2022-04-26 DIAGNOSIS — M15.9 GENERALIZED OSTEOARTHRITIS: Primary | ICD-10-CM

## 2022-04-26 PROCEDURE — 97110 THERAPEUTIC EXERCISES: CPT

## 2022-04-26 NOTE — PROGRESS NOTES
Daily Note     Today's date: 2022  Patient name: Mata Urbano  :   MRN: [id number]  Referring provider: Payton Ziegler MD  Dx:   Encounter Diagnosis     ICD-10-CM    1  Generalized osteoarthritis  M15 9    2  Ambulatory dysfunction  R26 2                   Subjective: Patient reports she has some L knee pain today  " I just feel more achy in my body overall today"      Objective: See treatment diary below      Assessment: Tolerated treatment well  Noted less energy with LE ex on Cybex today  No issues with rises from chair when done early in session  Returned to UE as pt felt she had enough energy to complete full session today  Pt continues to be diligent with her home routine  Plan: Continue per plan of care  Precautions: FALL risk, h/o B reverse TSA, 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  Manuals                                    Neuro Re-Ed                sidestepping 3x foam // bars- S assist 3x bars (foam not available) 3x in bars ballet bar 3x  W/2# 3x in bars 2#    Tandem walking 3x foam // bars - S assist 3x // bars (foam not available) 3x ballet bar-  3x w#2 FW  3x bars 2#                                    Ther Ex                Nu step x15' L1 x15 L1 15' L2 x15' L2 x15' L1   UBE 5/5 90 RPM  5/5 90 RPM 10 min 5/590 RPM 8 min' 4/4, 90 RPM UBE 4/4  90 rpm           LAQ -   2# 3x10 2# 3x10           SLR x 3 standing -   2# 3x10 2# 3x10   Ham curls -   2# 3x10 3x10 2#    HR -   2x10 2x10           Ball squeezes -    20x :05   squats -   20x // bars 20x // bars           scap retraction  -   2x10 2x10   Chest press with cane -  Chest press with cane 20x 2#  20x 2#  2# 2x10   Cane flex, seated  -  2# 2x10  2# 2x10 2  2x10   Biceps  -  2# 20x 2# 20x 2# 20x   Seated  Shoulder flexion  -  2# 20x ea 2# 20 2  # 10x2 ea           Sit<>stand from chair  2x10 (first) 10x - too fatigued towards end   2x10 NT- fatigued NT fatigued   Leg press 40#  2x10 40# 3x10 40# 2x15  40# 2x15 40# 2x15- NT    Cybex hip abd 15#  2x10 (20# too hard) 20# 3x10 15# 3x10 NT- fatigued # 15 2x10-NT                                                           Ther Activity                        Gait Training                        Modalities

## 2022-04-28 ENCOUNTER — OFFICE VISIT (OUTPATIENT)
Dept: FAMILY MEDICINE CLINIC | Facility: CLINIC | Age: 87
End: 2022-04-28
Payer: COMMERCIAL

## 2022-04-28 VITALS
TEMPERATURE: 97.2 F | WEIGHT: 145 LBS | DIASTOLIC BLOOD PRESSURE: 62 MMHG | BODY MASS INDEX: 24.16 KG/M2 | SYSTOLIC BLOOD PRESSURE: 144 MMHG | HEIGHT: 65 IN | HEART RATE: 84 BPM | RESPIRATION RATE: 16 BRPM

## 2022-04-28 DIAGNOSIS — E03.9 ACQUIRED HYPOTHYROIDISM: ICD-10-CM

## 2022-04-28 DIAGNOSIS — N18.32 STAGE 3B CHRONIC KIDNEY DISEASE (HCC): ICD-10-CM

## 2022-04-28 DIAGNOSIS — M54.50 CHRONIC LOW BACK PAIN WITHOUT SCIATICA, UNSPECIFIED BACK PAIN LATERALITY: ICD-10-CM

## 2022-04-28 DIAGNOSIS — E78.2 MIXED HYPERLIPIDEMIA: ICD-10-CM

## 2022-04-28 DIAGNOSIS — I10 PRIMARY HYPERTENSION: Primary | ICD-10-CM

## 2022-04-28 DIAGNOSIS — G89.29 CHRONIC LOW BACK PAIN WITHOUT SCIATICA, UNSPECIFIED BACK PAIN LATERALITY: ICD-10-CM

## 2022-04-28 DIAGNOSIS — I73.9 PERIPHERAL VASCULAR DISEASE (HCC): ICD-10-CM

## 2022-04-28 DIAGNOSIS — E11.22 TYPE 2 DM WITH CKD STAGE 3 AND HYPERTENSION (HCC): ICD-10-CM

## 2022-04-28 DIAGNOSIS — N18.30 TYPE 2 DM WITH CKD STAGE 3 AND HYPERTENSION (HCC): ICD-10-CM

## 2022-04-28 DIAGNOSIS — I12.9 TYPE 2 DM WITH CKD STAGE 3 AND HYPERTENSION (HCC): ICD-10-CM

## 2022-04-28 DIAGNOSIS — E11.42 DIABETIC PERIPHERAL NEUROPATHY (HCC): ICD-10-CM

## 2022-04-28 DIAGNOSIS — R26.2 AMBULATORY DYSFUNCTION: ICD-10-CM

## 2022-04-28 PROBLEM — M25.462 KNEE EFFUSION, LEFT: Status: RESOLVED | Noted: 2018-01-16 | Resolved: 2022-04-28

## 2022-04-28 PROBLEM — M25.562 CHRONIC PAIN OF LEFT KNEE: Status: RESOLVED | Noted: 2018-02-08 | Resolved: 2022-04-28

## 2022-04-28 PROBLEM — M25.462 EFFUSION OF LEFT KNEE: Status: RESOLVED | Noted: 2018-03-15 | Resolved: 2022-04-28

## 2022-04-28 PROCEDURE — 1160F RVW MEDS BY RX/DR IN RCRD: CPT | Performed by: FAMILY MEDICINE

## 2022-04-28 PROCEDURE — 99214 OFFICE O/P EST MOD 30 MIN: CPT | Performed by: FAMILY MEDICINE

## 2022-04-28 PROCEDURE — 3725F SCREEN DEPRESSION PERFORMED: CPT | Performed by: FAMILY MEDICINE

## 2022-04-28 PROCEDURE — 1036F TOBACCO NON-USER: CPT | Performed by: FAMILY MEDICINE

## 2022-04-28 NOTE — PROGRESS NOTES
Chief Complaint   Patient presents with    Follow-up     4 month follow up      Health Maintenance   Topic Date Due    DTaP,Tdap,and Td Vaccines (1 - Tdap) Never done    Influenza Vaccine (1) 09/01/2021    Fall Risk  07/29/2022    Medicare Annual Wellness Visit (AWV)  07/29/2022    HEMOGLOBIN A1C  10/14/2022    Diabetic Foot Exam  11/29/2022    Depression Screening  04/28/2023    BMI: Adult  04/28/2023    DM Eye Exam  08/17/2023    Pneumococcal Vaccine: 65+ Years  Completed    COVID-19 Vaccine  Completed    HIB Vaccine  Aged Out    Hepatitis B Vaccine  Aged Out    IPV Vaccine  Aged Out    Hepatitis A Vaccine  Aged Out    Meningococcal ACWY Vaccine  Aged Out    HPV Vaccine  Aged Out       Depression Screening and Follow-up Plan: Patient was screened for depression during today's encounter  They screened negative with a PHQ-2 score of 0  Falls Plan of Care: balance, strength, and gait training instructions were provided  Recommended assistive device to help with gait and balance  Medications that increase falls were reviewed  Vitamin D supplementation was recommended  Home safety education provided  Assessment/Plan:    Hypertension  BP remains stable  Continue Losartan 100 mg daily, Caduet 10/20 mg daily  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)    Lab Results   Component Value Date    HGBA1C 6 1 (H) 04/14/2022     Type 2 DM - diet controlled  Follow-up with ophthalmologist Dr Ole Huang  Continue follow-up with podiatry Dr Panchito Irizarry for routine foot care  Diabetic peripheral neuropathy [facility])    Lab Results   Component Value Date    HGBA1C 6 1 (H) 04/14/2022     Patient has balance problems  Continue to use a walker  Follow-up with podiatry  Hypothyroidism  Continue Levothyroxine 112 mcg daily  Hyperlipidemia  Continue Caduet 10/20 mg daily        Chronic kidney disease, stage 3  Lab Results   Component Value Date    EGFR 32 04/14/2022    EGFR 31 11/18/2021    EGFR 35 07/20/2021    CREATININE 1 37 (H) 04/14/2022    CREATININE 1 43 (H) 11/18/2021    CREATININE 1 30 07/20/2021       Creatinine level remains stable  Encouraged to stay well hydrated, avoid NSAID's, nephrotoxins  Will continue to monitor labs  Ambulatory dysfunction  Patient completed physical therapy this week  Reviewed fall precautions  Continue to use a walker for ambulation  Chronic low back pain without sciatica  Patient reports improvement in low back pain with physical therapy  Take Tylenol PRN  Peripheral vascular disease ([facility] )  Continue statin therapy  Schedule follow-up visit, medicare AWV in 4 months  Check labs prior to next visit  Diagnoses and all orders for this visit:    Primary hypertension  -     Comprehensive metabolic panel; Future    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)  -     Comprehensive metabolic panel; Future  -     Microalbumin / creatinine urine ratio    Diabetic peripheral neuropathy (HCC)    Acquired hypothyroidism  -     TSH, 3rd generation with Free T4 reflex; Future    Mixed hyperlipidemia    Stage 3b chronic kidney disease ([facility] )    Ambulatory dysfunction    Chronic low back pain without sciatica, unspecified back pain laterality    Peripheral vascular disease (HCC)          Subjective:      Patient ID: Leanne Oh is a 80 y o  female  HPI     Patient presents for 4 month follow-up visit accompanied by her daughter  PMHx: HTN, Hypothyroidism, Hyperlipidemia, Type 2 DM - diet controlled, microalbuminuria, CKD stage 3, PVD, generalized OA, ambulatory dysfunction, peripheral neuropathy, PVD, S/P endovascular AAA repair in 3/2017  Reviewed current medications, blood test results from 4/14/22  TSH 2 890, creatinine 1 37- stable, GFR 32, fasting blood sugar 121, potassium 4 0  LFTs- within normal range  Hb A1C 6 1  HTN - patient reports no chest pain, shortness of breath, dizziness      BP at home ranges in 140's -156/ 70's     Currently taking Losartan 100 mg daily, Caduet 10/20 mg daily  Type 2 DM - diet controlled  Foot exams as per podiatry Dr Brain Garcia  He comes to patient's home every 10 weeks  Phone # [phone]  Patient ambulates with a walker  Reports no falls  She has balance problems due to peripheral neuropathy  Completed 3 month course of physical therapy this week  Reports improvement in R hip and low back pain  She walks better with using right foot brace  Daughter Corine Soliman is actively involved in her mother's care  Cognitive status is stable  Patient received COVID booster in September 2021  The following portions of the patient's history were reviewed and updated as appropriate: allergies, past family history, past medical history, past social history, past surgical history and problem list     Review of Systems   Constitutional: Positive for fatigue (mild)  Negative for activity change, appetite change, chills and fever  HENT: Positive for hearing loss  Negative for congestion, ear pain, nosebleeds, sore throat and trouble swallowing  Eyes: Negative  Respiratory: Negative for cough, chest tightness, shortness of breath and wheezing  Cardiovascular: Negative for chest pain, palpitations and leg swelling  Gastrointestinal: Positive for constipation  Negative for abdominal pain, blood in stool, diarrhea, nausea and vomiting  Genitourinary: Negative for difficulty urinating, dysuria, flank pain, frequency and hematuria  Musculoskeletal: Positive for arthralgias, back pain (improved ) and gait problem (ambulates with a walker)  Negative for joint swelling  Skin: Negative for rash  Neurological: Positive for numbness (in legs)  Negative for dizziness, syncope and headaches  Hematological: Negative  Psychiatric/Behavioral: Negative for dysphoric mood and sleep disturbance          Anxiety - mood has been stable         Objective:      /62 (BP Location: Left arm, Patient Position: Sitting)   Pulse 84   Temp (!) 97 2 °F (36 2 °C) (Tympanic)   Resp 16   Ht 5' 5" (1 651 m)   Wt 65 8 kg (145 lb)   BMI 24 13 kg/m²          Physical Exam  Vitals and nursing note reviewed  Constitutional:       Appearance: Normal appearance  HENT:      Head: Normocephalic and atraumatic  Eyes:      Conjunctiva/sclera: Conjunctivae normal       Pupils: Pupils are equal, round, and reactive to light  Cardiovascular:      Rate and Rhythm: Normal rate and regular rhythm  Heart sounds: No murmur heard  Comments: Patient wears light compression stockings  Pulmonary:      Effort: Pulmonary effort is normal       Breath sounds: Normal breath sounds  Abdominal:      General: There is no distension  Palpations: Abdomen is soft  Tenderness: There is no abdominal tenderness  Musculoskeletal:         General: No swelling  Cervical back: Normal range of motion and neck supple  Comments: Ambulates with a walker  Arthritic changes in hand joints   Skin:     General: Skin is warm and dry  Findings: No rash  Neurological:      Mental Status: She is alert     Psychiatric:         Mood and Affect: Mood normal

## 2022-04-29 NOTE — ASSESSMENT & PLAN NOTE
Lab Results   Component Value Date    HGBA1C 6 1 (H) 04/14/2022     Patient has balance problems  Continue to use a walker  Follow-up with podiatry

## 2022-04-29 NOTE — ASSESSMENT & PLAN NOTE
Lab Results   Component Value Date    EGFR 32 04/14/2022    EGFR 31 11/18/2021    EGFR 35 07/20/2021    CREATININE 1 37 (H) 04/14/2022    CREATININE 1 43 (H) 11/18/2021    CREATININE 1 30 07/20/2021       Creatinine level remains stable  Encouraged to stay well hydrated, avoid NSAID's, nephrotoxins  Will continue to monitor labs

## 2022-04-29 NOTE — ASSESSMENT & PLAN NOTE
Patient completed physical therapy this week  Reviewed fall precautions  Continue to use a walker for ambulation

## 2022-04-29 NOTE — ASSESSMENT & PLAN NOTE
Lab Results   Component Value Date    HGBA1C 6 1 (H) 04/14/2022     Type 2 DM - diet controlled  Follow-up with ophthalmologist Dr Blanca Nix  Continue follow-up with podiatry Dr Savanna Arcos for routine foot care

## 2022-05-01 DIAGNOSIS — I10 ESSENTIAL HYPERTENSION: ICD-10-CM

## 2022-05-01 RX ORDER — LOSARTAN POTASSIUM 100 MG/1
TABLET ORAL
Qty: 90 TABLET | Refills: 3 | Status: SHIPPED | OUTPATIENT
Start: 2022-05-01

## 2022-05-03 ENCOUNTER — OFFICE VISIT (OUTPATIENT)
Dept: PHYSICAL THERAPY | Age: 87
End: 2022-05-03
Payer: COMMERCIAL

## 2022-05-03 DIAGNOSIS — R26.2 AMBULATORY DYSFUNCTION: ICD-10-CM

## 2022-05-03 DIAGNOSIS — M15.9 GENERALIZED OSTEOARTHRITIS: Primary | ICD-10-CM

## 2022-05-03 PROCEDURE — 97110 THERAPEUTIC EXERCISES: CPT

## 2022-05-03 PROCEDURE — 97112 NEUROMUSCULAR REEDUCATION: CPT

## 2022-05-03 NOTE — PROGRESS NOTES
Daily Note     Today's date: 5/3/2022  Patient name: Jaye Stephen  :   MRN: [id number]  Referring provider: Denny Sykes MD  Dx:   Encounter Diagnosis     ICD-10-CM    1  Generalized osteoarthritis  M15 9    2  Ambulatory dysfunction  R26 2                   Subjective: Patient stated that she has been working on HEP daily, getting ready for D/C  Objective: See treatment diary below      Assessment: Tolerated treatment well  Progressed balance tasks to be completed with single UE support, apprehension initially, but she was able to complete with confidence with repetitions  UE tolerated better than LE strengthening today  Multiple failed attempts during STS, improved performance from elevated surface  Patient demonstrated fatigue post session and would benefit from continued PT  Plan: Continue per plan of care  Precautions: FALL risk, h/o B reverse TSA, 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  Manuals 4/19 4/21 4/26 5/3 4/14                                   Neuro Re-Ed                sidestepping 3x foam // bars- S assist 3x bars (foam not available) 3x in bars ballet bar 3x ballet bar-  3x in bars 2#    Tandem walking 3x foam // bars - S assist 3x // bars (foam not available) 3x ballet bar-  3x ballet bar-  1 UE support FW  3x bars 2#                                    Ther Ex                Nu step x15' L1 x15 L1 15' L2 15' L1 x15' L1   UBE 5/5 90 RPM  5/5 90 RPM 10 min 5/590 RPM 5/5 90 RPM UBE 4/4  90 rpm           LAQ -    2# 3x10           SLR x 3 standing -    2# 3x10   Ham curls -    3x10 2#    HR -    2x10           Ball squeezes -    20x :05   squats -    20x // bars           scap retraction  -    2x10   Chest press with cane -  Chest press with cane 20x 2#  Chest press with cane 20x 2#  2# 2x10   Cane flex, seated  -  2# 2x10  2# 2x10  2  2x10   Biceps  -  2# 20x 2# 20x 2# 20x   Seated  Shoulder flexion  -  2# 20x ea 2# 20x 2  # 10x2 ea Sit<>stand from chair  2x10 (first) 10x - too fatigued towards end   2x10 20 /c foam boost NT fatigued   Leg press 40#  2x10 40# 3x10 40# 2x15  NT 40# 2x15- NT    Cybex hip abd 15#  2x10 (20# too hard) 20# 3x10 15# 3x10 NT # 15 2x10-NT                                                           Ther Activity                        Gait Training                        Modalities

## 2022-05-05 ENCOUNTER — OFFICE VISIT (OUTPATIENT)
Dept: PHYSICAL THERAPY | Age: 87
End: 2022-05-05
Payer: COMMERCIAL

## 2022-05-05 ENCOUNTER — APPOINTMENT (OUTPATIENT)
Dept: PHYSICAL THERAPY | Age: 87
End: 2022-05-05
Payer: COMMERCIAL

## 2022-05-05 DIAGNOSIS — R26.2 AMBULATORY DYSFUNCTION: ICD-10-CM

## 2022-05-05 DIAGNOSIS — M15.9 GENERALIZED OSTEOARTHRITIS: Primary | ICD-10-CM

## 2022-05-05 PROCEDURE — 97110 THERAPEUTIC EXERCISES: CPT

## 2022-05-05 NOTE — PROGRESS NOTES
Daily Note     Today's date: 2022  Patient name: Leanne Oh  :   MRN: [id number]  Referring provider: Taiwo Morales MD  Dx:   Encounter Diagnosis     ICD-10-CM    1  Generalized osteoarthritis  M15 9    2  Ambulatory dysfunction  R26 2                   Subjective: Patient stated that she has been working on HEP daily, getting ready for D/C  Pt wants to know priority exercises to do at home      Objective: See treatment diary below      Assessment: Tolerated treatment well  Challenged with ex intensity with both UE and LE ex  Pt was issued handouts with home ex with UE and LE priority ex to be done  Patient ready for D/C and is used to performing ex at home when given by therapist and is usually diligent with ex  Plan: D/C PT to Home Program       Precautions: FALL risk, h/o B reverse TSA, 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  Manuals 4/19 4/21 4/26 5/3 5/                                   Neuro Re-Ed                sidestepping 3x foam // bars- S assist 3x bars (foam not available) 3x in bars ballet bar 3x ballet bar-  3x in bars 2#    Tandem walking 3x foam // bars - S assist 3x // bars (foam not available) 3x ballet bar-  3x ballet bar-  1 UE support FW  3x bars 2#                                    Ther Ex                Nu step x15' L1 x15 L1 15' L2 15' L1 x15' L1   UBE 5/5 90 RPM  5/5 90 RPM 10 min 5/590 RPM 5/5 90 RPM UBE 4/4  90 rpm           LAQ -               SLR x 3 standing -       Ham curls -       HR -               Ball squeezes -       squats -               scap retraction  -       Chest press with cane -  Chest press with cane 20x 2#  Chest press with cane 20x 2#  2# 2x10   Cane flex, seated  -  2# 2x10  2# 2x10  2#  2x10   Biceps  -  2# 20x 2# 20x 2# 20x   Seated  Shoulder flexion  -  2# 20x ea 2# 20x 2  # 10x2 ea           Sit<>stand from chair  2x10 (first) 10x - too fatigued towards end   2x10 20 /c foam boost NT fatigued   Leg press 40#  2x10 40# 3x10 40# 2x15  NT 40# 2x15-    Cybex hip abd 15#  2x10 (20# too hard) 20# 3x10 15# 3x10 NT # 15 2x10                                                           Ther Activity                        Gait Training                        Modalities

## 2022-05-06 NOTE — PROGRESS NOTES
5/5/2022 PT discharge assessment    Patient seen for brief discharge assessment, discussed plan for patient exercising at home, established HEP for daily exercises and recommended patient to continue with her walking as she's able  Patient has made great progress with PT and enjoys exercising to keep herself active and maintain her functional independence  MMT  4/5 grossly throughout B LEs, WFL AROM grossly throughout  4-/5 grossly throughout B UEs, WFL AROM 2* h/o reverse total shoulders  Goals  Impairment Goals to be met within 4 weeks  - Decrease pain to 0/10 met  - Improve ROM 5 degrees extension met  - Increase strength to 4+/5 throughout progressing  - Increase UE strength to 4-/5 grossly throughout   progressing      Functional Goals to be met within 4-6 weeks     - Return to Prior Level of Function met  - Increase Functional Status Measure to: expected met  - Patient will be independent with HEP met  - Patient to be able to perform SLS x 2 sec on ea met  - Patient to be able to perform 5 reps sit<> 15 sec    met

## 2022-05-10 ENCOUNTER — APPOINTMENT (OUTPATIENT)
Dept: PHYSICAL THERAPY | Age: 87
End: 2022-05-10
Payer: COMMERCIAL

## 2022-05-12 ENCOUNTER — APPOINTMENT (OUTPATIENT)
Dept: PHYSICAL THERAPY | Age: 87
End: 2022-05-12
Payer: COMMERCIAL

## 2022-05-17 ENCOUNTER — APPOINTMENT (OUTPATIENT)
Dept: PHYSICAL THERAPY | Age: 87
End: 2022-05-17
Payer: COMMERCIAL

## 2022-05-19 ENCOUNTER — APPOINTMENT (OUTPATIENT)
Dept: PHYSICAL THERAPY | Age: 87
End: 2022-05-19
Payer: COMMERCIAL

## 2022-05-24 ENCOUNTER — APPOINTMENT (OUTPATIENT)
Dept: PHYSICAL THERAPY | Age: 87
End: 2022-05-24
Payer: COMMERCIAL

## 2022-05-26 ENCOUNTER — APPOINTMENT (OUTPATIENT)
Dept: PHYSICAL THERAPY | Age: 87
End: 2022-05-26
Payer: COMMERCIAL

## 2022-05-31 ENCOUNTER — APPOINTMENT (OUTPATIENT)
Dept: PHYSICAL THERAPY | Age: 87
End: 2022-05-31
Payer: COMMERCIAL

## 2022-06-02 ENCOUNTER — APPOINTMENT (OUTPATIENT)
Dept: PHYSICAL THERAPY | Age: 87
End: 2022-06-02

## 2022-06-07 ENCOUNTER — APPOINTMENT (OUTPATIENT)
Dept: PHYSICAL THERAPY | Age: 87
End: 2022-06-07

## 2022-06-09 ENCOUNTER — APPOINTMENT (OUTPATIENT)
Dept: PHYSICAL THERAPY | Age: 87
End: 2022-06-09

## 2022-06-25 DIAGNOSIS — I10 ESSENTIAL HYPERTENSION: ICD-10-CM

## 2022-06-25 RX ORDER — AMLODIPINE BESYLATE AND ATORVASTATIN CALCIUM 10; 20 MG/1; MG/1
TABLET, FILM COATED ORAL
Qty: 90 TABLET | Refills: 3 | Status: SHIPPED | OUTPATIENT
Start: 2022-06-25

## 2022-08-09 LAB
CREAT ?TM UR-SCNC: 40.4 UMOL/L
EXT MICROALBUMIN URINE RANDOM: 3.4
MICROALBUMIN/CREAT UR: 84.2 MG/G{CREAT}

## 2022-08-31 ENCOUNTER — OFFICE VISIT (OUTPATIENT)
Dept: FAMILY MEDICINE CLINIC | Facility: CLINIC | Age: 87
End: 2022-08-31
Payer: COMMERCIAL

## 2022-08-31 VITALS
DIASTOLIC BLOOD PRESSURE: 64 MMHG | OXYGEN SATURATION: 97 % | TEMPERATURE: 97.6 F | RESPIRATION RATE: 16 BRPM | HEIGHT: 62 IN | SYSTOLIC BLOOD PRESSURE: 142 MMHG | HEART RATE: 68 BPM | WEIGHT: 145.6 LBS | BODY MASS INDEX: 26.79 KG/M2

## 2022-08-31 DIAGNOSIS — N18.30 TYPE 2 DM WITH CKD STAGE 3 AND HYPERTENSION (HCC): ICD-10-CM

## 2022-08-31 DIAGNOSIS — E78.2 MIXED HYPERLIPIDEMIA: ICD-10-CM

## 2022-08-31 DIAGNOSIS — G63 POLYNEUROPATHY ASSOCIATED WITH UNDERLYING DISEASE (HCC): ICD-10-CM

## 2022-08-31 DIAGNOSIS — R20.2 PARESTHESIA OF BOTH HANDS: ICD-10-CM

## 2022-08-31 DIAGNOSIS — E03.9 ACQUIRED HYPOTHYROIDISM: ICD-10-CM

## 2022-08-31 DIAGNOSIS — L40.50 PSORIATIC ARTHROPATHY (HCC): ICD-10-CM

## 2022-08-31 DIAGNOSIS — E11.22 TYPE 2 DM WITH CKD STAGE 3 AND HYPERTENSION (HCC): ICD-10-CM

## 2022-08-31 DIAGNOSIS — I12.9 TYPE 2 DM WITH CKD STAGE 3 AND HYPERTENSION (HCC): ICD-10-CM

## 2022-08-31 DIAGNOSIS — E11.42 DIABETIC PERIPHERAL NEUROPATHY (HCC): ICD-10-CM

## 2022-08-31 DIAGNOSIS — M15.9 GENERALIZED OSTEOARTHRITIS: ICD-10-CM

## 2022-08-31 DIAGNOSIS — Z00.00 MEDICARE ANNUAL WELLNESS VISIT, SUBSEQUENT: ICD-10-CM

## 2022-08-31 DIAGNOSIS — I10 PRIMARY HYPERTENSION: Primary | ICD-10-CM

## 2022-08-31 DIAGNOSIS — R26.2 AMBULATORY DYSFUNCTION: ICD-10-CM

## 2022-08-31 DIAGNOSIS — N18.32 STAGE 3B CHRONIC KIDNEY DISEASE (HCC): ICD-10-CM

## 2022-08-31 DIAGNOSIS — I73.9 PERIPHERAL VASCULAR DISEASE (HCC): ICD-10-CM

## 2022-08-31 PROCEDURE — G0439 PPPS, SUBSEQ VISIT: HCPCS | Performed by: FAMILY MEDICINE

## 2022-08-31 PROCEDURE — 99214 OFFICE O/P EST MOD 30 MIN: CPT | Performed by: FAMILY MEDICINE

## 2022-08-31 PROCEDURE — 1090F PRES/ABSN URINE INCON ASSESS: CPT | Performed by: FAMILY MEDICINE

## 2022-08-31 PROCEDURE — 1003F LEVEL OF ACTIVITY ASSESS: CPT | Performed by: FAMILY MEDICINE

## 2022-08-31 NOTE — PROGRESS NOTES
Assessment and Plan:     Problem List Items Addressed This Visit        Endocrine    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 hypertension ([facility] )       Lab Results   Component Value Date    HGBA1C 6 1 (H) 04/14/2022     Type 2 DM - diet controlled  Check eye exam by ophthalmologist Dr Smith Records annually  Continue follow-up with podiatry Dr Mendy Regalado for routine foot care  Relevant Orders    Comprehensive metabolic panel    Hemoglobin A1C    Hypothyroidism     Continue Levothyroxine 112 mcg daily  Recheck thyroid function test in 2 months  Relevant Orders    TSH, 3rd generation with Free T4 reflex    Diabetic peripheral neuropathy ([facility] )       Lab Results   Component Value Date    HGBA1C 6 1 (H) 04/14/2022     Patient c/o balance problems  Reports no falls  Recommended to continue using a walker  Follow-up with podiatry  Cardiovascular and Mediastinum    Hypertension - Primary     Blood pressure is stable  Continue Losartan 100 mg daily, Caduet 10/20 mg daily  Relevant Orders    Comprehensive metabolic panel    Lipid panel    CBC and differential    Basic metabolic panel    Peripheral vascular disease ([facility] )     Continue statin therapy  Nervous and Auditory    Peripheral neuropathy       Musculoskeletal and Integument    Generalized osteoarthritis     Take Tylenol Arthritis 650 mg 1 tablet every 8 hours as needed for joint pains  Psoriatic arthropathy (HCC)       Genitourinary    Chronic kidney disease, stage 3 (HCC)     Lab Results   Component Value Date    EGFR 32 04/14/2022    EGFR 31 11/18/2021    EGFR 35 07/20/2021    CREATININE 1 37 (H) 04/14/2022    CREATININE 1 43 (H) 11/18/2021    CREATININE 1 30 07/20/2021     Recommended patient to stay well hydrated  Avoid NSAIDs, nephrotoxins  Recheck BMP in 2 months             Relevant Orders    Comprehensive metabolic panel    CBC and differential    Basic metabolic panel       Other    Hyperlipidemia Continue Caduet 10/20 mg daily  Relevant Orders    Comprehensive metabolic panel    Ambulatory dysfunction     Discussed fall precautions  Continue to use a walker for ambulation to prevent falls  Medicare annual wellness visit, subsequent    Paresthesia of both hands     Differential diagnoses include carpal tunnel, neuropathy  Recommended to start wearing wrist splints for carpal tunnel at night  Start Vit B complex 1 tablet daily  Consider trial of Gabapentin or Lyrica at bedtime to provide relief in burning sensation in both hands  Advised patient's daughter to call with update on symptoms in 2 weeks  Consider referral to orthopedic surgeon if symptoms persist or worsen  Schedule follow-up visit in 4 months  Check labs prior to next visit  BMI Counseling: Body mass index is 26 62 kg/m²  The BMI is above normal  Nutrition recommendations include encouraging healthy choices of fruits and vegetables, decreasing fast food intake, consuming healthier snacks, limiting drinks that contain sugar, moderation in carbohydrate intake, increasing intake of lean protein, reducing intake of saturated and trans fat and reducing intake of cholesterol  Exercise recommendations include exercising 3-5 times per week  No pharmacotherapy was ordered  Rationale for BMI follow-up plan is due to patient being overweight or obese  Depression Screening and Follow-up Plan: Patient was screened for depression during today's encounter  They screened negative with a PHQ-2 score of 1  Falls Plan of Care: balance, strength, and gait training instructions were provided  Recommended assistive device to help with gait and balance  Vitamin D supplementation was recommended  Home safety education provided  Preventive health issues were discussed with patient, and age appropriate screening tests were ordered as noted in patient's After Visit Summary    Personalized health advice and appropriate referrals for health education or preventive services given if needed, as noted in patient's After Visit Summary  History of Present Illness:     Patient presents for a Medicare Wellness Visit    HPI     Patient presents for 4 month follow-up office visit, Medicare AWV accompanied by her daughter  [id number]: HTN, Hypothyroidism, Hyperlipidemia, Type 2 DM - diet controlled, microalbuminuria, CKD stage 3,  generalized OA, ambulatory dysfunction, peripheral neuropathy, PVD, S/P endovascular AAA repair in 3/2017  Reviewed current medications, blood test results from 8/9/22  TSH 4 45, T4 free 1 43  Glucose 132, creatinine 1 46 -stable, GFR 33, potassium 4 5, sodium 138  HTN - patient monitors BP at home  Blood pressure remains stable  Denies chest pain, dizziness  C/o mild exertional shortness of breath during hot/ humid weather  Currently taking Losartan 100 mg daily, Caduet 10/20 mg daily  Type 2 DM - diet controlled  Patient is seeing podiatrist Dr Hero Hargrove every 10 weeks  Eye exam done by ophthalmologist Dr Mary Ann Hong  Patient reports worsening stiffness in hand joints, bilateral hip pain  She was previously seen by orthopedic surgeon Dr Catherine Dash  She is planning to schedule appointment with Dr Catherine Dash to discuss hip injections  C/o numbness, tingling, burning sensation in her fingers, difficulty to grab things  Hypothyroidism - currently taking Levothyroxine 112 mcg daily  Weight has been stable  Patient ambulates with a walker  Reports no recent falls  She has balance problems due to peripheral neuropathy  Completed physical therapy in April  Cognitive status is stable  Patient Care Team:  Frederick Gonzalez MD as PCP - MD Emmie Walters MD (Orthopedic Surgery)     Review of Systems:     Review of Systems   Constitutional: Positive for fatigue (mild)   Negative for activity change, appetite change, chills and fever  HENT: Positive for hearing loss  Negative for congestion, ear pain, sore throat and trouble swallowing  Eyes: Negative for pain, discharge, redness, itching and visual disturbance  Respiratory: Positive for shortness of breath (mild exertional SOB)  Negative for cough, chest tightness and wheezing  Cardiovascular: Positive for leg swelling (stable )  Negative for chest pain and palpitations  Gastrointestinal: Positive for constipation  Negative for abdominal pain, blood in stool, diarrhea, nausea and vomiting  Genitourinary: Negative for difficulty urinating, dysuria, flank pain and hematuria  Musculoskeletal: Positive for arthralgias, back pain (chronic) and gait problem  Negative for joint swelling  Skin: Negative for rash  Neurological: Negative for dizziness, syncope and headaches  Tingling , numbness, burning sensation in fingers BL hands   Hematological: Negative  Psychiatric/Behavioral: Positive for sleep disturbance  Negative for dysphoric mood          Anxiety - mood has been stable        Problem List:     Patient Active Problem List   Diagnosis    Aneurysm of abdominal aorta (HCC)    S/p left reverse total shoulder arthroplasty    Hypertension    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)    Hypothyroidism    Chronic kidney disease, stage 3 (HCC)    Hyperlipidemia    Hyponatremia    Peripheral vascular disease (HCC)    Generalized osteoarthritis    Ambulatory dysfunction    Bilateral leg edema    Allergic rhinitis    Anxiety    Aortoiliac stenosis (HCC)    Constipation    Diabetic peripheral neuropathy (HCC)    Dry scalp    First degree AV block    Lumbosacral spondylosis without myelopathy    Microalbuminuria    Osteopenia    Peripheral neuropathy    Seborrheic dermatitis of scalp    Spinal stenosis    Pes anserinus bursitis of left knee    S/P total knee replacement, left    Medicare annual wellness visit, subsequent    Leg length discrepancy    Right hip pain    Trochanteric bursitis of right hip    Weakness of right hip    Chronic low back pain without sciatica    Psoriasis    Sacral pain    Paresthesia of both hands    Psoriatic arthropathy (HCC)      Past Medical and Surgical History:     Past Medical History:   Diagnosis Date    AAA (abdominal aortic aneurysm) (HCC)     s/p EVAR 3/10/16    Allergic urticaria     LAST ASSESSED: [id number]    Appendicitis     Arthritis     CKD (chronic kidney disease), stage III (formerly [facility])     Coronary artery disease     Diabetes mellitus (formerly [facility])     Eczema     Gross hematuria     LAST ASSESSED: [id number]    Hematuria     Hypertension     Hyponatremia     Hypothyroid     Osteoporosis     LAST ASSESSED: [id number]    PONV (postoperative nausea and vomiting)     Primary osteoarthritis of left knee     LAST ASSESSED: [id number]    Renal disorder     Renal insufficiency     Rotator cuff tear arthropathy     LEFT LAST ASSESSED: [id number]    Secondary osteoarthritis of right shoulder     LAST ASSESSED: [id number]    Syncope      Past Surgical History:   Procedure Laterality Date    APPENDECTOMY      BACK SURGERY      CATARACT EXTRACTION W/  INTRAOCULAR LENS IMPLANT Bilateral     KNEE ARTHROSCOPY Left     ONSET: [id number] THERAPEUTIC    LUMBAR LAMINECTOMY      [place] AAA REPAIR,MODULR BIFURCATED PROSTH N/A 3/10/2016    Procedure: REPAIR ANEURYSM ENDOVASCULAR ABDOMINAL AORTIC  (EVAR) ;   Surgeon: Cesario Oliva MD;  [facility]: [facility];  Service: Vascular    [place] RECONSTR TOTAL SHOULDER IMPLANT Left 5/31/2016    Procedure: ARTHROPLASTY SHOULDER REVERSE;  Surgeon: Alexis Ahmadi MD;  [facility];  Service: Orthopedics    [place] TOTAL KNEE ARTHROPLASTY Left 7/23/2018    Procedure: KNEE : COMPLETE REPLACEMENT;  Surgeon: Mey García MD;  [facility]: [facility];  Service: Orthopedics    REVERSE TOTAL SHOULDER ARTHROPLASTY Right     ROTATOR CUFF REPAIR Left     RC SURGERY RESOLVED: 1999    SHOULDER ARTHROPLASTY      5/15 - FOR RIGHT SHOULDER; 5/16 - FOR LEFT SHOULDER    SHOULDER ARTHROSCOPY      TOTAL HIP ARTHROPLASTY Left     ONSET: [id number]    TOTAL HIP ARTHROPLASTY  05/31/2006    REVISION      Family History:     Family History   Problem Relation Age of Onset    Coronary artery disease Mother     Diabetes Mother     Coronary artery disease Father     Cancer Sister     Arthritis Sister     Unexplained death Family         RAPID    Cancer Daughter       Social History:     Social History     Socioeconomic History    Marital status:      Spouse name: None    Number of children: None    Years of education: None    Highest education level: None   Occupational History    Occupation: RETIRED   Tobacco Use    Smoking status: Never Smoker    Smokeless tobacco: Never Used   Substance and Sexual Activity    Alcohol use: No    Drug use: No    Sexual activity: None   Other Topics Concern    None   Social History Narrative    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 - [employer]     Social Determinants of Health     Financial Resource Strain: Not on file   Food Insecurity: Not on file   Transportation Needs: Not on file   Physical Activity: Not on file   Stress: Not on file   Social Connections: Not on file   Intimate Partner Violence: Not on file   Housing Stability: Not on file      Medications and Allergies:     Current Outpatient Medications   Medication Sig Dispense Refill    acetaminophen (TYLENOL) 325 mg tablet Take 2 tablets (650 mg total) by mouth every 6 (six) hours as needed for mild pain  30 tablet 0    amLODIPine-atorvastatin (CADUET) 10-20 MG per tablet TAKE 1 TABLET AT BEDTIME 90 tablet 3    Blood Glucose Monitoring Suppl (ONE TOUCH ULTRA MINI) w/Device KIT Test blood sugar once daily 1 kit 0    calcium citrate-vitamin D (CITRACAL+D) 315-200 MG-UNIT per tablet Take 1 tablet by mouth daily   Calcium + D TABS  Refills: 0   , M D ; Active       Docusate Sodium (COLACE PO) Take by mouth as needed      glucose blood (OneTouch Ultra) test strip Check blood sugar once daily 100 each 3    Lancets (OneTouch Delica Plus [id number]) MISC Test blood sugar once daily 100 each 3    LORazepam (ATIVAN) 0 5 mg tablet Take 1 tab  daily PRN for anxiety 30 tablet 3    losartan (COZAAR) 100 MG tablet TAKE 1 TABLET ONCE DAILY 90 tablet 3    Multiple Vitamins-Minerals (MULTIVITAMIN & MINERAL PO) 1 tablet daily  Multivitamin & Mineral Oral Liquid  Quantity: 0;  Refills: 0   , M D ; Active       pyrithione zinc (HEAD AND SHOULDERS) 1 % shampoo Apply topically daily as needed for dandruff      trolamine salicylate (ASPERCREME) 10 % cream Apply topically as needed for muscle/joint pain      fexofenadine (ALLEGRA) 180 MG tablet Take 1 tablet by mouth as needed   (Patient not taking: No sig reported)      fluticasone (FLONASE) 50 mcg/act nasal spray 2 sprays into each nostril daily Fluticasone Propionate 50 MCG/ACT Nasal Suspension use 2 spr  in each nostril daily  Quantity: 1;  Refills: 5    [facility] M D ;  Started 23-Sep-2014 Active (Patient not taking: No sig reported) 16 g 5    levothyroxine 112 mcg tablet Take 1 tablet (112 mcg total) by mouth daily in the early morning 90 tablet 3     No current facility-administered medications for this visit       Allergies   Allergen Reactions    Clobetasol     Fluocinolone     Ketoconazole     Pyrithione     Bextra [Valdecoxib] Rash     GI INTOL    Diclofenac Headache    Percocet [Oxycodone-Acetaminophen] Rash and GI Intolerance      Immunizations:     Immunization History   Administered Date(s) Administered    COVID-19 PFIZER VACCINE 0 3 ML IM 01/23/2021, 02/12/2021, 09/25/2021    INFLUENZA 10/22/2009, 10/18/2018    Influenza Split High Dose Preservative Free IM 09/23/2014, 10/05/2017    Influenza, high dose seasonal 0 7 mL 11/01/2019    Influenza, seasonal, injectable 09/18/2012, 09/20/2013, 10/01/2015, 10/01/2016    Pneumococcal Conjugate 13-Valent 09/01/2015    Pneumococcal Polysaccharide PPV23 07/29/2021    Zoster Vaccine Recombinant 08/23/2019, 11/01/2019    influenza, trivalent, adjuvanted 09/15/2020      Health Maintenance: There are no preventive care reminders to display for this patient  Topic Date Due    COVID-19 Vaccine (4 - Booster for Pfizer series) 01/25/2022    Influenza Vaccine (1) 09/01/2022      Medicare Screening Tests and Risk Assessments: Bishop Gibson is here for her Subsequent Wellness visit  Health Risk Assessment:   Patient rates overall health as very good  Patient feels that their physical health rating is same  Patient is satisfied with their life  Eyesight was rated as slightly worse  Hearing was rated as slightly worse  Patient feels that their emotional and mental health rating is same  Patients states they are sometimes angry  Patient states they are never, rarely unusually tired/fatigued  Pain experienced in the last 7 days has been a lot  Patient's pain rating has been 8/10  Patient states that she has experienced no weight loss or gain in last 6 months  Depression Screening:   PHQ-2 Score: 1      Fall Risk Screening: In the past year, patient has experienced: no history of falling in past year      Urinary Incontinence Screening:   Patient has leaked urine accidently in the last six months  Home Safety:  Patient has trouble with stairs inside or outside of their home  Patient has working smoke alarms and has working carbon monoxide detector  Home safety hazards include: none  Nutrition:   Current diet is Regular and Limited junk food  Medications:   Patient is currently taking over-the-counter supplements  OTC medications include: see medication list  Patient is not able to manage medications       Activities of Daily Living (ADLs)/Instrumental Activities of Daily Living (IADLs):   Walk and transfer into and out of bed and chair?: Yes  Dress and groom yourself?: Yes    Bathe or shower yourself?: No    Feed yourself? Yes  Do your laundry/housekeeping?: Yes  Manage your money, pay your bills and track your expenses?: No  Make your own meals?: No    Do your own shopping?: No    Previous Hospitalizations:   Any hospitalizations or ED visits within the last 12 months?: No      Advance Care Planning:   Living will: Yes    Durable POA for healthcare: Yes    Advanced directive: Yes    Advanced directive counseling given: Yes    End of Life Decisions reviewed with patient: Yes      Cognitive Screening:   Provider or family/friend/caregiver concerned regarding cognition?: No    PREVENTIVE SCREENINGS      Cardiovascular Screening:    General: Screening Not Indicated and History Lipid Disorder      Diabetes Screening:     General: Screening Not Indicated and History Diabetes      Colorectal Cancer Screening:     General: Screening Not Indicated      Breast Cancer Screening:     General: Screening Not Indicated      Cervical Cancer Screening:    General: Screening Not Indicated      Osteoporosis Screening:    General: Screening Not Indicated      Abdominal Aortic Aneurysm (AAA) Screening:        General: Screening Not Indicated and History AAA      Lung Cancer Screening:     General: Screening Not Indicated      Hepatitis C Screening:    General: Screening Not Indicated    Screening, Brief Intervention, and Referral to Treatment (SBIRT)    Screening  Typical number of drinks in a day: 0  Typical number of drinks in a week: 2  Interpretation: Low risk drinking behavior  Single Item Drug Screening:  How often have you used an illegal drug (including marijuana) or a prescription medication for non-medical reasons in the past year? never    Single Item Drug Screen Score: 0  Interpretation: Negative screen for possible drug use disorder    Other Counseling Topics:   Skin self-exam and calcium and vitamin D intake and regular weightbearing exercise       No exam data present     Physical Exam:     /64 (BP Location: Left arm, Patient Position: Sitting)   Pulse 68   Temp 97 6 °F (36 4 °C) (Tympanic)   Resp 16   Ht 5' 2 01" (1 575 m)   Wt 66 kg (145 lb 9 6 oz)   SpO2 97%   BMI 26 62 kg/m²     Physical Exam  Vitals and nursing note reviewed  Constitutional:       Appearance: Normal appearance  HENT:      Head: Normocephalic and atraumatic  Eyes:      Conjunctiva/sclera: Conjunctivae normal       Pupils: Pupils are equal, round, and reactive to light  Neck:      Vascular: No carotid bruit  Cardiovascular:      Rate and Rhythm: Normal rate and regular rhythm  Heart sounds: No murmur heard  Comments: Patient wears light compression stockings  Pulmonary:      Effort: Pulmonary effort is normal       Breath sounds: Normal breath sounds  Abdominal:      General: There is no distension  Palpations: Abdomen is soft  Tenderness: There is no abdominal tenderness  Musculoskeletal:      Cervical back: Normal range of motion and neck supple  Comments: Ambulates with a walker  Arthritic changes in hand joints   Skin:     General: Skin is warm and dry  Findings: No rash  Neurological:      General: No focal deficit present  Mental Status: She is alert and oriented to person, place, and time     Psychiatric:         Mood and Affect: Mood normal           Boy Miller MD

## 2022-08-31 NOTE — PATIENT INSTRUCTIONS
Medicare Preventive Visit Patient Instructions  Thank you for completing your Welcome to Medicare Visit or Medicare Annual Wellness Visit today  Your next wellness visit will be due in one year (9/1/2023)  The screening/preventive services that you may require over the next 5-10 years are detailed below  Some tests may not apply to you based off risk factors and/or age  Screening tests ordered at today's visit but not completed yet may show as past due  Also, please note that scanned in results may not display below  Preventive Screenings:  Service Recommendations Previous Testing/Comments   Colorectal Cancer Screening  * Colonoscopy    * Fecal Occult Blood Test (FOBT)/Fecal Immunochemical Test (FIT)  * Fecal DNA/Cologuard Test  * Flexible Sigmoidoscopy Age: 39-70 years old   Colonoscopy: every 10 years (may be performed more frequently if at higher risk)  OR  FOBT/FIT: every 1 year  OR  Cologuard: every 3 years  OR  Sigmoidoscopy: every 5 years  Screening may be recommended earlier than age 39 if at higher risk for colorectal cancer  Also, an individualized decision between you and your healthcare provider will decide whether screening between the ages of 74-80 would be appropriate  Colonoscopy: Not on file  FOBT/FIT: Not on file  Cologuard: Not on file  Sigmoidoscopy: Not on file          Breast Cancer Screening Age: 36 years old  Frequency: every 1-2 years  Not required if history of left and right mastectomy Mammogram: Not on file        Cervical Cancer Screening Between the ages of 21-29, pap smear recommended once every 3 years  Between the ages of 33-67, can perform pap smear with HPV co-testing every 5 years     Recommendations may differ for women with a history of total hysterectomy, cervical cancer, or abnormal pap smears in past  Pap Smear: Not on file        Hepatitis C Screening Once for adults born between [facility]  More frequently in patients at high risk for Hepatitis C Hep C Antibody: Not on file        Diabetes Screening 1-2 times per year if you're at risk for diabetes or have pre-diabetes Fasting glucose: 121 mg/dL (4/14/2022)  A1C: 6 1 % (4/14/2022)      Cholesterol Screening Once every 5 years if you don't have a lipid disorder  May order more often based on risk factors  Lipid panel: 11/18/2021          Other Preventive Screenings Covered by Medicare:  1  Abdominal Aortic Aneurysm (AAA) Screening: covered once if your at risk  You're considered to be at risk if you have a family history of AAA  2  Lung Cancer Screening: covers low dose CT scan once per year if you meet all of the following conditions: (1) Age 50-69; (2) No signs or symptoms of lung cancer; (3) Current smoker or have quit smoking within the last 15 years; (4) You have a tobacco smoking history of at least 20 pack years (packs per day multiplied by number of years you smoked); (5) You get a written order from a healthcare provider  3  Glaucoma Screening: covered annually if you're considered high risk: (1) You have diabetes OR (2) Family history of glaucoma OR (3)  aged 48 and older OR (3)  American aged 72 and older  3  Osteoporosis Screening: covered every 2 years if you meet one of the following conditions: (1) You're estrogen deficient and at risk for osteoporosis based off medical history and other findings; (2) Have a vertebral abnormality; (3) On glucocorticoid therapy for more than 3 months; (4) Have primary hyperparathyroidism; (5) On osteoporosis medications and need to assess response to drug therapy  · Last bone density test (DXA Scan): Not on file  5  HIV Screening: covered annually if you're between the age of 12-76  Also covered annually if you are younger than 13 and older than 72 with risk factors for HIV infection  For pregnant patients, it is covered up to 3 times per pregnancy      Immunizations:  Immunization Recommendations   Influenza Vaccine Annual influenza vaccination during flu season is recommended for all persons aged >= 6 months who do not have contraindications   Pneumococcal Vaccine   * Pneumococcal conjugate vaccine = PCV13 (Prevnar 13), PCV15 (Vaxneuvance), PCV20 (Prevnar 20)  * Pneumococcal polysaccharide vaccine = PPSV23 (Pneumovax) Adults 25-60 years old: 1-3 doses may be recommended based on certain risk factors  Adults 72 years old: 1-2 doses may be recommended based off what pneumonia vaccine you previously received   Hepatitis B Vaccine 3 dose series if at intermediate or high risk (ex: diabetes, end stage renal disease, liver disease)   Tetanus (Td) Vaccine - COST NOT COVERED BY MEDICARE PART B Following completion of primary series, a booster dose should be given every 10 years to maintain immunity against tetanus  Td may also be given as tetanus wound prophylaxis  Tdap Vaccine - COST NOT COVERED BY MEDICARE PART B Recommended at least once for all adults  For pregnant patients, recommended with each pregnancy  Shingles Vaccine (Shingrix) - COST NOT COVERED BY MEDICARE PART B  2 shot series recommended in those aged 48 and above     Health Maintenance Due:  There are no preventive care reminders to display for this patient  Immunizations Due:      Topic Date Due    COVID-19 Vaccine (4 - Booster for Tinajero Peter series) 01/25/2022    Influenza Vaccine (1) 09/01/2022     Advance Directives   What are advance directives? Advance directives are legal documents that state your wishes and plans for medical care  These plans are made ahead of time in case you lose your ability to make decisions for yourself  Advance directives can apply to any medical decision, such as the treatments you want, and if you want to donate organs  What are the types of advance directives? There are many types of advance directives, and each state has rules about how to use them  You may choose a combination of any of the following:  · Living will:   This is a written record of the treatment you want  You can also choose which treatments you do not want, which to limit, and which to stop at a certain time  This includes surgery, medicine, IV fluid, and tube feedings  · Durable power of  for healthcare [facility]): This is a written record that states who you want to make healthcare choices for you when you are unable to make them for yourself  This person, called a proxy, is usually a family member or a friend  You may choose more than 1 proxy  · Do not resuscitate (DNR) order:  A DNR order is used in case your heart stops beating or you stop breathing  It is a request not to have certain forms of treatment, such as CPR  A DNR order may be included in other types of advance directives  · Medical directive: This covers the care that you want if you are in a coma, near death, or unable to make decisions for yourself  You can list the treatments you want for each condition  Treatment may include pain medicine, surgery, blood transfusions, dialysis, IV or tube feedings, and a ventilator (breathing machine)  · Values history: This document has questions about your views, beliefs, and how you feel and think about life  This information can help others choose the care that you would choose  Why are advance directives important? An advance directive helps you control your care  Although spoken wishes may be used, it is better to have your wishes written down  Spoken wishes can be misunderstood, or not followed  Treatments may be given even if you do not want them  An advance directive may make it easier for your family to make difficult choices about your care  Weight Management   Why it is important to manage your weight:  Being overweight increases your risk of health conditions such as heart disease, high blood pressure, type 2 diabetes, and certain types of cancer  It can also increase your risk for osteoarthritis, sleep apnea, and other respiratory problems   Aim for a slow, steady weight loss  Even a small amount of weight loss can lower your risk of health problems  How to lose weight safely:  A safe and healthy way to lose weight is to eat fewer calories and get regular exercise  You can lose up about 1 pound a week by decreasing the number of calories you eat by 500 calories each day  Healthy meal plan for weight management:  A healthy meal plan includes a variety of foods, contains fewer calories, and helps you stay healthy  A healthy meal plan includes the following:  · Eat whole-grain foods more often  A healthy meal plan should contain fiber  Fiber is the part of grains, fruits, and vegetables that is not broken down by your body  Whole-grain foods are healthy and provide extra fiber in your diet  Some examples of whole-grain foods are whole-wheat breads and pastas, oatmeal, brown rice, and bulgur  · Eat a variety of vegetables every day  Include dark, leafy greens such as spinach, kale, jackie greens, and mustard greens  Eat yellow and orange vegetables such as carrots, sweet potatoes, and winter squash  · Eat a variety of fruits every day  Choose fresh or canned fruit (canned in its own juice or light syrup) instead of juice  Fruit juice has very little or no fiber  · Eat low-fat dairy foods  Drink fat-free (skim) milk or 1% milk  Eat fat-free yogurt and low-fat cottage cheese  Try low-fat cheeses such as mozzarella and other reduced-fat cheeses  · Choose meat and other protein foods that are low in fat  Choose beans or other legumes such as split peas or lentils  Choose fish, skinless poultry (chicken or turkey), or lean cuts of red meat (beef or pork)  Before you cook meat or poultry, cut off any visible fat  · Use less fat and oil  Try baking foods instead of frying them  Add less fat, such as margarine, sour cream, regular salad dressing and mayonnaise to foods  Eat fewer high-fat foods   Some examples of high-fat foods include french fries, doughnuts, ice cream, and cakes  · Eat fewer sweets  Limit foods and drinks that are high in sugar  This includes candy, cookies, regular soda, and sweetened drinks  Exercise:  Exercise at least 30 minutes per day on most days of the week  Some examples of exercise include walking, biking, dancing, and swimming  You can also fit in more physical activity by taking the stairs instead of the elevator or parking farther away from stores  Ask your healthcare provider about the best exercise plan for you  © Copyright [employer] 2018 Information is for End User's use only and may not be sold, redistributed or otherwise used for commercial purposes   All illustrations and images included in CareNotes® are the copyrighted property of [employer]  or [address] [place] [address]

## 2022-09-15 ENCOUNTER — OFFICE VISIT (OUTPATIENT)
Dept: FAMILY MEDICINE CLINIC | Facility: CLINIC | Age: 87
End: 2022-09-15
Payer: COMMERCIAL

## 2022-09-15 VITALS
TEMPERATURE: 97.6 F | HEIGHT: 62 IN | DIASTOLIC BLOOD PRESSURE: 66 MMHG | WEIGHT: 148 LBS | SYSTOLIC BLOOD PRESSURE: 140 MMHG | HEART RATE: 68 BPM | BODY MASS INDEX: 27.23 KG/M2 | RESPIRATION RATE: 16 BRPM

## 2022-09-15 DIAGNOSIS — H61.23 IMPACTED CERUMEN, BILATERAL: Primary | ICD-10-CM

## 2022-09-15 PROCEDURE — 1160F RVW MEDS BY RX/DR IN RCRD: CPT | Performed by: NURSE PRACTITIONER

## 2022-09-15 PROCEDURE — 99214 OFFICE O/P EST MOD 30 MIN: CPT | Performed by: NURSE PRACTITIONER

## 2022-09-15 PROCEDURE — 69210 REMOVE IMPACTED EAR WAX UNI: CPT | Performed by: NURSE PRACTITIONER

## 2022-09-15 NOTE — PROGRESS NOTES
[facility]    NAME: Marianne Desir  AGE: 80 y o  SEX: female  : 1925     DATE: 9/15/2022     Assessment and Plan:     Problem List Items Addressed This Visit        Nervous and Auditory    Impacted cerumen, bilateral - Primary     The patient presents to the office today at the request of her audiologist   She did have not any allergy exam this week and bilateral impacted cerumen was noted  Irrigation of her bilateral ear canals was performed  Complete resolution of impacted cerumen  Relevant Orders    Ear cerumen removal (Completed)              No follow-ups on file  Chief Complaint:     Chief Complaint   Patient presents with    Ear flush         History of Present Illness:     Patient here for removal of ear wax  She was seen by her audiologist this week who noted impacted cerumen  Complete resolution and removal of ear wax  Review of Systems:     Review of Systems   Constitutional: Negative for activity change, fatigue and fever  HENT: Positive for hearing loss  Negative for congestion, rhinorrhea, trouble swallowing and voice change  Impacted cerumen   Eyes: Negative for photophobia, pain, discharge and visual disturbance  Respiratory: Negative for cough, chest tightness and shortness of breath  Cardiovascular: Negative for chest pain, palpitations and leg swelling  Gastrointestinal: Negative for abdominal pain, blood in stool, constipation, nausea and vomiting  Endocrine: Negative for cold intolerance and heat intolerance  Genitourinary: Negative for difficulty urinating, frequency, hematuria, urgency, vaginal bleeding and vaginal discharge  Musculoskeletal: Positive for gait problem (ambulates with rollator)  Negative for arthralgias and myalgias  Skin: Negative  Neurological: Negative for dizziness, weakness, numbness and headaches  Psychiatric/Behavioral: Negative for decreased concentration   The patient is not nervous/anxious           Problem List:     Patient Active Problem List   Diagnosis    Aneurysm of abdominal aorta (HCC)    S/p left reverse total shoulder arthroplasty    Hypertension    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 hypertension ([address] )    Hypothyroidism    Chronic kidney disease, stage 3 (HCC)    Hyperlipidemia    Hyponatremia    Peripheral vascular disease (HCC)    Generalized osteoarthritis    Ambulatory dysfunction    Bilateral leg edema    Allergic rhinitis    Anxiety    Aortoiliac stenosis (HCC)    Constipation    Diabetic peripheral neuropathy (HCC)    Dry scalp    First degree AV block    Lumbosacral spondylosis without myelopathy    Microalbuminuria    Osteopenia    Peripheral neuropathy    Seborrheic dermatitis of scalp    Spinal stenosis    Pes anserinus bursitis of left knee    S/P total knee replacement, left    Medicare annual wellness visit, subsequent    Leg length discrepancy    Right hip pain    Trochanteric bursitis of right hip    Weakness of right hip    Chronic low back pain without sciatica    Psoriasis    Sacral pain    Paresthesia of both hands    Psoriatic arthropathy (HCC)    Impacted cerumen, bilateral        Objective:     /66   Pulse 68   Temp 97 6 °F (36 4 °C) (Tympanic)   Resp 16   Ht 5' 2" (1 575 m)   Wt 67 1 kg (148 lb)   BMI 27 07 kg/m²    Current Outpatient Medications   Medication Instructions    acetaminophen (TYLENOL) 650 mg, Oral, Every 6 hours PRN    amLODIPine-atorvastatin (CADUET) 10-20 MG per tablet TAKE 1 TABLET AT BEDTIME    Blood Glucose Monitoring Suppl (ONE TOUCH ULTRA MINI) w/Device KIT Test blood sugar once daily    calcium citrate-vitamin D (CITRACAL+D) 315-200 MG-UNIT per tablet 1 tablet, Oral, Daily, Calcium + D TABS  Refills: 0   , M D ; Active    Docusate Sodium (COLACE PO) Oral, As needed    glucose blood (OneTouch Ultra) test strip Check blood sugar once daily    Lancets (OneTouch [employer] Plus [id number]) MISC Test blood sugar once daily    levothyroxine 112 mcg, Oral, Daily (early morning)    LORazepam (ATIVAN) 0 5 mg tablet Take 1 tab  daily PRN for anxiety    losartan (COZAAR) 100 MG tablet TAKE 1 TABLET ONCE DAILY    Multiple Vitamins-Minerals (MULTIVITAMIN & MINERAL PO) 1 tablet, Daily, Multivitamin & Mineral Oral Liquid  Quantity: 0;  Refills: 0   , M D ; Active    pyrithione zinc (HEAD AND SHOULDERS) 1 % shampoo Topical, Daily PRN    trolamine salicylate (ASPERCREME) 10 % cream Topical, As needed         Ear cerumen removal    Date/Time: 9/15/2022 3:24 PM  Performed by: LUCIANA Maier  Authorized by: LUCIANA Maier   Universal Protocol:  Consent: Verbal consent obtained  Consent given by: patient  Patient understanding: patient states understanding of the procedure being performed  Patient identity confirmed: verbally with patient      Patient location:  Clinic  Procedure details:     Location:  L ear and R ear    Procedure type: irrigation with instrumentation      Instrumentation: curette      Approach:  External  Post-procedure details:     Complication:  None    Patient tolerance of procedure: Tolerated well, no immediate complications  Comments:      Complete removal of impacted cerumen        Physical Exam  Vitals reviewed  Constitutional:       Appearance: Normal appearance  She is obese  HENT:      Head: Normocephalic  Right Ear: There is impacted cerumen  Left Ear: There is impacted cerumen  Nose: Nose normal       Mouth/Throat:      Mouth: Mucous membranes are moist       Pharynx: Oropharynx is clear  Eyes:      Extraocular Movements: Extraocular movements intact  Pupils: Pupils are equal, round, and reactive to light  Cardiovascular:      Rate and Rhythm: Normal rate and regular rhythm  Musculoskeletal:         General: Normal range of motion  Skin:     General: Skin is warm and dry  Neurological:      General: No focal deficit present  Mental Status: She is alert and oriented to person, place, and time  Psychiatric:         Mood and Affect: Mood normal          Behavior: Behavior normal          Thought Content:  Thought content normal          Judgment: Judgment normal          Josue Hawkins, [id number] [address]

## 2022-09-15 NOTE — ASSESSMENT & PLAN NOTE
The patient presents to the office today at the request of her audiologist   She did have not any allergy exam this week and bilateral impacted cerumen was noted  Irrigation of her bilateral ear canals was performed  Complete resolution of impacted cerumen

## 2022-10-11 ENCOUNTER — OFFICE VISIT (OUTPATIENT)
Dept: OBGYN CLINIC | Facility: HOSPITAL | Age: 87
End: 2022-10-11
Payer: COMMERCIAL

## 2022-10-11 VITALS
HEIGHT: 62 IN | BODY MASS INDEX: 26.28 KG/M2 | SYSTOLIC BLOOD PRESSURE: 179 MMHG | HEART RATE: 80 BPM | WEIGHT: 142.8 LBS | DIASTOLIC BLOOD PRESSURE: 55 MMHG

## 2022-10-11 DIAGNOSIS — M70.61 TROCHANTERIC BURSITIS OF BOTH HIPS: Primary | ICD-10-CM

## 2022-10-11 DIAGNOSIS — M70.62 TROCHANTERIC BURSITIS OF BOTH HIPS: Primary | ICD-10-CM

## 2022-10-11 PROCEDURE — 99214 OFFICE O/P EST MOD 30 MIN: CPT | Performed by: ORTHOPAEDIC SURGERY

## 2022-10-11 PROCEDURE — 20610 DRAIN/INJ JOINT/BURSA W/O US: CPT | Performed by: ORTHOPAEDIC SURGERY

## 2022-10-11 RX ORDER — BUPIVACAINE HYDROCHLORIDE 2.5 MG/ML
1 INJECTION, SOLUTION INFILTRATION; PERINEURAL
Status: COMPLETED | OUTPATIENT
Start: 2022-10-11 | End: 2022-10-11

## 2022-10-11 RX ORDER — LIDOCAINE HYDROCHLORIDE 10 MG/ML
1 INJECTION, SOLUTION INFILTRATION; PERINEURAL
Status: COMPLETED | OUTPATIENT
Start: 2022-10-11 | End: 2022-10-11

## 2022-10-11 RX ORDER — BETAMETHASONE SODIUM PHOSPHATE AND BETAMETHASONE ACETATE 3; 3 MG/ML; MG/ML
6 INJECTION, SUSPENSION INTRA-ARTICULAR; INTRALESIONAL; INTRAMUSCULAR; SOFT TISSUE
Status: COMPLETED | OUTPATIENT
Start: 2022-10-11 | End: 2022-10-11

## 2022-10-11 RX ADMIN — BETAMETHASONE SODIUM PHOSPHATE AND BETAMETHASONE ACETATE 6 MG: 3; 3 INJECTION, SUSPENSION INTRA-ARTICULAR; INTRALESIONAL; INTRAMUSCULAR; SOFT TISSUE at 15:41

## 2022-10-11 RX ADMIN — LIDOCAINE HYDROCHLORIDE 1 ML: 10 INJECTION, SOLUTION INFILTRATION; PERINEURAL at 15:41

## 2022-10-11 RX ADMIN — BUPIVACAINE HYDROCHLORIDE 1 ML: 2.5 INJECTION, SOLUTION INFILTRATION; PERINEURAL at 15:41

## 2022-10-11 NOTE — PROGRESS NOTES
Assessment:   Diagnosis ICD-10-CM Associated Orders   1  Trochanteric bursitis of both hips  M70 61     M70 62        Plan:    Patient received  Corticosteroid injections to bilateral greater trochanter bursa  Patient is doing well, and will follow up with us in 3 months for re-evaluation at that time  To do next visit:  Return in about 3 months (around 1/11/2023)  The above stated was discussed in layman's terms and the patient expressed understanding  All questions were answered to the patient's satisfaction  Subjective: Phoebe Ray is a 80 y o  female who presents for bilateral greater trochanter bursa pain  Patient was last seen in September of 2020 for right greater trochanteric bursitis, for which she underwent a CSI at that time  Patient states that she had months of relief following this injection  Patient has history of bilateral total hip arthroplasties, with a revision arthroplasty on the right hip  Patient has also undergone a left total knee arthroplasty with Dr Misael Mccall  Patient states no complications from these surgeries  Patient is here for bilateral greater trochanteric bursitis that has been bothering her for the past few months  Patient is interested in receiving corticosteroid injections today        Review of systems negative unless otherwise specified in HPI    Past Medical History:   Diagnosis Date   • AAA (abdominal aortic aneurysm)     s/p EVAR 3/10/16   • Allergic urticaria     LAST ASSESSED: [id number]   • Appendicitis    • Arthritis    • CKD (chronic kidney disease), stage III ([facility] [address] )    • Coronary artery disease    • Diabetes mellitus (HCC)    • Eczema    • Gross hematuria     LAST ASSESSED: [id number]   • Hematuria    • Hypertension    • Hyponatremia    • Hypothyroid    • Osteoporosis     LAST ASSESSED: [id number]   • PONV (postoperative nausea and vomiting)    • Primary osteoarthritis of left knee     LAST ASSESSED: [id number]   • Renal disorder    • Renal insufficiency    • Rotator cuff tear arthropathy     LEFT LAST ASSESSED: [id number]   • Secondary osteoarthritis of right shoulder     LAST ASSESSED: [id number]   • Syncope        Past Surgical History:   Procedure Laterality Date   • APPENDECTOMY     • BACK SURGERY     • CATARACT EXTRACTION W/  INTRAOCULAR LENS IMPLANT Bilateral    • KNEE ARTHROSCOPY Left     ONSET: [id number] THERAPEUTIC   • LUMBAR LAMINECTOMY     • WA AAA REPAIR,MODULR BIFURCATED PROSTH N/A 3/10/2016    Procedure: REPAIR ANEURYSM ENDOVASCULAR ABDOMINAL AORTIC  (EVAR) ;   Surgeon: Oneyda Magallanes MD;  Location: [facility];  Service: Vascular   • WA RECONSTR TOTAL SHOULDER IMPLANT Left 5/31/2016    Procedure: ARTHROPLASTY SHOULDER REVERSE;  Surgeon: Eduardo Lawrence MD;  Location: [facility];  Service: Orthopedics   • WA TOTAL KNEE ARTHROPLASTY Left 7/23/2018    Procedure: KNEE : COMPLETE REPLACEMENT;  Surgeon: Ian Casiano MD;  Location: [facility];  Service: Orthopedics   • REVERSE TOTAL SHOULDER ARTHROPLASTY Right    • ROTATOR CUFF REPAIR Left     RC SURGERY RESOLVED: 1999   • SHOULDER ARTHROPLASTY      5/15 - FOR RIGHT SHOULDER; 5/16 - FOR LEFT SHOULDER   • SHOULDER ARTHROSCOPY     • TOTAL HIP ARTHROPLASTY Left     ONSET: [id number]   • TOTAL HIP ARTHROPLASTY  05/31/2006    REVISION       Family History   Problem Relation Age of Onset   • Coronary artery disease Mother    • Diabetes Mother    • Coronary artery disease Father    • Cancer Sister    • Arthritis Sister    • Unexplained death Family         RAPID   • Cancer Daughter        Social History     Occupational History   • Occupation: RETIRED   Tobacco Use   • Smoking status: Never Smoker   • Smokeless tobacco: Never Used   Substance and Sexual Activity   • Alcohol use: No   • Drug use: No   • Sexual activity: Not on file         Current Outpatient Medications:   •  acetaminophen (TYLENOL) 325 mg tablet, Take 2 tablets (650 mg total) by mouth every 6 (six) hours as needed for mild pain , Disp: 30 tablet, Rfl: 0  •  amLODIPine-atorvastatin (CADUET) 10-20 MG per tablet, TAKE 1 TABLET AT BEDTIME, Disp: 90 tablet, Rfl: 3  •  Blood Glucose Monitoring Suppl (ONE TOUCH ULTRA MINI) w/Device KIT, Test blood sugar once daily, Disp: 1 kit, Rfl: 0  •  calcium citrate-vitamin D (CITRACAL+D) 315-200 MG-UNIT per tablet, Take 1 tablet by mouth daily  Calcium + D TABS  Refills: 0   , M D ; Active , Disp: , Rfl:   •  Docusate Sodium (COLACE PO), Take by mouth as needed, Disp: , Rfl:   •  glucose blood (OneTouch Ultra) test strip, Check blood sugar once daily, Disp: 100 each, Rfl: 3  •  Lancets (OneTouch Delica Plus [id number]) MISC, Test blood sugar once daily, Disp: 100 each, Rfl: 3  •  levothyroxine 112 mcg tablet, Take 1 tablet (112 mcg total) by mouth daily in the early morning, Disp: 90 tablet, Rfl: 3  •  LORazepam (ATIVAN) 0 5 mg tablet, Take 1 tab  daily PRN for anxiety, Disp: 30 tablet, Rfl: 3  •  losartan (COZAAR) 100 MG tablet, TAKE 1 TABLET ONCE DAILY, Disp: 90 tablet, Rfl: 3  •  Multiple Vitamins-Minerals (MULTIVITAMIN & MINERAL PO), 1 tablet daily  Multivitamin & Mineral Oral Liquid  Quantity: 0;  Refills: 0   , M D ; Active , Disp: , Rfl:   •  pyrithione zinc (HEAD AND SHOULDERS) 1 % shampoo, Apply topically daily as needed for dandruff, Disp: , Rfl:   •  trolamine salicylate (ASPERCREME) 10 % cream, Apply topically as needed for muscle/joint pain, Disp: , Rfl:     Allergies   Allergen Reactions   • Clobetasol    • Fluocinolone    • Ketoconazole    • Pyrithione    • Bextra [Valdecoxib] Rash     GI INTOL   • Diclofenac Headache   • Percocet [Oxycodone-Acetaminophen] Rash and GI Intolerance            Vitals:    10/11/22 1410   BP: (!) 180/64   Pulse: 80       Objective:                    Right Hip Exam     Tenderness   The patient is experiencing tenderness in the greater trochanter      Range of Motion   Abduction: normal   Adduction: normal   Extension: normal   Flexion: normal   External rotation: normal Internal rotation: normal     Muscle Strength   The patient has normal right hip strength  Other   Erythema: absent  Scars: present  Sensation: normal  Pulse: present      Left Hip Exam     Tenderness   The patient is experiencing tenderness in the greater trochanter  Range of Motion   Abduction: normal   Adduction: normal   Extension: normal   Flexion: normal   External rotation: normal   Internal rotation: normal     Muscle Strength   The patient has normal left hip strength  Other   Erythema: absent  Scars: present  Sensation: normal  Pulse: present            Diagnostics, reviewed and taken today if performed as documented:  None performed      Procedures, if performed today:    Large joint arthrocentesis: bilateral greater trochanteric bursa  Universal Protocol:  Consent: Verbal consent obtained  Risks and benefits: risks, benefits and alternatives were discussed  Consent given by: patient  Time out: Immediately prior to procedure a "time out" was called to verify the correct patient, procedure, equipment, support staff and site/side marked as required    Timeout called at: 10/11/2022 3:39 PM   Patient understanding: patient states understanding of the procedure being performed  Site marked: the operative site was marked  Patient identity confirmed: verbally with patient    Supporting Documentation  Indications: pain   Procedure Details  Location: hip - bilateral greater trochanteric bursa  Needle size: 22 G  Ultrasound guidance: no  Approach: anteromedial    Medications (Right): 1 mL lidocaine 1 %; 6 mg betamethasone acetate-betamethasone sodium phosphate 6 (3-3) mg/mL; 1 mL bupivacaine 0 25 %Medications (Left): 1 mL lidocaine 1 %; 6 mg betamethasone acetate-betamethasone sodium phosphate 6 (3-3) mg/mL; 1 mL bupivacaine 0 25 %   Patient tolerance: patient tolerated the procedure well with no immediate complications  Dressing:  Sterile dressing applied        Portions of the record may have been created with voice recognition software  Occasional wrong word or "sound a like" substitutions may have occurred due to the inherent limitations of voice recognition software  Read the chart carefully and recognize, using context, where substitutions have occurred

## 2022-10-27 ENCOUNTER — APPOINTMENT (OUTPATIENT)
Dept: LAB | Age: 87
End: 2022-10-27
Payer: COMMERCIAL

## 2022-10-27 DIAGNOSIS — E11.22 TYPE 2 DM WITH CKD STAGE 3 AND HYPERTENSION (HCC): ICD-10-CM

## 2022-10-27 DIAGNOSIS — N18.30 TYPE 2 DM WITH CKD STAGE 3 AND HYPERTENSION (HCC): ICD-10-CM

## 2022-10-27 DIAGNOSIS — N18.32 STAGE 3B CHRONIC KIDNEY DISEASE (HCC): ICD-10-CM

## 2022-10-27 DIAGNOSIS — E03.9 ACQUIRED HYPOTHYROIDISM: ICD-10-CM

## 2022-10-27 DIAGNOSIS — I12.9 TYPE 2 DM WITH CKD STAGE 3 AND HYPERTENSION (HCC): ICD-10-CM

## 2022-10-27 DIAGNOSIS — I10 PRIMARY HYPERTENSION: ICD-10-CM

## 2022-10-27 LAB
ALBUMIN SERPL BCP-MCNC: 3.7 G/DL (ref 3.5–5)
ALP SERPL-CCNC: 62 U/L (ref 46–116)
ALT SERPL W P-5'-P-CCNC: 34 U/L (ref 12–78)
ANION GAP SERPL CALCULATED.3IONS-SCNC: 8 MMOL/L (ref 4–13)
AST SERPL W P-5'-P-CCNC: 15 U/L (ref 5–45)
BILIRUB SERPL-MCNC: 1.17 MG/DL (ref 0.2–1)
BUN SERPL-MCNC: 29 MG/DL (ref 5–25)
CALCIUM SERPL-MCNC: 9.5 MG/DL (ref 8.3–10.1)
CHLORIDE SERPL-SCNC: 108 MMOL/L (ref 96–108)
CO2 SERPL-SCNC: 22 MMOL/L (ref 21–32)
CREAT SERPL-MCNC: 1.5 MG/DL (ref 0.6–1.3)
GFR SERPL CREATININE-BSD FRML MDRD: 29 ML/MIN/1.73SQ M
GLUCOSE P FAST SERPL-MCNC: 125 MG/DL (ref 65–99)
POTASSIUM SERPL-SCNC: 4.5 MMOL/L (ref 3.5–5.3)
PROT SERPL-MCNC: 7.2 G/DL (ref 6.4–8.4)
SODIUM SERPL-SCNC: 138 MMOL/L (ref 135–147)
T4 FREE SERPL-MCNC: 1.43 NG/DL (ref 0.76–1.46)
TSH SERPL DL<=0.05 MIU/L-ACNC: 4.96 UIU/ML (ref 0.45–4.5)

## 2022-10-27 PROCEDURE — 36415 COLL VENOUS BLD VENIPUNCTURE: CPT

## 2022-10-27 PROCEDURE — 80053 COMPREHEN METABOLIC PANEL: CPT

## 2022-10-27 PROCEDURE — 84439 ASSAY OF FREE THYROXINE: CPT

## 2022-10-27 PROCEDURE — 84443 ASSAY THYROID STIM HORMONE: CPT

## 2022-10-31 DIAGNOSIS — E03.9 ACQUIRED HYPOTHYROIDISM: Primary | ICD-10-CM

## 2022-10-31 DIAGNOSIS — I12.9 TYPE 2 DM WITH CKD STAGE 3 AND HYPERTENSION (HCC): ICD-10-CM

## 2022-10-31 DIAGNOSIS — N18.30 TYPE 2 DM WITH CKD STAGE 3 AND HYPERTENSION (HCC): ICD-10-CM

## 2022-10-31 DIAGNOSIS — E11.22 TYPE 2 DM WITH CKD STAGE 3 AND HYPERTENSION (HCC): ICD-10-CM

## 2022-10-31 DIAGNOSIS — E78.2 MIXED HYPERLIPIDEMIA: ICD-10-CM

## 2022-11-14 DIAGNOSIS — R26.2 AMBULATORY DYSFUNCTION: Primary | ICD-10-CM

## 2022-11-14 DIAGNOSIS — R29.898 LEG WEAKNESS, BILATERAL: ICD-10-CM

## 2022-11-14 PROBLEM — H61.23 IMPACTED CERUMEN, BILATERAL: Status: RESOLVED | Noted: 2022-09-15 | Resolved: 2022-11-14

## 2022-11-18 DIAGNOSIS — E11.9 TYPE 2 DIABETES MELLITUS WITHOUT COMPLICATION, WITHOUT LONG-TERM CURRENT USE OF INSULIN (HCC): ICD-10-CM

## 2022-11-18 DIAGNOSIS — E11.65 TYPE 2 DIABETES MELLITUS WITH HYPERGLYCEMIA, WITHOUT LONG-TERM CURRENT USE OF INSULIN (HCC): ICD-10-CM

## 2022-11-18 RX ORDER — BLOOD SUGAR DIAGNOSTIC
STRIP MISCELLANEOUS
Qty: 100 STRIP | Refills: 3 | Status: SHIPPED | OUTPATIENT
Start: 2022-11-18

## 2022-11-18 RX ORDER — LANCETS 30 GAUGE
EACH MISCELLANEOUS
Qty: 100 EACH | Refills: 3 | Status: SHIPPED | OUTPATIENT
Start: 2022-11-18

## 2022-11-22 DIAGNOSIS — F41.9 ANXIETY: ICD-10-CM

## 2022-11-25 RX ORDER — LORAZEPAM 0.5 MG/1
TABLET ORAL
Qty: 30 TABLET | Refills: 3 | Status: SHIPPED | OUTPATIENT
Start: 2022-11-25

## 2022-12-08 ENCOUNTER — EVALUATION (OUTPATIENT)
Dept: PHYSICAL THERAPY | Age: 87
End: 2022-12-08

## 2022-12-08 DIAGNOSIS — R26.2 AMBULATORY DYSFUNCTION: Primary | ICD-10-CM

## 2022-12-08 DIAGNOSIS — R29.898 LEG WEAKNESS, BILATERAL: ICD-10-CM

## 2022-12-08 NOTE — PROGRESS NOTES
PT Evaluation     Today's date: 2022  Patient name: aTri Rice  :   MRN: [id number]  Referring provider: Victorina Davey MD  Dx:   Encounter Diagnosis     ICD-10-CM    1  Ambulatory dysfunction  R26 2 Ambulatory Referral to Physical Therapy      2  Leg weakness, bilateral  R29 898 Ambulatory Referral to Physical Therapy                     Assessment  Assessment details: Patient seen for PT evaluation for gait dysfunction and leg weakness  Patient presents with decreased ROM, decreased B LE strength, postural deficits, core weakness, decreased standing balance, has been compliant with HEP at home  Patient is a good candidate for PT  PT recommending 2x/week x 6-8 weeks  Impairments: abnormal gait, abnormal or restricted ROM, activity intolerance, impaired physical strength, lacks appropriate home exercise program, pain with function, poor posture  and poor body mechanics  Functional limitations: See objective for functional deficits  Patient rates functional deficits as moderate-severeUnderstanding of Dx/Px/POC: good   Prognosis: good    Goals  Impairment Goals to be met within 4 weeks  - Decrease pain to 2/10 back pain with making her bed  - Improve ROM to 90 degrees hip flexion  - Increase strength by 1/2 MMT throughout  - Reduce TUG time by 1-2 seconds  - Patient to be able to perform 2 minute walk test  - Patient to be able to perform sit<>stand test      Functional Goals to be met within 4-6 weeks     - Return to Prior Level of Function  - Increase Functional Status Measure to: expected  - Patient will be independent with HEP  - Patient to be able to tolerate walking program inside        Plan  Patient would benefit from: skilled physical therapy  Planned modality interventions: cryotherapy and thermotherapy: hydrocollator packs  Planned therapy interventions: abdominal trunk stabilization, joint mobilization, manual therapy, neuromuscular re-education, patient education, postural training, strengthening, stretching, therapeutic activities, therapeutic exercise, home exercise program, gait training, body mechanics training and balance  Frequency: 2x week  Duration in weeks: 8  Treatment plan discussed with: patient        Subjective Evaluation    History of Present Illness  Mechanism of injury: Patient with progressing full body pain, low back that radiates into B hips  Patient reports that she followed up with ortho, had an injection for her hips, which reduced the pain some; however, patient has been having worsening thoracic and lumbar spine pain as well as B hip/leg pain  Patient notes that she has been having memory loss, only slightly, feels that it started from a medication that she was taking from her psoriasis medication  Patient notes that she had hallucinations from this medication, didn't report this to her family until she stopped taking it  Patient feels that she has pain with the following functional activities:   Standing, walking, with dressing, making her meals, feels that her knees are rubbing together again  Patient's primary concern is losing her balance forward, backwards (especially when she doesn't have a hand hold support)  Patient reports back pain with making her bed in the AM, does often sit down when her pain is severe  Patient reports that she's been having this back pain/weakness in ~ 6 months even when in PT last time; however, does feel that her pain progressively worsened as she stopped PT  Patient currently goes for walks daily, outside, will be walking inside in the winter  Patient does wear an OTC AFO - to keep her toes dorsiflexed during swing phase     Pain  Current pain ratin  At best pain ratin  At worst pain ratin  Location: lumbar spine, B hips  Quality: dull ache  Aggravating factors: sitting, standing, walking and lifting  Progression: worsening    Social Support  Steps to enter house: no  Stairs in house: no Lives in: community-based residential facility    Employment status: not working    Diagnostic Tests  No diagnostic tests performed  Treatments  Previous treatment: physical therapy  Patient Goals  Patient goals for therapy: decreased pain, improved balance, increased motion, increased strength and independence with ADLs/IADLs          Objective     Active Range of Motion     Lumbar   Flexion: 40 degrees  with pain  Extension: -5 degrees   Left Hip   Flexion: 80 degrees with pain    Right Hip   Flexion: 80 degrees with pain  Left Knee   Normal active range of motion    Right Knee   Normal active range of motion  Left Ankle/Foot   Dorsiflexion (ke): 5 degrees     Additional Active Range of Motion Details  Loss of balance, lacks strength to  fully extended posture  Passive Range of Motion     Right Ankle/Foot    Dorsiflexion (ke): 5 degrees     Strength/Myotome Testing     Left Hip   Planes of Motion   Flexion: 4-  Extension: 4-  Abduction: 4-  Adduction: 4    Right Hip   Planes of Motion   Flexion: 4-  Extension: 4-  Abduction: 4-  Adduction: 4    Left Knee   Flexion: 4-  Extension: 4-    Right Knee   Flexion: 4-  Extension: 4-    Left Ankle/Foot   Dorsiflexion: 4  Plantar flexion: 4-    Right Ankle/Foot   Dorsiflexion: 3-  Plantar flexion: 4-  Neuro Exam:     Functional outcomes   5x sit to stand: unable to perform, back pain (seconds)  2 minute walk test: unable to perform today, too weak from busy day at home  TU with 4 wheel walker (seconds)  Functional outcome gait comment: Patient ambulates with appropriate stride length, decreased R toe clearance- wearing OTC AFO/toe lift to assist with R foot  Patient ambulates with narrow base of support, using 4 wheel walker with all gait  Precautions: FALL RISK  H/o B reverse total shoulders, total hip replacements, L knee replacement; R toes catch     CC: back pain/weakness with making her bed   Joint pain full body  Manuals  Neuro Re-Ed                SLS foam        sidestepping        Tandem walking                                Ther Ex                Nu step x12'       Pulleys? Seated p ball flexion, diagonals- pain free range                SLR x 3 2# ? Ham curls 2# ? Standing marching, alt 2#? HR                LAQ 2# ?        Ball squeezes                scap retraction        Seated vs standing rows and extension- watch standing balance Start with seated       Bicep curls        Shoulder scaption, seated, single arm        Chest press cane                                                Ther Activity                        Gait Training                        Modalities        MH vs CP PRN                Progress as able

## 2022-12-13 ENCOUNTER — OFFICE VISIT (OUTPATIENT)
Dept: PHYSICAL THERAPY | Age: 87
End: 2022-12-13

## 2022-12-13 ENCOUNTER — APPOINTMENT (OUTPATIENT)
Dept: LAB | Age: 87
End: 2022-12-13

## 2022-12-13 DIAGNOSIS — R26.2 AMBULATORY DYSFUNCTION: Primary | ICD-10-CM

## 2022-12-13 DIAGNOSIS — E78.2 MIXED HYPERLIPIDEMIA: ICD-10-CM

## 2022-12-13 DIAGNOSIS — N18.32 STAGE 3B CHRONIC KIDNEY DISEASE (HCC): ICD-10-CM

## 2022-12-13 DIAGNOSIS — N18.30 TYPE 2 DM WITH CKD STAGE 3 AND HYPERTENSION (HCC): ICD-10-CM

## 2022-12-13 DIAGNOSIS — E11.22 TYPE 2 DM WITH CKD STAGE 3 AND HYPERTENSION (HCC): ICD-10-CM

## 2022-12-13 DIAGNOSIS — I12.9 TYPE 2 DM WITH CKD STAGE 3 AND HYPERTENSION (HCC): ICD-10-CM

## 2022-12-13 DIAGNOSIS — R29.898 LEG WEAKNESS, BILATERAL: ICD-10-CM

## 2022-12-13 DIAGNOSIS — E03.9 ACQUIRED HYPOTHYROIDISM: ICD-10-CM

## 2022-12-13 DIAGNOSIS — I10 PRIMARY HYPERTENSION: ICD-10-CM

## 2022-12-13 LAB
ALBUMIN SERPL BCP-MCNC: 3.9 G/DL (ref 3.5–5)
ALP SERPL-CCNC: 58 U/L (ref 46–116)
ALT SERPL W P-5'-P-CCNC: 31 U/L (ref 12–78)
ANION GAP SERPL CALCULATED.3IONS-SCNC: 9 MMOL/L (ref 4–13)
AST SERPL W P-5'-P-CCNC: 23 U/L (ref 5–45)
BASOPHILS # BLD AUTO: 0.08 THOUSANDS/ÂΜL (ref 0–0.1)
BASOPHILS NFR BLD AUTO: 1 % (ref 0–1)
BILIRUB SERPL-MCNC: 1.01 MG/DL (ref 0.2–1)
BUN SERPL-MCNC: 34 MG/DL (ref 5–25)
CALCIUM SERPL-MCNC: 9.9 MG/DL (ref 8.3–10.1)
CHLORIDE SERPL-SCNC: 107 MMOL/L (ref 96–108)
CHOLEST SERPL-MCNC: 162 MG/DL
CO2 SERPL-SCNC: 20 MMOL/L (ref 21–32)
CREAT SERPL-MCNC: 1.46 MG/DL (ref 0.6–1.3)
EOSINOPHIL # BLD AUTO: 0.25 THOUSAND/ÂΜL (ref 0–0.61)
EOSINOPHIL NFR BLD AUTO: 3 % (ref 0–6)
ERYTHROCYTE [DISTWIDTH] IN BLOOD BY AUTOMATED COUNT: 15.1 % (ref 11.6–15.1)
EST. AVERAGE GLUCOSE BLD GHB EST-MCNC: 131 MG/DL
GFR SERPL CREATININE-BSD FRML MDRD: 30 ML/MIN/1.73SQ M
GLUCOSE P FAST SERPL-MCNC: 128 MG/DL (ref 65–99)
HBA1C MFR BLD: 6.2 %
HCT VFR BLD AUTO: 35.1 % (ref 34.8–46.1)
HDLC SERPL-MCNC: 109 MG/DL
HGB BLD-MCNC: 11.5 G/DL (ref 11.5–15.4)
IMM GRANULOCYTES # BLD AUTO: 0.03 THOUSAND/UL (ref 0–0.2)
IMM GRANULOCYTES NFR BLD AUTO: 0 % (ref 0–2)
LDLC SERPL CALC-MCNC: 40 MG/DL (ref 0–100)
LYMPHOCYTES # BLD AUTO: 1.79 THOUSANDS/ÂΜL (ref 0.6–4.47)
LYMPHOCYTES NFR BLD AUTO: 23 % (ref 14–44)
MCH RBC QN AUTO: 32.7 PG (ref 26.8–34.3)
MCHC RBC AUTO-ENTMCNC: 32.8 G/DL (ref 31.4–37.4)
MCV RBC AUTO: 100 FL (ref 82–98)
MONOCYTES # BLD AUTO: 0.81 THOUSAND/ÂΜL (ref 0.17–1.22)
MONOCYTES NFR BLD AUTO: 11 % (ref 4–12)
NEUTROPHILS # BLD AUTO: 4.72 THOUSANDS/ÂΜL (ref 1.85–7.62)
NEUTS SEG NFR BLD AUTO: 62 % (ref 43–75)
NONHDLC SERPL-MCNC: 53 MG/DL
NRBC BLD AUTO-RTO: 0 /100 WBCS
PLATELET # BLD AUTO: 207 THOUSANDS/UL (ref 149–390)
PMV BLD AUTO: 10.4 FL (ref 8.9–12.7)
POTASSIUM SERPL-SCNC: 4.5 MMOL/L (ref 3.5–5.3)
PROT SERPL-MCNC: 7.1 G/DL (ref 6.4–8.4)
RBC # BLD AUTO: 3.52 MILLION/UL (ref 3.81–5.12)
SODIUM SERPL-SCNC: 136 MMOL/L (ref 135–147)
TRIGL SERPL-MCNC: 65 MG/DL
TSH SERPL DL<=0.05 MIU/L-ACNC: 2.47 UIU/ML (ref 0.45–4.5)
WBC # BLD AUTO: 7.68 THOUSAND/UL (ref 4.31–10.16)

## 2022-12-13 NOTE — PROGRESS NOTES
Daily Note     Today's date: 2022  Patient name: Enma Pompa  : [birth year]  MRN: [id number]  Referring provider: Shant Villar MD  Dx:   Encounter Diagnosis     ICD-10-CM    1  Ambulatory dysfunction  R26 2       2  Leg weakness, bilateral  R29 898                      Subjective:  First visit after eval  Pt notes she is not as strong as she was  Pt notes her knees feel weak and sore  Pt notes L LE weaker than R      Objective: See treatment diary below      Assessment: Tolerated treatment well  Pt felt weaker on L LE with most ex  Felt cramping at times in her L leg but would stop after performing ex  Pt can do ex below 90 degrees with UE but lack strength to go higher at this time  Pt was moderately fatigued with ex and will have to gauge pt tolerance and adjust ex as needed to help pt tolerate tx  Plan: Cont PT per LPT plan     Precautions: FALL RISK  H/o B reverse total shoulders, total hip replacements, L knee replacement; R toes catch     CC: back pain/weakness with making her bed  Joint pain full body  Manuals                                       Neuro Re-Ed                SLS foam        sidestepping        Tandem walking                                Ther Ex                Nu step x12' 15 min      Pulleys?                 Seated p ball flexion, diagonals- pain free range                SLR x 3  2# x20 R, L 1 5# x20      Ham curls  2# x20 R, L 1 5# x20      Standing marching, alt  2#x20 R, L  1 5# x20      HR                LAQ  2# x20      Ball squeezes  [id number]              scap retraction        Seated vs standing rows and extension- watch standing balance Start with seated       Bicep curls  Reg and hammer 2# x20      Shoulder scaption, seated, single arm  1 5# 2x10      Chest press cane                                                Ther Activity                        Gait Training                        Modalities        MH vs CP PRN                Progress as able [] : Fellow [Time Spent: ___ minutes] : I have spent [unfilled] minutes of time on the encounter. [>50% of the face to face encounter time was spent on counseling and/or coordination of care for ___] : Greater than 50% of the face to face encounter time was spent on counseling and/or coordination of care for [unfilled]

## 2022-12-15 ENCOUNTER — APPOINTMENT (OUTPATIENT)
Dept: PHYSICAL THERAPY | Age: 87
End: 2022-12-15

## 2022-12-20 ENCOUNTER — OFFICE VISIT (OUTPATIENT)
Dept: PHYSICAL THERAPY | Age: 87
End: 2022-12-20

## 2022-12-20 DIAGNOSIS — R26.2 AMBULATORY DYSFUNCTION: Primary | ICD-10-CM

## 2022-12-20 DIAGNOSIS — R29.898 LEG WEAKNESS, BILATERAL: ICD-10-CM

## 2022-12-20 NOTE — PROGRESS NOTES
Daily Note     Today's date: 2022  Patient name: Bharat Doe  :   MRN: [id number]  Referring provider: Adarsh Bonilla MD  Dx:   Encounter Diagnosis     ICD-10-CM    1  Ambulatory dysfunction  R26 2       2  Leg weakness, bilateral  R29 898                      Subjective:  Pt notes she is not as strong as she was  Pt notes her knees feel weak   Pt notes L LE weaker than R  Does feel last session wnet well with no ill effects      Objective: See treatment diary below      Assessment: Tolerated treatment well  Pt felt weaker on L LE with most ex  Pt can do ex below 90 degrees with UE but lack strength to go higher at this time  Added pulleys to aid with shld ROM to help that  Gave pt digiflex as she complained of  strength rachel on R  Pt was moderately fatigued with ex and will have to gauge pt tolerance and adjust ex as needed to help pt tolerate tx  Plan: Cont PT per LPT plan     Precautions: FALL RISK  H/o B reverse total shoulders, total hip replacements, L knee replacement; R toes catch     CC: back pain/weakness with making her bed  Joint pain full body  Manuals                                      Neuro Re-Ed                SLS foam        sidestepping        Tandem walking                                Ther Ex                Nu step x12' 15 min 15m     Pulleys?    3 min             Seated p ball flexion, diagonals- pain free range                SLR x 3  2# x20 R, L 1 5# x20 2# x20 R, L 1 5# x20     Ham curls  2# x20 R, L 1 5# x20 2# x20 R, L 1 5# x20     Standing marching, alt  2#x20 R, L  1 5# x20 2#x20 R, L  1 5# x20     HR                LAQ  2# x20 2# x30     Ball squeezes  [id number]             scap retraction        Seated vs standing rows and extension- watch standing balance Start with seated       Bicep curls  Reg and hammer 2# x20 Reg and hammer 2# x20     Shoulder scaption, seated, single arm   2# 2x10 watch pain     Chest press cane  1 5# 2x10      digi     Yellow 2x10                                     Ther Activity                        Gait Training                        Modalities        MH vs CP PRN                Progress as able

## 2022-12-22 ENCOUNTER — OFFICE VISIT (OUTPATIENT)
Dept: PHYSICAL THERAPY | Age: 87
End: 2022-12-22

## 2022-12-22 DIAGNOSIS — R29.898 LEG WEAKNESS, BILATERAL: ICD-10-CM

## 2022-12-22 DIAGNOSIS — R26.2 AMBULATORY DYSFUNCTION: Primary | ICD-10-CM

## 2022-12-22 NOTE — PROGRESS NOTES
Daily Note     Today's date: 2022  Patient name: Yuriy Anand  :   MRN: [id number]  Referring provider: Demarcus Shelby MD  Dx:   Encounter Diagnosis     ICD-10-CM    1  Ambulatory dysfunction  R26 2       2  Leg weakness, bilateral  R29 898                      Subjective:  Patient reports she still has trouble with her walking  Using rollator walker to assist with ambulation  Objective: See treatment diary below      Assessment: Tolerated treatment well  Appropriate fatigue noted with exercises  Appropriate rest breaks needed with exercises  No complaints of pain  Progress with strengthening as able  Plan: Cont PT per LPT plan     Precautions: FALL RISK  H/o B reverse total shoulders, total hip replacements, L knee replacement; R toes catch     CC: back pain/weakness with making her bed  Joint pain full body  Manuals                                     Neuro Re-Ed                SLS foam        sidestepping        Tandem walking                                Ther Ex                Nu step x12' 15 min 15m 15 min    Pulleys?    3 min 3 min            Seated p ball flexion, diagonals- pain free range                SLR x 3  2# x20 R, L 1 5# x20 2# x20 R, L 1 5# x20 2# R  1 5# L x20 ea    Ham curls  2# x20 R, L 1 5# x20 2# x20 R, L 1 5# x20 2# R  1 5# L x20 ea    Standing marching, alt  2#x20 R, L  1 5# x20 2#x20 R, L  1 5# x20 2# R  1 5# L x20 ea    HR                LAQ  2# x20 2# x30 2# x30    Ball squeezes  [id number] 5 sec x20            scap retraction        Seated vs standing rows and extension- watch standing balance Start with seated       Bicep curls  Reg and hammer 2# x20 Reg and hammer 2# x20 Reg and hammer 2# x20    Shoulder scaption, seated, single arm   2# 2x10 watch pain 2# 2x10    Chest press cane  1 5# 2x10      digi     Yellow 2x10 Red  2x10                                    Ther Activity                        Gait Training                        Modalities        MH vs CP PRN                Progress as able

## 2022-12-27 ENCOUNTER — APPOINTMENT (OUTPATIENT)
Dept: PHYSICAL THERAPY | Age: 87
End: 2022-12-27

## 2022-12-29 ENCOUNTER — OFFICE VISIT (OUTPATIENT)
Dept: PHYSICAL THERAPY | Age: 87
End: 2022-12-29

## 2022-12-29 DIAGNOSIS — R26.2 AMBULATORY DYSFUNCTION: Primary | ICD-10-CM

## 2022-12-29 DIAGNOSIS — R29.898 LEG WEAKNESS, BILATERAL: ICD-10-CM

## 2022-12-29 NOTE — PROGRESS NOTES
Daily Note     Today's date: 2022  Patient name: Kp Solomon  :   MRN: [id number]  Referring provider: Grace Pina MD  Dx:   Encounter Diagnosis     ICD-10-CM    1  Ambulatory dysfunction  R26 2       2  Leg weakness, bilateral  R29 898                      Subjective: Patient reports that she was sick earlier this week, woke up dizzy on [birth year] her birthday  Patient reports that she was fine, laid down and rested through the day, stay hydrated as well  Patient is feeling fine today  Objective: See treatment diary below      Assessment: Tolerated treatment well  Patient demonstrated fatigue post treatment, exhibited good technique with therapeutic exercises and would benefit from continued PT Patient fatigued with UE strengthening exercises, needing to rest between and does better with exercising 1 arm then the other arm  Progressed patient to using 2# weights for B LEs with standing TE  Patient eager to participate with PT and to strengthen her body  Patient feels less pain after PT sessions  Plan: Continue per plan of care  Precautions: FALL RISK  H/o B reverse total shoulders, total hip replacements, L knee replacement; R toes catch     CC: back pain/weakness with making her bed  Joint pain full body  Manuals                                    Neuro Re-Ed                SLS foam        sidestepping        Tandem walking                                Ther Ex                Nu step x12' 15 min 15m 15 min x15'    Pulleys?    3 min 3 min x3'           Seated p ball flexion, diagonals- pain free range                SLR x 3  2# x20 R, L 1 5# x20 2# x20 R, L 1 5# x20 2# R  1 5# L x20 ea 2# 20x ea    Ham curls  2# x20 R, L 1 5# x20 2# x20 R, L 1 5# x20 2# R  1 5# L x20 ea 2# 20x ea   Standing marching, alt  2#x20 R, L  1 5# x20 2#x20 R, L  1 5# x20 2# R  1 5# L x20 ea 2# 20x ea   HR                LAQ  2# x20 2# x30 2# x30 2# 30x    Sheyla Genaro squeezes  [id number] 5 sec x20 20x:05           scap retraction        Seated vs standing rows and extension- watch standing balance Start with seated       Bicep curls  Reg and hammer 2# x20 Reg and hammer 2# x20 Reg and hammer 2# x20 2# 20x ea   Shoulder scaption, seated, single arm   2# 2x10 watch pain 2# 2x10 2# 2x10   Chest press cane  1 5# 2x10      digi     Yellow 2x10 Red  2x10 Red 2x10                                   Ther Activity                        Gait Training                        Modalities        MH vs CP PRN                Progress as able

## 2023-01-03 ENCOUNTER — OFFICE VISIT (OUTPATIENT)
Dept: PHYSICAL THERAPY | Age: 88
End: 2023-01-03

## 2023-01-03 DIAGNOSIS — R26.2 AMBULATORY DYSFUNCTION: Primary | ICD-10-CM

## 2023-01-03 DIAGNOSIS — R29.898 LEG WEAKNESS, BILATERAL: ICD-10-CM

## 2023-01-03 NOTE — PROGRESS NOTES
Daily Note     Today's date: 1/3/2023  Patient name: Claudio Das  :   MRN: [id number]  Referring provider: Marian Farris MD  Dx:   Encounter Diagnosis     ICD-10-CM    1  Ambulatory dysfunction  R26 2       2  Leg weakness, bilateral  R29 898                      Subjective: Patient reports she does not know if it is her age but she does not feel she is recovering as quickly as she has in the past with PT        Objective: See treatment diary below      Assessment: Tolerated treatment well  Exercises performed at single hand rail for safety  Cuing for proper technique with good carryover  Decreased resistance with L shoulder scaption with increased ROM with no weight  Patient demonstrated fatigue post treatment and would benefit from continued PT      Plan: Progress treatment as tolerated  Precautions: FALL RISK  H/o B reverse total shoulders, total hip replacements, L knee replacement; R toes catch     CC: back pain/weakness with making her bed  Joint pain full body  Manuals 1/3/23  12/20 12/22 12/29                                   Neuro Re-Ed                SLS foam        sidestepping        Tandem walking                                Ther Ex                Nu step 15'  15m 15 min x15'    Pulleys?  NV  3 min 3 min x3'           Seated p ball flexion, diagonals- pain free range                SLR x 3 2# 20x ea  2# x20 R, L 1 5# x20 2# R  1 5# L x20 ea 2# 20x ea    Ham curls 2# 20x ea  2# x20 R, L 1 5# x20 2# R  1 5# L x20 ea 2# 20x ea   Standing marching, alt 2# 20x ea  2#x20 R, L  1 5# x20 2# R  1 5# L x20 ea 2# 20x ea   HR                LAQ 2# 30x   2# x30 2# x30 2# 30x    Ball squeezes 5 sec x20  [id number] 5 sec x20 20x:05           scap retraction        Seated vs standing rows and extension- watch standing balance        Bicep curls Reg and hammer 2# x20 ea  Reg and hammer 2# x20 Reg and hammer 2# x20 2# 20x ea   Shoulder scaption, seated, single arm 2# on R  0# on L   2x10 ea 2# 2x10 watch pain 2# 2x10 2# 2x10   Chest press cane        digi   Red 20x   Yellow 2x10 Red  2x10 Red 2x10                                   Ther Activity                        Gait Training                        Modalities        MH vs CP PRN                Progress as able

## 2023-01-04 ENCOUNTER — OFFICE VISIT (OUTPATIENT)
Dept: FAMILY MEDICINE CLINIC | Facility: CLINIC | Age: 88
End: 2023-01-04

## 2023-01-04 VITALS
WEIGHT: 142.8 LBS | HEIGHT: 62 IN | OXYGEN SATURATION: 98 % | BODY MASS INDEX: 26.28 KG/M2 | SYSTOLIC BLOOD PRESSURE: 142 MMHG | HEART RATE: 68 BPM | TEMPERATURE: 98 F | RESPIRATION RATE: 16 BRPM | DIASTOLIC BLOOD PRESSURE: 70 MMHG

## 2023-01-04 DIAGNOSIS — G63 POLYNEUROPATHY ASSOCIATED WITH UNDERLYING DISEASE (HCC): ICD-10-CM

## 2023-01-04 DIAGNOSIS — I73.9 PERIPHERAL VASCULAR DISEASE (HCC): ICD-10-CM

## 2023-01-04 DIAGNOSIS — L40.50 PSORIATIC ARTHROPATHY (HCC): ICD-10-CM

## 2023-01-04 DIAGNOSIS — E78.2 MIXED HYPERLIPIDEMIA: ICD-10-CM

## 2023-01-04 DIAGNOSIS — N18.32 STAGE 3B CHRONIC KIDNEY DISEASE (HCC): ICD-10-CM

## 2023-01-04 DIAGNOSIS — E11.22 TYPE 2 DM WITH CKD STAGE 3 AND HYPERTENSION (HCC): ICD-10-CM

## 2023-01-04 DIAGNOSIS — I12.9 TYPE 2 DM WITH CKD STAGE 3 AND HYPERTENSION (HCC): ICD-10-CM

## 2023-01-04 DIAGNOSIS — M15.9 GENERALIZED OSTEOARTHRITIS: ICD-10-CM

## 2023-01-04 DIAGNOSIS — E03.9 ACQUIRED HYPOTHYROIDISM: ICD-10-CM

## 2023-01-04 DIAGNOSIS — R26.2 AMBULATORY DYSFUNCTION: ICD-10-CM

## 2023-01-04 DIAGNOSIS — I10 PRIMARY HYPERTENSION: Primary | ICD-10-CM

## 2023-01-04 DIAGNOSIS — N18.30 TYPE 2 DM WITH CKD STAGE 3 AND HYPERTENSION (HCC): ICD-10-CM

## 2023-01-04 NOTE — PROGRESS NOTES
Name: Matthew Rogers      :       MRN: [id number]  Encounter Provider: Brayden Maldonado MD  Encounter Date: 2023   Encounter department: [facility] [address]  Chief Complaint   Patient presents with   • Follow-up     4 month      Health Maintenance   Topic Date Due   • Hepatitis B Vaccine (1 of 3 - 3-dose series) Never done   • COVID-19 Vaccine (4 - Booster for Pfizer series) 2021   • PT PLAN OF CARE  2022   • Influenza Vaccine (1) 2022   • Diabetic Foot Exam  2022   • HEMOGLOBIN A1C  2023   • DM Eye Exam  2023   • Depression Screening  2023   • Urinary Incontinence Screening  2023   • Medicare Annual Wellness Visit (AWV)  2023   • BMI: Followup Plan  2023   • Fall Risk  2023   • BMI: Adult  2024   • Pneumococcal Vaccine: 65+ Years  Completed   • HIB Vaccine  Aged Out   • IPV Vaccine  Aged Out   • Hepatitis A Vaccine  Aged Out   • Meningococcal ACWY Vaccine  Aged Out   • HPV Vaccine  Aged Out       Assessment & Plan     1  Primary hypertension  Assessment & Plan:  BP is stable  Continue Losartan 100 mg daily, Caduet 10/20 mg daily  2  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)  Assessment & Plan:    Lab Results   Component Value Date    HGBA1C 6 2 (H) 2022     Type 2 DM - diet controlled  Monitor blood sugar at home 2-3 times per week  Continue follow-up with podiatry Dr Armand Spencer  Check eye exam by ophthalmology Dr Vicente Marshall annually  Orders:  -     Hemoglobin A1C; Future; Expected date: 2023  -     Microalbumin / creatinine urine ratio; Future; Expected date: 2023    3  Peripheral vascular disease ([address] )  Assessment & Plan:  Continue statin  4  Acquired hypothyroidism  Assessment & Plan:  TSH 2 470  Continue Levothyroxine 112 mcg daily  Orders:  -     TSH, 3rd generation with Free T4 reflex; Future; Expected date: 2023    5   Polyneuropathy associated with underlying disease [facility])  Assessment & Plan:  Symptoms are stable  Follow-up with podiatry  Orders:  -     Vitamin B12; Future; Expected date: 04/26/2023    6  Generalized osteoarthritis  Assessment & Plan:  Take Tylenol arthritis 1 tablet every 8 hours as needed for joint pains  7  Mixed hyperlipidemia  Assessment & Plan:  LDL 40 - at goal   Continue Caduet 10/20 mg daily  Orders:  -     Lipid panel; Future; Expected date: 04/26/2023    8  Stage 3b chronic kidney disease [facility])  Assessment & Plan:  Lab Results   Component Value Date    EGFR 30 12/13/2022    EGFR 29 10/27/2022    EGFR 32 04/14/2022    CREATININE 1 46 (H) 12/13/2022    CREATININE 1 50 (H) 10/27/2022    CREATININE 1 37 (H) 04/14/2022     Creatinine level remains stable  Recommended to stay well-hydrated  Avoid NSAIDs, nephrotoxins  9  Ambulatory dysfunction  Assessment & Plan:  Continue PT  Use a walker for ambulation  Discussed fall precautions  10  Psoriatic arthropathy ([address] )  Assessment & Plan:  Take Tylenol as needed for joint pains  Schedule follow-up visit in 4 months  Check labs prior to next visit  BMI Counseling: Body mass index is 26 12 kg/m²  The BMI is above normal  Nutrition recommendations include encouraging healthy choices of fruits and vegetables, decreasing fast food intake, consuming healthier snacks, moderation in carbohydrate intake, increasing intake of lean protein, reducing intake of saturated and trans fat and reducing intake of cholesterol  Exercise recommendations include exercising 3-5 times per week  Rationale for BMI follow-up plan is due to patient being overweight or obese  Subjective      HPI     Patient presents for 4 month follow-up visit accompanied by her daughter      PMHx: HTN, Hypothyroidism, Hyperlipidemia, Type 2 DM - diet controlled, microalbuminuria, CKD stage 3,  generalized OA, ambulatory dysfunction, peripheral neuropathy, PVD, S/P endovascular AAA repair in 3/2017  Reviewed current medications, blood work results from December 16, 2022  TSH 2 470, creatinine 1 46 - stable  Potassium 4 5, glucose 128  Sodium 136  Hb A1C 6 2  Cholesterol 162, , LDL 40  HTN - blood pressure remained stable  Patient checks blood pressure at home 2-3 times per week  Denies chest pain, shortness of breath, dizziness  Type 2 DM - diet controlled  Blood sugar this morning was 116  Patient is seeing podiatrist Dr Amando Ross every 10 weeks  [facility]  Eye exam done by ophthalmologist Dr Shelvy Shone 1 month ago  Ambulatory dysfunction - patient c/o balance problems due to peripheral neuropathy, chronic low back pain  She goes to [place] [id number] for PT 2 times per weeks  Ambulates with a walker  Reports no recent falls  Walks daily  C/o being forgetful  Cognitive status is stable  Family is very supportive  Review of Systems   Constitutional: Positive for fatigue (mild)  Negative for activity change, appetite change, chills and fever  HENT: Positive for hearing loss  Negative for congestion, ear pain, sore throat and trouble swallowing  Facial swelling: wears BL hearing aids  Eyes: Negative  Respiratory: Negative for cough, chest tightness, shortness of breath and wheezing  Cardiovascular: Positive for leg swelling (stable)  Negative for chest pain and palpitations  Gastrointestinal: Positive for constipation  Negative for abdominal pain, blood in stool, diarrhea, nausea and vomiting  Genitourinary: Negative for difficulty urinating, dysuria and hematuria  Musculoskeletal: Positive for arthralgias, back pain (chronic ) and gait problem (ambulates with a walker)  Negative for joint swelling  Skin: Negative for rash  Neurological: Positive for numbness (in hands)  Negative for dizziness, syncope and headaches  Hematological: Negative      Psychiatric/Behavioral: Negative for dysphoric mood and sleep disturbance  Anxiety - mood has been stable       Current Outpatient Medications on File Prior to Visit   Medication Sig   • acetaminophen (TYLENOL) 325 mg tablet Take 2 tablets (650 mg total) by mouth every 6 (six) hours as needed for mild pain  • amLODIPine-atorvastatin (CADUET) 10-20 MG per tablet TAKE 1 TABLET AT BEDTIME   • Blood Glucose Monitoring Suppl (ONE TOUCH ULTRA MINI) w/Device KIT Test blood sugar once daily   • calcium citrate-vitamin D (CITRACAL+D) 315-200 MG-UNIT per tablet Take 1 tablet by mouth daily  Calcium + D TABS  Refills: 0   , M D ; Active    • Docusate Sodium (COLACE PO) Take by mouth as needed   • Lancets (OneTouch Delica Plus [id number]) MISC TEST BLLOD SUGARS ONCE DAILY   • levothyroxine 112 mcg tablet Take 1 tablet (112 mcg total) by mouth daily in the early morning   • LORazepam (ATIVAN) 0 5 mg tablet TAKE 1 TABLET BY MOUTH EVERY DAY AS NEEDED FOR ANXIETY   • losartan (COZAAR) 100 MG tablet TAKE 1 TABLET ONCE DAILY   • Multiple Vitamins-Minerals (MULTIVITAMIN & MINERAL PO) 1 tablet daily  Multivitamin & Mineral Oral Liquid  Quantity: 0;  Refills: 0   , M D ; Active    • OneTouch Ultra test strip CHECK BLOOD SUGAR ONCE DAILY   • pyrithione zinc (HEAD AND SHOULDERS) 1 % shampoo Apply topically daily as needed for dandruff   • trolamine salicylate (ASPERCREME) 10 % cream Apply topically as needed for muscle/joint pain       Objective     /70 (BP Location: Left arm, Patient Position: Sitting, Cuff Size: Standard)   Pulse 68   Temp 98 °F (36 7 °C) (Tympanic)   Resp 16   Ht 5' 2" (1 575 m)   Wt 64 8 kg (142 lb 12 8 oz)   SpO2 98%   BMI 26 12 kg/m²     Physical Exam  Vitals and nursing note reviewed  Constitutional:       Appearance: Normal appearance  HENT:      Head: Normocephalic and atraumatic  Eyes:      Conjunctiva/sclera: Conjunctivae normal       Pupils: Pupils are equal, round, and reactive to light  Neck:      Vascular: No carotid bruit  Cardiovascular:      Rate and Rhythm: Normal rate and regular rhythm  Heart sounds: No murmur heard  Comments: Patient wears compression stokings  Pulmonary:      Effort: Pulmonary effort is normal       Breath sounds: Normal breath sounds  Abdominal:      General: There is no distension  Palpations: Abdomen is soft  Tenderness: There is no abdominal tenderness  Musculoskeletal:      Cervical back: Normal range of motion and neck supple  Comments: Ambulates with a walker  Arthritic changes in hand joints  Wears R LE brace   Skin:     General: Skin is warm and dry  Findings: No rash  Neurological:      General: No focal deficit present  Mental Status: She is alert  Mental status is at baseline     Psychiatric:         Mood and Affect: Mood normal        Italo Grande MD

## 2023-01-04 NOTE — ASSESSMENT & PLAN NOTE
Lab Results   Component Value Date    HGBA1C 6 2 (H) 12/13/2022     Type 2 DM - diet controlled  Monitor blood sugar at home 2-3 times per week  Continue follow-up with podiatry Dr Christian Lemos  Check eye exam by ophthalmology Dr Ele Neves annually

## 2023-01-04 NOTE — ASSESSMENT & PLAN NOTE
Lab Results   Component Value Date    EGFR 30 12/13/2022    EGFR 29 10/27/2022    EGFR 32 04/14/2022    CREATININE 1 46 (H) 12/13/2022    CREATININE 1 50 (H) 10/27/2022    CREATININE 1 37 (H) 04/14/2022     Creatinine level remains stable  Recommended to stay well-hydrated  Avoid NSAIDs, nephrotoxins

## 2023-01-05 ENCOUNTER — TELEPHONE (OUTPATIENT)
Dept: ADMINISTRATIVE | Facility: OTHER | Age: 88
End: 2023-01-05

## 2023-01-05 ENCOUNTER — OFFICE VISIT (OUTPATIENT)
Dept: PHYSICAL THERAPY | Age: 88
End: 2023-01-05

## 2023-01-05 DIAGNOSIS — R26.2 AMBULATORY DYSFUNCTION: Primary | ICD-10-CM

## 2023-01-05 DIAGNOSIS — R29.898 LEG WEAKNESS, BILATERAL: ICD-10-CM

## 2023-01-05 NOTE — TELEPHONE ENCOUNTER
----- Message from [facility] sent at 1/4/2023  5:19 PM EST -----  Regarding: care gap request  01/04/23 5:19 PM     Hello, our patient attached above has had Diabetic Foot Exam completed/performed  Please assist in updating the patient chart by making an External outreach to Dr Sun Devine facility located in [place]  The date of service is unknown      Thank you,  [facility]

## 2023-01-05 NOTE — PROGRESS NOTES
Daily Note     Today's date: 2023  Patient name: Julissa Pineda  :   MRN: [id number]  Referring provider: Shirlene Thrasher MD  Dx:   Encounter Diagnosis     ICD-10-CM    1  Ambulatory dysfunction  R26 2       2  Leg weakness, bilateral  R29 898                      Subjective: Patient reports she feels both her legs are weak and does not know why today  Pt does note her back her more this AM and her knees are currently stiff      Objective: See treatment diary below      Assessment: Tolerated treatment well  Decreased weight on L LE due to weakness today  Pt did flexion with ball and after that, could perform L LE ex better  Did not reduced endurance with ex and decreased reps noted Patient demonstrated fatigue post treatment and would benefit from continued PT  Will adjust ex baseed on pt tolerance      Plan: Progress treatment as tolerated  Precautions: FALL RISK  H/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  NV 3 min 3 min 3 min x3'           Seated p ball flexion, diagonals- pain free range  10x              SLR x 3 2# 20x ea 2# x15 on R  O# on L x15 2# x20 R, L 1 5# x20 2# R  1 5# L x20 ea 2# 20x ea    Ham curls 2# 20x ea 2# x 15 onR, 0# on L x15 2# x20 R, L 1 5# x20 2# R  1 5# L x20 ea 2# 20x ea   Standing marching, alt 2# 20x ea 2# x15 on R, 0# on L x15 2#x20 R, L  1 5# x20 2# R  1 5# L x20 ea 2# 20x ea   HR                LAQ 2# 30x  2# x30 2# x30 2# x30 2# 30x    Ball squeezes 5 sec x20 [id number] 5 sec x20 20x:05           scap retraction        Seated vs standing rows and extension- watch standing balance Bicep curls Reg and hammer 2# x20 ea Reg and hammer 2# x20 ea Reg and hammer 2# x20 Reg and hammer 2# x20 2# 20x ea   Shoulder scaption, seated, single arm 2# on R  0# on L   2x10 ea 2# on R  0# on L   2x10 ea 2# 2x10 watch pain 2# 2x10 2# 2x10   Chest press cane  1 5# x15      digi   Red 20x  Red x20 Yellow 2x10 Red  2x10 Red 2x10                                   Ther Activity                        Gait Training                        Modalities        MH vs CP PRN                Progress as able

## 2023-01-05 NOTE — TELEPHONE ENCOUNTER
Upon review of the In Basket request we have found that the patient has aged out of the measure and/or the document date is outside of the measure timeframe  Any additional questions or concerns should be emailed to the Practice Liaisons via the appropriate education email address, please do not reply via In Basket      Thank you  Liza Gongora

## 2023-01-05 NOTE — TELEPHONE ENCOUNTER
Upon review of the In Basket request and the patient's chart, initial outreach has been made via fax to facility  Please see Contacts section for details       Thank you  Zack Diaz

## 2023-01-05 NOTE — TELEPHONE ENCOUNTER
----- Message from [facility] sent at 1/4/2023  5:18 PM EST -----  Regarding: care gap request  01/04/23 5:18 PM    Hello, our patient attached above has had Diabetic Eye Exam completed/performed  Please assist in updating the patient chart by making an External outreach to Dr Kim Lobo facility located in [place]  The date of service is 12/2022      Thank you,  [facility]

## 2023-01-05 NOTE — LETTER
Diabetic Eye Exam Form    Date Requested: 23  Patient: Yousif North  Patient : 1925   Referring Provider: Gregory Parham MD      DIABETIC Eye Exam Date _______________________________      Type of Exam MUST be documented for Diabetic Eye Exams  Please CHECK ONE  Retinal Exam       Dilated Retinal Exam       OCT       Optomap-Iris Exam      Fundus Photography       Left Eye - Please check Retinopathy or No Retinopathy        Exam did show retinopathy    Exam did not show retinopathy       Right Eye - Please check Retinopathy or No Retinopathy       Exam did show retinopathy    Exam did not show retinopathy       Comments __________________________________________________________    Practice Providing Exam ______________________________________________    Exam Performed By (print name) _______________________________________      Provider Signature ___________________________________________________      These reports are needed for  compliance  Please fax this completed form and a copy of the Diabetic Eye Exam report to our office located at [place] [address] as soon as possible via [phone] attention [place] Miller: Phone [phone]  We thank you for your assistance in treating our mutual patient

## 2023-01-05 NOTE — LETTER
Diabetic Foot Exam Form    Date Requested: 23  Patient: Ishmael Pereyra  Patient : 1925   Referring Provider: Farzad Carpenter MD    Diabetic Foot Exam Performed with shoes and socks removed        Yes         No     Date of Diabetic Foot Exam ______________________________  Risk Score ____________________________________________    Left Foot       Visual Inspection         Monofilament Testing Sensory Exam        Pedal Pulses         Additional Comments         Right Foot      Visual Inspection         Monofilament Testing Sensory Exam       Pedal Pulses         Additional Comments         Comments __________________________________________________________    Practice Providing Exam ______________________________________________    Exam Performed By (print name) _______________________________________      Provider Signature ___________________________________________________      These reports are needed for  compliance  Please fax this completed form and a copy of the Diabetic Foot Exam report to our office located at [place] [address] as soon as possible via [phone] attention Tiajuana Bigger: Phone [phone]    We thank you for your assistance in treating our mutual patient

## 2023-01-10 ENCOUNTER — OFFICE VISIT (OUTPATIENT)
Dept: PHYSICAL THERAPY | Age: 88
End: 2023-01-10

## 2023-01-10 DIAGNOSIS — R26.2 AMBULATORY DYSFUNCTION: Primary | ICD-10-CM

## 2023-01-10 DIAGNOSIS — R29.898 LEG WEAKNESS, BILATERAL: ICD-10-CM

## 2023-01-10 NOTE — PROGRESS NOTES
PT Re-Evaluation     Today's date: 1/10/2023  Patient name: Lizzeth Latham  :   MRN: [id number]  Referring provider: Eagle Howe MD  Dx:   Encounter Diagnosis     ICD-10-CM    1  Ambulatory dysfunction  R26 2       2  Leg weakness, bilateral  R29 898                      Assessment  Assessment details: Patient seen for PT re-assessment  Patient presents with improving LE strength, slight improvement in lumbar flexion range, did have L hip pain which was alleviated with repeated flexion during therapy  Patient does continue to present with balance and gait dysfunction  Activity tolerance is progressing since IE  Patient is also starting to walk in the hallways at her [facility] in addition to outside surfaces (only if weather permitting)  Patient has been exercising at home as well as in therapy  Patient is continuing to make good progress towards goals and would benefit to continue PT at this time  Impairments: abnormal gait, abnormal or restricted ROM, activity intolerance, impaired physical strength, lacks appropriate home exercise program, pain with function, poor posture  and poor body mechanics  Functional limitations: See objective for functional deficits  Patient rates functional deficits as moderate  Understanding of Dx/Px/POC: good   Prognosis: good    Goals  Impairment Goals to be met within 4 weeks  - Decrease pain to 2/10 back pain with making her bed  Not met, still painful in her back  - Improve ROM to 90 degrees hip flexion progressing, not met, tight today and painful in her back  - Increase strength by 1/2 MMT throughout progressing  - Reduce TUG time by 1-2 seconds  met  - Patient to be able to perform 2 minute walk test met  - Patient to be able to perform sit<>stand test   met    Functional Goals to be met within 4-6 weeks     - Return to Prior Level of Function progressing  - Increase Functional Status Measure to: expected progressing  - Patient will be independent with HEP met  - Patient to be able to tolerate walking program inside  - just initiated    Goals added  - Increase gait distance by 20' with 2 minute walk test to improve patient's gait speed  - Patient to be able to perform sit<>stand test 5 reps with 1-2 seconds less than on re-eval       Plan  Patient would benefit from: skilled physical therapy  Planned modality interventions: cryotherapy and thermotherapy: hydrocollator packs  Planned therapy interventions: abdominal trunk stabilization, joint mobilization, manual therapy, neuromuscular re-education, patient education, postural training, strengthening, stretching, therapeutic activities, therapeutic exercise, home exercise program, gait training, body mechanics training and balance  Frequency: 2x week  Duration in weeks: 8  Treatment plan discussed with: patient        Subjective Evaluation    History of Present Illness  Mechanism of injury: Patient reports a steady increase in B SI/hip area pain  L>R side last session and today  Patient came into therapy last session with increased difficulty with flexing L hip in a seated position, feeling the LE heavy with standing and walking  PT performed repeated flexion stretching with p ball, leg symptoms eased and patient was able to tolerate therapy better  Patient reports that she is still feeling better (overall) from last session  Was able to tolerate walking - 3 floors (at her shelter) and has not had an increase in pain like she did previously  Patient does feel that she is gaining strength, does feel that she enjoys exercising with therapy and is making good progress towards goals  Patient does note that she is feeling fatigued and had some residual dizziness from laying flat in the dentist chair  Patient did note a significant increase in dizziness after the dentist, which did subside that day and this morning  Patient without any complaints of dizziness during session   Did recommend patient to discuss with her dentist for next session as she may need to be positioned differently  Pain  Current pain ratin  At best pain ratin  At worst pain ratin  Location: lumbar spine, B hips  Quality: dull ache  Aggravating factors: sitting, standing, walking and lifting  Progression: worsening    Social Support  Steps to enter house: no  Stairs in house: no   Lives in: community-based residential facility    Employment status: not working    Diagnostic Tests  No diagnostic tests performed  Treatments  Previous treatment: physical therapy  Patient Goals  Patient goals for therapy: decreased pain, improved balance, increased motion, increased strength and independence with ADLs/IADLs          Objective     Active Range of Motion     Lumbar   Flexion: 60 degrees  with pain  Extension: -5 degrees   Left Hip   Flexion: 80 degrees with pain    Right Hip   Flexion: 80 degrees with pain  Left Knee   Normal active range of motion    Right Knee   Normal active range of motion  Left Ankle/Foot   Dorsiflexion (ke): 5 degrees     Additional Active Range of Motion Details  Loss of balance, lacks strength to  fully extended posture  Passive Range of Motion     Right Ankle/Foot    Dorsiflexion (ke): 5 degrees     Strength/Myotome Testing     Left Hip   Planes of Motion   Flexion: 4  Extension: 4  Abduction: 4-  Adduction: 4+    Right Hip   Planes of Motion   Flexion: 4  Extension: 4  Abduction: 4-  Adduction: 4+    Left Knee   Flexion: 4-  Extension: 4-    Right Knee   Flexion: 4-  Extension: 4-    Left Ankle/Foot   Dorsiflexion: 4  Plantar flexion: 4-    Right Ankle/Foot   Dorsiflexion: 3 (ankle does fatigue easily)  Plantar flexion: 4-  Neuro Exam:     Functional outcomes   5x sit to stand: 23, resting between (seconds)  2 minute walk test: 150' does take standing rest breaks during, slow turning, stops frequently if feeling hip is tired or if she's catching her toes    TU with 4 wheel walker (seconds)  Functional outcome gait comment: Patient ambulates with appropriate stride length, decreased R toe clearance- wearing OTC AFO/toe lift to assist with R foot  Patient ambulates with narrow base of support, using 4 wheel walker with all gait  Patient's gait pattern persists, patient noticing the weakness at the R hip during R SLS - notices that she needs to "think" about controlling that hip vs letting it drift to the side (eg trendelenburg gait pattern)  Patient's R ankle does fatigue during gait; PT adjusted strap to assist with pulling foot into better DF range to assist with better toe clearance during swing phase  Precautions: FALL RISK  H/o B reverse total shoulders, total hip replacements, L knee replacement; R toes catch     CC: back pain/weakness with making her bed  Joint pain full body  Manuals 1/3/23 1/5/23 1/10  12/29                                   Neuro Re-Ed                SLS foam        sidestepping        Tandem walking                                Ther Ex                Nu step 15' 15m 15m 15 min x15'    Pulleys?  NV 3 min NT 3 min x3'           Seated p ball flexion, diagonals- pain free range  10x              SLR x 3 2# 20x ea 2# x15 on R  O# on L x15 2 5# 20x 2# R  1 5# L x20 ea 2# 20x ea    Ham curls 2# 20x ea 2# x 15 onR, 0# on L x15 2 5# 20x  2# R  1 5# L x20 ea 2# 20x ea   Standing marching, alt 2# 20x ea 2# x15 on R, 0# on L x15 Seated today 2 5# 20x  2# R  1 5# L x20 ea 2# 20x ea   HR                LAQ 2# 30x  2# x30 2 5# 20x 2# x30 2# 30x    Ball squeezes 5 sec x20 [id number] 5 sec x20 20x:05           scap retraction        Seated vs standing rows and extension- watch standing balance        Bicep curls Reg and hammer 2# x20 ea Reg and hammer 2# x20 ea Reg and hammer 2# x20 Reg and hammer 2# x20 2# 20x ea   Shoulder scaption, seated, single arm 2# on R  0# on L   2x10 ea 2# on R  0# on L   2x10 ea 2# on R 0# on L  2x10 ea 2# 2x10 2# 2x10   Chest press cane  1 5# x15 -     digi  Red 20x  Red x20 Red 2x10 Red  2x10 Red 2x10                                   Ther Activity                        Gait Training                        Modalities        MH vs CP PRN                Progress as able

## 2023-01-12 ENCOUNTER — OFFICE VISIT (OUTPATIENT)
Dept: PHYSICAL THERAPY | Age: 88
End: 2023-01-12

## 2023-01-12 DIAGNOSIS — R26.2 AMBULATORY DYSFUNCTION: Primary | ICD-10-CM

## 2023-01-12 DIAGNOSIS — R29.898 LEG WEAKNESS, BILATERAL: ICD-10-CM

## 2023-01-12 NOTE — PROGRESS NOTES
Daily Note     Today's date: 2023  Patient name: Jose Cat  :   MRN: [id number]  Referring provider: Mayra Malin MD  Dx:   Encounter Diagnosis     ICD-10-CM    1  Ambulatory dysfunction  R26 2       2  Leg weakness, bilateral  R29 898                      Subjective: Patient feeling weak today in the L hip area  Patient denies pain, only a weak feeling  Patient discouraged about hip weakness, sees ortho next week, hoping another injection will help with her pain/weakness  Objective: See treatment diary below      Assessment: Tolerated treatment well  Patient demonstrated fatigue post treatment, exhibited good technique with therapeutic exercises and would benefit from continued PT PT talked with patient about weakness in LE with walking only (doesn't feel any other time), trialed repeated flexion, seated, and no change in LE weakness  Possible weakness related to fatigue from increase in weight last session with PT  Patient denies increase in pain or feeling fatigued after last therapy session  Patient also plans to follow up with ortho, hoping for another injection for her R hip pain and hoping this will help her leg feel stronger when she's walking  Plan: Continue per plan of care  Precautions: FALL RISK  H/o B reverse total shoulders, total hip replacements, L knee replacement; R toes catch     CC: back pain/weakness with making her bed  Joint pain full body  Manuals 1/3/23 1/5/23 1/10 1/12 12/29                                   Neuro Re-Ed                SLS foam        sidestepping        Tandem walking                                Ther Ex                Nu step 15' 15m 15m 15 min x15'    Pulleys?  NV 3 min NT NT x3'           Seated p ball flexion, diagonals- pain free range  10x  10x flexion only today            SLR x 3 2# 20x ea 2# x15 on R  O# on L x15 2 5# 20x 0# 2x10 ea 2# 20x ea    Ham curls 2# 20x ea 2# x 15 onR, 0# on L x15 2 5# 20x  0# 2x10 2# 20x ea   Standing marching, alt 2# 20x ea 2# x15 on R, 0# on L x15 Seated today 2 5# 20x  NT 2# 20x ea   HR                LAQ 2# 30x  2# x30 2 5# 20x 2# x30 2# 30x    Ball squeezes 5 sec x20 [id number] 5 sec x20 20x:05           scap retraction        Seated vs standing rows and extension- watch standing balance        Bicep curls Reg and hammer 2# x20 ea Reg and hammer 2# x20 ea Reg and hammer 2# x20 Reg and hammer 2# x20 2# 20x ea   Shoulder scaption, seated, single arm 2# on R  0# on L   2x10 ea 2# on R  0# on L   2x10 ea 2# on R 0# on L  2x10 ea 2# 2x10 2# 2x10   Chest press cane  1 5# x15 - -    digi   Red 20x  Red x20 Red 2x10 Red  2x10- NT Red 2x10                                   Ther Activity                        Gait Training                        Modalities        MH vs CP PRN                Progress as able

## 2023-01-17 ENCOUNTER — OFFICE VISIT (OUTPATIENT)
Dept: OBGYN CLINIC | Facility: HOSPITAL | Age: 88
End: 2023-01-17

## 2023-01-17 VITALS
HEART RATE: 76 BPM | SYSTOLIC BLOOD PRESSURE: 157 MMHG | BODY MASS INDEX: 25.76 KG/M2 | WEIGHT: 140 LBS | HEIGHT: 62 IN | DIASTOLIC BLOOD PRESSURE: 55 MMHG

## 2023-01-17 DIAGNOSIS — M70.61 TROCHANTERIC BURSITIS OF BOTH HIPS: Primary | ICD-10-CM

## 2023-01-17 DIAGNOSIS — M70.62 TROCHANTERIC BURSITIS OF BOTH HIPS: Primary | ICD-10-CM

## 2023-01-17 RX ORDER — BETAMETHASONE SODIUM PHOSPHATE AND BETAMETHASONE ACETATE 3; 3 MG/ML; MG/ML
12 INJECTION, SUSPENSION INTRA-ARTICULAR; INTRALESIONAL; INTRAMUSCULAR; SOFT TISSUE
Status: COMPLETED | OUTPATIENT
Start: 2023-01-17 | End: 2023-01-17

## 2023-01-17 RX ORDER — BUPIVACAINE HYDROCHLORIDE 2.5 MG/ML
4 INJECTION, SOLUTION INFILTRATION; PERINEURAL
Status: COMPLETED | OUTPATIENT
Start: 2023-01-17 | End: 2023-01-17

## 2023-01-17 RX ADMIN — BUPIVACAINE HYDROCHLORIDE 4 ML: 2.5 INJECTION, SOLUTION INFILTRATION; PERINEURAL at 14:52

## 2023-01-17 RX ADMIN — BETAMETHASONE SODIUM PHOSPHATE AND BETAMETHASONE ACETATE 12 MG: 3; 3 INJECTION, SUSPENSION INTRA-ARTICULAR; INTRALESIONAL; INTRAMUSCULAR; SOFT TISSUE at 14:52

## 2023-01-17 NOTE — PROGRESS NOTES
Orthopaedic Surgery - Office Note  Martinez Ewing (17 y o  female)   : 1925   MRN: [id number]  Encounter Date: 2023    Chief Complaint   Patient presents with   • Right Hip - Follow-up   • Left Hip - Follow-up       Assessment / Plan  Bilateral greater trochanteric bursitis    · Patient continues to recieve benefit from periodic corticosteroid injections into her bilateral troch bursas  These were repeated in the office today and well tolerated  · Activity as tolerated  · Continue outpatient PT  · Anti-inflammatories or Tylenol prn pain  Return in about 3 months (around 2023)  History of Present Illness  Martinez Ewing is a 80 y o  female who presents as a follow up for bilateral troch bursitis  At her last visit she was administered bilateral troch bursa CSIs  She reports that this provided good relief for approx 2 months, after which she started going to PT with some incomplete benefit  Pain is worse on the right than left, and localized to the lateral hip without radiation  Pain is worse with palpation, better with rest  No numbness or tingling  Review of Systems  Pertinent items are noted in HPI  All other systems were reviewed and are negative  Physical Exam  /55   Pulse 76   Ht 5' 2" (1 575 m)   Wt 63 5 kg (140 lb)   BMI 25 61 kg/m²   Cons: Appears well  No apparent distress  Psych: Alert  Oriented x3  Mood and affect normal   Eyes: PERRLA, EOMI  Resp: Normal effort  No audible wheezing or stridor  CV: Palpable pulse  No discernable arrhythmia  No LE edema  Lymph:  No palpable cervical, axillary, or inguinal lymphadenopathy  Skin: Warm  No palpable masses  No visible lesions  Neuro: Normal muscle tone  Normal and symmetric DTR's       MSK Right Hip  • Skin intact over the hip  • TTP over the troch bursa  • No pain with gentle int/ext rotation of the hip  • Negative JAVY GONZALEZ Stinchfield  • Motor intact hip flexion, knee flex/ext, ankle DF/PF, EHL/FHL  • SILT L3-S1  • Foot warm and well perfused     MSK Left Hip  • Skin intact over the hip  • TTP over the troch bursa  • No pain with gentle int/ext rotation of the hip  • Negative JAVY GONZALEZ Stinchfield  • Motor intact hip flexion, knee flex/ext, ankle DF/PF, EHL/FHL  • SILT L3-S1  • Foot warm and well perfused      Studies Reviewed  XR of the pelvis: well appearing bilateral total hip arthroplasties without fracture or dislocation    Large joint arthrocentesis: bilateral hip joint  Universal Protocol:  Risks and benefits: risks, benefits and alternatives were discussed  Consent given by: patient  Time out: Immediately prior to procedure a "time out" was called to verify the correct patient, procedure, equipment, support staff and site/side marked as required  Timeout called at: 1/17/2023 2:50 PM   Patient understanding: patient states understanding of the procedure being performed  Patient consent: the patient's understanding of the procedure matches consent given  Procedure consent: procedure consent matches procedure scheduled  Relevant documents: relevant documents present and verified  Test results: test results available and properly labeled  Site marked: the operative site was marked  Radiology Images displayed and confirmed   If images not available, report reviewed: imaging studies available  Required items: required blood products, implants, devices, and special equipment available  Patient identity confirmed: verbally with patient    Supporting Documentation  Indications: pain   Procedure Details  Location: hip - bilateral hip joint  Preparation: Patient was prepped and draped in the usual sterile fashion  Needle size: 22 G  Approach: lateral    Medications (Right): 4 mL bupivacaine 0 25 %; 12 mg betamethasone acetate-betamethasone sodium phosphate 6 (3-3) mg/mLMedications (Left): 4 mL bupivacaine 0 25 %; 12 mg betamethasone acetate-betamethasone sodium phosphate 6 (3-3) mg/mL   Patient tolerance: patient tolerated the procedure well with no immediate complications  Dressing:  Sterile dressing applied             Medical, Surgical, Family, and Social History  The patient's medical history, family history, and social history, were reviewed and updated as appropriate  Past Medical History:   Diagnosis Date   • AAA (abdominal aortic aneurysm)     s/p EVAR 3/10/16   • Allergic urticaria     LAST ASSESSED: [id number]   • Appendicitis    • Arthritis    • CKD (chronic kidney disease), stage III ([address] )    • Coronary artery disease    • Diabetes mellitus (HCC)    • Eczema    • Gross hematuria     LAST ASSESSED: [id number]   • Hematuria    • Hypertension    • Hyponatremia    • Hypothyroid    • Osteoporosis     LAST ASSESSED: [id number]   • PONV (postoperative nausea and vomiting)    • Primary osteoarthritis of left knee     LAST ASSESSED: [id number]   • Renal disorder    • Renal insufficiency    • Rotator cuff tear arthropathy     LEFT LAST ASSESSED: [id number]   • Secondary osteoarthritis of right shoulder     LAST ASSESSED: [id number]   • Syncope        Past Surgical History:   Procedure Laterality Date   • APPENDECTOMY     • BACK SURGERY     • CATARACT EXTRACTION W/  INTRAOCULAR LENS IMPLANT Bilateral    • KNEE ARTHROSCOPY Left     ONSET: [id number] THERAPEUTIC   • LUMBAR LAMINECTOMY     • OH AAA REPAIR,MODULR BIFURCATED PROSTH N/A 3/10/2016    Procedure: REPAIR ANEURYSM ENDOVASCULAR ABDOMINAL AORTIC  (EVAR) ;   Surgeon: Kit Degroot MD;  Location: [facility];  Service: Vascular   • OH ARTHROPLASTY GLENOHUMERAL JOINT TOTAL SHOULDER Left 5/31/2016    Procedure: ARTHROPLASTY SHOULDER REVERSE;  Surgeon: Phil Sparks MD;  Location: [facility];  Service: Orthopedics   • OH ARTHRP KNE CONDYLE&PLATU MEDIAL&LAT COMPARTMENTS Left 7/23/2018    Procedure: KNEE : COMPLETE REPLACEMENT;  Surgeon: Maura Stanton MD;  Location: [facility];  Service: Orthopedics   • REVERSE TOTAL SHOULDER ARTHROPLASTY Right    • ROTATOR CUFF REPAIR Left     RC SURGERY RESOLVED: 1999   • SHOULDER ARTHROPLASTY      5/15 - FOR RIGHT SHOULDER; 5/16 - FOR LEFT SHOULDER   • SHOULDER ARTHROSCOPY     • TOTAL HIP ARTHROPLASTY Left     ONSET: [id number]   • TOTAL HIP ARTHROPLASTY  05/31/2006    REVISION       Family History   Problem Relation Age of Onset   • Coronary artery disease Mother    • Diabetes Mother    • Coronary artery disease Father    • Cancer Sister    • Arthritis Sister    • Unexplained death Family         RAPID   • Cancer Daughter        Social History     Occupational History   • Occupation: RETIRED   Tobacco Use   • Smoking status: Never   • Smokeless tobacco: Never   Substance and Sexual Activity   • Alcohol use: No   • Drug use: No   • Sexual activity: Not on file       Allergies   Allergen Reactions   • Clobetasol    • Fluocinolone    • Ketoconazole    • Pyrithione    • Bextra [Valdecoxib] Rash     GI INTOL   • Diclofenac Headache   • Percocet [Oxycodone-Acetaminophen] Rash and GI Intolerance         Current Outpatient Medications:   •  acetaminophen (TYLENOL) 325 mg tablet, Take 2 tablets (650 mg total) by mouth every 6 (six) hours as needed for mild pain , Disp: 30 tablet, Rfl: 0  •  amLODIPine-atorvastatin (CADUET) 10-20 MG per tablet, TAKE 1 TABLET AT BEDTIME, Disp: 90 tablet, Rfl: 3  •  Blood Glucose Monitoring Suppl (ONE TOUCH ULTRA MINI) w/Device KIT, Test blood sugar once daily, Disp: 1 kit, Rfl: 0  •  calcium citrate-vitamin D (CITRACAL+D) 315-200 MG-UNIT per tablet, Take 1 tablet by mouth daily   Calcium + D TABS  Refills: 0   , M D ; Active , Disp: , Rfl:   •  Docusate Sodium (COLACE PO), Take by mouth as needed, Disp: , Rfl:   •  Lancets (OneTouch Delica Plus [id number]) MISC, TEST BLLOD SUGARS ONCE DAILY, Disp: 100 each, Rfl: 3  •  levothyroxine 112 mcg tablet, Take 1 tablet (112 mcg total) by mouth daily in the early morning, Disp: 90 tablet, Rfl: 3  •  LORazepam (ATIVAN) 0 5 mg tablet, TAKE 1 TABLET BY MOUTH EVERY DAY AS NEEDED FOR ANXIETY, Disp: 30 tablet, Rfl: 3  •  losartan (COZAAR) 100 MG tablet, TAKE 1 TABLET ONCE DAILY, Disp: 90 tablet, Rfl: 3  •  Multiple Vitamins-Minerals (MULTIVITAMIN & MINERAL PO), 1 tablet daily   Multivitamin & Mineral Oral Liquid  Quantity: 0;  Refills: 0   , M D ; Active , Disp: , Rfl:   •  OneTouch Ultra test strip, CHECK BLOOD SUGAR ONCE DAILY, Disp: 100 strip, Rfl: 3  •  pyrithione zinc (HEAD AND SHOULDERS) 1 % shampoo, Apply topically daily as needed for dandruff, Disp: , Rfl:   •  trolamine salicylate (ASPERCREME) 10 % cream, Apply topically as needed for muscle/joint pain, Disp: , Rfl:       Joanie Galo MD    Scribe Attestation    I,:   am acting as a scribe while in the presence of the attending physician :       I,:   personally performed the services described in this documentation    as scribed in my presence :

## 2023-01-19 ENCOUNTER — OFFICE VISIT (OUTPATIENT)
Dept: PHYSICAL THERAPY | Age: 88
End: 2023-01-19

## 2023-01-19 DIAGNOSIS — R26.2 AMBULATORY DYSFUNCTION: Primary | ICD-10-CM

## 2023-01-19 DIAGNOSIS — R29.898 LEG WEAKNESS, BILATERAL: ICD-10-CM

## 2023-01-19 NOTE — PROGRESS NOTES
Daily Note     Today's date: 2023  Patient name: Yuriy Anand  :   MRN: [id number]  Referring provider: Demarcus Shelby MD  Dx:   Encounter Diagnosis     ICD-10-CM    1  Ambulatory dysfunction  R26 2       2  Leg weakness, bilateral  R29 898                      Subjective: Patient feeling weak today in the B  hips L hip is weaker than R  Patient denies pain, only a weak feeling  " I really want to get my legs stronger"   Pt also feeling she can elevate L shld as good as R due to RTC issues      Objective: See treatment diary below      Assessment: Tolerated treatment well  Patient demonstrated fatigue post treatment, exhibited good technique with therapeutic exercises and would benefit from continued PT   Pt used physioball stretch and flexion stretch prior to arm and leg and pt did like ex  Pt felt that she had good fatigue for ex and she felt her energy was actually better after session  Will adjust ex based on pt tolerance each visit      Plan: Continue per plan of care  Precautions: FALL RISK  H/o B reverse total shoulders, total hip replacements, L knee replacement; R toes catch     CC: back pain/weakness with making her bed  Joint pain full body  Manuals 1/3/23 1/5/23 1/10 1/12 1/19                                   Neuro Re-Ed                SLS foam        sidestepping        Tandem walking                                Ther Ex                Nu step 15' 15m 15m 15 min x15'    Pulleys?  NV 3 min NT NT x3'           Seated p ball flexion, diagonals- pain free range  10x  10x flexion only today 10x flex and diag           SLR x 3 2# 20x ea 2# x15 on R  O# on L x15 2 5# 20x 0# 2x10 ea 2# 20x ea    Ham curls 2# 20x ea 2# x 15 onR, 0# on L x15 2 5# 20x  0# 2x10 2# 20x ea   Standing marching, alt 2# 20x ea 2# x15 on R, 0# on L x15 Seated today 2 5# 20x  NT 2# 20x ea   HR                LAQ 2# 30x  2# x30 2 5# 20x 2# x30 2# 30x    Ball squeezes 5 sec x20 [id number] 5 sec x20 20x:05           scap retraction        Seated vs standing rows and extension- watch standing balance        Bicep curls Reg and hammer 2# x20 ea Reg and hammer 2# x20 ea Reg and hammer 2# x20 Reg and hammer 2# x20 Reg and hammer 2# x20   Shoulder scaption, seated, single arm 2# on R  0# on L   2x10 ea 2# on R  0# on L   2x10 ea 2# on R 0# on L  2x10 ea 2# 2x10 2# 2x10 R, 2# 1x10 L   Chest press cane  1 5# x15 - -    digi   Red 20x  Red x20 Red 2x10 Red  2x10- NT Red 2x10-NT                                   Ther Activity                        Gait Training                        Modalities        MH vs CP PRN                Progress as able

## 2023-01-24 ENCOUNTER — OFFICE VISIT (OUTPATIENT)
Dept: PHYSICAL THERAPY | Age: 88
End: 2023-01-24

## 2023-01-24 DIAGNOSIS — R26.2 AMBULATORY DYSFUNCTION: Primary | ICD-10-CM

## 2023-01-24 DIAGNOSIS — R29.898 LEG WEAKNESS, BILATERAL: ICD-10-CM

## 2023-01-24 NOTE — TELEPHONE ENCOUNTER
Upon review of the In Basket request we were able to locate, review, and update the patient chart as requested for Diabetic Foot Exam     Any additional questions or concerns should be emailed to the Practice Liaisons via the appropriate education email address, please do not reply via In Basket      Thank you  Goldie Do

## 2023-01-24 NOTE — TELEPHONE ENCOUNTER
As a follow-up, a second attempt has been made for outreach via fax to facility  Please see Contacts section for details      Thank you  Munir Dong

## 2023-01-24 NOTE — PROGRESS NOTES
Daily Note     Today's date: 2023  Patient name: Jesus Herring  :   MRN: [id number]  Referring provider: Bessy Bender MD  Dx:   Encounter Diagnosis     ICD-10-CM    1  Ambulatory dysfunction  R26 2       2  Leg weakness, bilateral  R29 898                      Subjective: Patient reports her legs feel weak and turn in when she walks  Pt notes she is having a hard time rising from chair      Objective: See treatment diary below      Assessment: Tolerated treatment well  Patient demonstrated fatigue post treatment, exhibited good technique with therapeutic exercises and would benefit from continued PT   Pt used physioball stretch and flexion stretch prior to arm and leg and pt did like ex  Tried to improve transfers with mock transfers out of chair using legs but legs were to weak and pt need 1 arm to assist with activity  Pt felt that she had good fatigue for ex and she was able to walk better  Will adjust ex based on pt tolerance each visit      Plan: Continue per plan of care  Precautions: FALL RISK  H/o B reverse total shoulders, total hip replacements, L knee replacement; R toes catch     CC: back pain/weakness with making her bed  Joint pain full body  Manuals 1/24/23 1/5/23 1/10 1/12 1/19                                   Neuro Re-Ed                SLS foam        sidestepping        Tandem walking                                Ther Ex                Nu step 15' 15m 15m 15 min x15'    Pulleys?  NV 3 min NT NT x3'           Seated p ball flexion, diagonals- pain free range 10x flexion and diag 10x  10x flexion only today 10x flex and diag           SLR x 3 2# 20x ea 2# x15 on R  O# on L x15 2 5# 20x 0# 2x10 ea 2# 20x ea    Ham curls 2# 20x ea 2# x 15 onR, 0# on L x15 2 5# 20x  0# 2x10 2# 20x ea   Standing marching, alt 2# 20x ea 2# x15 on R, 0# on L x15 Seated today 2 5# 20x  NT 2# 20x ea   HR                LAQ 2# 30x  2# x30 2 5# 20x 2# x30 2# 30x    Ball squeezes 5 sec x20 [id number] 5 sec x20 20x:05           scap retraction        Seated vs standing rows and extension- watch standing balance        Bicep curls Reg and hammer 2# x20 ea Reg and hammer 2# x20 ea Reg and hammer 2# x20 Reg and hammer 2# x20 Reg and hammer 2# x20   Shoulder scaption, seated, single arm 2# on R x20  2# on L x10 2# on R  0# on L   2x10 ea 2# on R 0# on L  2x10 ea 2# 2x10 2# 2x10 R, 2# 1x10 L   Chest press cane  1 5# x15 - -    digi   Red 20x  Red x20 Red 2x10 Red  2x10- NT Red 2x10-NT   Rises from chair 2 pads x10 may need to use L arm                               Ther Activity                        Gait Training                        Modalities        MH vs CP PRN                Progress as able

## 2023-01-24 NOTE — TELEPHONE ENCOUNTER
Upon review of the In Basket request we were able to locate, review, and update the patient chart as requested for Diabetic Eye Exam     Any additional questions or concerns should be emailed to the Practice Liaisons via the appropriate education email address, please do not reply via In Basket      Thank you  Goldie Do

## 2023-01-26 ENCOUNTER — OFFICE VISIT (OUTPATIENT)
Dept: PHYSICAL THERAPY | Age: 88
End: 2023-01-26

## 2023-01-26 DIAGNOSIS — R26.2 AMBULATORY DYSFUNCTION: Primary | ICD-10-CM

## 2023-01-26 DIAGNOSIS — R29.898 LEG WEAKNESS, BILATERAL: ICD-10-CM

## 2023-01-26 NOTE — PROGRESS NOTES
Daily Note     Today's date: 2023  Patient name: Derrick Maya  :   MRN: [id number]  Referring provider: Harvey Suresh MD  Dx:   Encounter Diagnosis     ICD-10-CM    1  Ambulatory dysfunction  R26 2       2  Leg weakness, bilateral  R29 898                      Subjective: Pt's main c/o is weakness in L LE and pt is frustrated as she feels therapy is not helping and L LE is getting weaker and weaker  Pt does not feel MD is listening to her about leg weakness      Objective: See treatment diary below      Assessment: Tolerated treatment fair  Pt reported hamstring curls as  Weakest movement on L  Denied any pain in hips with ex  Pt remains motivated as she regularly walks and exercises at home as directed  Plan:      Precautions: FALL RISK  H/o B reverse total shoulders, total hip replacements, L knee replacement; R toes catch     CC: back pain/weakness with making her bed  Joint pain full body  Manuals 1/24/23 1/26 1/10 1/12 1/19                                   Neuro Re-Ed                SLS foam        sidestepping        Tandem walking                                Ther Ex                Nu step 15' 15m 15m 15 min x15'    Pulleys?  NV 3 min NT NT x3'           Seated p ball flexion, diagonals- pain free range 10x flexion and diag 10x  10x flexion only today 10x flex and diag           SLR x 3 2# 20x ea 2# ea 20x 2 5# 20x 0# 2x10 ea 2# 20x ea    Ham curls 2# 20x ea 2# ea 20x 2 5# 20x  0# 2x10 2# 20x ea   Standing marching, alt 2# 20x ea 2# ea 20x Seated today 2 5# 20x  NT 2# 20x ea   HR                LAQ 2# 30x  2# x30 2 5# 20x 2# x30 2# 30x    Ball squeezes 5 sec x20 [id number] 5 sec x20 20x:05           scap retraction        Seated vs standing rows and extension- watch standing balance        Bicep curls Reg and hammer 2# x20 ea Reg and hammer 2# x20 ea Reg and hammer 2# x20 Reg and hammer 2# x20 Reg and hammer 2# x20   Shoulder scaption, seated, single arm 2# on R x20  2# on L x10 2# on R x20  2# on L 10x 2# on R 0# on L  2x10 ea 2# 2x10 2# 2x10 R, 2# 1x10 L   Chest press cane   - -    digi   Red 20x   Red 2x10 Red  2x10- NT Red 2x10-NT   Rises from chair 2 pads x10 may need to use L arm NT                                Ther Activity                        Gait Training                        Modalities        MH vs CP PRN                Progress as able

## 2023-01-29 DIAGNOSIS — E03.9 ACQUIRED HYPOTHYROIDISM: ICD-10-CM

## 2023-01-29 RX ORDER — LEVOTHYROXINE SODIUM 112 MCG
TABLET ORAL
Qty: 90 TABLET | Refills: 3 | Status: SHIPPED | OUTPATIENT
Start: 2023-01-29

## 2023-01-31 ENCOUNTER — OFFICE VISIT (OUTPATIENT)
Dept: PHYSICAL THERAPY | Age: 88
End: 2023-01-31

## 2023-01-31 DIAGNOSIS — R26.2 AMBULATORY DYSFUNCTION: Primary | ICD-10-CM

## 2023-01-31 DIAGNOSIS — R29.898 LEG WEAKNESS, BILATERAL: ICD-10-CM

## 2023-01-31 NOTE — PROGRESS NOTES
Daily Note     Today's date: 2023  Patient name: Markos Chavis  :   MRN: [id number]  Referring provider: Lisette Rascon MD  Dx:   Encounter Diagnosis     ICD-10-CM    1  Ambulatory dysfunction  R26 2       2  Leg weakness, bilateral  R29 898                      Subjective: Pt reports having no pain coming in today  Her biggest complaint is walking and reports therapy was beginning to become challenging for her due to weakness  Pt reports feeling off balanced when donning jacket  Educated pt on safer techniques for donning jacket to prevent falls as well as the purpose of interventions to improve balance  Pt agreed to be treated by Betzy HUBBARD under the direct supervision of Jluis Deluca DPT  Objective: See treatment diary below      Assessment: Tolerated treatment well  Patient exhibited good technique with therapeutic exercises and reported fatigue post shoulder exercises L>R  Plan: Continue per plan of care  Progress treatment as tolerated  Precautions: FALL RISK  H/o B reverse total shoulders, total hip replacements, L knee replacement; R toes catch     CC: back pain/weakness with making her bed  Joint pain full body  Manuals 23                                   Neuro Re-Ed                SLS foam        sidestepping        Tandem walking                                Ther Ex                Nu step 15' 15m 15m 15 min x15'    Pulleys?  NV 3 min 3' NT x3'           Seated p ball flexion, diagonals- pain free range 10x flexion and diag 10x 10x:10 10x flexion only today 10x flex and diag           SLR x 3 2# 20x ea 2# ea 20x 2# 20x 0# 2x10 ea 2# 20x ea    Ham curls 2# 20x ea 2# ea 20x 2# 20x  0# 2x10 2# 20x ea   Standing marching, alt 2# 20x ea 2# ea 20x   NT 2# 20x ea   HR                LAQ 2# 30x  2# x30 2# 20x 2# x30 2# 30x    Ball squeezes 5 sec x20 [id number] 5 sec x20 20x:05           scap retraction        Seated vs standing rows and extension- watch standing balance        Bicep curls Reg and hammer 2# x20 ea Reg and hammer 2# x20 ea Reg and hammer 2# x20 Reg and hammer 2# x20 Reg and hammer 2# x20   Shoulder scaption, seated, single arm 2# on R x20  2# on L x10 2# on R x20  2# on L 10x 2# on R 0# on L  2x10 ea 2# 2x10 2# 2x10 R, 2# 1x10 L   Chest press cane   - -    digi   Red 20x   Red 2x10 Red  2x10- NT Red 2x10-NT   Rises from chair 2 pads x10 may need to use L arm NT                                Ther Activity                        Gait Training                        Modalities        MH vs CP PRN                Progress as able

## 2023-01-31 NOTE — PROGRESS NOTES
PT Discharge    Today's date: 2023  Patient name: Anitra Steinberg  : [birth year]  MRN: [id number]  Referring provider: Claude Cardenas MD  Dx:   Encounter Diagnosis     ICD-10-CM    1  Ambulatory dysfunction  R26 2       2  Leg weakness, bilateral  R29 898           Start Time: 1400  Stop Time: 1500  Total time in clinic (min): 60 minutes    Assessment  Assessment details: Patient seen for PT discharge assessment  Patient presents with improvement in knee extension, hip adduction strength, improving UE strength since IE  Patient is ready for discharge to [facility] and PT in agreement  Patient has made some good progress, still having weakness and pain in the L hip- plans to discuss with ortho  Impairments: abnormal gait, abnormal or restricted ROM, activity intolerance, impaired physical strength, lacks appropriate home exercise program, pain with function, poor posture  and poor body mechanics  Functional limitations: See objective for functional deficits  Patient rates functional deficits as moderate  Understanding of Dx/Px/POC: good   Prognosis: good    Goals  Impairment Goals to be met within 4 weeks  - Decrease pain to 2/10 back pain with making her bed  Not met, still painful in her back  - Improve ROM to 90 degrees hip flexion progressing, not met, tight today and painful in her back  - Increase strength by 1/2 MMT throughout progressing  - Reduce TUG time by 1-2 seconds  met  - Patient to be able to perform 2 minute walk test met  - Patient to be able to perform sit<>stand test   met    Functional Goals to be met within 4-6 weeks     - Return to Prior Level of Function progressing  - Increase Functional Status Measure to: expected progressing  - Patient will be independent with HEP met  - Patient to be able to tolerate walking program inside  - just initiated    Goals added  - Increase gait distance by 20' with 2 minute walk test to improve patient's gait speed  - Patient to be able to perform sit<>stand test 5 reps with 1-2 seconds less than on re-eval       Plan  Patient would benefit from: skilled physical therapy  Planned modality interventions: cryotherapy and thermotherapy: hydrocollator packs  Planned therapy interventions: abdominal trunk stabilization, joint mobilization, manual therapy, neuromuscular re-education, patient education, postural training, strengthening, stretching, therapeutic activities, therapeutic exercise, home exercise program, gait training, body mechanics training and balance  Frequency: 2x week  Duration in weeks: 8  Treatment plan discussed with: patient        Subjective Evaluation    History of Present Illness  Mechanism of injury: Patient feeling ready to discharge today  Feeling that she's made some good progress, still feeling slightly discouraged about her lack of balance, and feeling lack of strength in the L hip  Patient plans to follow up with ortho, thinking another injection may help with the pain and weakness in her hip  PT talked with patient at length about her balance, patient is limited with balance with h/o multiple joint replacements (including shoulders), will have a hard time with reaching, stabilizing herself; as well as h/o 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 which limits her balance     Pain  Current pain ratin  At best pain ratin  At worst pain ratin  Location: lumbar spine, B hips  Quality: dull ache  Aggravating factors: sitting, standing, walking and lifting  Progression: worsening    Social Support  Steps to enter house: no  Stairs in house: no   Lives in: community-based residential facility    Employment status: not working    Diagnostic Tests  No diagnostic tests performed  Treatments  Previous treatment: physical therapy  Patient Goals  Patient goals for therapy: decreased pain, improved balance, increased motion, increased strength and independence with ADLs/IADLs          Objective     Active Range of Motion     Lumbar   Flexion: 60 degrees  with pain  Extension: -5 degrees   Left Hip   Flexion: 80 degrees with pain    Right Hip   Flexion: 80 degrees with pain  Left Knee   Normal active range of motion    Right Knee   Normal active range of motion  Left Ankle/Foot   Dorsiflexion (ke): 5 degrees     Additional Active Range of Motion Details  Loss of balance, lacks strength to  fully extended posture  Passive Range of Motion     Right Ankle/Foot    Dorsiflexion (ke): 5 degrees     Strength/Myotome Testing     Left Hip   Planes of Motion   Flexion: 4  Extension: 4  Abduction: 4  Adduction: 5    Right Hip   Planes of Motion   Flexion: 4  Extension: 4  Abduction: 4  Adduction: 5    Left Knee   Flexion: 4-  Extension: 4    Right Knee   Flexion: 4  Extension: 4    Left Ankle/Foot   Dorsiflexion: 4  Plantar flexion: 4-    Right Ankle/Foot   Dorsiflexion: 3 (ankle does fatigue easily)  Plantar flexion: 4-  Neuro Exam:     Functional outcomes   5x sit to stand: 23, resting between (seconds)  2 minute walk test: 150' does take standing rest breaks during, slow turning, stops frequently if feeling hip is tired or if she's catching her toes  TU with 4 wheel walker (seconds)  Functional outcome gait comment: Patient ambulates with appropriate stride length, decreased R toe clearance- wearing OTC AFO/toe lift to assist with R foot  Patient ambulates with narrow base of support, using 4 wheel walker with all gait  Patient's gait pattern persists, patient noticing the weakness at the R hip during R SLS - notices that she needs to "think" about controlling that hip vs letting it drift to the side (eg trendelenburg gait pattern)  Patient's R ankle does fatigue during gait; PT adjusted strap to assist with pulling foot into better DF range to assist with better toe clearance during swing phase                See daily note

## 2023-02-02 ENCOUNTER — APPOINTMENT (OUTPATIENT)
Dept: PHYSICAL THERAPY | Age: 88
End: 2023-02-02

## 2023-02-07 ENCOUNTER — APPOINTMENT (OUTPATIENT)
Dept: PHYSICAL THERAPY | Age: 88
End: 2023-02-07

## 2023-02-09 ENCOUNTER — APPOINTMENT (OUTPATIENT)
Dept: PHYSICAL THERAPY | Age: 88
End: 2023-02-09

## 2023-02-14 ENCOUNTER — APPOINTMENT (OUTPATIENT)
Dept: PHYSICAL THERAPY | Age: 88
End: 2023-02-14

## 2023-02-16 ENCOUNTER — APPOINTMENT (OUTPATIENT)
Dept: PHYSICAL THERAPY | Age: 88
End: 2023-02-16

## 2023-03-24 ENCOUNTER — OFFICE VISIT (OUTPATIENT)
Dept: URGENT CARE | Age: 88
End: 2023-03-24

## 2023-03-24 VITALS
HEIGHT: 62 IN | DIASTOLIC BLOOD PRESSURE: 68 MMHG | SYSTOLIC BLOOD PRESSURE: 169 MMHG | OXYGEN SATURATION: 97 % | TEMPERATURE: 97.4 F | BODY MASS INDEX: 24.84 KG/M2 | RESPIRATION RATE: 16 BRPM | HEART RATE: 80 BPM | WEIGHT: 135 LBS

## 2023-03-24 DIAGNOSIS — R42 DIZZINESS AND GIDDINESS: Primary | ICD-10-CM

## 2023-03-24 RX ORDER — MECLIZINE HCL 12.5 MG/1
12.5 TABLET ORAL EVERY 8 HOURS PRN
Qty: 30 TABLET | Refills: 0 | Status: SHIPPED | OUTPATIENT
Start: 2023-03-24

## 2023-03-24 NOTE — PATIENT INSTRUCTIONS
Please begin Antivert every 8 hours as needed for dizziness  Ensure adequate hydration  Check blood glucose regularly  Follow-up with primary care provider within 1 to 2 weeks  Continue to monitor symptoms over the next 24 hours  Present to emergency department if symptoms worsen

## 2023-03-24 NOTE — PROGRESS NOTES
[address]        NAME: Antony Underwood is a 80 y o  female  : 1925    MRN: [id number]  DATE: 2023  TIME: 10:21 AM    Assessment and Plan   Dizziness and giddiness [R42]  1  Dizziness and giddiness  meclizine (ANTIVERT) 12 5 MG tablet      Please begin Antivert every 8 hours as needed for dizziness  Ensure adequate hydration  Check blood glucose regularly  Follow-up with primary care provider within 1 to 2 weeks  Continue to monitor symptoms over the next 24 hours  Present to emergency department if symptoms worsen  Patient Instructions   Dizziness   WHAT YOU NEED TO KNOW:   Dizziness is a feeling of being off balance or unsteady  Common causes of dizziness are an inner ear fluid imbalance or a lack of oxygen in your blood  Dizziness may be acute (lasts 3 days or less) or chronic (lasts longer than 3 days)  You may have dizzy spells that last from seconds to a few hours  DISCHARGE INSTRUCTIONS:   Return to the emergency department if:   • You have a headache and a stiff neck      • You have shaking chills and a fever       • You vomit over and over with no relief       • Your vomit or bowel movements are red or black       • You have pain in your chest, back, or abdomen       • You have numbness, especially in your face, arms, or legs       • You have trouble moving your arms or legs       • You are confused      Contact your healthcare provider if:   • You have a fever       • Your symptoms do not get better with treatment       • You have questions or concerns about your condition or care      Manage your symptoms:   • Do not drive  or operate heavy machinery when you are dizzy       • Get up slowly  from sitting or lying down       • Drink plenty of liquids  Liquids help prevent dehydration   Ask how much liquid to drink each day and which liquids are best for you      Follow up with your doctor as directed:  Write down your questions so you remember to ask them during your visits  © [facility] 2022 Information is for End User's use only and may not be sold, redistributed or otherwise used for commercial purposes  The above information is an  only  It is not intended as medical advice for individual conditions or treatments  Talk to your doctor, nurse or pharmacist before following any medical regimen to see if it is safe and effective for you        Follow up with PCP in 3-5 days  Proceed to  ER if symptoms worsen  Chief Complaint     Chief Complaint   Patient presents with   • Dizziness     Dizzy, lightheaded- got worse today and yesterday  Used to get vertigo, but recently has not  Getting out of bed also causes dizziness  No meds taken for these symptoms           History of Present Illness       Patient is a 19-year-old female with past medical history significant for vertigo, AAA, diabetes mellitus (currently not on any hypoglycemic medication), who presents with daughter for evaluation of dizziness  She reports that this morning when she got out of bed she felt dizzy and had to sit at the edge of the bed for a moment before she could stand up  She also notes dizziness yesterday  She reports that she has not had any vertigo symptoms for several years  She denies vision changes, tinnitus, chest pain, palpitations, nausea/vomiting/diarrhea, weakness or focal deficit  Review of Systems   Review of Systems   Constitutional: Negative for fatigue and fever  HENT: Negative for congestion, ear discharge, ear pain, postnasal drip, rhinorrhea, sinus pressure, sinus pain, sneezing and sore throat  Eyes: Negative  Negative for pain, discharge, redness and itching  Respiratory: Negative  Negative for apnea, cough, choking, chest tightness, shortness of breath and stridor  Cardiovascular: Negative  Negative for chest pain and palpitations  Gastrointestinal: Negative  Negative for diarrhea, nausea and vomiting  Endocrine: Negative  Negative for polydipsia, polyphagia and polyuria  Genitourinary: Negative  Negative for decreased urine volume and flank pain  Musculoskeletal: Negative  Negative for arthralgias, back pain, myalgias, neck pain and neck stiffness  Skin: Negative  Negative for color change and rash  Allergic/Immunologic: Negative  Negative for environmental allergies  Neurological: Positive for dizziness  Negative for facial asymmetry, light-headedness, numbness and headaches  Hematological: Negative  Negative for adenopathy  Psychiatric/Behavioral: Negative  Current Medications       Current Outpatient Medications:   •  acetaminophen (TYLENOL) 325 mg tablet, Take 2 tablets (650 mg total) by mouth every 6 (six) hours as needed for mild pain , Disp: 30 tablet, Rfl: 0  •  amLODIPine-atorvastatin (CADUET) 10-20 MG per tablet, TAKE 1 TABLET AT BEDTIME, Disp: 90 tablet, Rfl: 3  •  Blood Glucose Monitoring Suppl (ONE TOUCH ULTRA MINI) w/Device KIT, Test blood sugar once daily, Disp: 1 kit, Rfl: 0  •  calcium citrate-vitamin D (CITRACAL+D) 315-200 MG-UNIT per tablet, Take 1 tablet by mouth daily  Calcium + D TABS  Refills: 0   , M D ; Active , Disp: , Rfl:   •  Docusate Sodium (COLACE PO), Take by mouth as needed, Disp: , Rfl:   •  Lancets (OneTouch Delica Plus [id number]) MISC, TEST BLLOD SUGARS ONCE DAILY, Disp: 100 each, Rfl: 3  •  LORazepam (ATIVAN) 0 5 mg tablet, TAKE 1 TABLET BY MOUTH EVERY DAY AS NEEDED FOR ANXIETY, Disp: 30 tablet, Rfl: 3  •  losartan (COZAAR) 100 MG tablet, TAKE 1 TABLET ONCE DAILY, Disp: 90 tablet, Rfl: 3  •  meclizine (ANTIVERT) 12 5 MG tablet, Take 1 tablet (12 5 mg total) by mouth every 8 (eight) hours as needed for dizziness, Disp: 30 tablet, Rfl: 0  •  Multiple Vitamins-Minerals (MULTIVITAMIN & MINERAL PO), 1 tablet daily   Multivitamin & Mineral Oral Liquid  Quantity: 0;  Refills: 0   , M D ; Active , Disp: , Rfl:   •  OneTouch Ultra test strip, CHECK BLOOD SUGAR ONCE DAILY, Disp: 100 strip, Rfl: 3  •  pyrithione zinc (HEAD AND SHOULDERS) 1 % shampoo, Apply topically daily as needed for dandruff, Disp: , Rfl:   •  Synthroid 112 MCG tablet, TAKE 1 TABLET DAILY IN THE EARLY MORNING, Disp: 90 tablet, Rfl: 3  •  trolamine salicylate (ASPERCREME) 10 % cream, Apply topically as needed for muscle/joint pain, Disp: , Rfl:     Current Allergies     Allergies as of 03/24/2023 - Reviewed 03/24/2023   Allergen Reaction Noted   • Clobetasol  09/05/2019   • Fluocinolone  09/05/2019   • Ketoconazole  09/05/2019   • Pyrithione  09/05/2019   • Bextra [valdecoxib] Rash 01/29/2016   • Diclofenac Headache 03/01/2016   • Percocet [oxycodone-acetaminophen] Rash and GI Intolerance 01/29/2016            The following portions of the patient's history were reviewed and updated as appropriate: allergies, current medications, past family history, past medical history, past social history, past surgical history and problem list      Past Medical History:   Diagnosis Date   • AAA (abdominal aortic aneurysm)     s/p EVAR 3/10/16   • Allergic urticaria     LAST ASSESSED: [id number]   • Appendicitis    • Arthritis    • CKD (chronic kidney disease), stage III ([address] )    • Coronary artery disease    • Diabetes mellitus ([address] )    • Eczema    • Gross hematuria     LAST ASSESSED: [id number]   • Hematuria    • Hypertension    • Hyponatremia    • Hypothyroid    • Osteoporosis     LAST ASSESSED: [id number]   • PONV (postoperative nausea and vomiting)    • Primary osteoarthritis of left knee     LAST ASSESSED: [id number]   • Renal disorder    • Renal insufficiency    • Rotator cuff tear arthropathy     LEFT LAST ASSESSED: [id number]   • Secondary osteoarthritis of right shoulder     LAST ASSESSED: [id number]   • Syncope        Past Surgical History:   Procedure Laterality Date   • APPENDECTOMY     • BACK SURGERY     • CATARACT EXTRACTION W/  INTRAOCULAR LENS IMPLANT Bilateral    • KNEE ARTHROSCOPY Left     ONSET: [id number] THERAPEUTIC   • LUMBAR LAMINECTOMY     • AK AAA REPAIR,MODULR BIFURCATED PROSTH N/A 3/10/2016    Procedure: REPAIR ANEURYSM ENDOVASCULAR ABDOMINAL AORTIC  (EVAR) ; Surgeon: Blease Hamman, MD;  Location: [facility];  Service: Vascular   • AK ARTHROPLASTY GLENOHUMERAL JOINT TOTAL SHOULDER Left 5/31/2016    Procedure: ARTHROPLASTY SHOULDER REVERSE;  Surgeon: Belgica Douglas MD;  Location: [facility];  Service: Orthopedics   • AK ARTHRP KNE CONDYLE&PLATU MEDIAL&LAT COMPARTMENTS Left 7/23/2018    Procedure: KNEE : COMPLETE REPLACEMENT;  Surgeon: Fabi Perez MD;  Location: [facility];  Service: Orthopedics   • REVERSE TOTAL SHOULDER ARTHROPLASTY Right    • ROTATOR CUFF REPAIR Left     RC SURGERY RESOLVED: 1999   • SHOULDER ARTHROPLASTY      5/15 - FOR RIGHT SHOULDER; 5/16 - FOR LEFT SHOULDER   • SHOULDER ARTHROSCOPY     • TOTAL HIP ARTHROPLASTY Left     ONSET: [id number]   • TOTAL HIP ARTHROPLASTY  05/31/2006    REVISION       Family History   Problem Relation Age of Onset   • Coronary artery disease Mother    • Diabetes Mother    • Coronary artery disease Father    • Cancer Sister    • Arthritis Sister    • Unexplained death Family         RAPID   • Cancer Daughter          Medications have been verified  Objective   /68   Pulse 80   Temp (!) 97 4 °F (36 3 °C)   Resp 16   Ht 5' 2" (1 575 m)   Wt 61 2 kg (135 lb)   SpO2 97%   BMI 24 69 kg/m²        Physical Exam     Physical Exam  Vitals and nursing note reviewed  Constitutional:       General: She is not in acute distress  Appearance: Normal appearance  She is not ill-appearing, toxic-appearing or diaphoretic  Interventions: She is not intubated  HENT:      Head: Normocephalic and atraumatic  Right Ear: External ear normal       Left Ear: External ear normal       Nose: Nose normal  No congestion or rhinorrhea  Mouth/Throat:      Mouth: Mucous membranes are moist    Eyes:      Extraocular Movements: Extraocular movements intact  Conjunctiva/sclera: Conjunctivae normal       Pupils: Pupils are equal, round, and reactive to light  Cardiovascular:      Rate and Rhythm: Normal rate and regular rhythm  Pulses: Normal pulses  Heart sounds: Normal heart sounds, S1 normal and S2 normal  Heart sounds not distant  No murmur heard  No friction rub  No gallop  Pulmonary:      Effort: Pulmonary effort is normal  No tachypnea, bradypnea, accessory muscle usage, prolonged expiration, respiratory distress or retractions  She is not intubated  Breath sounds: Normal breath sounds  No stridor, decreased air movement or transmitted upper airway sounds  No decreased breath sounds, wheezing, rhonchi or rales  Abdominal:      General: Bowel sounds are normal       Palpations: Abdomen is soft  Tenderness: There is no abdominal tenderness  There is no guarding or rebound  Musculoskeletal:         General: Normal range of motion  Cervical back: Normal range of motion and neck supple  No rigidity or tenderness  Lymphadenopathy:      Cervical: No cervical adenopathy  Skin:     General: Skin is warm and dry  Capillary Refill: Capillary refill takes less than 2 seconds  Neurological:      General: No focal deficit present  Mental Status: She is alert and oriented to person, place, and time  GCS: GCS eye subscore is 4  GCS verbal subscore is 5  GCS motor subscore is 6  Cranial Nerves: No cranial nerve deficit  Sensory: Sensation is intact  Motor: Motor function is intact  Coordination: Coordination is intact  Gait: Gait is intact  Comments: PERRLA, 2 mm bilaterally, no nystagmus with extraocular movements  Bilateral  strength strong  Bilateral lower extremity strength strong     Psychiatric:         Mood and Affect: Mood normal          Behavior: Behavior normal

## 2023-03-29 ENCOUNTER — TELEPHONE (OUTPATIENT)
Dept: FAMILY MEDICINE CLINIC | Facility: CLINIC | Age: 88
End: 2023-03-29

## 2023-03-29 NOTE — TELEPHONE ENCOUNTER
Patient's daughter Nighat Guadarrama ([phone]) called to request replacement glucose metor as well as lancets and test strips be sent to [facility]  Reports patient's current meter is no longer working correctly  Patient currently uses one touch delica lancets and strips-asked to send if possible to match with new meter

## 2023-03-29 NOTE — TELEPHONE ENCOUNTER
Patients daughter, Ivette Childs ([phone]) called to ask if a predetermination could be sent to [facility] to see if they would cover lift for shoe and confirm it is used for medical purpose  Requests call back to confirm

## 2023-03-30 ENCOUNTER — TELEPHONE (OUTPATIENT)
Dept: FAMILY MEDICINE CLINIC | Facility: CLINIC | Age: 88
End: 2023-03-30

## 2023-03-30 DIAGNOSIS — E11.65 TYPE 2 DIABETES MELLITUS WITH HYPERGLYCEMIA, WITHOUT LONG-TERM CURRENT USE OF INSULIN (HCC): ICD-10-CM

## 2023-03-30 RX ORDER — BLOOD-GLUCOSE METER
KIT MISCELLANEOUS
Qty: 1 KIT | Refills: 0 | Status: SHIPPED | OUTPATIENT
Start: 2023-03-30

## 2023-03-30 RX ORDER — BLOOD-GLUCOSE METER
KIT MISCELLANEOUS
Qty: 1 KIT | Refills: 0 | Status: SHIPPED | OUTPATIENT
Start: 2023-03-30 | End: 2023-03-30 | Stop reason: SDUPTHER

## 2023-03-30 NOTE — TELEPHONE ENCOUNTER
Called patient's daughter she need to confirm that her moms insurance will has that type of coverage for a lift for shoe

## 2023-03-30 NOTE — TELEPHONE ENCOUNTER
Patient's daughter, Ryder Santos ([phone]) called to ask that script for glucose monitoring supplys be sent to [address] [place] [address]  Please cancel order with Dianne and resend to [facility] in [place]

## 2023-04-25 ENCOUNTER — APPOINTMENT (OUTPATIENT)
Dept: LAB | Age: 88
End: 2023-04-25

## 2023-04-25 DIAGNOSIS — I12.9 TYPE 2 DM WITH CKD STAGE 3 AND HYPERTENSION (HCC): ICD-10-CM

## 2023-04-25 DIAGNOSIS — G63 POLYNEUROPATHY ASSOCIATED WITH UNDERLYING DISEASE (HCC): ICD-10-CM

## 2023-04-25 DIAGNOSIS — N18.30 TYPE 2 DM WITH CKD STAGE 3 AND HYPERTENSION (HCC): ICD-10-CM

## 2023-04-25 DIAGNOSIS — E03.9 ACQUIRED HYPOTHYROIDISM: ICD-10-CM

## 2023-04-25 DIAGNOSIS — E78.2 MIXED HYPERLIPIDEMIA: ICD-10-CM

## 2023-04-25 DIAGNOSIS — E11.22 TYPE 2 DM WITH CKD STAGE 3 AND HYPERTENSION (HCC): ICD-10-CM

## 2023-04-25 LAB
CHOLEST SERPL-MCNC: 167 MG/DL
CREAT UR-MCNC: 45.4 MG/DL
HDLC SERPL-MCNC: 105 MG/DL
LDLC SERPL CALC-MCNC: 48 MG/DL (ref 0–100)
MICROALBUMIN UR-MCNC: 45.1 MG/L (ref 0–20)
MICROALBUMIN/CREAT 24H UR: 99 MG/G CREATININE (ref 0–30)
NONHDLC SERPL-MCNC: 62 MG/DL
T4 FREE SERPL-MCNC: 1.38 NG/DL (ref 0.76–1.46)
TRIGL SERPL-MCNC: 72 MG/DL
TSH SERPL DL<=0.05 MIU/L-ACNC: 6.85 UIU/ML (ref 0.45–4.5)
VIT B12 SERPL-MCNC: 2558 PG/ML (ref 100–900)

## 2023-04-26 LAB
EST. AVERAGE GLUCOSE BLD GHB EST-MCNC: 137 MG/DL
HBA1C MFR BLD: 6.4 %

## 2023-04-27 ENCOUNTER — RA CDI HCC (OUTPATIENT)
Dept: OTHER | Facility: HOSPITAL | Age: 88
End: 2023-04-27

## 2023-04-27 NOTE — PROGRESS NOTES
[facility]  coding opportunities          Chart Reviewed number of suggestions sent to Provider: 3   E11 51  E11 42  E11 29, R80 9    Patients Insurance     Medicare Insurance: [employer] Advantage

## 2023-05-03 ENCOUNTER — OFFICE VISIT (OUTPATIENT)
Dept: FAMILY MEDICINE CLINIC | Facility: CLINIC | Age: 88
End: 2023-05-03

## 2023-05-03 VITALS
RESPIRATION RATE: 16 BRPM | DIASTOLIC BLOOD PRESSURE: 64 MMHG | WEIGHT: 137 LBS | HEIGHT: 62 IN | SYSTOLIC BLOOD PRESSURE: 156 MMHG | OXYGEN SATURATION: 98 % | HEART RATE: 69 BPM | BODY MASS INDEX: 25.21 KG/M2 | TEMPERATURE: 97.1 F

## 2023-05-03 DIAGNOSIS — R26.2 AMBULATORY DYSFUNCTION: ICD-10-CM

## 2023-05-03 DIAGNOSIS — I12.9 TYPE 2 DM WITH CKD STAGE 3 AND HYPERTENSION (HCC): ICD-10-CM

## 2023-05-03 DIAGNOSIS — L40.50 PSORIATIC ARTHROPATHY (HCC): ICD-10-CM

## 2023-05-03 DIAGNOSIS — E78.2 MIXED HYPERLIPIDEMIA: ICD-10-CM

## 2023-05-03 DIAGNOSIS — E11.42 DIABETIC PERIPHERAL NEUROPATHY (HCC): ICD-10-CM

## 2023-05-03 DIAGNOSIS — M15.9 GENERALIZED OSTEOARTHRITIS: ICD-10-CM

## 2023-05-03 DIAGNOSIS — E55.9 VITAMIN D DEFICIENCY: ICD-10-CM

## 2023-05-03 DIAGNOSIS — E11.65 TYPE 2 DIABETES MELLITUS WITH HYPERGLYCEMIA, WITHOUT LONG-TERM CURRENT USE OF INSULIN (HCC): ICD-10-CM

## 2023-05-03 DIAGNOSIS — I73.9 PERIPHERAL VASCULAR DISEASE (HCC): ICD-10-CM

## 2023-05-03 DIAGNOSIS — E11.22 TYPE 2 DM WITH CKD STAGE 3 AND HYPERTENSION (HCC): ICD-10-CM

## 2023-05-03 DIAGNOSIS — I10 PRIMARY HYPERTENSION: Primary | ICD-10-CM

## 2023-05-03 DIAGNOSIS — N18.32 STAGE 3B CHRONIC KIDNEY DISEASE (HCC): ICD-10-CM

## 2023-05-03 DIAGNOSIS — E03.9 ACQUIRED HYPOTHYROIDISM: ICD-10-CM

## 2023-05-03 DIAGNOSIS — N18.30 TYPE 2 DM WITH CKD STAGE 3 AND HYPERTENSION (HCC): ICD-10-CM

## 2023-05-03 RX ORDER — BLOOD SUGAR DIAGNOSTIC
STRIP MISCELLANEOUS
Qty: 100 STRIP | Refills: 3 | Status: SHIPPED | OUTPATIENT
Start: 2023-05-03 | End: 2023-05-04 | Stop reason: SDUPTHER

## 2023-05-03 NOTE — PROGRESS NOTES
Name: Kel Winston      :       MRN: [id number]  Encounter Provider: Yani Foster MD  Encounter Date: 5/3/2023   Encounter department: [facility] [address]  Chief Complaint   Patient presents with    Follow-up     Pt is here for 4 mos f/u     Health Maintenance   Topic Date Due    COVID-19 Vaccine (4 - Booster for MashON series) 2021    PT PLAN OF CARE  2022    Diabetic Foot Exam  2022    DM Eye Exam  2023    Urinary Incontinence Screening  2023    Medicare Annual Wellness Visit (AWV)  2023    Influenza Vaccine (Season Ended) 2023    HEMOGLOBIN A1C  10/25/2023    Fall Risk  2023    BMI: Followup Plan  2024    Depression Screening  2024    BMI: Adult  2024    Pneumococcal Vaccine: 65+ Years  Completed    HIB Vaccine  Aged Out    IPV Vaccine  Aged Out    Hepatitis A Vaccine  Aged Out    Meningococcal ACWY Vaccine  Aged Out    HPV Vaccine  Aged Out         Assessment & Plan     1  Primary hypertension  Assessment & Plan:  BP on retake 156/64  Continue Losartan 100 mg 1 tablet daily, Caduet 10/20 mg one tablet daily  Continue to monitor blood pressure at home  Orders:  -     Comprehensive metabolic panel; Future; Expected date: 2023    2  Acquired hypothyroidism  Assessment & Plan:  TSH 6 850  Take Levothyroxine 112 mcg Monday through Saturday and 1 5 tab  on   Check thyroid function test in 6 weeks  Orders:  -     TSH, 3rd generation with Free T4 reflex; Future; Expected date: 2023    3  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)  Assessment & Plan:    Lab Results   Component Value Date    HGBA1C 6 4 (H) 2023     Type 2 DM - diet controlled  Continue to monitor blood sugar at home 3 times per week  Check eye exam by ophthalmology Dr Arville Shone annually  Follow-up with podiatry Dr Haig Goldberg for routine foot care      Orders:  -     Comprehensive metabolic panel; Future; Expected date: 06/14/2023    4  Diabetic peripheral neuropathy [facility])  Assessment & Plan:    Lab Results   Component Value Date    HGBA1C 6 4 (H) 04/25/2023     Patient c/o balance problems due to neuropathy  Reports no recent falls  Recommended to use a walker for ambulation  Follow-up with podiatry  Continue regular exercise as tolerated  5  Generalized osteoarthritis  Assessment & Plan:  Take Tylenol arthritis as needed for joint pains  6  Stage 3b chronic kidney disease [facility])  Assessment & Plan:  Lab Results   Component Value Date    EGFR 30 12/13/2022    EGFR 29 10/27/2022    EGFR 32 04/14/2022    CREATININE 1 46 (H) 12/13/2022    CREATININE 1 50 (H) 10/27/2022    CREATININE 1 37 (H) 04/14/2022     Recommended to stay well hydrated  Avoid NSAIDs, nephrotoxins  Continue to monitor labs  Orders:  -     Comprehensive metabolic panel; Future; Expected date: 06/14/2023    7  Psoriatic arthropathy ([facility] )  Assessment & Plan:  Take Tylenol Arthritis PRN for pain  8  Mixed hyperlipidemia  Assessment & Plan:  Lipid panel is well controlled  Continue Caduet 10/20 mg daily  Orders:  -     Comprehensive metabolic panel; Future; Expected date: 06/14/2023    9  Ambulatory dysfunction  Assessment & Plan:  Reviewed fall precautions  Continue to use a walker for ambulation  10  Peripheral vascular disease ([facility] )  Assessment & Plan:  Continue statin therapy  11  Vitamin D deficiency  -     Vitamin D 25 hydroxy; Future; Expected date: 06/14/2023      Depression Screening and Follow-up Plan: Patient was screened for depression during today's encounter  They screened negative with a PHQ-2 score of 0  Schedule follow-up visit, Medicare AWV in 4 months  Subjective      HPI     Patient presents for 4 month follow-up visit accompanied by her daughter      PMHx: HTN, Hypothyroidism, Hyperlipidemia, Type 2 DM - diet controlled, microalbuminuria, CKD stage 3, generalized OA, ambulatory dysfunction, peripheral neuropathy, PVD, S/P endovascular AAA repair in 3/2017  Reviewed current medications, blood test results from April 25, 2023  TSH 6 850  Hemoglobin A1C 6 4  Cholesterol 167, , LDL 48  HTN - denies chest pain  Currently taking Losartan 100 mg daily, Caduet  10/20 mg daily  BP at home ranges 150-160/60's  C/o mild exertional shortness of breath  Feels lightheaded when getting up quickly from a seated position  Type 2 DM - diet controlled  Patient checks blood sugar at home 3 times per week  Reports no hypoglycemic episodes  Patient is due for eye exam with ophthalmologist Dr Gema Martinez  She follows with podiatry Dr Tyler Cochran every 6 months  Hypothyroidism - last month patient was advised  to take Levothyroxine 112 mcg daily Monday through Sat and 1 5 tab  on Sunday  Patient was seen on 4/18/23 by orthopedic surgeon Dr Jaron Montero, received cortisone injections in both hips with some improvement in pain  Dr Jaron Montero referred patient to pain management for evaluation of chronic low back pain  Patient has chronic right foot drop, wears a brace and right shoe lift  Ambulates with a walker  Reports no recent falls  Review of Systems   Constitutional: Positive for fatigue  Negative for activity change, appetite change, chills and fever  HENT: Positive for hearing loss (wears BL hearing aids)  Negative for congestion, ear pain, sore throat and trouble swallowing  Eyes: Negative  Respiratory: Positive for shortness of breath (mild exertional SOB)  Negative for cough and chest tightness  Cardiovascular: Positive for leg swelling (improved)  Negative for chest pain and palpitations  Gastrointestinal: Positive for constipation (occasional)  Negative for abdominal pain, blood in stool, diarrhea, nausea and vomiting  Genitourinary: Negative for difficulty urinating, dysuria and hematuria     Musculoskeletal: Positive for arthralgias, back pain (chronic) and gait problem (ambulates with a walker)  Negative for joint swelling  Skin: Negative for rash  Neurological: Positive for dizziness (occasional dizziness) and numbness (in feet and hands)  Negative for syncope  Hematological: Negative  Psychiatric/Behavioral: Negative for dysphoric mood and sleep disturbance  Anxiety - mood has been stable       Current Outpatient Medications on File Prior to Visit   Medication Sig    acetaminophen (TYLENOL) 325 mg tablet Take 2 tablets (650 mg total) by mouth every 6 (six) hours as needed for mild pain   amLODIPine-atorvastatin (CADUET) 10-20 MG per tablet TAKE 1 TABLET AT BEDTIME    Blood Glucose Monitoring Suppl (ONE TOUCH ULTRA MINI) w/Device KIT Test blood sugar once daily    Blood Glucose Monitoring Suppl (ONE TOUCH ULTRA MINI) w/Device KIT Test blood sugar once daily    calcium citrate-vitamin D (CITRACAL+D) 315-200 MG-UNIT per tablet Take 1 tablet by mouth daily  Calcium + D TABS  Refills: 0   , M D ; Active     Docusate Sodium (COLACE PO) Take by mouth as needed    Lancets (OneTouch Delica Plus [id number]) MISC TEST BLLOD SUGARS ONCE DAILY    LORazepam (ATIVAN) 0 5 mg tablet TAKE 1 TABLET BY MOUTH EVERY DAY AS NEEDED FOR ANXIETY    losartan (COZAAR) 100 MG tablet TAKE 1 TABLET ONCE DAILY    meclizine (ANTIVERT) 12 5 MG tablet Take 1 tablet (12 5 mg total) by mouth every 8 (eight) hours as needed for dizziness    Multiple Vitamins-Minerals (MULTIVITAMIN & MINERAL PO) 1 tablet daily   Multivitamin & Mineral Oral Liquid  Quantity: 0;  Refills: 0   , M D ; Active     OneTouch Ultra test strip CHECK BLOOD SUGAR ONCE DAILY    pyrithione zinc (HEAD AND SHOULDERS) 1 % shampoo Apply topically daily as needed for dandruff    Synthroid 112 MCG tablet TAKE 1 TABLET DAILY IN THE EARLY MORNING    trolamine salicylate (ASPERCREME) 10 % cream Apply topically as needed for muscle/joint pain       Objective "    /64 (BP Location: Left arm, Patient Position: Sitting, Cuff Size: Standard)   Pulse 69   Temp (!) 97 1 °F (36 2 °C) (Temporal)   Resp 16   Ht 5' 2\" (1 575 m)   Wt 62 1 kg (137 lb)   SpO2 98%   BMI 25 06 kg/m²     Physical Exam  Vitals and nursing note reviewed  Constitutional:       Appearance: Normal appearance  HENT:      Head: Normocephalic and atraumatic  Eyes:      Conjunctiva/sclera: Conjunctivae normal       Pupils: Pupils are equal, round, and reactive to light  Neck:      Vascular: No carotid bruit  Cardiovascular:      Rate and Rhythm: Normal rate and regular rhythm  Heart sounds: No murmur heard  Comments: Trace BL ankle edema  Pulmonary:      Effort: Pulmonary effort is normal       Breath sounds: Normal breath sounds  Abdominal:      General: There is no distension  Palpations: Abdomen is soft  Tenderness: There is no abdominal tenderness  Musculoskeletal:      Cervical back: Normal range of motion and neck supple  Comments: Ambulates with a walker, wears R LE brace  Arthritic changes in hand joints   Skin:     General: Skin is warm and dry  Findings: No rash  Neurological:      General: No focal deficit present  Mental Status: She is alert  Mental status is at baseline     Psychiatric:         Mood and Affect: Mood normal        Kamilah Pereyra MD  "

## 2023-05-03 NOTE — ASSESSMENT & PLAN NOTE
BP on retake 156/64  Continue Losartan 100 mg 1 tablet daily, Caduet 10/20 mg one tablet daily  Continue to monitor blood pressure at home

## 2023-05-03 NOTE — ASSESSMENT & PLAN NOTE
Lab Results   Component Value Date    EGFR 30 12/13/2022    EGFR 29 10/27/2022    EGFR 32 04/14/2022    CREATININE 1 46 (H) 12/13/2022    CREATININE 1 50 (H) 10/27/2022    CREATININE 1 37 (H) 04/14/2022     Recommended to stay well hydrated  Avoid NSAIDs, nephrotoxins  Continue to monitor labs

## 2023-05-03 NOTE — ASSESSMENT & PLAN NOTE
TSH 6 850  Take Levothyroxine 112 mcg Monday through Saturday and 1 5 tab  on Sunday  Check thyroid function test in 6 weeks

## 2023-05-03 NOTE — ASSESSMENT & PLAN NOTE
Lab Results   Component Value Date    HGBA1C 6 4 (H) 04/25/2023     Type 2 DM - diet controlled  Continue to monitor blood sugar at home 3 times per week  Check eye exam by ophthalmology Dr Remigio Prather annually  Follow-up with podiatry Dr Juan Forrest for routine foot care

## 2023-05-03 NOTE — ASSESSMENT & PLAN NOTE
Lab Results   Component Value Date    HGBA1C 6 4 (H) 04/25/2023     Patient c/o balance problems due to neuropathy  Reports no recent falls  Recommended to use a walker for ambulation  Follow-up with podiatry  Continue regular exercise as tolerated

## 2023-05-04 DIAGNOSIS — E11.65 TYPE 2 DIABETES MELLITUS WITH HYPERGLYCEMIA, WITHOUT LONG-TERM CURRENT USE OF INSULIN (HCC): ICD-10-CM

## 2023-05-04 RX ORDER — BLOOD SUGAR DIAGNOSTIC
STRIP MISCELLANEOUS
Qty: 100 STRIP | Refills: 3 | Status: SHIPPED | OUTPATIENT
Start: 2023-05-04

## 2023-05-04 NOTE — TELEPHONE ENCOUNTER
Patient ([phone]) called to request that test strips be re-sent to [facility]

## 2023-06-13 ENCOUNTER — TELEPHONE (OUTPATIENT)
Dept: FAMILY MEDICINE CLINIC | Facility: CLINIC | Age: 88
End: 2023-06-13

## 2023-06-13 ENCOUNTER — APPOINTMENT (OUTPATIENT)
Dept: LAB | Age: 88
End: 2023-06-13
Payer: COMMERCIAL

## 2023-06-13 DIAGNOSIS — E11.22 TYPE 2 DM WITH CKD STAGE 3 AND HYPERTENSION (HCC): ICD-10-CM

## 2023-06-13 DIAGNOSIS — E78.2 MIXED HYPERLIPIDEMIA: ICD-10-CM

## 2023-06-13 DIAGNOSIS — I12.9 TYPE 2 DM WITH CKD STAGE 3 AND HYPERTENSION (HCC): ICD-10-CM

## 2023-06-13 DIAGNOSIS — E03.9 ACQUIRED HYPOTHYROIDISM: ICD-10-CM

## 2023-06-13 DIAGNOSIS — N18.32 STAGE 3B CHRONIC KIDNEY DISEASE (HCC): ICD-10-CM

## 2023-06-13 DIAGNOSIS — N18.30 TYPE 2 DM WITH CKD STAGE 3 AND HYPERTENSION (HCC): ICD-10-CM

## 2023-06-13 DIAGNOSIS — E55.9 VITAMIN D DEFICIENCY: ICD-10-CM

## 2023-06-13 DIAGNOSIS — I10 PRIMARY HYPERTENSION: ICD-10-CM

## 2023-06-13 LAB
25(OH)D3 SERPL-MCNC: 46.3 NG/ML (ref 30–100)
ALBUMIN SERPL BCP-MCNC: 4.3 G/DL (ref 3.5–5)
ALP SERPL-CCNC: 72 U/L (ref 46–116)
ALT SERPL W P-5'-P-CCNC: 36 U/L (ref 12–78)
ANION GAP SERPL CALCULATED.3IONS-SCNC: 6 MMOL/L (ref 4–13)
AST SERPL W P-5'-P-CCNC: 20 U/L (ref 5–45)
BILIRUB SERPL-MCNC: 1.09 MG/DL (ref 0.2–1)
BUN SERPL-MCNC: 30 MG/DL (ref 5–25)
CALCIUM SERPL-MCNC: 9.9 MG/DL (ref 8.3–10.1)
CHLORIDE SERPL-SCNC: 109 MMOL/L (ref 96–108)
CO2 SERPL-SCNC: 22 MMOL/L (ref 21–32)
CREAT SERPL-MCNC: 1.46 MG/DL (ref 0.6–1.3)
GFR SERPL CREATININE-BSD FRML MDRD: 29 ML/MIN/1.73SQ M
GLUCOSE P FAST SERPL-MCNC: 135 MG/DL (ref 65–99)
POTASSIUM SERPL-SCNC: 5.3 MMOL/L (ref 3.5–5.3)
PROT SERPL-MCNC: 7.3 G/DL (ref 6.4–8.4)
SODIUM SERPL-SCNC: 137 MMOL/L (ref 135–147)
TSH SERPL DL<=0.05 MIU/L-ACNC: 3.42 UIU/ML (ref 0.45–4.5)

## 2023-06-13 PROCEDURE — 80053 COMPREHEN METABOLIC PANEL: CPT

## 2023-06-13 PROCEDURE — 84443 ASSAY THYROID STIM HORMONE: CPT

## 2023-06-13 PROCEDURE — 82306 VITAMIN D 25 HYDROXY: CPT

## 2023-06-13 PROCEDURE — 36415 COLL VENOUS BLD VENIPUNCTURE: CPT

## 2023-06-13 NOTE — TELEPHONE ENCOUNTER
Left detailed message for pt's daughter----- Message from Wilson Olszewski, MD sent at 6/13/2023  2:20 PM EDT -----  Please call patient's daughter  Vit D, TSH I - within normal range  Recommend to continue current dose of Levothyroxine

## 2023-06-14 ENCOUNTER — HOSPITAL ENCOUNTER (EMERGENCY)
Facility: HOSPITAL | Age: 88
Discharge: HOME | End: 2023-06-14
Attending: EMERGENCY MEDICINE
Payer: COMMERCIAL

## 2023-06-14 ENCOUNTER — TELEPHONE (OUTPATIENT)
Dept: FAMILY MEDICINE CLINIC | Facility: CLINIC | Age: 88
End: 2023-06-14

## 2023-06-14 VITALS
TEMPERATURE: 97.3 F | SYSTOLIC BLOOD PRESSURE: 167 MMHG | HEART RATE: 75 BPM | RESPIRATION RATE: 20 BRPM | DIASTOLIC BLOOD PRESSURE: 74 MMHG | OXYGEN SATURATION: 96 %

## 2023-06-14 DIAGNOSIS — R55 NEAR SYNCOPE: ICD-10-CM

## 2023-06-14 DIAGNOSIS — R42 DIZZINESS: Primary | ICD-10-CM

## 2023-06-14 DIAGNOSIS — R42 POSTURAL DIZZINESS WITH NEAR SYNCOPE: ICD-10-CM

## 2023-06-14 DIAGNOSIS — R42 LIGHTHEADEDNESS: Primary | ICD-10-CM

## 2023-06-14 DIAGNOSIS — R55 POSTURAL DIZZINESS WITH NEAR SYNCOPE: ICD-10-CM

## 2023-06-14 LAB
2HR DELTA HS TROPONIN: -1 NG/L
ALBUMIN SERPL BCP-MCNC: 4.3 G/DL (ref 3.5–5)
ALP SERPL-CCNC: 76 U/L (ref 46–116)
ALT SERPL W P-5'-P-CCNC: 34 U/L (ref 12–78)
ANION GAP SERPL CALCULATED.3IONS-SCNC: 5 MMOL/L (ref 4–13)
AST SERPL W P-5'-P-CCNC: 25 U/L (ref 5–45)
BASOPHILS # BLD AUTO: 0.07 THOUSANDS/ÂΜL (ref 0–0.1)
BASOPHILS NFR BLD AUTO: 1 % (ref 0–1)
BILIRUB SERPL-MCNC: 0.74 MG/DL (ref 0.2–1)
BUN SERPL-MCNC: 34 MG/DL (ref 5–25)
CALCIUM SERPL-MCNC: 9.8 MG/DL (ref 8.3–10.1)
CARDIAC TROPONIN I PNL SERPL HS: 27 NG/L
CARDIAC TROPONIN I PNL SERPL HS: 28 NG/L
CHLORIDE SERPL-SCNC: 105 MMOL/L (ref 96–108)
CO2 SERPL-SCNC: 23 MMOL/L (ref 21–32)
CREAT SERPL-MCNC: 1.6 MG/DL (ref 0.6–1.3)
EOSINOPHIL # BLD AUTO: 0.06 THOUSAND/ÂΜL (ref 0–0.61)
EOSINOPHIL NFR BLD AUTO: 1 % (ref 0–6)
ERYTHROCYTE [DISTWIDTH] IN BLOOD BY AUTOMATED COUNT: 14.5 % (ref 11.6–15.1)
GFR SERPL CREATININE-BSD FRML MDRD: 26 ML/MIN/1.73SQ M
GLUCOSE SERPL-MCNC: 197 MG/DL (ref 65–140)
HCT VFR BLD AUTO: 35.5 % (ref 34.8–46.1)
HGB BLD-MCNC: 12.4 G/DL (ref 11.5–15.4)
IMM GRANULOCYTES # BLD AUTO: 0.04 THOUSAND/UL (ref 0–0.2)
IMM GRANULOCYTES NFR BLD AUTO: 0 % (ref 0–2)
LYMPHOCYTES # BLD AUTO: 1.66 THOUSANDS/ÂΜL (ref 0.6–4.47)
LYMPHOCYTES NFR BLD AUTO: 19 % (ref 14–44)
MCH RBC QN AUTO: 33.5 PG (ref 26.8–34.3)
MCHC RBC AUTO-ENTMCNC: 34.9 G/DL (ref 31.4–37.4)
MCV RBC AUTO: 96 FL (ref 82–98)
MONOCYTES # BLD AUTO: 0.8 THOUSAND/ÂΜL (ref 0.17–1.22)
MONOCYTES NFR BLD AUTO: 9 % (ref 4–12)
NEUTROPHILS # BLD AUTO: 6.36 THOUSANDS/ÂΜL (ref 1.85–7.62)
NEUTS SEG NFR BLD AUTO: 70 % (ref 43–75)
NRBC BLD AUTO-RTO: 0 /100 WBCS
PLATELET # BLD AUTO: 203 THOUSANDS/UL (ref 149–390)
PMV BLD AUTO: 10.6 FL (ref 8.9–12.7)
POTASSIUM SERPL-SCNC: 5.1 MMOL/L (ref 3.5–5.3)
PROT SERPL-MCNC: 7.9 G/DL (ref 6.4–8.4)
RBC # BLD AUTO: 3.7 MILLION/UL (ref 3.81–5.12)
SODIUM SERPL-SCNC: 133 MMOL/L (ref 135–147)
WBC # BLD AUTO: 8.99 THOUSAND/UL (ref 4.31–10.16)

## 2023-06-14 PROCEDURE — 36415 COLL VENOUS BLD VENIPUNCTURE: CPT

## 2023-06-14 PROCEDURE — 99284 EMERGENCY DEPT VISIT MOD MDM: CPT

## 2023-06-14 PROCEDURE — 85025 COMPLETE CBC W/AUTO DIFF WBC: CPT | Performed by: EMERGENCY MEDICINE

## 2023-06-14 PROCEDURE — 93005 ELECTROCARDIOGRAM TRACING: CPT

## 2023-06-14 PROCEDURE — 80053 COMPREHEN METABOLIC PANEL: CPT | Performed by: EMERGENCY MEDICINE

## 2023-06-14 PROCEDURE — 84484 ASSAY OF TROPONIN QUANT: CPT | Performed by: EMERGENCY MEDICINE

## 2023-06-14 NOTE — TELEPHONE ENCOUNTER
Called and spoke with patient daughter Lakia Marco Ariddhi Strickland verbalized understanding, will bring patient to ED   Thanks

## 2023-06-14 NOTE — TELEPHONE ENCOUNTER
Called and spoke with Lizette Lafleur patient daughter  Per daughter patient been complaining about getting   Lightheaded when walking or standing and when sitting and wait couple of minutes, resolved  Patient also states her heart raise when walking sometimes  BP AM- 144/53        PM- 164/56- after incident of feeling                             Dizzy   patient daughter is driven to [facility] where patient is at to see how is she doing  Patient daughter wants to know if you recommend patient to go to ED due to patient is 80 YRS old and daughter don't want patient sitting in the ED for hrs without need  Please advise   Than ks

## 2023-06-14 NOTE — ED PROVIDER NOTES
History  Chief Complaint   Patient presents with   • Dizziness     Pt reports intermittent dizziness and pre-syncopal episodes  Jean-Pierre Atkinson is a 80year-old woman with history of first degree AV block, AAA, DM2, presenting with intermittent dizziness and near syncopal episodes since around lunch time today  She sat down today, went to stand up, and then had a dizzy spell  She did not pass out  She leaned over the counter for support  Symptoms then resolved  Eating and drinking normally  No issues with her AAA, repair 5 years ago, with no recent follow up  She's been feeling a little more short of breath over the last few weeks  No blood thinners  She has had episodes like this in the past, but today's was worse  She is fairly certain that this is near syncope and not vertigo  No chest pain, shortness of breath, headache, nausea, vomiting, urinary complaints  Prior to Admission Medications   Prescriptions Last Dose Informant Patient Reported? Taking? Blood Glucose Monitoring Suppl (ONE TOUCH ULTRA MINI) w/Device KIT   No No   Sig: Test blood sugar once daily   Blood Glucose Monitoring Suppl (ONE TOUCH ULTRA MINI) w/Device KIT   No No   Sig: Test blood sugar once daily   Docusate Sodium (COLACE PO)  Self Yes No   Sig: Take by mouth as needed   LORazepam (ATIVAN) 0 5 mg tablet   No No   Sig: TAKE 1 TABLET BY MOUTH EVERY DAY AS NEEDED FOR ANXIETY   Lancets (OneTouch Delica Plus [id number]) MISC   No No   Sig: TEST BLLOD SUGARS ONCE DAILY   Multiple Vitamins-Minerals (MULTIVITAMIN & MINERAL PO)  Self Yes No   Si tablet daily  Multivitamin & Mineral Oral Liquid  Quantity: 0;  Refills: 0   , M D ; Active    Synthroid 112 MCG tablet   No No   Sig: TAKE 1 TABLET DAILY IN THE EARLY MORNING   acetaminophen (TYLENOL) 325 mg tablet  Self No No   Sig: Take 2 tablets (650 mg total) by mouth every 6 (six) hours as needed for mild pain     amLODIPine-atorvastatin (CADUET) 10-20 MG per tablet   No No Sig: TAKE 1 TABLET AT BEDTIME   calcium citrate-vitamin D (CITRACAL+D) 315-200 MG-UNIT per tablet  Self Yes No   Sig: Take 1 tablet by mouth daily  Calcium + D TABS  Refills: 0   , M D ; Active    glucose blood (OneTouch Ultra) test strip   No No   Sig: Use as instructed   losartan (COZAAR) 100 MG tablet   No No   Sig: TAKE 1 TABLET ONCE DAILY   meclizine (ANTIVERT) 12 5 MG tablet   No No   Sig: Take 1 tablet (12 5 mg total) by mouth every 8 (eight) hours as needed for dizziness   pyrithione zinc (HEAD AND SHOULDERS) 1 % shampoo  Self Yes No   Sig: Apply topically daily as needed for dandruff   trolamine salicylate (ASPERCREME) 10 % cream  Self Yes No   Sig: Apply topically as needed for muscle/joint pain      Facility-Administered Medications: None       Past Medical History:   Diagnosis Date   • AAA (abdominal aortic aneurysm) (HCC)     s/p EVAR 3/10/16   • Allergic urticaria     LAST ASSESSED: [id number]   • Appendicitis    • Arthritis    • CKD (chronic kidney disease), stage III ([facility] )    • Coronary artery disease    • Diabetes mellitus ([facility] )    • Eczema    • Gross hematuria     LAST ASSESSED: [id number]   • Hematuria    • Hypertension    • Hyponatremia    • Hypothyroid    • Osteoporosis     LAST ASSESSED: [id number]   • PONV (postoperative nausea and vomiting)    • Primary osteoarthritis of left knee     LAST ASSESSED: [id number]   • Renal disorder    • Renal insufficiency    • Rotator cuff tear arthropathy     LEFT LAST ASSESSED: [id number]   • Secondary osteoarthritis of right shoulder     LAST ASSESSED: [id number]   • Syncope        Past Surgical History:   Procedure Laterality Date   • APPENDECTOMY     • BACK SURGERY     • CATARACT EXTRACTION W/  INTRAOCULAR LENS IMPLANT Bilateral    • KNEE ARTHROSCOPY Left     ONSET: [id number] THERAPEUTIC   • LUMBAR LAMINECTOMY     • VA AAA REPAIR,MODULR BIFURCATED PROSTH N/A 3/10/2016    Procedure: REPAIR ANEURYSM ENDOVASCULAR ABDOMINAL AORTIC  (EVAR) ;   Surgeon: Joelle Solitario MD;  Location: [facility];  Service: Vascular   • MO ARTHROPLASTY GLENOHUMERAL JOINT TOTAL SHOULDER Left 5/31/2016    Procedure: ARTHROPLASTY SHOULDER REVERSE;  Surgeon: cJ Schaefer MD;  Location[facility];  Service: Orthopedics   • MO ARTHRP KNE CONDYLE&PLATU MEDIAL&LAT COMPARTMENTS Left 7/23/2018    Procedure: KNEE : COMPLETE REPLACEMENT;  Surgeon: Dustin Palafox MD;  Location: [facility];  Service: Orthopedics   • REVERSE TOTAL SHOULDER ARTHROPLASTY Right    • ROTATOR CUFF REPAIR Left     RC SURGERY RESOLVED: 1999   • SHOULDER ARTHROPLASTY      5/15 - FOR RIGHT SHOULDER; 5/16 - FOR LEFT SHOULDER   • SHOULDER ARTHROSCOPY     • TOTAL HIP ARTHROPLASTY Left     ONSET: [id number]   • TOTAL HIP ARTHROPLASTY  05/31/2006    REVISION       Family History   Problem Relation Age of Onset   • Coronary artery disease Mother    • Diabetes Mother    • Coronary artery disease Father    • Cancer Sister    • Arthritis Sister    • Unexplained death Family         RAPID   • Cancer Daughter      I have reviewed and agree with the history as documented  E-Cigarette/Vaping     E-Cigarette/Vaping Substances     Social History     Tobacco Use   • Smoking status: Never   • Smokeless tobacco: Never   Substance Use Topics   • Alcohol use: No   • Drug use: No        Review of Systems   All other systems reviewed and are negative  Physical Exam  ED Triage Vitals [06/14/23 1631]   Temperature Pulse Respirations Blood Pressure SpO2   (!) 97 3 °F (36 3 °C) 75 20 167/74 96 %      Temp Source Heart Rate Source Patient Position - Orthostatic VS BP Location FiO2 (%)   Oral Monitor Sitting Left arm --      Pain Score       No Pain             Orthostatic Vital Signs  Vitals:    06/14/23 1631   BP: 167/74   Pulse: 75   Patient Position - Orthostatic VS: Sitting       Physical Exam  Vitals and nursing note reviewed  Constitutional:       General: She is not in acute distress  Appearance: She is well-developed     HENT:      Head: Normocephalic and atraumatic  Right Ear: External ear normal       Left Ear: External ear normal       Nose: Nose normal    Eyes:      Conjunctiva/sclera: Conjunctivae normal    Cardiovascular:      Rate and Rhythm: Normal rate and regular rhythm  Pulmonary:      Effort: Pulmonary effort is normal  No respiratory distress  Breath sounds: Normal breath sounds  Abdominal:      General: There is no distension  Palpations: Abdomen is soft  Tenderness: There is no abdominal tenderness  Musculoskeletal:         General: No deformity  Cervical back: Neck supple  Skin:     General: Skin is warm and dry  Neurological:      General: No focal deficit present  Mental Status: She is alert and oriented to person, place, and time           ED Medications  Medications - No data to display    Diagnostic Studies  Results Reviewed     Procedure Component Value Units Date/Time    HS Troponin I 2hr [[id number]]  (Normal) Collected: 06/14/23 1903    Lab Status: Final result Specimen: Blood from Arm, Right Updated: 06/14/23 1952     hs TnI 2hr 27 ng/L      Delta 2hr hsTnI -1 ng/L     Comprehensive metabolic panel [[id number]]  (Abnormal) Collected: 06/14/23 1639    Lab Status: Final result Specimen: Blood from Arm, Right Updated: 06/14/23 1729     Sodium 133 mmol/L      Potassium 5 1 mmol/L      Chloride 105 mmol/L      CO2 23 mmol/L      ANION GAP 5 mmol/L      BUN 34 mg/dL      Creatinine 1 60 mg/dL      Glucose 197 mg/dL      Calcium 9 8 mg/dL      AST 25 U/L      ALT 34 U/L      Alkaline Phosphatase 76 U/L      Total Protein 7 9 g/dL      Albumin 4 3 g/dL      Total Bilirubin 0 74 mg/dL      eGFR 26 ml/min/1 73sq m     Narrative:      [place] guidelines for Chronic Kidney Disease (CKD):   •  Stage 1 with normal or high GFR (GFR > 90 mL/min/1 73 square meters)  •  Stage 2 Mild CKD (GFR = 60-89 mL/min/1 73 square meters)  •  Stage 3A Moderate CKD (GFR = 45-59 mL/min/1 73 square meters)  •  Stage 3B Moderate CKD (GFR = 30-44 mL/min/1 73 square meters)  •  Stage 4 Severe CKD (GFR = 15-29 mL/min/1 73 square meters)  •  Stage 5 End Stage CKD (GFR <15 mL/min/1 73 square meters)  Note: GFR calculation is accurate only with a steady state creatinine    HS Troponin 0hr (reflex protocol) [[id number]]  (Normal) Collected: 06/14/23 1639    Lab Status: Final result Specimen: Blood from Arm, Right Updated: 06/14/23 1719     hs TnI 0hr 28 ng/L     CBC and differential [[id number]]  (Abnormal) Collected: 06/14/23 1639    Lab Status: Final result Specimen: Blood from Arm, Right Updated: 06/14/23 1653     WBC 8 99 Thousand/uL      RBC 3 70 Million/uL      Hemoglobin 12 4 g/dL      Hematocrit 35 5 %      MCV 96 fL      MCH 33 5 pg      MCHC 34 9 g/dL      RDW 14 5 %      MPV 10 6 fL      Platelets 518 Thousands/uL      nRBC 0 /100 WBCs      Neutrophils Relative 70 %      Immat GRANS % 0 %      Lymphocytes Relative 19 %      Monocytes Relative 9 %      Eosinophils Relative 1 %      Basophils Relative 1 %      Neutrophils Absolute 6 36 Thousands/µL      Immature Grans Absolute 0 04 Thousand/uL      Lymphocytes Absolute 1 66 Thousands/µL      Monocytes Absolute 0 80 Thousand/µL      Eosinophils Absolute 0 06 Thousand/µL      Basophils Absolute 0 07 Thousands/µL                  No orders to display         Procedures  Procedures      ED Course               Identification of Seniors at [address] [place] [address] Most Recent Value   (ISAR) Identification of Seniors at Risk    Before the illness or injury that brought you to the Emergency, did you need someone to help you on a regular basis? 1 Filed at: 06/14/2023 1631   In the last 24 hours, have you needed more help than usual? 1 Filed at: 06/14/2023 1631   Have you been hospitalized for one or more nights during the past 6 months?  0 Filed at: 06/14/2023 1631   In general, do you see well? 0 Filed at: 06/14/2023 1631   In general, do you have serious problems with your memory? 0 Filed at: 06/14/2023 1631   Do you take more than three different medications every day? 1 Filed at: 06/14/2023 1631   ISAR Score 3 Filed at: 06/14/2023 1631                              Medical Decision Making  81 yo woman presenting with near syncope  Concerning for arrhythmia, electrolyte abnormality, ACS  First nursing orders placed labs, I do not believe any further labs are necessary  Hemoglobin normal   GFR at baseline  Delta troponin -1  Potassium normal   Discussed plan of care with patient  I discussed admission for further evaluation of possible arrhythmia, with telemetry  Patient stated that she did not want any aggressive care moving forward  She would not want any operations, including a pacemaker  Daughter was present for these discussions  Given the patient does not want any further aggressive care, she prefers to go home  Discharged home in stable condition  Amount and/or Complexity of Data Reviewed  Labs: ordered  Disposition  Final diagnoses:   Lightheadedness   Near syncope     Time reflects when diagnosis was documented in both MDM as applicable and the Disposition within this note     Time User Action Codes Description Comment    6/14/2023  8:18 PM Yoseph Porteous Add [R42] Lightheadedness     6/14/2023  8:18 PM Yoseph Porteous Add [R55] Near syncope       ED Disposition     ED Disposition   Discharge    Condition   Stable    Date/Time   Wed Jun 14, 2023  8:18 PM    Comment   [facility] [place] [facility] discharge to home/self care                 Follow-up Information     Follow up With Specialties Details Why Contact Info Additional Information    Gila Lazcano MD Family Medicine   [address] [facility] [phone]       [address] [place] [address] Emergency Department Emergency Medicine  If symptoms worsen [address] [phone]  [address] Emergency Department, [address] [facility] [place], [address] [facility]          Discharge Medication List as of 6/14/2023  8:18 PM      CONTINUE these medications which have NOT CHANGED    Details   acetaminophen (TYLENOL) 325 mg tablet Take 2 tablets (650 mg total) by mouth every 6 (six) hours as needed for mild pain  , Starting Sat 3/12/2016, OTC      amLODIPine-atorvastatin (CADUET) 10-20 MG per tablet TAKE 1 TABLET AT BEDTIME, Normal      !! Blood Glucose Monitoring Suppl (ONE TOUCH ULTRA MINI) w/Device KIT Test blood sugar once daily, Normal      !! Blood Glucose Monitoring Suppl (ONE TOUCH ULTRA MINI) w/Device KIT Test blood sugar once daily, Normal      calcium citrate-vitamin D (CITRACAL+D) 315-200 MG-UNIT per tablet Take 1 tablet by mouth daily  Calcium + D TABS  Refills: 0   , M D ; Active , Historical Med      Docusate Sodium (COLACE PO) Take by mouth as needed, Historical Med      glucose blood (OneTouch Ultra) test strip Use as instructed, Normal      Lancets (OneTouch Delica Plus [id number]) MISC TEST BLLOD SUGARS ONCE DAILY, Normal      LORazepam (ATIVAN) 0 5 mg tablet TAKE 1 TABLET BY MOUTH EVERY DAY AS NEEDED FOR ANXIETY, Normal      losartan (COZAAR) 100 MG tablet TAKE 1 TABLET ONCE DAILY, Normal      meclizine (ANTIVERT) 12 5 MG tablet Take 1 tablet (12 5 mg total) by mouth every 8 (eight) hours as needed for dizziness, Starting Fri 3/24/2023, Normal      Multiple Vitamins-Minerals (MULTIVITAMIN & MINERAL PO) 1 tablet daily  Multivitamin & Mineral Oral Liquid  Quantity: 0;  Refills: 0   , M D ; Active , Historical Med      pyrithione zinc (HEAD AND SHOULDERS) 1 % shampoo Apply topically daily as needed for dandruff, Historical Med      Synthroid 112 MCG tablet TAKE 1 TABLET DAILY IN THE EARLY MORNING, Normal      trolamine salicylate (ASPERCREME) 10 % cream Apply topically as needed for muscle/joint pain, Historical Med       !! - Potential duplicate medications found  Please discuss with provider          No discharge procedures on file  PDMP Review     None           ED Provider  Attending physically available and evaluated [facility]  I managed the patient along with the ED Attending      Electronically Signed by         Jenna Toribio MD  06/14/23 4413

## 2023-06-14 NOTE — TELEPHONE ENCOUNTER
Patient's Daughter Thea Soria ([phone]) LMOM to report patient had an incident at [facility] where she got very dizzy and felt she was close to passing out  Asks if patient should be seen in office or go to ER  Requests call back with instructions

## 2023-06-15 LAB
ATRIAL RATE: 79 BPM
P AXIS: 69 DEGREES
PR INTERVAL: 224 MS
QRS AXIS: -40 DEGREES
QRSD INTERVAL: 122 MS
QT INTERVAL: 400 MS
QTC INTERVAL: 458 MS
T WAVE AXIS: 89 DEGREES
VENTRICULAR RATE: 79 BPM

## 2023-06-15 PROCEDURE — 93010 ELECTROCARDIOGRAM REPORT: CPT | Performed by: INTERNAL MEDICINE

## 2023-06-15 NOTE — ED ATTENDING ATTESTATION
6/14/2023  IKyrie MD, saw and evaluated the patient  I have discussed the patient with the resident/non-physician practitioner and agree with the resident's/non-physician practitioner's findings, Plan of Care, and MDM as documented in the resident's/non-physician practitioner's note, except where noted  All available labs and Radiology studies were reviewed  I was present for key portions of any procedure(s) performed by the resident/non-physician practitioner and I was immediately available to provide assistance  At this point I agree with the current assessment done in the Emergency Department  I have conducted an independent evaluation of this patient a history and physical is as follows:  26-year-old female here with known history of first-degree AV block, AAA, diabetes presenting with intermittent dizziness and a near syncopal event since around lunchtime  Patient did not faint  She did not have chest pain or shortness of breath  Symptoms resolved  Patient had her AAA repaired 5 years ago, no recent follow-up  She has been feeling more short of breath over the last few weeks  No complaints at this time  On exam vital signs were reviewed  Patient is awake, alert, interactive  The patient's pupils are equally round reactive to light  Oropharynx is clear with moist mucous membranes  Neck is supple and nontender with no adenopathy or JVD  Heart is regular with no murmurs, rubs, or gallops  Lungs are clear and equal with no wheezes, rales, or rhonchi  Abdomen is soft and nontender with no masses, rebound, or guarding  There is no CVA tenderness  The patient was completely exposed  There is no skin breakdown  There are no rashes or skin changes  Extremities are warm and well perfused with good pulses  The patient has normal strength, sensation, and cranial nerves  EKG reviewed by me, sinus rhythm, frequent ventricular ectopy    MEDICAL DECISION MAKING    Number and Complexity of Problems  • Differential diagnosis: Dissection, dysrhythmia, dehydration, renal failure, MI    Medical Decision Making Data  • External documents reviewed:   • My EKG interpretation: Sinus with frequent ectopyMy CT interpretation:   • My X-ray interpretation:   • My ultrasound interpretation:     No orders to display       Labs Reviewed   CBC AND DIFFERENTIAL - Abnormal       Result Value Ref Range Status    WBC 8 99  4 31 - 10 16 Thousand/uL Final    RBC 3 70 (*) 3 81 - 5 12 Million/uL Final    Hemoglobin 12 4  11 5 - 15 4 g/dL Final    Hematocrit 35 5  34 8 - 46 1 % Final    MCV 96  82 - 98 fL Final    MCH 33 5  26 8 - 34 3 pg Final    MCHC 34 9  31 4 - 37 4 g/dL Final    RDW 14 5  11 6 - 15 1 % Final    MPV 10 6  8 9 - 12 7 fL Final    Platelets 417  127 - 390 Thousands/uL Final    nRBC 0  /100 WBCs Final    Neutrophils Relative 70  43 - 75 % Final    Immat GRANS % 0  0 - 2 % Final    Lymphocytes Relative 19  14 - 44 % Final    Monocytes Relative 9  4 - 12 % Final    Eosinophils Relative 1  0 - 6 % Final    Basophils Relative 1  0 - 1 % Final    Neutrophils Absolute 6 36  1 85 - 7 62 Thousands/µL Final    Immature Grans Absolute 0 04  0 00 - 0 20 Thousand/uL Final    Lymphocytes Absolute 1 66  0 60 - 4 47 Thousands/µL Final    Monocytes Absolute 0 80  0 17 - 1 22 Thousand/µL Final    Eosinophils Absolute 0 06  0 00 - 0 61 Thousand/µL Final    Basophils Absolute 0 07  0 00 - 0 10 Thousands/µL Final   COMPREHENSIVE METABOLIC PANEL - Abnormal    Sodium 133 (*) 135 - 147 mmol/L Final    Potassium 5 1  3 5 - 5 3 mmol/L Final    Chloride 105  96 - 108 mmol/L Final    CO2 23  21 - 32 mmol/L Final    ANION GAP 5  4 - 13 mmol/L Final    BUN 34 (*) 5 - 25 mg/dL Final    Creatinine 1 60 (*) 0 60 - 1 30 mg/dL Final    Comment: Standardized to IDMS reference method    Glucose 197 (*) 65 - 140 mg/dL Final    Comment: If the patient is fasting, the ADA then defines impaired fasting glucose as > 100 mg/dL and "diabetes as > or equal to 123 mg/dL  Specimen collection should occur prior to Sulfasalazine administration due to the potential for falsely depressed results  Specimen collection should occur prior to Sulfapyridine administration due to the potential for falsely elevated results  Calcium 9 8  8 3 - 10 1 mg/dL Final    AST 25  5 - 45 U/L Final    Comment: Specimen collection should occur prior to Sulfasalazine administration due to the potential for falsely depressed results  ALT 34  12 - 78 U/L Final    Comment: Specimen collection should occur prior to Sulfasalazine and/or Sulfapyridine administration due to the potential for falsely depressed results  Alkaline Phosphatase 76  46 - 116 U/L Final    Total Protein 7 9  6 4 - 8 4 g/dL Final    Albumin 4 3  3 5 - 5 0 g/dL Final    Total Bilirubin 0 74  0 20 - 1 00 mg/dL Final    Comment: Use of this assay is not recommended for patients undergoing treatment with eltrombopag due to the potential for falsely elevated results  eGFR 26  ml/min/1 73sq m Final    Narrative:     [place] guidelines for Chronic Kidney Disease (CKD):   •  Stage 1 with normal or high GFR (GFR > 90 mL/min/1 73 square meters)  •  Stage 2 Mild CKD (GFR = 60-89 mL/min/1 73 square meters)  •  Stage 3A Moderate CKD (GFR = 45-59 mL/min/1 73 square meters)  •  Stage 3B Moderate CKD (GFR = 30-44 mL/min/1 73 square meters)  •  Stage 4 Severe CKD (GFR = 15-29 mL/min/1 73 square meters)  •  Stage 5 End Stage CKD (GFR <15 mL/min/1 73 square meters)  Note: GFR calculation is accurate only with a steady state creatinine   HS TROPONIN I 0HR - Normal    hs TnI 0hr 28  \"Refer to ACS Flowchart\"- see link ng/L Final    Comment:                                              Initial (time 0) result  If >=50 ng/L, Myocardial injury suggested ;  Type of myocardial injury and treatment strategy  to be determined    If 5-49 ng/L, a delta result at 2 hours and or 4 hours will be " "needed to further evaluate  If <4 ng/L, and chest pain has been >3 hours since onset, patient may qualify for discharge based on the HEART score in the ED  If <5 ng/L and <3hours since onset of chest pain, a delta result at 2 hours will be needed to further evaluate  HS Troponin 99th Percentile URL of a Health Population=12 ng/L with a 95% Confidence Interval of 8-18 ng/L  Second Troponin (time 2 hours)  If calculated delta >= 20 ng/L,  Myocardial injury suggested ; Type of myocardial injury and treatment strategy to be determined  If 5-49 ng/L and the calculated delta is 5-19 ng/L, consult medical service for evaluation  Continue evaluation for ischemia on ecg and other possible etiology and repeat hs troponin at 4 hours  If delta is <5 ng/L at 2 hours, consider discharge based on risk stratification via the HEART score (if in ED), or BREANN risk score in IP/Observation  HS Troponin 99th Percentile URL of a Health Population=12 ng/L with a 95% Confidence Interval of 8-18 ng/L    HS TROPONIN I 2HR - Normal    hs TnI 2hr 27  \"Refer to ACS Flowchart\"- see link ng/L Final    Comment:                                              Initial (time 0) result  If >=50 ng/L, Myocardial injury suggested ;  Type of myocardial injury and treatment strategy  to be determined  If 5-49 ng/L, a delta result at 2 hours and or 4 hours will be needed to further evaluate  If <4 ng/L, and chest pain has been >3 hours since onset, patient may qualify for discharge based on the HEART score in the ED  If <5 ng/L and <3hours since onset of chest pain, a delta result at 2 hours will be needed to further evaluate  HS Troponin 99th Percentile URL of a Health Population=12 ng/L with a 95% Confidence Interval of 8-18 ng/L  Second Troponin (time 2 hours)  If calculated delta >= 20 ng/L,  Myocardial injury suggested ; Type of myocardial injury and treatment strategy to be determined    If 5-49 ng/L and the calculated delta is 5-19 " ng/L, consult medical service for evaluation  Continue evaluation for ischemia on ecg and other possible etiology and repeat hs troponin at 4 hours  If delta is <5 ng/L at 2 hours, consider discharge based on risk stratification via the HEART score (if in ED), or BREANN risk score in IP/Observation  HS Troponin 99th Percentile URL of a Health Population=12 ng/L with a 95% Confidence Interval of 8-18 ng/L  Delta 2hr hsTnI -1  <20 ng/L Final       • Labs reviewed by me are significant for: Negative troponin    • Clinical decision rules/scores are significant for:     • Discussed case with:   • Considered admission for:     Treatment and Disposition  ED course: Patient states that she would not want a pacemaker, but she would not want any other procedures, and that she feels comfortable and would prefer to just go home    Shared decision making:   Code status:   ED Course       Diagnosis 1 near syncope, 2 frequent ventricular ectopy  Critical Care Time  Procedures

## 2023-06-15 NOTE — DISCHARGE INSTRUCTIONS
If you would like a further work up of your symptoms, or have any new or worsening symptoms, please return to your nearest ER

## 2023-06-16 ENCOUNTER — TELEPHONE (OUTPATIENT)
Dept: FAMILY MEDICINE CLINIC | Facility: CLINIC | Age: 88
End: 2023-06-16

## 2023-06-16 ENCOUNTER — OFFICE VISIT (OUTPATIENT)
Dept: FAMILY MEDICINE CLINIC | Facility: CLINIC | Age: 88
End: 2023-06-16
Payer: COMMERCIAL

## 2023-06-16 VITALS
HEART RATE: 79 BPM | DIASTOLIC BLOOD PRESSURE: 68 MMHG | WEIGHT: 138 LBS | BODY MASS INDEX: 25.4 KG/M2 | HEIGHT: 62 IN | SYSTOLIC BLOOD PRESSURE: 142 MMHG | RESPIRATION RATE: 16 BRPM | OXYGEN SATURATION: 98 % | TEMPERATURE: 96.8 F

## 2023-06-16 DIAGNOSIS — I49.1 PAC (PREMATURE ATRIAL CONTRACTION): ICD-10-CM

## 2023-06-16 DIAGNOSIS — N18.30 TYPE 2 DM WITH CKD STAGE 3 AND HYPERTENSION (HCC): ICD-10-CM

## 2023-06-16 DIAGNOSIS — I12.9 TYPE 2 DM WITH CKD STAGE 3 AND HYPERTENSION (HCC): ICD-10-CM

## 2023-06-16 DIAGNOSIS — I51.7 LEFT VENTRICULAR HYPERTROPHY: Primary | ICD-10-CM

## 2023-06-16 DIAGNOSIS — I49.3 PVC (PREMATURE VENTRICULAR CONTRACTION): ICD-10-CM

## 2023-06-16 DIAGNOSIS — R94.31 PROLONGATION OF QRS COMPLEX ON ELECTROCARDIOGRAPHY: ICD-10-CM

## 2023-06-16 DIAGNOSIS — I44.0 1ST DEGREE AV BLOCK: ICD-10-CM

## 2023-06-16 DIAGNOSIS — N18.4 STAGE 4 CHRONIC KIDNEY DISEASE (HCC): ICD-10-CM

## 2023-06-16 DIAGNOSIS — E11.22 TYPE 2 DM WITH CKD STAGE 3 AND HYPERTENSION (HCC): ICD-10-CM

## 2023-06-16 PROCEDURE — 99214 OFFICE O/P EST MOD 30 MIN: CPT | Performed by: NURSE PRACTITIONER

## 2023-06-16 NOTE — PROGRESS NOTES
[facility] - [facility]    NAME: Leslie Aldridge  AGE: 80 y o  SEX: female  : 1925     DATE: 2023     Assessment and Plan:     Problem List Items Addressed This Visit        Endocrine    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 hypertension ([facility] [address] )   Other Visit Diagnoses     Left ventricular hypertrophy    -  Primary    Relevant Orders    Ambulatory Referral to Hospice    Prolongation of QRS complex on electrocardiography        Relevant Orders    Ambulatory Referral to Hospice    1st degree AV block        Relevant Orders    Ambulatory Referral to Hospice    PVC (premature ventricular contraction)        Relevant Orders    Ambulatory Referral to Hospice    PAC (premature atrial contraction)        Relevant Orders    Ambulatory Referral to Hospice    Stage 4 chronic kidney disease ([facility] [address] )        Relevant Orders    Ambulatory Referral to Hospice              No follow-ups on file  Chief Complaint:     Chief Complaint   Patient presents with   • Follow-up     ED  Patient wants to discuss Hospice Care        History of Present Illness:     Mili Tillman presents to the office today accompanied by her 2 daughters to discuss a referral for hospice  The patient was recently evaluated in the emergency room at which time an EKG showed left ventricular hypertrophy, QRS widening, frequent PACs and PVCs  The recommendation at that time was for admission with further cardiac testing and potentially a pacemaker placement  The patient declined admission at that time and does not wish to have any further work-up or pacemaker placed  She was recently taken to the emergency room due to dizziness and near syncope  She also reports that she has found herself having to stop more frequently when going for a walk which is new for her    Patient also has underlying stage IV chronic kidney disease, diabetes which is well controlled, history of a AAA repair approximately 5 years ago, first-degree block, spinal stenosis and degenerative changes  The daughters are in agreement with a hospice eval   I did discuss with them that the hospice provider will make the determination of whether or not the patient is a candidate for hospice  My office has faxed that referral to [facility]  The patient is a resident in independent living [facility]  The patient is alert and oriented  She does make her own medical decisions  Review of Systems:     Review of Systems   Constitutional: Positive for fatigue  Negative for activity change and fever  HENT: Negative for congestion, hearing loss, rhinorrhea, trouble swallowing and voice change  Eyes: Negative for photophobia, pain, discharge and visual disturbance  Respiratory: Positive for shortness of breath  Negative for cough and chest tightness  Cardiovascular: Positive for palpitations  Negative for chest pain and leg swelling  Gastrointestinal: Negative for abdominal pain, blood in stool, constipation, nausea and vomiting  Endocrine: Negative for cold intolerance and heat intolerance  Genitourinary: Negative for difficulty urinating, frequency, hematuria, urgency, vaginal bleeding and vaginal discharge  Musculoskeletal: Negative for arthralgias and myalgias  Skin: Negative  Neurological: Positive for dizziness, syncope, weakness and light-headedness  Negative for numbness and headaches  Psychiatric/Behavioral: Negative for decreased concentration  The patient is not nervous/anxious           Problem List:     Patient Active Problem List   Diagnosis   • Aneurysm of abdominal aorta (HCC)   • S/p left reverse total shoulder arthroplasty   • Hypertension   • 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 hypertension ([facility])   • Hypothyroidism   • Chronic kidney disease, stage 3 (HCC)   • Hyperlipidemia   • Hyponatremia   • Peripheral vascular disease (HCC)   • Generalized osteoarthritis   • Ambulatory dysfunction   • Bilateral leg edema   • Allergic rhinitis   • "Anxiety   • Aortoiliac stenosis (HCC)   • Constipation   • Diabetic peripheral neuropathy (HCC)   • Dry scalp   • First degree AV block   • Lumbosacral spondylosis without myelopathy   • Microalbuminuria   • Osteopenia   • Peripheral neuropathy   • Seborrheic dermatitis of scalp   • Spinal stenosis   • Pes anserinus bursitis of left knee   • S/P total knee replacement, left   • Medicare annual wellness visit, subsequent   • Leg length discrepancy   • Right hip pain   • Trochanteric bursitis of right hip   • Leg weakness, bilateral   • Chronic low back pain without sciatica   • Psoriasis   • Sacral pain   • Paresthesia of both hands   • Psoriatic arthropathy (HCC)        Objective:     /68 (BP Location: Left arm, Patient Position: Sitting, Cuff Size: Standard)   Pulse 79   Temp (!) 96 8 °F (36 °C) (Temporal)   Resp 16   Ht 5' 2\" (1 575 m)   Wt 62 6 kg (138 lb)   SpO2 98%   BMI 25 24 kg/m²     Current Outpatient Medications   Medication Sig Dispense Refill   • acetaminophen (TYLENOL) 325 mg tablet Take 2 tablets (650 mg total) by mouth every 6 (six) hours as needed for mild pain  30 tablet 0   • amLODIPine-atorvastatin (CADUET) 10-20 MG per tablet TAKE 1 TABLET AT BEDTIME 90 tablet 3   • Blood Glucose Monitoring Suppl (ONE TOUCH ULTRA MINI) w/Device KIT Test blood sugar once daily 1 kit 0   • calcium citrate-vitamin D (CITRACAL+D) 315-200 MG-UNIT per tablet Take 1 tablet by mouth daily   Calcium + D TABS  Refills: 0   , M D ; Active      • Docusate Sodium (COLACE PO) Take by mouth as needed     • glucose blood (OneTouch Ultra) test strip Use as instructed 100 strip 3   • Lancets (OneTouch Delica Plus [id number]) MISC TEST BLLOD SUGARS ONCE DAILY 100 each 3   • LORazepam (ATIVAN) 0 5 mg tablet TAKE 1 TABLET BY MOUTH EVERY DAY AS NEEDED FOR ANXIETY 30 tablet 3   • losartan (COZAAR) 100 MG tablet TAKE 1 TABLET ONCE DAILY 90 tablet 3   • meclizine (ANTIVERT) 12 5 MG tablet Take 1 tablet (12 5 mg total) by mouth " every 8 (eight) hours as needed for dizziness 30 tablet 0   • Multiple Vitamins-Minerals (MULTIVITAMIN & MINERAL PO) 1 tablet daily  Multivitamin & Mineral Oral Liquid  Quantity: 0;  Refills: 0   , M D ; Active      • pyrithione zinc (HEAD AND SHOULDERS) 1 % shampoo Apply topically daily as needed for dandruff     • Synthroid 112 MCG tablet TAKE 1 TABLET DAILY IN THE EARLY MORNING 90 tablet 3   • trolamine salicylate (ASPERCREME) 10 % cream Apply topically as needed for muscle/joint pain     • Blood Glucose Monitoring Suppl (ONE TOUCH ULTRA MINI) w/Device KIT Test blood sugar once daily 1 kit 0     No current facility-administered medications for this visit  Physical Exam  Vitals reviewed  Constitutional:       Appearance: Normal appearance  HENT:      Head: Normocephalic  Nose: Nose normal       Mouth/Throat:      Mouth: Mucous membranes are moist       Pharynx: Oropharynx is clear  Eyes:      Extraocular Movements: Extraocular movements intact  Pupils: Pupils are equal, round, and reactive to light  Cardiovascular:      Rate and Rhythm: Normal rate  Rhythm irregular  Heart sounds: Murmur heard  Pulmonary:      Effort: Pulmonary effort is normal       Breath sounds: Normal breath sounds  Musculoskeletal:         General: Normal range of motion  Skin:     General: Skin is warm and dry  Neurological:      General: No focal deficit present  Mental Status: She is alert and oriented to person, place, and time  Psychiatric:         Mood and Affect: Mood normal          Behavior: Behavior normal          Thought Content:  Thought content normal          Judgment: Judgment normal          Jarad Mcneal, [id number] [facility]

## 2023-06-16 NOTE — TELEPHONE ENCOUNTER
Received Hospice order    Needs order to be electronically signed by Leandro Fitzgerald and faxed back to them at [phone]

## 2023-06-19 NOTE — TELEPHONE ENCOUNTER
Marybeth Cochran, [facility] (phone: [phone]) phoned to follow up on the hospice order - they can use the original order from [facility] if it gets signed and faxed or generate a new signed order  They would like to evaluate the patient today

## 2023-10-16 NOTE — PROGRESS NOTES
Dr Hayley Dodson aware with pt initial bp 173/69, ranged from 128/57, 119/56,139/59, with final bp after walking and sitting back on edge of bed 108/52  Pt c/o dizziness with postural changes will write for bolus awaiting orders  Consent (Marginal Mandibular)/Introductory Paragraph: The rationale for Mohs surgery was explained to the patient and written informed consent was obtained. The risks, benefits, and alternatives to Mohs Surgery were discussed in detail. Specifically, the risks of infection, scarring, bleeding, prolonged wound healing, incomplete removal, allergy to anesthesia, nerve injury, and recurrence were addressed. Given the site, the patient was also counseled about the risk of damage to the marginal mandibular nerve, potentially permanently resulting in an asymmetrical smile, inability to pull the lower lip downward or outward, inability to rotate lip outward, and drooling. The patient expressed understanding and desired to proceed. Prior to beginning the procedure, the surgical site was identified with the assistance of the patient and the medical record, including photographs and/or maps if available. The site was then clearly confirmed by the patient by direct visualization and/or the use of a hand mirror and a cotton-tipped applicator stick. All components of Universal Protocol/PAUSE Rule were completed: Prior to beginning the procedure, a pause was taken during which the surgeon, his assistant, and the patient verbally confirmed the patient's identification, the intended procedure, and the correct surgical site. Pause time:

## 2024-02-08 ENCOUNTER — TELEPHONE (OUTPATIENT)
Dept: FAMILY MEDICINE CLINIC | Facility: CLINIC | Age: 89
End: 2024-02-08

## 2025-05-26 NOTE — ASSESSMENT & PLAN NOTE
Blood pressure is stable  Continue Losartan 100 mg daily, Caduet 10/20 mg daily 
Continue Caduet 10/20 mg daily 
Continue Levothyroxine 112 mcg daily  Recheck thyroid function test in 2 months 
Continue statin therapy 
Differential diagnoses include carpal tunnel, neuropathy  Recommended to start wearing wrist splints for carpal tunnel at night  Start Vit B complex 1 tablet daily  Consider trial of Gabapentin or Lyrica at bedtime to provide relief in burning sensation in both hands  Advised patient's daughter to call with update on symptoms in 2 weeks  Consider referral to orthopedic surgeon if symptoms persist or worsen 
Discussed fall precautions  Continue to use a walker for ambulation to prevent falls 
Lab Results   Component Value Date    EGFR 32 04/14/2022    EGFR 31 11/18/2021    EGFR 35 07/20/2021    CREATININE 1 37 (H) 04/14/2022    CREATININE 1 43 (H) 11/18/2021    CREATININE 1 30 07/20/2021     Recommended patient to stay well hydrated  Avoid NSAIDs, nephrotoxins  Recheck BMP in 2 months 
Lab Results   Component Value Date    HGBA1C 6 1 (H) 04/14/2022     Patient c/o balance problems  Reports no falls  Recommended to continue using a walker  Follow-up with podiatry 
Lab Results   Component Value Date    HGBA1C 6 1 (H) 04/14/2022     Type 2 DM - diet controlled  Check eye exam by ophthalmologist Dr Cookie Arceo annually  Continue follow-up with podiatry Dr Soren Virk for routine foot care 
Take Tylenol Arthritis 650 mg 1 tablet every 8 hours as needed for joint pains 
No

## (undated) DEVICE — SUT VICRYL PLUS 1 CTB-1 36 IN VCPB947H

## (undated) DEVICE — REM POLYHESIVE ADULT PATIENT RETURN ELECTRODE: Brand: VALLEYLAB

## (undated) DEVICE — SILVER-COATED ANTIMICROBIAL BARRIER DRESSING: Brand: ACTICOAT   4" X 8"

## (undated) DEVICE — HOOD: Brand: FLYTE, SURGICOOL

## (undated) DEVICE — SPINNING CEMENT MIXING BOWL

## (undated) DEVICE — SPONGE GAUZE 4 X 9

## (undated) DEVICE — JP 3-SPRING RES W/10FR PVC DRAIN/TR: Brand: CARDINAL HEALTH

## (undated) DEVICE — PADDING CAST 4 IN  COTTON STRL

## (undated) DEVICE — CHLORAPREP HI-LITE 26ML ORANGE

## (undated) DEVICE — TRAY FOLEY 16FR URIMETER SURESTEP

## (undated) DEVICE — DRAPE SHEET X-LG

## (undated) DEVICE — INTENDED FOR TISSUE SEPARATION, AND OTHER PROCEDURES THAT REQUIRE A SHARP SURGICAL BLADE TO PUNCTURE OR CUT.: Brand: BARD-PARKER SAFETY BLADES SIZE 10, STERILE

## (undated) DEVICE — COOL TEMP PAD

## (undated) DEVICE — SUT VICRYL PLUS 2-0 CTB-1 27 IN VCPB259H

## (undated) DEVICE — SCD SEQUENTIAL COMPRESSION COMFORT SLEEVE MEDIUM KNEE LENGTH: Brand: KENDALL SCD

## (undated) DEVICE — ACE WRAP 6 IN STERILE

## (undated) DEVICE — 3M™ IOBAN™ 2 ANTIMICROBIAL INCISE DRAPE 6650EZ: Brand: IOBAN™ 2

## (undated) DEVICE — RECIPROCATING BLADE, DOUBLE SIDED (70.0 X 0.96 X 12.5MM)

## (undated) DEVICE — STOCKINETTE REGULAR

## (undated) DEVICE — THE SIMPULSE SOLO SYSTEM WITH ULTREX RETRACTABLE SPLASH SHIELD TIP: Brand: SIMPULSE SOLO

## (undated) DEVICE — BETHLEHEM UNIV TOTAL KNEE, KIT: Brand: CARDINAL HEALTH

## (undated) DEVICE — GLOVE INDICATOR PI UNDERGLOVE SZ 8.5 BLUE

## (undated) DEVICE — NO-SCRATCH ™ SMALL WHITNEY CURETTE ™ IS A SINGLE-USE, PLASTIC CURETTE FOR QUICKLY APPLYING, MANIPULATING AND REMOVING BONE CEMENT DURING HIP AND KNEE REPLACEMENT SURGERY. THE PLASTIC IS SOFTER THAN STEEL INSTRUMENTS, REDUCING THE RISK OF DAMAGING THE PROSTHESIS WITH METAL INSTRUMENTS.  THE CURETTE’S 6MM TIP REMOVES EXCESS CEMENT FROM REPLACEMENT HIPS AND KNEES. EASY-TO-MANEUVER, THE SMALL BLUE CURETTE LETS YOU REMOVE CEMENT FROM ALL EDGES OF THE PROSTHESIS.NO-SCRATCH WHITNEY SMALL CURETTE FEATURES:SAFER THAN STEEL- MADE OF PLASTIC - STURDY YET SOFTER THAN SURGICAL STEEL.HANDIER- EACH TOOL HAS A MOLDED-IN THUMB INDENTATION INSTANTLY ORIENTING THE TOOL.- EASIER TO MANEUVER IN HARD TO SEE PLACES.- COLOR-CODED FOR EASY IDENTIFICATION.FASTER- COMES INDIVIDUALLY PACKAGED IN STERILE, PEEL OPEN POUCH, READY TO GO.- APPLIES, MANIPULATES, OR REMOVES CEMENT WITH FINGERTIP PRECISION.ECONOMICAL- THE COST OF A SINGLE REVISION DWARFS THE COST OF A SINGLE-USE CURETTE. - DISPOSABLE – THERE’S NO NEED TO WASTE TIME REMOVING HARDENED CEMENT OR RE-STERILIZING TOOLS.- LESS EXPENSIVE TO BUY AND INVENTORY - ORDER ONLY THE TOOL YOU USE.- PACKAGED 25 INDIVIDUALLY WRAPPED TOOLS TO A CARTON FOR CONVENIENT SHELF STORAGE.: Brand: WHITNEY NO-SCRATCH CURETTE (SMALL)

## (undated) DEVICE — DUAL CUT SAGITTAL BLADE

## (undated) DEVICE — ALL PURPOSE SPONGES,NON-WOVEN, 4 PLY: Brand: CURITY

## (undated) DEVICE — SPONGE PVP SCRUB WING STERILE

## (undated) DEVICE — GLOVE SRG BIOGEL 8

## (undated) DEVICE — PLUMEPEN PRO 10FT

## (undated) DEVICE — CAPIT KNEE ATTUNE FB CEMENT - DEPUY

## (undated) DEVICE — ACE WRAP 6 IN UNSTERILE